# Patient Record
Sex: FEMALE | Race: WHITE | NOT HISPANIC OR LATINO | Employment: OTHER | URBAN - METROPOLITAN AREA
[De-identification: names, ages, dates, MRNs, and addresses within clinical notes are randomized per-mention and may not be internally consistent; named-entity substitution may affect disease eponyms.]

---

## 2017-05-04 ENCOUNTER — HOSPITAL ENCOUNTER (EMERGENCY)
Facility: HOSPITAL | Age: 72
Discharge: HOME | End: 2017-05-04
Attending: EMERGENCY MEDICINE
Payer: MEDICARE

## 2017-05-04 VITALS
WEIGHT: 230 LBS | OXYGEN SATURATION: 98 % | RESPIRATION RATE: 18 BRPM | BODY MASS INDEX: 45.16 KG/M2 | TEMPERATURE: 98 F | HEART RATE: 78 BPM | SYSTOLIC BLOOD PRESSURE: 138 MMHG | DIASTOLIC BLOOD PRESSURE: 68 MMHG | HEIGHT: 60 IN

## 2017-05-04 DIAGNOSIS — I10 ESSENTIAL HYPERTENSION: Primary | ICD-10-CM

## 2017-05-04 LAB
ALBUMIN SERPL BCP-MCNC: 3.9 G/DL
ALP SERPL-CCNC: 76 U/L
ALT SERPL W/O P-5'-P-CCNC: 13 U/L
ANION GAP SERPL CALC-SCNC: 13 MMOL/L
AST SERPL-CCNC: 18 U/L
BACTERIA #/AREA URNS HPF: ABNORMAL /HPF
BASOPHILS # BLD AUTO: 0.04 K/UL
BASOPHILS NFR BLD: 0.3 %
BILIRUB SERPL-MCNC: 0.3 MG/DL
BILIRUB UR QL STRIP: NEGATIVE
BNP SERPL-MCNC: 51 PG/ML
BUN SERPL-MCNC: 18 MG/DL
CALCIUM SERPL-MCNC: 9.5 MG/DL
CHLORIDE SERPL-SCNC: 108 MMOL/L
CLARITY UR: CLEAR
CO2 SERPL-SCNC: 21 MMOL/L
COLOR UR: YELLOW
CREAT SERPL-MCNC: 0.8 MG/DL
DIFFERENTIAL METHOD: ABNORMAL
EOSINOPHIL # BLD AUTO: 0.4 K/UL
EOSINOPHIL NFR BLD: 3.5 %
ERYTHROCYTE [DISTWIDTH] IN BLOOD BY AUTOMATED COUNT: 12.8 %
EST. GFR  (AFRICAN AMERICAN): >60 ML/MIN/1.73 M^2
EST. GFR  (NON AFRICAN AMERICAN): >60 ML/MIN/1.73 M^2
GLUCOSE SERPL-MCNC: 109 MG/DL
GLUCOSE UR QL STRIP: NEGATIVE
HCT VFR BLD AUTO: 43.5 %
HGB BLD-MCNC: 14.8 G/DL
HGB UR QL STRIP: ABNORMAL
KETONES UR QL STRIP: NEGATIVE
LEUKOCYTE ESTERASE UR QL STRIP: NEGATIVE
LYMPHOCYTES # BLD AUTO: 3 K/UL
LYMPHOCYTES NFR BLD: 25.4 %
MCH RBC QN AUTO: 32.7 PG
MCHC RBC AUTO-ENTMCNC: 34 %
MCV RBC AUTO: 96 FL
MICROSCOPIC COMMENT: ABNORMAL
MONOCYTES # BLD AUTO: 0.8 K/UL
MONOCYTES NFR BLD: 6.7 %
NEUTROPHILS # BLD AUTO: 7.6 K/UL
NEUTROPHILS NFR BLD: 63.7 %
NITRITE UR QL STRIP: NEGATIVE
PH UR STRIP: 6 [PH] (ref 5–8)
PLATELET # BLD AUTO: 234 K/UL
PMV BLD AUTO: 11 FL
POTASSIUM SERPL-SCNC: 4.9 MMOL/L
PROT SERPL-MCNC: 7 G/DL
PROT UR QL STRIP: NEGATIVE
RBC # BLD AUTO: 4.52 M/UL
RBC #/AREA URNS HPF: 7 /HPF (ref 0–4)
SODIUM SERPL-SCNC: 142 MMOL/L
SP GR UR STRIP: <=1.005 (ref 1–1.03)
SQUAMOUS #/AREA URNS HPF: 2 /HPF
TROPONIN I SERPL DL<=0.01 NG/ML-MCNC: 0.01 NG/ML
TROPONIN I SERPL DL<=0.01 NG/ML-MCNC: 0.01 NG/ML
URN SPEC COLLECT METH UR: ABNORMAL
UROBILINOGEN UR STRIP-ACNC: NEGATIVE EU/DL
WBC # BLD AUTO: 11.92 K/UL
WBC #/AREA URNS HPF: 1 /HPF (ref 0–5)

## 2017-05-04 PROCEDURE — 85025 COMPLETE CBC W/AUTO DIFF WBC: CPT

## 2017-05-04 PROCEDURE — 93005 ELECTROCARDIOGRAM TRACING: CPT

## 2017-05-04 PROCEDURE — 84484 ASSAY OF TROPONIN QUANT: CPT

## 2017-05-04 PROCEDURE — 25000003 PHARM REV CODE 250: Performed by: EMERGENCY MEDICINE

## 2017-05-04 PROCEDURE — 80053 COMPREHEN METABOLIC PANEL: CPT

## 2017-05-04 PROCEDURE — 83880 ASSAY OF NATRIURETIC PEPTIDE: CPT

## 2017-05-04 PROCEDURE — 81000 URINALYSIS NONAUTO W/SCOPE: CPT

## 2017-05-04 PROCEDURE — 99285 EMERGENCY DEPT VISIT HI MDM: CPT

## 2017-05-04 RX ORDER — LOSARTAN POTASSIUM 50 MG/1
50 TABLET ORAL DAILY
COMMUNITY
End: 2018-12-10

## 2017-05-04 RX ORDER — POTASSIUM CHLORIDE 1500 MG/1
TABLET, EXTENDED RELEASE ORAL
COMMUNITY
End: 2018-10-16

## 2017-05-04 RX ORDER — LORAZEPAM 1 MG/1
1 TABLET ORAL NIGHTLY
COMMUNITY
End: 2018-12-10 | Stop reason: SDUPTHER

## 2017-05-04 RX ORDER — ASPIRIN 325 MG
325 TABLET ORAL
Status: COMPLETED | OUTPATIENT
Start: 2017-05-04 | End: 2017-05-04

## 2017-05-04 RX ORDER — HYDROCORTISONE 1 %
CREAM (GRAM) TOPICAL
Qty: 30 G | Refills: 0 | Status: SHIPPED | OUTPATIENT
Start: 2017-05-04 | End: 2018-10-16

## 2017-05-04 RX ADMIN — ASPIRIN 325 MG ORAL TABLET 325 MG: 325 PILL ORAL at 03:05

## 2017-05-04 NOTE — ED NOTES
Pt getting dressed and up to bedside commode to urinate; pt discharged home per whch per cab; pt is alert and oriented and pt is stable; no rx given; nurse called to room and pt had a bm; pt cleaned self and dressed self; pt assisted to whch for the discharge and has instructions; pt assisted to check out desk in er lobby and cab requested for transport home as requested

## 2017-05-04 NOTE — ED NOTES
Pt  request to use restroom. Pt given instructions for clean catch urine sample. Pt verbalized understanding.  Pt ambulatory to bathroom.

## 2017-05-04 NOTE — ED AVS SNAPSHOT
[facility]  [address] [place] [address]               Purvi Quiroga   2017  1:31 AM   ED    Description:  Female : 1945   Department:  [facility]           Your Care was Coordinated By:     Provider Role From To    Alexandrea Lott MD Attending Provider 17 0147 --      Reason for Visit     Fatigue     Rash           Diagnoses this Visit        Comments    Essential hypertension    -  Primary       ED Disposition     None           To Do List           Follow-up Information     Schedule an appointment as soon as possible for a visit with Benjamín Zepeda MD.    Specialty:  Family Medicine    Contact information:    [address]  [place] [address]  [phone]        [facility]     [facility] -  Assistance  Unless otherwise directed by your provider, please contact [facility], our nurse care line that is available for  assistance.     Registered nurses in the [facility] provide: appointment scheduling, clinical advisement, health education, and other advisory services.  Call: [phone] (toll free)               Medications           Message regarding Medications     Verify the changes and/or additions to your medication regime listed below are the same as discussed with your clinician today.  If any of these changes or additions are incorrect, please notify your healthcare provider.        These medications were administered today        Dose Freq    aspirin tablet 325 mg 325 mg ED 1 Time    Sig: Take 1 tablet (325 mg total) by mouth ED 1 Time.    Class: Normal    Route: Oral           Verify that the below list of medications is an accurate representation of the medications you are currently taking.  If none reported, the list may be blank. If incorrect, please contact your healthcare provider. Carry this list with you in case of emergency.           Current  Medications     CELECOXIB (CELEBREX ORAL) Take by mouth.    HYDROCHLOROTHIAZIDE ORAL Take by mouth.    lorazepam (ATIVAN) 1 MG tablet Take 1 mg by mouth every evening.    losartan (COZAAR) 50 MG tablet Take 50 mg by mouth once daily.    METOPROLOL TARTRATE ORAL Take by mouth.    OMEPRAZOLE ORAL Take by mouth.    potassium chloride (K-TAB) 20 mEq Take by mouth.           Clinical Reference Information           Your Vitals Were     BP Pulse Temp Resp Height Weight    152/81 (BP Location: Left arm, Patient Position: Lying, BP Method: Automatic) 86 98 °F (36.7 °C) (Oral) 18 5' (1.524 m) 104.3 kg (230 lb)    SpO2 BMI             98% 44.92 kg/m2         Allergies as of 5/4/2017     No Known Allergies      Immunizations Administered on Date of Encounter - 5/4/2017     None      ED Micro, Lab, POCT     Start Ordered       Status Ordering Provider    05/04/17 0219 05/04/17 0218  Urinalysis Clean Catch  STAT      Final result     05/04/17 0219 05/04/17 0219  Urinalysis Microscopic  Once      Final result     05/04/17 0205 05/04/17 0205  CBC auto differential  STAT      Final result     05/04/17 0205 05/04/17 0205  Comprehensive metabolic panel  STAT      Final result     05/04/17 0205 05/04/17 0205  Troponin I  Now then every 3 hours     Comments:  PLEASE REVIEW ORDER START TIME BEFORE MARKING SPECIMEN COLLECTED.   Start Status   05/04/17 0205 Final result   05/04/17 0505 Final result       Acknowledged     05/04/17 0205 05/04/17 0205  B-Type natriuretic peptide (BNP)  STAT      Final result       ED Imaging Orders     Start Ordered       Status Ordering Provider    05/04/17 0205 05/04/17 0205  X-Ray Chest PA And Lateral  1 time imaging      Final result         Discharge Instructions         Taking Your Blood Pressure  Blood pressure is the force of blood against the artery wall as it moves from the heart through the blood vessels. You can take your own blood pressure reading using a digital monitor. Take readings as often  as your healthcare provider instructs. Take each reading at the same time of day.  Step 1. Relax    · Take your blood pressure at the same time every day, such as in the morning or evening, or at the time your healthcare provider recommends.  · Wait at least a half-hour after smoking, eating, drinking caffeinated beverages, or exercising.  · Sit comfortably at a table with both feet on the floor. Do not cross your legs or feet. Place the monitor near you.  · Rest for a few minutes before you begin.  Step 2. Wrap the cuff    · Place your arm on the table, palm up. Your arm should be at the level of your heart. Wrap the cuff around your upper arm, just above your elbow. Its best done on bare skin, not over clothing. Most cuffs will indicate where the brachial artery (the blood vessel in the middle of the arm at the inner side of the elbow) should line up with the cuff. Look in your monitor's instruction booklet for an illustration. You can also bring your cuff to your healthcare provider and have them show you how to correctly place the cuff.  · Make sure your cuff fits. If it doesnt wrap around your upper arm, order a larger cuff.  Step 3. Inflate the cuff    · Pump the cuff until the scale reads 160. If you have a self-inflating cuff, push the button that starts the pump.  · The cuff will tighten, then loosen.  · The numbers will change. When they stop changing, your blood pressure reading will appear.  · Take 2 or 3 readings one minute apart.  Step 4. Write down the results of each reading    · Write down your blood pressure numbers for each reading. Note the date and time. Keep your results in one place, such as a notebook. Even if your monitor has a built-in memory, keep a hard copy of the readings.  · Remove the cuff from your arm. Turn off the machine.  · Share your blood pressure records with your healthcare providers at each visit.  Date Last Reviewed: 4/27/2016  © 7913-7457 [employer] [address]  [place] [address]. All rights reserved. This information is not intended as a substitute for professional medical care. Always follow your healthcare professional's instructions.          [facility] Sign-Up     Activating your [facility] account is as easy as 1-2-3!     1) Visit [email or web address], select Sign Up Now, enter this activation code and your date of birth, then select Next.  [email or web address]  Expires: 6/18/2017  4:09 AM      2) Create a username and password to use when you visit [facility] in the future and select a security question in case you lose your password and select Next.    3) Enter your e-mail address and click Sign Up!    Additional Information  If you have questions, please e-mail [email or web address] or call [phone] to talk to our [facility] staff. Remember, [facility] is NOT to be used for urgent needs. For medical emergencies, dial 911.         Smoking Cessation     If you would like to quit smoking:   You may be eligible for free services if you are a [place] resident and started smoking cigarettes before September 1, 1988.  Call the Smoking Cessation Trust ([facility]) toll free at (351) 574-9837 or (575) 788-6325.   Call 1-800-QUIT-NOW if you do not meet the above criteria.   Contact us via email: [email or web address]   View our website for more information: [email or web address]         [facility] complies with applicable Federal civil rights laws and does not discriminate on the basis of race, color, national origin, age, disability, or sex.        Language Assistance Services     ATTENTION: Language assistance services are available, free of charge. Please call [phone].      ATENCIÓN: Si habla leonor, tiene a leung disposición servicios gratuitos de asistencia lingüística. Llame al [phone].     CHÚ Ý: N?u b?n nói Ti?ng Vi?t, có các d?ch v? h? tr? ngôn ng? mi?n phí dành cho b?n. G?i s? [phone].

## 2017-05-04 NOTE — ED NOTES
Patient cleared for discharge per dr sanabria and pt was made aware; pt has no complaints at present; vitals stable; no pain reported; pt plans to take a cab home

## 2017-05-04 NOTE — ED PROVIDER NOTES
Encounter Date: 5/4/2017       History     Chief Complaint   Patient presents with    Fatigue     pt called EMS for c/o feeling weak and having elevated BP    Rash     pt also c/o rash to generalized body x 2 weeks.      Review of patient's allergies indicates:  No Known Allergies  HPI Comments: 73 yo F presents to the ED by EMS for elevated BP. atient reports this evening she started feeling weak, sshe has a blood pressure cuff, from [employer], and took her blood pressure.  It was 160s over 180s.  She took her low certain.  She rechecked it another 30 minutes later, and it was still elevated.  She took another half tablet of her low certain.  She continued to take her blood pressure and when it was elevated at 220 she called 911. She has persistent shortness of breath when walking.  She denies chest pain abdominal pain nausea or vomiting.  She has a bitemporal headache, no vision changes  No vision loss no eye pain.  She also reports having an itchy rash over her right lower leg that is been persistent for 2 weeks.    Patient is a 72 y.o. female presenting with the following complaint: general illness. The history is provided by the patient.   General Illness    The current episode started today. The problem occurs rarely. The problem has been unchanged. The pain is at a severity of 3/10. Nothing relieves the symptoms. Nothing aggravates the symptoms. Pertinent negatives include no fever, no double vision, no abdominal pain, no nausea and no vomiting.     Past Medical History:   Diagnosis Date    GERD (gastroesophageal reflux disease)     Hypertension      No past surgical history on file.  History reviewed. No pertinent family history.  Social History   Substance Use Topics    Smoking status: None    Smokeless tobacco: None    Alcohol use None     Review of Systems   Constitutional: Negative for fever.   Eyes: Negative for double vision.   Respiratory: Negative.    Gastrointestinal: Negative for abdominal  pain, nausea and vomiting.   Genitourinary: Negative.    All other systems reviewed and are negative.      Physical Exam   Initial Vitals   BP Pulse Resp Temp SpO2   05/04/17 0145 05/04/17 0145 05/04/17 0145 05/04/17 0145 05/04/17 0145   195/89 91 18 98.2 °F (36.8 °C) 99 %     Physical Exam    Nursing note and vitals reviewed.  Constitutional: She appears well-developed and well-nourished. She appears distressed.   HENT:   Head: Normocephalic and atraumatic.   Eyes: EOM are normal. Pupils are equal, round, and reactive to light.   Neck: Normal range of motion. Neck supple.   Cardiovascular: Normal rate and normal heart sounds.   Pulmonary/Chest: Breath sounds normal.   Abdominal: Soft.   Musculoskeletal: Normal range of motion.   Neurological: She is alert and oriented to person, place, and time. She has normal strength. No cranial nerve deficit.   Skin: Skin is warm and dry.   Psychiatric: She has a normal mood and affect. Her behavior is normal. Thought content normal.         ED Course   Procedures  Labs Reviewed   CBC W/ AUTO DIFFERENTIAL - Abnormal; Notable for the following:        Result Value    MCH 32.7 (*)     All other components within normal limits    Narrative:     PLEASE REVIEW ORDER START TIME BEFORE MARKING SPECIMEN  COLLECTED.   COMPREHENSIVE METABOLIC PANEL - Abnormal; Notable for the following:     CO2 21 (*)     All other components within normal limits    Narrative:     PLEASE REVIEW ORDER START TIME BEFORE MARKING SPECIMEN  COLLECTED.   URINALYSIS - Abnormal; Notable for the following:     Specific Gravity, UA <=1.005 (*)     Occult Blood UA 1+ (*)     All other components within normal limits   URINALYSIS MICROSCOPIC - Abnormal; Notable for the following:     RBC, UA 7 (*)     All other components within normal limits   TROPONIN I    Narrative:     PLEASE REVIEW ORDER START TIME BEFORE MARKING SPECIMEN  COLLECTED.   TROPONIN I    Narrative:     PLEASE REVIEW ORDER START TIME BEFORE MARKING  SPECIMEN  COLLECTED.   B-TYPE NATRIURETIC PEPTIDE    Narrative:     PLEASE REVIEW ORDER START TIME BEFORE MARKING SPECIMEN  COLLECTED.             Medical Decision Making:   Initial Assessment:   73 yo F here with feeling weak, and having elevated blood pressure this evening  Differential Diagnosis:   ACS, renal insufficiency, dehydration, eczema  Clinical Tests:   Lab Tests: Ordered and Reviewed  The following lab test(s) were unremarkable: CMP and CBC  Radiological Study: Ordered and Reviewed  Medical Tests: Ordered and Reviewed  ED Management:  EKG nonischemic  Trop, BNP, CBC and CMP normal  BP resolved on it's own last 152/81    Gave instructions to follow up with primary care provider, encouraged sleep hygiene and stress reducing activities  Patient discharged in stable condition with return precautions for all conditions discussed with patient and/or any available family members. No further emergent ED evaluation or treatment clinically indicated at this time.      I discussed with patient that evaluation in the ED at this time does not suggest any emergent or life threatening condition medical condition requiring immediate intervention beyond what was provided in the ED. Regardless, an unremarkable evaluation in the ED does not preclude the development or presence of a serious of life threatening condition. As such, patient was instructed to return immediately for any worsening or change in current symptoms.                     ED Course     Clinical Impression:   The encounter diagnosis was Essential hypertension.          Alexandrea Lott MD  05/04/17 0413

## 2017-05-04 NOTE — DISCHARGE INSTRUCTIONS
Taking Your Blood Pressure  Blood pressure is the force of blood against the artery wall as it moves from the heart through the blood vessels. You can take your own blood pressure reading using a digital monitor. Take readings as often as your healthcare provider instructs. Take each reading at the same time of day.  Step 1. Relax    · Take your blood pressure at the same time every day, such as in the morning or evening, or at the time your healthcare provider recommends.  · Wait at least a half-hour after smoking, eating, drinking caffeinated beverages, or exercising.  · Sit comfortably at a table with both feet on the floor. Do not cross your legs or feet. Place the monitor near you.  · Rest for a few minutes before you begin.  Step 2. Wrap the cuff    · Place your arm on the table, palm up. Your arm should be at the level of your heart. Wrap the cuff around your upper arm, just above your elbow. Its best done on bare skin, not over clothing. Most cuffs will indicate where the brachial artery (the blood vessel in the middle of the arm at the inner side of the elbow) should line up with the cuff. Look in your monitor's instruction booklet for an illustration. You can also bring your cuff to your healthcare provider and have them show you how to correctly place the cuff.  · Make sure your cuff fits. If it doesnt wrap around your upper arm, order a larger cuff.  Step 3. Inflate the cuff    · Pump the cuff until the scale reads 160. If you have a self-inflating cuff, push the button that starts the pump.  · The cuff will tighten, then loosen.  · The numbers will change. When they stop changing, your blood pressure reading will appear.  · Take 2 or 3 readings one minute apart.  Step 4. Write down the results of each reading    · Write down your blood pressure numbers for each reading. Note the date and time. Keep your results in one place, such as a notebook. Even if your monitor has a built-in memory, keep a hard  copy of the readings.  · Remove the cuff from your arm. Turn off the machine.  · Share your blood pressure records with your healthcare providers at each visit.  Date Last Reviewed: 4/27/2016  © 1959-5333 [employer]. [address] [place] [address], [place] [address]. All rights reserved. This information is not intended as a substitute for professional medical care. Always follow your healthcare professional's instructions.

## 2017-05-04 NOTE — ED NOTES
Reviewed home care and follow up instructions with pt and also done per md; pt anxious about iv removal and tried to reassure

## 2018-10-16 ENCOUNTER — OFFICE VISIT (OUTPATIENT)
Dept: NEUROLOGY | Facility: CLINIC | Age: 73
End: 2018-10-16
Payer: MEDICARE

## 2018-10-16 VITALS
HEIGHT: 60 IN | SYSTOLIC BLOOD PRESSURE: 126 MMHG | HEART RATE: 81 BPM | DIASTOLIC BLOOD PRESSURE: 81 MMHG | WEIGHT: 208.31 LBS | BODY MASS INDEX: 40.9 KG/M2

## 2018-10-16 DIAGNOSIS — G43.009 MIGRAINE WITHOUT AURA AND WITHOUT STATUS MIGRAINOSUS, NOT INTRACTABLE: ICD-10-CM

## 2018-10-16 PROCEDURE — 3074F SYST BP LT 130 MM HG: CPT | Mod: CPTII,,, | Performed by: NEUROMUSCULOSKELETAL MEDICINE & OMM

## 2018-10-16 PROCEDURE — 99213 OFFICE O/P EST LOW 20 MIN: CPT | Mod: PBBFAC,PO | Performed by: NEUROMUSCULOSKELETAL MEDICINE & OMM

## 2018-10-16 PROCEDURE — 3079F DIAST BP 80-89 MM HG: CPT | Mod: CPTII,,, | Performed by: NEUROMUSCULOSKELETAL MEDICINE & OMM

## 2018-10-16 PROCEDURE — 3288F FALL RISK ASSESSMENT DOCD: CPT | Mod: CPTII,,, | Performed by: NEUROMUSCULOSKELETAL MEDICINE & OMM

## 2018-10-16 PROCEDURE — 99204 OFFICE O/P NEW MOD 45 MIN: CPT | Mod: S$PBB,,, | Performed by: NEUROMUSCULOSKELETAL MEDICINE & OMM

## 2018-10-16 PROCEDURE — 1100F PTFALLS ASSESS-DOCD GE2>/YR: CPT | Mod: CPTII,,, | Performed by: NEUROMUSCULOSKELETAL MEDICINE & OMM

## 2018-10-16 PROCEDURE — 99999 PR PBB SHADOW E&M-EST. PATIENT-LVL III: CPT | Mod: PBBFAC,,, | Performed by: NEUROMUSCULOSKELETAL MEDICINE & OMM

## 2018-10-16 RX ORDER — LOSARTAN POTASSIUM 100 MG/1
100 TABLET ORAL DAILY
COMMUNITY
End: 2018-12-10

## 2018-10-16 RX ORDER — AMITRIPTYLINE HYDROCHLORIDE 10 MG/1
10 TABLET, FILM COATED ORAL NIGHTLY
Qty: 30 TABLET | Refills: 3 | Status: ON HOLD | OUTPATIENT
Start: 2018-10-16 | End: 2024-01-15 | Stop reason: HOSPADM

## 2018-10-16 RX ORDER — BUTALBITAL, ACETAMINOPHEN AND CAFFEINE 50; 325; 40 MG/1; MG/1; MG/1
1 TABLET ORAL 3 TIMES DAILY PRN
Refills: 3 | COMMUNITY
Start: 2018-09-25 | End: 2018-12-13 | Stop reason: SDUPTHER

## 2018-10-16 RX ORDER — ATORVASTATIN CALCIUM 20 MG/1
TABLET, FILM COATED ORAL
Refills: 2 | COMMUNITY
Start: 2018-10-12 | End: 2018-12-10 | Stop reason: SDUPTHER

## 2018-10-16 RX ORDER — HYDROXYZINE PAMOATE 50 MG/1
50 CAPSULE ORAL 3 TIMES DAILY PRN
Refills: 0 | COMMUNITY
Start: 2018-10-12 | End: 2019-07-18 | Stop reason: SDUPTHER

## 2018-10-16 RX ORDER — OMEPRAZOLE 20 MG/1
20 CAPSULE, DELAYED RELEASE ORAL 2 TIMES DAILY
Refills: 0 | COMMUNITY
Start: 2018-07-17 | End: 2018-12-10 | Stop reason: SDUPTHER

## 2018-10-16 RX ORDER — METOPROLOL TARTRATE 50 MG/1
50 TABLET ORAL 2 TIMES DAILY
Refills: 2 | COMMUNITY
Start: 2018-09-12 | End: 2018-12-10 | Stop reason: SDUPTHER

## 2018-10-16 RX ORDER — ALENDRONATE SODIUM 70 MG/1
70 TABLET ORAL
Refills: 11 | COMMUNITY
Start: 2018-10-12 | End: 2018-12-10 | Stop reason: SDUPTHER

## 2018-10-16 RX ORDER — POTASSIUM CHLORIDE 750 MG/1
CAPSULE, EXTENDED RELEASE ORAL
Refills: 2 | COMMUNITY
Start: 2018-10-10 | End: 2018-12-10 | Stop reason: SDUPTHER

## 2018-10-16 NOTE — LETTER
October 17, 2018      Benjamín Zepeda MD  [address]  [place] [address]           [place] [facility]  [address] [address]  Phone: [phone]          Patient: Purvi Quiroga   MR Number: [id number]   YOB: 1945   Date of Visit: 10/16/2018       Dear Dr. Benjamín Zepeda:    Thank you for referring Purvi Quiroga to me for evaluation. Attached you will find relevant portions of my assessment and plan of care.    If you have questions, please do not hesitate to call me. I look forward to following Purvi Quiroga along with you.    Sincerely,    Chico Coleman MD    Enclosure  CC:  No Recipients    If you would like to receive this communication electronically, please contact [email or web address] or [phone] to request more information on [email or web address] access.    For providers and/or their staff who would like to refer a patient to [facility], please contact us through our one-stop-shop provider referral line, [facility], at [phone].    If you feel you have received this communication in error or would no longer like to receive these types of communications, please e-mail [email or web address]

## 2018-10-17 PROBLEM — G43.009 MIGRAINE WITHOUT AURA: Status: ACTIVE | Noted: 2018-10-17

## 2018-10-17 NOTE — PROGRESS NOTES
From: Jenni Lou  To: Melvin Farooq M.D.  Sent: 2/6/2018 7:48 AM PST  Subject: Non-Urgent Medical Question    This message is being sent by Deisi Lou on behalf of Jenni Lou    I have counseling at 9a and it's my first appt.... super important :( anything later?    ----- Message -----  From: Melvin Farooq M.D.  Sent: 2/6/18, 7:44 AM  To: Jenni Lou  Subject: RE: Non-Urgent Medical Question    I would recommend being seen. I can double book her between 8-9 if that works for you to get her in to be checked out.    ----- Message -----   From: Jenni Lou   Sent: 2/6/2018 7:34 AM PST   To: Melvin Farooq M.D.  Subject: Non-Urgent Medical Question    This message is being sent by Deisi Lou on behalf of Jenni Lou    [place] throat is super swollen :( I've had her on Benadryl but I think she definitely needs to be seen today...... João has similar symptoms but not quite as bad. I really think it's viral..... however, her throat is too swollen visually (IMO) that it makes me really uncomfortable..... they are both miserable with throat pain/cough... should [facility] thru the  or [facility]? Or can you see them? Or do you think it's okay but recommend OTC medications to help them feel better through this virus? I don't see white spot, just red and swelling and her's looks a little 'pale' for lack of a better term.   Purvi Junaid  1945  Review of patient's allergies indicates:  No Known Allergies  [unfilled]    Past Medical History:   Diagnosis Date    GERD (gastroesophageal reflux disease)     Hypertension      Social History     Socioeconomic History    Marital status: Single     Spouse name: Not on file    Number of children: Not on file    Years of education: Not on file    Highest education level: Not on file   Social Needs    Financial resource strain: Not on file    Food insecurity - worry: Not on file    Food insecurity - inability: Not on file    Transportation needs - medical: Not on file    Transportation needs - non-medical: Not on file   Occupational History    Not on file   Tobacco Use    Smoking status: Never Smoker   Substance and Sexual Activity    Alcohol use: No     Frequency: Never    Drug use: No    Sexual activity: Not on file   Other Topics Concern    Not on file   Social History Narrative    Not on file     No family history on file.    Review of systems:  Constitutional-negative  Eyes-negative  ENT, mouth-negative  Cardiovascular-negative  Respiratory-negative  GI-negative  - negative  Musculoskeletal-negative  Skin-negative  Neurologic-negative  Psychiatric-negative  Endocrine-negative  Hematology/lymph nodes-negative  Allergies/immunology-negative  Purvi Quiroga  1945  Review of patient's allergies indicates:  No Known Allergies  [unfilled]    Past Medical History:   Diagnosis Date    GERD (gastroesophageal reflux disease)     Hypertension      Social History     Socioeconomic History    Marital status: Single     Spouse name: Not on file    Number of children: Not on file    Years of education: Not on file    Highest education level: Not on file   Social Needs    Financial resource strain: Not on file    Food insecurity - worry: Not on file    Food insecurity - inability: Not on file    Transportation needs - medical: Not on file    Transportation  needs - non-medical: Not on file   Occupational History    Not on file   Tobacco Use    Smoking status: Never Smoker   Substance and Sexual Activity    Alcohol use: No     Frequency: Never    Drug use: No    Sexual activity: Not on file   Other Topics Concern    Not on file   Social History Narrative    Not on file     No family history on file.    Review of systems:  Constitutional-negative  Eyes-negative  ENT, mouth-negative  Cardiovascular-negative  Respiratory-negative  GI-negative  - negative  Musculoskeletal-negative  Skin-negative  Neurologic-negative  Psychiatric-negative  Endocrine-negative  Hematology/lymph nodes-negative  Allergies/immunology-negative  Gen. Appearance: Well-developed with no obvious deformities  Carotid arteries symmetrical pulses  Peripheral vascular shows symmetrical pulses with no obvious edema or tenderness  Social History :  Patient is retired.  She walks with a cane due to her arthritis.  Present history:   This is a 73-year-old female who presents with a history of migraine headaches for which she takes Fioricet almost on a daily basis.  Up until 2 weeks ago she was having daily headaches over the last year to.  She describes the headaches as a frontal pressure pain associated with photophobia, nausea occasionally but no vomiting.  The bad headaches are 5-6/10 intensity.  She has a frontal pressure headache right now that is 3-4/10.  She was taking Neurontin but states that she could not drink alcohol with the Neurontin so she stopped the Neurontin so that she can have her alcoholic drinks.  She has poor sleep with difficulty getting to sleep and takes Ativan 1 mg with melatonin.  She feels the Ativan 1 mg is not enough for her to get to sleep.  She denies any focal symptoms other than she does complain at times she drops things from the right hand.  She is left-handed.  She does complain the headaches are bad enough that she would like to take preventive medicines for the  headaches.  She denies any particular triggers.  She denies any aura with the headaches.    Neurological Exam:  Mental status-alert and oriented to person, place, and time; attention span and concentration is good. Fund of knowledge-patient is aware of current events and able to give detailed history of the current problem.recent and remote memory seems intact. Language function is normal with no evidence of aphasia  Cranial nerves:Visual acuity to hand chart -normal; visual fields to confrontation normal;pupils were equal and reactive to light ;no evidence of ptosis ;  funduscopic examination was normal with sharp disc margins. external ocular movements were full with no nystagmus. Facial sensation to pinprick : normal ; corneal reflexes intact; Facial muscles were symmetrical. Hearing is unimpaired symmetrical finger rub; Tongue movements - normal ; palate movements - normal ;Swallowing unimpaired. Shoulder shrug was intact with good strength Speech was normal  Motor examination: Upper : normal                                      Lower extremities - Normal;muscle tone was normal ;               left-handed  Sensory examination:   Upper; normal pinprick and soft touch ;   Lower extremities - normal and symmetrical.   Vibration sense: 15-20 seconds @ toes  Deep tendon reflexes: upper extremities :1-2+ symmetrical ;     lower extremities KJ- 1-2 +; AJ - 1-2+ Both plantar responses were flexor  Cerebellar examination upper: Normal finger to nose and rapid alternating movements  Gait: Steady with no ataxia;      heel and toe walk normal  Romberg test: negative       Tandem gait: Normal    Involuntary movements: Negative  TMJ - no tenderness  Cervical examination: Full range of motion with no pain Cervical tenderness :negative  Lumbar examination: Low back tenderness-negative                  Sciatic notchtenderness-negative            Straight leg raising : negative    Impression:  Migraine without aura; insomnia;  degenerative arthritis particularly in the knees  Recommendations/Plan :  Discussed preventive medications.  Will start patient on amitriptyline 10 mg HS with an option to double the dose at night if needed.  Have advised her to back off on the Fioricet and only use once or twice per week.  Follow-up 3 months with headache diary.;

## 2018-10-25 ENCOUNTER — TELEPHONE (OUTPATIENT)
Dept: NEUROLOGY | Facility: CLINIC | Age: 73
End: 2018-10-25

## 2018-10-25 NOTE — TELEPHONE ENCOUNTER
----- Message from Thaddeus Deshpande sent at 10/25/2018  2:45 PM CDT -----  Needs Advice    Reason for call: Pt is asking to speak w/ Marj to confirm if the doctor will prescribe gabapentin for headaches        Communication Preference: [phone]    Additional Information:

## 2018-10-25 NOTE — TELEPHONE ENCOUNTER
Patient said she took 1 amitriptyline tablet and didn't have any relief. She didn't want to increase the dose and would instead like to use gabapentin (previously taken when first Dx with migraine). She stated she tolerated it quite well. One incident of intoxication with the gabapentin (had a couple drinks).

## 2018-10-30 RX ORDER — GABAPENTIN 300 MG/1
300 CAPSULE ORAL 3 TIMES DAILY
Qty: 90 CAPSULE | Refills: 11 | Status: SHIPPED | OUTPATIENT
Start: 2018-10-30 | End: 2019-10-21 | Stop reason: SDUPTHER

## 2018-12-10 ENCOUNTER — TELEPHONE (OUTPATIENT)
Dept: INTERNAL MEDICINE | Facility: CLINIC | Age: 73
End: 2018-12-10

## 2018-12-10 ENCOUNTER — OFFICE VISIT (OUTPATIENT)
Dept: INTERNAL MEDICINE | Facility: CLINIC | Age: 73
End: 2018-12-10
Payer: MEDICARE

## 2018-12-10 VITALS
WEIGHT: 212.31 LBS | SYSTOLIC BLOOD PRESSURE: 160 MMHG | DIASTOLIC BLOOD PRESSURE: 100 MMHG | HEIGHT: 60 IN | HEART RATE: 70 BPM | RESPIRATION RATE: 10 BRPM | BODY MASS INDEX: 41.68 KG/M2

## 2018-12-10 DIAGNOSIS — G43.009 MIGRAINE WITHOUT AURA AND WITHOUT STATUS MIGRAINOSUS, NOT INTRACTABLE: ICD-10-CM

## 2018-12-10 DIAGNOSIS — E66.01 MORBID OBESITY: Chronic | ICD-10-CM

## 2018-12-10 DIAGNOSIS — G47.00 INSOMNIA, UNSPECIFIED TYPE: ICD-10-CM

## 2018-12-10 DIAGNOSIS — M81.8 OTHER OSTEOPOROSIS WITHOUT CURRENT PATHOLOGICAL FRACTURE: Chronic | ICD-10-CM

## 2018-12-10 DIAGNOSIS — K44.9 HIATAL HERNIA: ICD-10-CM

## 2018-12-10 DIAGNOSIS — I10 ESSENTIAL HYPERTENSION: ICD-10-CM

## 2018-12-10 DIAGNOSIS — Z00.00 ANNUAL PHYSICAL EXAM: Primary | ICD-10-CM

## 2018-12-10 DIAGNOSIS — M89.9 DISORDER OF BONE: ICD-10-CM

## 2018-12-10 PROCEDURE — 90471 IMMUNIZATION ADMIN: CPT | Mod: S$GLB,,, | Performed by: INTERNAL MEDICINE

## 2018-12-10 PROCEDURE — 99499 UNLISTED E&M SERVICE: CPT | Mod: S$GLB,,, | Performed by: INTERNAL MEDICINE

## 2018-12-10 PROCEDURE — 99999 PR PBB SHADOW E&M-EST. PATIENT-LVL III: CPT | Mod: PBBFAC,,, | Performed by: INTERNAL MEDICINE

## 2018-12-10 PROCEDURE — 99204 OFFICE O/P NEW MOD 45 MIN: CPT | Mod: 25,S$GLB,, | Performed by: INTERNAL MEDICINE

## 2018-12-10 PROCEDURE — 90714 TD VACC NO PRESV 7 YRS+ IM: CPT | Mod: S$GLB,,, | Performed by: INTERNAL MEDICINE

## 2018-12-10 PROCEDURE — 3080F DIAST BP >= 90 MM HG: CPT | Mod: CPTII,S$GLB,, | Performed by: INTERNAL MEDICINE

## 2018-12-10 PROCEDURE — 3077F SYST BP >= 140 MM HG: CPT | Mod: CPTII,S$GLB,, | Performed by: INTERNAL MEDICINE

## 2018-12-10 RX ORDER — LOSARTAN POTASSIUM 50 MG/1
50 TABLET ORAL 2 TIMES DAILY
Qty: 180 TABLET | Refills: 3 | Status: SHIPPED | OUTPATIENT
Start: 2018-12-10 | End: 2019-01-14

## 2018-12-10 RX ORDER — LORAZEPAM 1 MG/1
1 TABLET ORAL NIGHTLY
Qty: 30 TABLET | Refills: 2 | Status: SHIPPED | OUTPATIENT
Start: 2018-12-10 | End: 2019-04-23 | Stop reason: SDUPTHER

## 2018-12-10 RX ORDER — POTASSIUM CHLORIDE 750 MG/1
CAPSULE, EXTENDED RELEASE ORAL
Qty: 90 CAPSULE | Refills: 3 | Status: SHIPPED | OUTPATIENT
Start: 2018-12-10 | End: 2019-12-28

## 2018-12-10 RX ORDER — ATORVASTATIN CALCIUM 20 MG/1
TABLET, FILM COATED ORAL
Qty: 90 TABLET | Refills: 3 | Status: SHIPPED | OUTPATIENT
Start: 2018-12-10 | End: 2020-01-27

## 2018-12-10 RX ORDER — OMEPRAZOLE 20 MG/1
20 CAPSULE, DELAYED RELEASE ORAL 2 TIMES DAILY
Qty: 180 CAPSULE | Refills: 3 | Status: SHIPPED | OUTPATIENT
Start: 2018-12-10 | End: 2020-03-25 | Stop reason: SDUPTHER

## 2018-12-10 RX ORDER — HYDROCHLOROTHIAZIDE 25 MG/1
TABLET ORAL
Refills: 2 | COMMUNITY
Start: 2018-11-26 | End: 2018-12-10 | Stop reason: SDUPTHER

## 2018-12-10 RX ORDER — ALENDRONATE SODIUM 70 MG/1
70 TABLET ORAL
Qty: 12 TABLET | Refills: 4 | Status: SHIPPED | OUTPATIENT
Start: 2018-12-10 | End: 2020-02-26

## 2018-12-10 RX ORDER — METOPROLOL TARTRATE 50 MG/1
50 TABLET ORAL 2 TIMES DAILY
Qty: 180 TABLET | Refills: 3 | Status: SHIPPED | OUTPATIENT
Start: 2018-12-10 | End: 2020-02-24

## 2018-12-10 RX ORDER — DIPHENOXYLATE HYDROCHLORIDE AND ATROPINE SULFATE 2.5; .025 MG/1; MG/1
1 TABLET ORAL 4 TIMES DAILY PRN
Qty: 10 TABLET | Refills: 0
Start: 2018-12-10 | End: 2018-12-20

## 2018-12-10 RX ORDER — HYDROCHLOROTHIAZIDE 25 MG/1
TABLET ORAL
Qty: 90 TABLET | Refills: 3 | Status: SHIPPED | OUTPATIENT
Start: 2018-12-10 | End: 2020-03-12

## 2018-12-10 NOTE — TELEPHONE ENCOUNTER
----- Message from Basilio Mann sent at 12/10/2018  4:02 PM CST -----  Contact: Self [phone]  Pt will like an copy of the list of blood work she has to have done mail to home address.

## 2018-12-10 NOTE — PROGRESS NOTES
Subjective:       Patient ID: Purvi Quiroga is a 73 y.o. female.    Chief Complaint: Annual Exam    HPI     73 y.o. female here for annual exam.     Cholesterol: needs  Vaccines: Influenza - done; Tetanus - done last 2005; Pneumovax - 2016; Prevnar - 2017; Zoster - needs  Sexual Screening: not active  STD screening: not active  Eye exam:  Done recently  Mammogram: done last end of 2017 or beginning of 2018  Gyn exam: has a yearly pap smear - has high risk HPV; Dr. Natty Hobbs with [facility]  Colonoscopy: refuses colonoscopy  DEXA: needs to be done  A1c: needs    Exercise:  Not very much.  She does walk about 1 block, almost 2 blocks, twice a week to put the trash out.  She has severe arthritis.  She needs a knee replacement of her left knee.  She is not in favor of this.  Diet:  Gets one meal served during the week and cooks the rest of the time.  She like fruits.    HTN - Patient is currently on metoprolol 50 mg BID, losartan 50 mg BID, HCTZ 25 mg, . She does not check her BP at home. Side effects of medications note: none. Denies headaches, blurred vision, chest pain, shortness of breath, nausea.  She does not want to take the HCTZ 7 days a week.  She feels she does not need it.  When she went to the ER, her BP went to 160-170.  She takes 1/2 during the day sometimes.  No results found for: CHOL, TRIG, HDL, LDLCALC     She has trouble sleeping and takes melatonin and ativan at night to sleep.  She takes Ativan at night.      Past Medical History:   Diagnosis Date    Essential hypertension 12/10/2018    GERD (gastroesophageal reflux disease)     Hiatal hernia     Hypertension     Insomnia     Other osteoporosis without current pathological fracture 12/10/2018     Past Surgical History:   Procedure Laterality Date    CATARACT EXTRACTION, BILATERAL      DILATION AND CURETTAGE OF UTERUS      KNEE ARTHROSCOPY       Social History     Socioeconomic History    Marital status: Single     Spouse name: Not on  file    Number of children: Not on file    Years of education: Not on file    Highest education level: Not on file   Social Needs    Financial resource strain: Not on file    Food insecurity - worry: Not on file    Food insecurity - inability: Not on file    Transportation needs - medical: Not on file    Transportation needs - non-medical: Not on file   Occupational History    Not on file   Tobacco Use    Smoking status: Never Smoker    Smokeless tobacco: Never Used    Tobacco comment: [age] yo for 1 yr   Substance and Sexual Activity    Alcohol use: Yes     Frequency: Never     Comment: once in a while a little whiskey (not even weekly)    Drug use: No    Sexual activity: Not on file   Other Topics Concern    Not on file   Social History Narrative    Not on file     Review of patient's allergies indicates:  No Known Allergies  Purvi Quiroga had no medications administered during this visit.    Review of Systems    Objective:      Physical Exam   Constitutional: She is oriented to person, place, and time. She appears well-developed and well-nourished.   HENT:   Head: Normocephalic and atraumatic.   Mouth/Throat: No oropharyngeal exudate.   Eyes: EOM are normal. Pupils are equal, round, and reactive to light. Right eye exhibits no discharge. Left eye exhibits no discharge. No scleral icterus.   Neck: Normal range of motion. Neck supple. No tracheal deviation present. No thyromegaly present.   Cardiovascular: Normal rate, regular rhythm and normal heart sounds. Exam reveals no gallop and no friction rub.   No murmur heard.  Pulmonary/Chest: Effort normal and breath sounds normal. No respiratory distress. She has no wheezes. She has no rales. She exhibits no tenderness.   Abdominal: Soft. Bowel sounds are normal. She exhibits no distension and no mass. There is no tenderness. There is no rebound and no guarding.   Musculoskeletal: Normal range of motion. She exhibits no edema or tenderness.    Neurological: She is alert and oriented to person, place, and time.   Skin: Skin is warm and dry. No rash noted. No erythema. No pallor.   Psychiatric: She has a normal mood and affect. Her behavior is normal.   Vitals reviewed.      Assessment:       1. Annual physical exam    2. Essential hypertension    3. Other osteoporosis without current pathological fracture    4. Insomnia, unspecified type    5. Hiatal hernia    6. Disorder of bone    7. Morbid obesity        Plan:       1.  Check CBC, CMP, TSH, lipids.  Up-to-date on flu vaccine.  Tetanus vaccine given today.  Discussed shingles vaccine.  2.  Losartan 50 mg b.i.d., HCTZ 25 mg daily, metoprolol 50 mg b.i.d..  Return to clinic in a month with blood pressures to reassess.  Patient take blood pressure medications as prescribed.  3.  Continue Fosamax 70 mg daily.  4.  Signed pain contract.  Urine tox screen done.  Refill of Ativan 1 mg daily given.  5.  Monitor.  6.  Fosamax 70 mg daily.  Check DEXA scan.  7.  Encouraged patient to exercise.

## 2018-12-11 ENCOUNTER — TELEPHONE (OUTPATIENT)
Dept: INTERNAL MEDICINE | Facility: CLINIC | Age: 73
End: 2018-12-11

## 2018-12-11 NOTE — TELEPHONE ENCOUNTER
----- Message from Abi Seymour sent at 12/11/2018  6:39 AM CST -----  Regarding: Lab Client Services  Contact: [phone]  Hi my name is Abi I work in the Lab Client Services. We had a problem with some lab work on this patient. If someone from your office could call us at [phone] or ext [phone] that would be great. Anyone in my department can help. Thank you

## 2018-12-11 NOTE — TELEPHONE ENCOUNTER
"Spoke with client services, urine sent "in wrong tube". Dr Burgos will add to labwork being drawn  "

## 2018-12-13 ENCOUNTER — TELEPHONE (OUTPATIENT)
Dept: INTERNAL MEDICINE | Facility: CLINIC | Age: 73
End: 2018-12-13

## 2018-12-13 RX ORDER — BUTALBITAL, ACETAMINOPHEN AND CAFFEINE 50; 325; 40 MG/1; MG/1; MG/1
TABLET ORAL
Qty: 30 TABLET | Refills: 0 | Status: SHIPPED | OUTPATIENT
Start: 2018-12-13 | End: 2021-05-26

## 2018-12-13 NOTE — TELEPHONE ENCOUNTER
left for patient that lab orders have been sent to [facility] and she can schedule when ready. If needed, she may  a copy of lab orders from this clinic. Clinic number left

## 2018-12-13 NOTE — TELEPHONE ENCOUNTER
----- Message from Lucero Davila sent at 12/12/2018 11:46 AM CST -----  Contact: Purvi Quiroga [phone]  Purvi would like a call back in regards to her the labs orders that was supposed to be faxed over to [facility].

## 2018-12-14 RX ORDER — BUTALBITAL, ACETAMINOPHEN AND CAFFEINE 50; 325; 40 MG/1; MG/1; MG/1
1 TABLET ORAL 3 TIMES DAILY PRN
Qty: 30 TABLET | Refills: 0 | OUTPATIENT
Start: 2018-12-14

## 2018-12-14 NOTE — TELEPHONE ENCOUNTER
----- Message from Tia S Jim sent at 12/14/2018  8:43 AM CST -----  Contact: Pt Mobile/Home [phone]    RX request - refill or new RX.  Is this a refill or new RX:  Refill  RX name and strength: butalbital-acetaminophen-caffeine -40 mg (FIORICET, ESGIC) -40 mg per tablet  Directions:   Is this a 30 day or 90 day RX:  30  Local pharmacy or mail order pharmacy: [facility] - [place] - [address] [place] [address]   Pharmacy name and phone #[facility] Phone#[phone], Fax# [phone]  Comments: Patient would like to know if you have sent her lab orders to [facility] [address]?

## 2018-12-31 LAB
ALBUMIN SERPL-MCNC: 4.2 G/DL (ref 3.6–5.1)
ALBUMIN/GLOB SERPL: 1.7 (CALC) (ref 1–2.5)
ALP SERPL-CCNC: 96 U/L (ref 33–130)
ALT SERPL-CCNC: 14 U/L (ref 6–29)
AST SERPL-CCNC: 13 U/L (ref 10–35)
BASOPHILS # BLD AUTO: 84 CELLS/UL (ref 0–200)
BASOPHILS NFR BLD AUTO: 0.8 %
BILIRUB SERPL-MCNC: 0.4 MG/DL (ref 0.2–1.2)
BUN SERPL-MCNC: 22 MG/DL (ref 7–25)
BUN/CREAT SERPL: NORMAL (CALC) (ref 6–22)
CALCIUM SERPL-MCNC: 9.3 MG/DL (ref 8.6–10.4)
CHLORIDE SERPL-SCNC: 102 MMOL/L (ref 98–110)
CHOLEST SERPL-MCNC: 166 MG/DL
CHOLEST/HDLC SERPL: 2.7 (CALC)
CO2 SERPL-SCNC: 31 MMOL/L (ref 20–32)
COMMENT: ABNORMAL
CREAT SERPL-MCNC: 0.7 MG/DL (ref 0.6–0.93)
DRUGS IDENTIFIED IN SPECIMEN BY SCREEN METHOD: ABNORMAL
DRUGS SERPL SCN: ABNORMAL
EOSINOPHIL # BLD AUTO: 315 CELLS/UL (ref 15–500)
EOSINOPHIL NFR BLD AUTO: 3 %
ERYTHROCYTE [DISTWIDTH] IN BLOOD BY AUTOMATED COUNT: 11.9 % (ref 11–15)
GFR SERPL CREATININE-BSD FRML MDRD: 86 ML/MIN/1.73M2
GLOBULIN SER CALC-MCNC: 2.5 G/DL (CALC) (ref 1.9–3.7)
GLUCOSE SERPL-MCNC: 99 MG/DL (ref 65–99)
HCT VFR BLD AUTO: 41.2 % (ref 35–45)
HDLC SERPL-MCNC: 61 MG/DL
HGB BLD-MCNC: 14.1 G/DL (ref 11.7–15.5)
LDLC SERPL CALC-MCNC: 80 MG/DL (CALC)
LYMPHOCYTES # BLD AUTO: 2678 CELLS/UL (ref 850–3900)
LYMPHOCYTES NFR BLD AUTO: 25.5 %
MCH RBC QN AUTO: 32.5 PG (ref 27–33)
MCHC RBC AUTO-ENTMCNC: 34.2 G/DL (ref 32–36)
MCV RBC AUTO: 94.9 FL (ref 80–100)
MONOCYTES # BLD AUTO: 788 CELLS/UL (ref 200–950)
MONOCYTES NFR BLD AUTO: 7.5 %
NEUTROPHILS # BLD AUTO: 6636 CELLS/UL (ref 1500–7800)
NEUTROPHILS NFR BLD AUTO: 63.2 %
NONHDLC SERPL-MCNC: 105 MG/DL (CALC)
PLATELET # BLD AUTO: 277 THOUSAND/UL (ref 140–400)
PMV BLD REES-ECKER: 11.4 FL (ref 7.5–12.5)
POTASSIUM SERPL-SCNC: 3.8 MMOL/L (ref 3.5–5.3)
PROT SERPL-MCNC: 6.7 G/DL (ref 6.1–8.1)
RBC # BLD AUTO: 4.34 MILLION/UL (ref 3.8–5.1)
SERVICE CMNT 02-IMP: ABNORMAL
SODIUM SERPL-SCNC: 141 MMOL/L (ref 135–146)
TRIGL SERPL-MCNC: 149 MG/DL
TSH SERPL-ACNC: 1.47 MIU/L (ref 0.4–4.5)
WBC # BLD AUTO: 10.5 THOUSAND/UL (ref 3.8–10.8)

## 2019-01-04 DIAGNOSIS — Z12.39 BREAST CANCER SCREENING: ICD-10-CM

## 2019-01-14 ENCOUNTER — OFFICE VISIT (OUTPATIENT)
Dept: INTERNAL MEDICINE | Facility: CLINIC | Age: 74
End: 2019-01-14
Payer: MEDICARE

## 2019-01-14 VITALS
WEIGHT: 211.88 LBS | TEMPERATURE: 99 F | HEIGHT: 55 IN | SYSTOLIC BLOOD PRESSURE: 126 MMHG | HEART RATE: 70 BPM | RESPIRATION RATE: 18 BRPM | BODY MASS INDEX: 49.04 KG/M2 | DIASTOLIC BLOOD PRESSURE: 76 MMHG

## 2019-01-14 DIAGNOSIS — E66.01 MORBID OBESITY: Chronic | ICD-10-CM

## 2019-01-14 DIAGNOSIS — I10 ESSENTIAL HYPERTENSION: Primary | ICD-10-CM

## 2019-01-14 DIAGNOSIS — F43.9 STRESS AT HOME: ICD-10-CM

## 2019-01-14 PROCEDURE — 1101F PT FALLS ASSESS-DOCD LE1/YR: CPT | Mod: CPTII,S$GLB,, | Performed by: INTERNAL MEDICINE

## 2019-01-14 PROCEDURE — 99214 OFFICE O/P EST MOD 30 MIN: CPT | Mod: S$GLB,,, | Performed by: INTERNAL MEDICINE

## 2019-01-14 PROCEDURE — 3078F DIAST BP <80 MM HG: CPT | Mod: CPTII,S$GLB,, | Performed by: INTERNAL MEDICINE

## 2019-01-14 PROCEDURE — 99499 UNLISTED E&M SERVICE: CPT | Mod: S$GLB,,, | Performed by: INTERNAL MEDICINE

## 2019-01-14 PROCEDURE — 3074F PR MOST RECENT SYSTOLIC BLOOD PRESSURE < 130 MM HG: ICD-10-PCS | Mod: CPTII,S$GLB,, | Performed by: INTERNAL MEDICINE

## 2019-01-14 PROCEDURE — 99499 RISK ADDL DX/OHS AUDIT: ICD-10-PCS | Mod: S$GLB,,, | Performed by: INTERNAL MEDICINE

## 2019-01-14 PROCEDURE — 3078F PR MOST RECENT DIASTOLIC BLOOD PRESSURE < 80 MM HG: ICD-10-PCS | Mod: CPTII,S$GLB,, | Performed by: INTERNAL MEDICINE

## 2019-01-14 PROCEDURE — 99999 PR PBB SHADOW E&M-EST. PATIENT-LVL III: ICD-10-PCS | Mod: PBBFAC,,, | Performed by: INTERNAL MEDICINE

## 2019-01-14 PROCEDURE — 3074F SYST BP LT 130 MM HG: CPT | Mod: CPTII,S$GLB,, | Performed by: INTERNAL MEDICINE

## 2019-01-14 PROCEDURE — 99214 PR OFFICE/OUTPT VISIT, EST, LEVL IV, 30-39 MIN: ICD-10-PCS | Mod: S$GLB,,, | Performed by: INTERNAL MEDICINE

## 2019-01-14 PROCEDURE — 99999 PR PBB SHADOW E&M-EST. PATIENT-LVL III: CPT | Mod: PBBFAC,,, | Performed by: INTERNAL MEDICINE

## 2019-01-14 PROCEDURE — 1101F PR PT FALLS ASSESS DOC 0-1 FALLS W/OUT INJ PAST YR: ICD-10-PCS | Mod: CPTII,S$GLB,, | Performed by: INTERNAL MEDICINE

## 2019-01-14 RX ORDER — IRBESARTAN 150 MG/1
150 TABLET ORAL 2 TIMES DAILY
Qty: 180 TABLET | Refills: 3 | Status: SHIPPED | OUTPATIENT
Start: 2019-01-14 | End: 2019-05-01 | Stop reason: SDUPTHER

## 2019-01-14 RX ORDER — PAROXETINE 10 MG/1
10 TABLET, FILM COATED ORAL EVERY MORNING
Qty: 30 TABLET | Refills: 11 | Status: SHIPPED | OUTPATIENT
Start: 2019-01-14 | End: 2019-12-17
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
## 2019-01-16 DIAGNOSIS — M81.8 OTHER OSTEOPOROSIS WITHOUT CURRENT PATHOLOGICAL FRACTURE: Chronic | ICD-10-CM

## 2019-01-16 DIAGNOSIS — Z13.820 SCREENING FOR OSTEOPOROSIS: Primary | ICD-10-CM

## 2019-01-21 DIAGNOSIS — Z12.11 COLON CANCER SCREENING: ICD-10-CM

## 2019-01-26 ENCOUNTER — HOSPITAL ENCOUNTER (EMERGENCY)
Facility: HOSPITAL | Age: 74
Discharge: HOME | End: 2019-01-26
Attending: EMERGENCY MEDICINE
Payer: MEDICARE

## 2019-01-26 VITALS
DIASTOLIC BLOOD PRESSURE: 81 MMHG | BODY MASS INDEX: 48.37 KG/M2 | RESPIRATION RATE: 17 BRPM | HEIGHT: 55 IN | SYSTOLIC BLOOD PRESSURE: 188 MMHG | TEMPERATURE: 99 F | WEIGHT: 209 LBS | HEART RATE: 61 BPM | OXYGEN SATURATION: 98 %

## 2019-01-26 DIAGNOSIS — I10 ASYMPTOMATIC HYPERTENSION: Primary | ICD-10-CM

## 2019-01-26 LAB
ANION GAP SERPL CALC-SCNC: 9 MMOL/L
BASOPHILS # BLD AUTO: 0.02 K/UL
BASOPHILS NFR BLD: 0.2 %
BUN SERPL-MCNC: 14 MG/DL
CALCIUM SERPL-MCNC: 9.7 MG/DL
CHLORIDE SERPL-SCNC: 103 MMOL/L
CO2 SERPL-SCNC: 27 MMOL/L
CREAT SERPL-MCNC: 0.7 MG/DL
DIFFERENTIAL METHOD: ABNORMAL
EOSINOPHIL # BLD AUTO: 0.1 K/UL
EOSINOPHIL NFR BLD: 1.4 %
ERYTHROCYTE [DISTWIDTH] IN BLOOD BY AUTOMATED COUNT: 12.4 %
EST. GFR  (AFRICAN AMERICAN): >60 ML/MIN/1.73 M^2
EST. GFR  (NON AFRICAN AMERICAN): >60 ML/MIN/1.73 M^2
GLUCOSE SERPL-MCNC: 127 MG/DL
HCT VFR BLD AUTO: 42.5 %
HGB BLD-MCNC: 14.2 G/DL
LYMPHOCYTES # BLD AUTO: 1.4 K/UL
LYMPHOCYTES NFR BLD: 14.7 %
MCH RBC QN AUTO: 32.2 PG
MCHC RBC AUTO-ENTMCNC: 33.4 G/DL
MCV RBC AUTO: 96 FL
MONOCYTES # BLD AUTO: 0.6 K/UL
MONOCYTES NFR BLD: 6 %
NEUTROPHILS # BLD AUTO: 7.6 K/UL
NEUTROPHILS NFR BLD: 77.4 %
PLATELET # BLD AUTO: 266 K/UL
PMV BLD AUTO: 11 FL
POTASSIUM SERPL-SCNC: 4.1 MMOL/L
RBC # BLD AUTO: 4.41 M/UL
SODIUM SERPL-SCNC: 139 MMOL/L
WBC # BLD AUTO: 9.76 K/UL

## 2019-01-26 PROCEDURE — 93005 ELECTROCARDIOGRAM TRACING: CPT

## 2019-01-26 PROCEDURE — 85025 COMPLETE CBC W/AUTO DIFF WBC: CPT

## 2019-01-26 PROCEDURE — 80048 BASIC METABOLIC PNL TOTAL CA: CPT

## 2019-01-26 PROCEDURE — 99283 EMERGENCY DEPT VISIT LOW MDM: CPT

## 2019-01-26 PROCEDURE — 93010 ELECTROCARDIOGRAM REPORT: CPT | Mod: ,,, | Performed by: INTERNAL MEDICINE

## 2019-01-26 PROCEDURE — 93010 EKG 12-LEAD: ICD-10-PCS | Mod: ,,, | Performed by: INTERNAL MEDICINE

## 2019-01-26 NOTE — ED PROVIDER NOTES
"Encounter Date: 1/26/2019    SCRIBE #1 NOTE: I, Vicki Leon, am scribing for, and in the presence of,  Dr. Carias. I have scribed the entire note.       History     Chief Complaint   Patient presents with    Hypertension     c/o htn, nausea, and weakness since early this morning. States she was started on irbesartan about 1 month ago, but it was making her too sleepy, so she switched back to losartan about 1 week ago on her own. Has taken extra losartan, but states bp remained elevated. BP improved just prior to arrival to the ER     Patient is a 74-year-old female who presents to the ED with the complaint of hypertension. Upon arrival to the ED, initial /86. Per medical record, patient's medication recently changed by PCP from Losartan to Irbesartan. Patient took Irbesartan as written for several days, but was not satisfied with associated side affects. Patient reportedly transitioned back to Losartan, but denies consulting PCP about medication change. Patient endorses compliance with Losartan, HCTZ, and Metoprolol. Yesterday evening patient reports that she got into a verbal argument with her significant other. She states that this current relationship is very stressful. After the argument, patient began feeling anxious and started trending her blood pressures at home, with gradual increase. She admits taking extra dose of Losartan as well as a dose of Irbesartan, which she has not taken in weeks. Patient called EMS this morning after BP was 179/200.  Pt denies any CP/SOB/N/V/fever/chills/abdominal pain/ type symptoms/syncope or pre-syncope, or leg swelling or pain when explicitly asked.  During interview, BP at 139/71, pt states she is "reassured" by the aforementioned blood pressure reading.       The history is provided by the patient.     Review of patient's allergies indicates:  No Known Allergies  Past Medical History:   Diagnosis Date    Essential hypertension 12/10/2018    GERD " (gastroesophageal reflux disease)     Hiatal hernia     Hypertension     Insomnia     Other osteoporosis without current pathological fracture 12/10/2018     Past Surgical History:   Procedure Laterality Date    CATARACT EXTRACTION, BILATERAL      DILATION AND CURETTAGE OF UTERUS      KNEE ARTHROSCOPY       Family History   Problem Relation Age of Onset    Heart disease Father         fatal MI in mid 60s    Hypertension Father     Hypertension Sister     Cancer Brother         colon cancer    Hypertension Brother     Diabetes Neg Hx     Stroke Neg Hx     Hyperlipidemia Neg Hx      Social History     Tobacco Use    Smoking status: Never Smoker    Smokeless tobacco: Never Used    Tobacco comment: 18 or 21 yo for 1 yr   Substance Use Topics    Alcohol use: Yes     Frequency: Never     Comment: once in a while a little whiskey (not even weekly)    Drug use: No     Review of Systems   Constitutional: Positive for chills. Negative for fever.   HENT: Negative for congestion, rhinorrhea and sore throat.    Eyes: Negative for redness and visual disturbance.   Respiratory: Negative for cough, shortness of breath and wheezing.    Cardiovascular: Negative for chest pain and palpitations.   Gastrointestinal: Positive for nausea. Negative for abdominal pain, diarrhea and vomiting.   Genitourinary: Negative for dysuria and hematuria.   Musculoskeletal: Negative for back pain, myalgias and neck pain.   Skin: Negative for rash.   Neurological: Negative for dizziness, weakness and light-headedness.   Psychiatric/Behavioral: Negative for confusion.       Physical Exam     Initial Vitals [01/26/19 1052]   BP Pulse Resp Temp SpO2   (!) 166/86 63 18 98.6 °F (37 °C) 99 %      MAP       --         Physical Exam    Nursing note and vitals reviewed.  Constitutional: She appears well-developed and well-nourished. No distress.   Pt mildly anxious and somewhat rude toward nursing staff (observed by self).    HENT:   Head:  Normocephalic and atraumatic.   Mouth/Throat: Oropharynx is clear and moist.   Eyes: Conjunctivae and EOM are normal. Pupils are equal, round, and reactive to light.   Neck: Normal range of motion. Neck supple.   Cardiovascular: Normal rate, regular rhythm and normal heart sounds.   Pulmonary/Chest: Breath sounds normal. No respiratory distress.   Abdominal: Soft. Bowel sounds are normal. There is no tenderness. There is no rebound and no guarding.   Musculoskeletal: Normal range of motion. She exhibits no edema or tenderness.   Neurological: She is alert and oriented to person, place, and time. She has normal strength.   Skin: Skin is warm and dry. Capillary refill takes less than 2 seconds. No rash noted.   Psychiatric: She has a normal mood and affect. Thought content normal.         ED Course   Procedures  Labs Reviewed   CBC W/ AUTO DIFFERENTIAL - Abnormal; Notable for the following components:       Result Value    MCH 32.2 (*)     Gran% 77.4 (*)     Lymph% 14.7 (*)     All other components within normal limits   BASIC METABOLIC PANEL - Abnormal; Notable for the following components:    Glucose 127 (*)     All other components within normal limits     EKG Readings: (Independently Interpreted)   EKG with sinus rhythm; no significant ST elevation/depression/T wave changes; ventricular rate 66; no STEMI       Imaging Results    None          Medical Decision Making:   Clinical Tests:   Lab Tests: Ordered and Reviewed  Medical Tests: Ordered and Reviewed  ED Management:  - EKG with sinus rhythm; no significant ST elevation/depression/T wave changes; ventricular rate 66; no STEMI  - CBC w/ diff WNL; no leukocytosis; H/H stable  - BMP WNL; renal function WNL  - On recheck of pt's blood pressure, SBP approx 160  - Will not treat asymptomatic hypertension; discussed with patient; discussed [facility] policy on treatment vs non-treatment of asymptomatic hypertension   - Stressed pt to follow up with PCP to discuss  alternatives to current blood pressure regimen as newly prescribed ARB with side effect of drowsiness  - No further intervention is indicated at this time after having taken into account the patient's history, physical exam findings, and empirical and objective data obtained during the patient's emergency department workup.   - The patient is at low risk for an emergent medical condition at this time, and I am of the belief that that it is safe to discharge the patient from the emergency department.   - The patient is instructed to follow up as outpatient as indicated on the discharge paperwork.    - I have discussed the specifics of the workup with the patient and the patient has verbalized understanding of the details of the workup, the diagnosis, the treatment plan, and the need for outpatient follow-up.    - Although the patient has no emergent etiology today this does not preclude the development of an emergent condition so, in addition, I have advised the patient that they can return to the ED and/or activate EMS at any time with worsening of their symptoms, change of their symptoms, or with any other medical complaint.    - The patient remained comfortable and stable during their visit in the ED.    - Discharge and follow-up instructions discussed with the patient who expressed understanding and willingness to comply with my recommendations.  - Results of all emergency department tests  discussed thoroughly with patient; all patient questions answered; pt in agreement with plan  - Pt instructed to follow up with PCP in one week for recheck of today's complaints  - Pt given strict emergency department return precautions for any new or worsening of symptoms  - Pt discharged from the emergency department in stable condition, in no acute distress                        Clinical Impression:     1. Asymptomatic hypertension          IWali,  personally performed the services described in this  documentation. All medical record entries made by the scribe were at my direction and in my presence.  I have reviewed the chart and agree that the record reflects my personal performance and is accurate and complete. Wali Carias M.D. 6:46 PM01/26/2019                  Wali Carias MD  01/27/19 1637
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
## 2019-01-26 NOTE — ED NOTES
"Pt complaining to Dr. Carias "If you aren't gonna do anything for me then just leave and go on." Dr. Carias in to try to speak to pt about POC.   Pt wheeled out and will call a cab to take herself home.   "

## 2019-01-28 ENCOUNTER — TELEPHONE (OUTPATIENT)
Dept: INTERNAL MEDICINE | Facility: CLINIC | Age: 74
End: 2019-01-28

## 2019-01-28 NOTE — TELEPHONE ENCOUNTER
----- Message from Jeison Butler sent at 1/28/2019 11:23 AM CST -----  Contact: Patient [phone]  Patient stating went to ER and wants to update the Dr on the ER visit regarding the BP going up,    Thinks may have to change the Rx, not sure,    Would like for the Dr to give a call back.    Please call an advise  Thank you    
(2) assistive person

## 2019-01-29 DIAGNOSIS — I10 ESSENTIAL HYPERTENSION: Primary | ICD-10-CM

## 2019-01-31 ENCOUNTER — TELEPHONE (OUTPATIENT)
Dept: INTERNAL MEDICINE | Facility: CLINIC | Age: 74
End: 2019-01-31

## 2019-01-31 NOTE — TELEPHONE ENCOUNTER
Let's have patient decrease irbesartan to once daily.  Have her message just back in a week with blood pressure readings.

## 2019-01-31 NOTE — TELEPHONE ENCOUNTER
Patient takes both metoprolol and irbasartan twice daily, states b/p is controlled with irbasartan but she does have dizziness at times with taking these medication. Would like to know if dose can be lowered without effecting b/p or what other recommendations Dr Burgos may have.

## 2019-01-31 NOTE — TELEPHONE ENCOUNTER
----- Message from Tia Fernandez sent at 1/31/2019  9:09 AM CST -----  Contact: Pt Mobile/Home [phone]   Patient would like a call back in regards to her high blood pressure medication, irbesartan (AVAPRO) 150 MG tablet. She said that she heard that the medication have a recall on it and she would like to speak with you on today about it please. She said that she called on Monday and left a message for you to return her call and no one called her back.

## 2019-01-31 NOTE — TELEPHONE ENCOUNTER
Reviewed Dr Burgos's notes. Patient repeated instructions back accurately. Will call back with readings in 1 week with decreased dose.

## 2019-02-04 ENCOUNTER — TELEPHONE (OUTPATIENT)
Dept: INTERNAL MEDICINE | Facility: CLINIC | Age: 74
End: 2019-02-04

## 2019-02-04 NOTE — TELEPHONE ENCOUNTER
----- Message from Britany Morfin sent at 2/4/2019 11:35 AM CST -----  Contact: Boyd / [facility] / [phone]  Pharmacy is calling to speak with someone in regards to the pt's irbesartan (AVAPRO) 150 MG tablet. He states that he was advised by the pt that she no longer takes this medication twice daily she takes it once daily, Please call back and advise.      Thanks

## 2019-02-04 NOTE — TELEPHONE ENCOUNTER
Spoke with pharmacist, clarified pt irbesartan decreased x 1 week with b/p readings due Thursday. Will get back if orders change

## 2019-03-26 ENCOUNTER — PATIENT OUTREACH (OUTPATIENT)
Dept: ADMINISTRATIVE | Facility: HOSPITAL | Age: 74
End: 2019-03-26

## 2019-04-09 ENCOUNTER — OFFICE VISIT (OUTPATIENT)
Dept: INTERNAL MEDICINE | Facility: CLINIC | Age: 74
End: 2019-04-09
Payer: MEDICARE

## 2019-04-09 ENCOUNTER — NURSE TRIAGE (OUTPATIENT)
Dept: ADMINISTRATIVE | Facility: CLINIC | Age: 74
End: 2019-04-09

## 2019-04-09 VITALS
HEART RATE: 65 BPM | SYSTOLIC BLOOD PRESSURE: 122 MMHG | DIASTOLIC BLOOD PRESSURE: 84 MMHG | HEIGHT: 55 IN | RESPIRATION RATE: 16 BRPM | OXYGEN SATURATION: 98 % | BODY MASS INDEX: 48.67 KG/M2 | WEIGHT: 210.31 LBS | TEMPERATURE: 98 F

## 2019-04-09 DIAGNOSIS — I10 ESSENTIAL HYPERTENSION: Chronic | ICD-10-CM

## 2019-04-09 DIAGNOSIS — R42 DIZZINESS: ICD-10-CM

## 2019-04-09 DIAGNOSIS — M25.579 ANKLE PAIN, UNSPECIFIED CHRONICITY, UNSPECIFIED LATERALITY: ICD-10-CM

## 2019-04-09 DIAGNOSIS — R53.1 WEAKNESS: Primary | ICD-10-CM

## 2019-04-09 PROCEDURE — 3074F SYST BP LT 130 MM HG: CPT | Mod: CPTII,S$GLB,, | Performed by: INTERNAL MEDICINE

## 2019-04-09 PROCEDURE — 3079F DIAST BP 80-89 MM HG: CPT | Mod: CPTII,S$GLB,, | Performed by: INTERNAL MEDICINE

## 2019-04-09 PROCEDURE — 1100F PTFALLS ASSESS-DOCD GE2>/YR: CPT | Mod: CPTII,S$GLB,, | Performed by: INTERNAL MEDICINE

## 2019-04-09 PROCEDURE — 99214 OFFICE O/P EST MOD 30 MIN: CPT | Mod: S$GLB,,, | Performed by: INTERNAL MEDICINE

## 2019-04-09 PROCEDURE — 99499 RISK ADDL DX/OHS AUDIT: ICD-10-PCS | Mod: S$GLB,,, | Performed by: INTERNAL MEDICINE

## 2019-04-09 PROCEDURE — 3288F PR FALLS RISK ASSESSMENT DOCUMENTED: ICD-10-PCS | Mod: CPTII,S$GLB,, | Performed by: INTERNAL MEDICINE

## 2019-04-09 PROCEDURE — 99499 UNLISTED E&M SERVICE: CPT | Mod: S$GLB,,, | Performed by: INTERNAL MEDICINE

## 2019-04-09 PROCEDURE — 3079F PR MOST RECENT DIASTOLIC BLOOD PRESSURE 80-89 MM HG: ICD-10-PCS | Mod: CPTII,S$GLB,, | Performed by: INTERNAL MEDICINE

## 2019-04-09 PROCEDURE — 99999 PR PBB SHADOW E&M-EST. PATIENT-LVL III: ICD-10-PCS | Mod: PBBFAC,,, | Performed by: INTERNAL MEDICINE

## 2019-04-09 PROCEDURE — 99214 PR OFFICE/OUTPT VISIT, EST, LEVL IV, 30-39 MIN: ICD-10-PCS | Mod: S$GLB,,, | Performed by: INTERNAL MEDICINE

## 2019-04-09 PROCEDURE — 99999 PR PBB SHADOW E&M-EST. PATIENT-LVL III: CPT | Mod: PBBFAC,,, | Performed by: INTERNAL MEDICINE

## 2019-04-09 PROCEDURE — 3074F PR MOST RECENT SYSTOLIC BLOOD PRESSURE < 130 MM HG: ICD-10-PCS | Mod: CPTII,S$GLB,, | Performed by: INTERNAL MEDICINE

## 2019-04-09 PROCEDURE — 1100F PR PT FALLS ASSESS DOC 2+ FALLS/FALL W/INJURY/YR: ICD-10-PCS | Mod: CPTII,S$GLB,, | Performed by: INTERNAL MEDICINE

## 2019-04-09 PROCEDURE — 3288F FALL RISK ASSESSMENT DOCD: CPT | Mod: CPTII,S$GLB,, | Performed by: INTERNAL MEDICINE

## 2019-04-09 NOTE — PROGRESS NOTES
Subjective:       Patient ID: Purvi Quiroga is a 74 y.o. female.    Chief Complaint: Follow-up (3 month) and Dizziness (x 1 week intermittently)    HPI     74-year-old female here for 3 month follow-up and evaluation of dizziness.    Dizziness - feeling of weakness or like she is going to faint.  This has been going on for the last 3.5-4 years.  She was diagnosed with an electrolyte imbalance by the doctor and given potassium.  She was given potassium and this resolved.  This sensation came back in the last 1-2 weeks.  She feels like she is going to faint.  It does not last all day.  It comes and goes.  It is not positional.  It happens more often when she is sitting or lying down.    HTN - Patient is currently on  Losartan 50 mg b.i.d., HCTZ 25 mg daily, metoprolol 50 mg b.i.d.. She does check her BP at home, and it runs 140-150s since her weak spells.  She has been checking with the wrist cuff instead of an arm cuff.  It is not as reliable.  She used to have an arm cuff. Side effects of medications note: none. Denies headaches, blurred vision, chest pain, shortness of breath, nausea.    She has a cyst on each ankle that is causing her pain.    Review of Systems    Objective:      Physical Exam   Constitutional: She is oriented to person, place, and time. She appears well-developed and well-nourished.   HENT:   Head: Normocephalic and atraumatic.   Mouth/Throat: No oropharyngeal exudate.   Eyes: Pupils are equal, round, and reactive to light. EOM are normal. Right eye exhibits no discharge. Left eye exhibits no discharge. No scleral icterus.   Neck: Normal range of motion. Neck supple. No tracheal deviation present. No thyromegaly present.   Cardiovascular: Normal rate, regular rhythm and normal heart sounds. Exam reveals no gallop and no friction rub.   No murmur heard.  Pulmonary/Chest: Effort normal and breath sounds normal. No respiratory distress. She has no wheezes. She has no rales. She exhibits no  tenderness.   Abdominal: Soft. Bowel sounds are normal. She exhibits no distension and no mass. There is no tenderness. There is no rebound and no guarding.   Musculoskeletal: Normal range of motion. She exhibits edema (1+ pitting edema bilaterally). She exhibits no tenderness.   Neurological: She is alert and oriented to person, place, and time.   Skin: Skin is warm and dry. No rash noted. No erythema. No pallor.   Psychiatric: She has a normal mood and affect. Her behavior is normal.   Vitals reviewed.      Assessment:       1. Weakness    2. Dizziness    3. Essential hypertension    4. Ankle pain, unspecified chronicity, unspecified laterality        Plan:       1/2.  Check BMP, magnesium.  3.  Continue Losartan 50 mg b.i.d., HCTZ 25 mg daily, metoprolol 50 mg b.i.d.  4.  Refer to Orthopedics.

## 2019-04-10 ENCOUNTER — TELEPHONE (OUTPATIENT)
Dept: INTERNAL MEDICINE | Facility: CLINIC | Age: 74
End: 2019-04-10

## 2019-04-10 DIAGNOSIS — Z12.31 VISIT FOR SCREENING MAMMOGRAM: Primary | ICD-10-CM

## 2019-04-10 DIAGNOSIS — M89.9 DISORDER OF BONE: ICD-10-CM

## 2019-04-10 NOTE — TELEPHONE ENCOUNTER
No response from Dr. Sanders, had the  call her cell.  She advises pt should be fine, doesn't suspect it will drop any farther than it has already, and she should resume her metoprolol as usual, in the morning.  Advised pt of the above and instructed if she felt poorly to give us a call back, she verbalized understanding.

## 2019-04-10 NOTE — TELEPHONE ENCOUNTER
Takes metoprolol 50 mg po bid, may have taken it twice tonight, she is unsure.  May have taken a first dose at 7:15, and took a dose at 7:45pm.   She does feel a little bit light headed, but this is not unusual for her after taking her medication.  Her blood pressure was 143/69 at  0815.  She is retaking it, and it is now 138/79, pulse is 63.  SC sent to Dr. Lizbet Sanders, awaiting response.   Reason for Disposition   [1] DOUBLE DOSE (an extra dose or lesser amount) of prescription drug AND [2] NO symptoms (Exception: a double dose of antibiotics)    Protocols used: MEDICATION QUESTION CALL-A-AH

## 2019-04-13 LAB
BUN SERPL-MCNC: 27 MG/DL (ref 7–25)
BUN/CREAT SERPL: 27 (CALC) (ref 6–22)
CALCIUM SERPL-MCNC: 9.1 MG/DL (ref 8.6–10.4)
CHLORIDE SERPL-SCNC: 104 MMOL/L (ref 98–110)
CO2 SERPL-SCNC: 28 MMOL/L (ref 20–32)
CREAT SERPL-MCNC: 1 MG/DL (ref 0.6–0.93)
GFRSERPLBLD MDRD-ARVRAT: 55 ML/MIN/1.73M2
GLUCOSE SERPL-MCNC: 103 MG/DL (ref 65–139)
MAGNESIUM SERPL-MCNC: 2.3 MG/DL (ref 1.5–2.5)
POTASSIUM SERPL-SCNC: 5 MMOL/L (ref 3.5–5.3)
SODIUM SERPL-SCNC: 141 MMOL/L (ref 135–146)

## 2019-04-23 RX ORDER — LORAZEPAM 1 MG/1
TABLET ORAL
Qty: 30 TABLET | Refills: 2 | Status: SHIPPED | OUTPATIENT
Start: 2019-04-23 | End: 2019-07-10 | Stop reason: SDUPTHER

## 2019-04-26 ENCOUNTER — TELEPHONE (OUTPATIENT)
Dept: INTERNAL MEDICINE | Facility: CLINIC | Age: 74
End: 2019-04-26

## 2019-04-26 NOTE — TELEPHONE ENCOUNTER
----- Message from Tia Fernandez sent at 4/26/2019 11:16 AM CDT -----  Contact: Pt self Mobile/Home [phone]   Patient is calling in regards to her wanting to speak with you about a mammogram form. She said she would like to speak with you personally about this and she'll give you the rest of the information on where she would like to have the form sent to.

## 2019-04-26 NOTE — TELEPHONE ENCOUNTER
Returned patient's call, vm left for patient to return call to discuss where she would like mammogram form sent

## 2019-05-01 NOTE — TELEPHONE ENCOUNTER
Patient insistance that she takes irbesartan once daily because twice daily make her dizzy. Pharmacy has counselled patient, have spoke to patient and she will not take medication twice daily. Request sent to Dr Burgos to decrease dose at patient and pharmacy request for compliance.

## 2019-05-02 RX ORDER — IRBESARTAN 150 MG/1
150 TABLET ORAL DAILY
Qty: 90 TABLET | Refills: 3 | Status: SHIPPED | OUTPATIENT
Start: 2019-05-02 | End: 2019-11-08

## 2019-05-08 ENCOUNTER — TELEPHONE (OUTPATIENT)
Dept: INTERNAL MEDICINE | Facility: CLINIC | Age: 74
End: 2019-05-08

## 2019-05-08 NOTE — TELEPHONE ENCOUNTER
----- Message from Charlotte Torres sent at 5/8/2019 12:48 PM CDT -----  Contact: self   Pt would like a hard copy  of the orders for her mammogram and bone density tests at [facility]. She misplaced the previous copies. Please mail to the patient's address on file.

## 2019-05-28 ENCOUNTER — HOSPITAL ENCOUNTER (OUTPATIENT)
Dept: RADIOLOGY | Facility: HOSPITAL | Age: 74
Discharge: HOME | End: 2019-05-28
Attending: FAMILY MEDICINE
Payer: MEDICARE

## 2019-05-28 ENCOUNTER — OFFICE VISIT (OUTPATIENT)
Dept: SPORTS MEDICINE | Facility: CLINIC | Age: 74
End: 2019-05-28
Payer: MEDICARE

## 2019-05-28 VITALS — HEIGHT: 55 IN | TEMPERATURE: 98 F | BODY MASS INDEX: 48.6 KG/M2 | WEIGHT: 210 LBS

## 2019-05-28 DIAGNOSIS — M17.11 PRIMARY OSTEOARTHRITIS OF RIGHT KNEE: Primary | ICD-10-CM

## 2019-05-28 DIAGNOSIS — M25.562 PAIN IN BOTH KNEES, UNSPECIFIED CHRONICITY: ICD-10-CM

## 2019-05-28 DIAGNOSIS — M25.561 PAIN IN BOTH KNEES, UNSPECIFIED CHRONICITY: ICD-10-CM

## 2019-05-28 PROCEDURE — 99999 PR PBB SHADOW E&M-EST. PATIENT-LVL III: CPT | Mod: PBBFAC,,, | Performed by: FAMILY MEDICINE

## 2019-05-28 PROCEDURE — 99204 OFFICE O/P NEW MOD 45 MIN: CPT | Mod: 25,S$GLB,, | Performed by: FAMILY MEDICINE

## 2019-05-28 PROCEDURE — 20611 DRAIN/INJ JOINT/BURSA W/US: CPT | Mod: RT,S$GLB,, | Performed by: FAMILY MEDICINE

## 2019-05-28 PROCEDURE — 1101F PT FALLS ASSESS-DOCD LE1/YR: CPT | Mod: CPTII,S$GLB,, | Performed by: FAMILY MEDICINE

## 2019-05-28 PROCEDURE — 73564 XR KNEE ORTHO BILAT WITH FLEXION: ICD-10-PCS | Mod: 26,50,, | Performed by: RADIOLOGY

## 2019-05-28 PROCEDURE — 73564 X-RAY EXAM KNEE 4 OR MORE: CPT | Mod: 26,50,, | Performed by: RADIOLOGY

## 2019-05-28 PROCEDURE — 99999 PR PBB SHADOW E&M-EST. PATIENT-LVL III: ICD-10-PCS | Mod: PBBFAC,,, | Performed by: FAMILY MEDICINE

## 2019-05-28 PROCEDURE — 99204 PR OFFICE/OUTPT VISIT, NEW, LEVL IV, 45-59 MIN: ICD-10-PCS | Mod: 25,S$GLB,, | Performed by: FAMILY MEDICINE

## 2019-05-28 PROCEDURE — 20611 LARGE JOINT ASPIRATION/INJECTION: R KNEE: ICD-10-PCS | Mod: RT,S$GLB,, | Performed by: FAMILY MEDICINE

## 2019-05-28 PROCEDURE — 1101F PR PT FALLS ASSESS DOC 0-1 FALLS W/OUT INJ PAST YR: ICD-10-PCS | Mod: CPTII,S$GLB,, | Performed by: FAMILY MEDICINE

## 2019-05-28 PROCEDURE — 73564 X-RAY EXAM KNEE 4 OR MORE: CPT | Mod: TC,50,FY,PO

## 2019-05-28 RX ORDER — TRIAMCINOLONE ACETONIDE 40 MG/ML
40 INJECTION, SUSPENSION INTRA-ARTICULAR; INTRAMUSCULAR
Status: DISCONTINUED | OUTPATIENT
Start: 2019-05-28 | End: 2019-05-28 | Stop reason: HOSPADM

## 2019-05-28 RX ADMIN — TRIAMCINOLONE ACETONIDE 40 MG: 40 INJECTION, SUSPENSION INTRA-ARTICULAR; INTRAMUSCULAR at 11:05

## 2019-05-28 NOTE — PROGRESS NOTES
Purvi Quiroga, a 74 y.o. female, presents today for evaluation of her right knee.      History of Present Illness (HPI)  Location: anterior knee, right  Onset: Chronic,   Palliative:    Relative rest   Oral analgesics - failed NSAIDS/Acetaminophen    CSI, 1991  Provocative:    ADLS   Prolonged ambulation  Prior: none  Progression: worsening discomfort  Quality:    sharp pain  Radiation: none  Severity: per nursing documentation  Timing: intermittent w/ use  Trauma: none specific    Review of Systems (ROS)  A 10+ review of systems was performed with pertinent positives and negatives noted above in the history of present illness. Other systems were negative unless otherwise specified.    Physical Examination (PE)  General:  The patient is alert and oriented x 3. Mood is pleasant. Observation of ears, eyes and nose reveal no gross abnormalities. HEENT: NCAT, sclera anicteric.   Lungs: Respirations are equal and unlabored.  Gait is coordinated. Patient can toe walk and heel walk without difficulty.    RIGHT KNEE EXAMINATION    Observation/Inspection  Gait:   Nonantalgic   Alignment:  Neutral   Scars:   None   Muscle atrophy: Mild  Effusion:  None   Warmth:  None   Discoloration:   none     Tenderness / Crepitus (T / C):         T / C      T / C  Patella   - / -   Lateral joint line   - / -     Peripatellar medial  -  Medial joint line    + / -  Peripatellar lateral -  Medial plica   - / -  Patellar tendon -   Popliteal fossa   - / -  Quad tendon   -   Gastrocnemius   -  Prepatellar Bursa - / -   Quadricep   -  Tibial tubercle  -  Thigh/hamstring  -  Pes anserine/HS -  Fibula    -  ITB   - / -  Tibia     -  Tib/fib joint  - / -  LCL    -    MFC   - / -   MCL: Proximal  -    LFC   - / -   Distal    -          ROM: (* = pain)  PASSIVE   ACTIVE    Left :   5 / 0 / 145   5 / 0 / 145     Right :    5 / 0 / 145*   5 / 0 / 145*    Patellofemoral examination:  See above noted areas of tenderness.   Patella position     Subluxation / dislocation: Centered        Sup. / Inf;   Normal   Crepitus (PF):    Absent   Patellar Mobility:       Medial-lateral:   Normal    Superior-inferior:  Normal    Inferior tilt   Normal    Patellar tendon:  Normal   Lateral tilt:    Normal   J-sign:     None   Patellofemoral grind:   No pain     Meniscal Signs:     Pain on terminal extension:  +  Pain on terminal flexion:  +  Kierras maneuver:  +*  Squat     NT  Thessaly    NT    Ligament Examination:  ACL / Lachman:  WNL  PCL-Post.  drawer: normal 0 to 2mm  MCL- Valgus:  normal 0 to 2mm  LCL- Varus:    normal 0 to 2mm  Pivot shift:  guarding   Dial Test:   difference c/w other side   At 30° flexion: normal (< 5°)    At 90° flexion: normal (< 5°)   Reverse Pivot Shift:   normal (Equal)     Strength: (* = with pain) Painful Side  Quadriceps   3+/5 *  Hamstring:   3+/5 *    Extremity Neuro-vascular Examination:   Sensation:  Grossly intact to light touch all dermatomal regions.   Motor Function:  Fully intact motor function at hip, knee, foot and ankle    DTRs;  quadriceps and  achilles 2+.  No clonus and downgoing Babinski.    Vascular status:  DP and PT pulses 2+, brisk capillary refill, symmetric.     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, bilat   W/ knee pain, right >>> left    No evidence of neurologic pathology  No evidence of vascular pathology    Imaging studies reviewed:   X-ray knee, bilateral 19.05    Plan:    We discussed the importance of appropriate diet, weight, and regular exercise including quadriceps strengthening     We discussed options including:  #1 watchful waiting  #2 physical therapy aimed at:   Core stability   RoM knee   Strengthening quadriceps   Gait training   #3 injection therapy:   CSI iaknee     Right,     Left,    VSI iaknee    Right,     Left,    Orthobiologics   #4 consultation      The patient chooses #2 and #3 csi iaknee right    Pain  management: handout given  Bracing:   Physical therapy: nfPT  Activity (e.g. sports, work) restrictions: as tolerated   school/vocation: retired    Follow up in 12 w  I = vsi iaknee   Should symptoms worsen or fail to resolve, consider:  Revisiting the above options

## 2019-05-28 NOTE — PROCEDURES
"Large Joint Aspiration/Injection: R knee  Date/Time: 5/28/2019 11:07 AM  Performed by: Cj Caruso MD  Authorized by: Cj Caruso MD     Consent Done?:  Yes (Verbal)  Indications:  Pain  Procedure site marked: Yes    Timeout: Prior to procedure the correct patient, procedure, and site was verified      Location:  Knee  Site:  R knee  Prep: Patient was prepped and draped in usual sterile fashion    Ultrasonic Guidance for needle placement: Yes  Images are saved and documented.  Needle size:  20 G  Approach:  Lateral  Medications:  40 mg triamcinolone acetonide 40 mg/mL  Patient tolerance:  Patient tolerated the procedure well with no immediate complications    Additional Comments: Description of ultrasound utilization for needle guidance:   Ultrasound guidance used for needle localization. Images saved and stored for documentation. The knee joint was visualized. Dynamic visualization of the 20g x 3.5" needle was continuous throughout the procedure.      "

## 2019-05-30 ENCOUNTER — TELEPHONE (OUTPATIENT)
Dept: SPORTS MEDICINE | Facility: CLINIC | Age: 74
End: 2019-05-30

## 2019-05-30 NOTE — TELEPHONE ENCOUNTER
Patient communication:    CSI Left knee    ----- Message from Ernesto Hoffman MA sent at 5/29/2019  1:45 PM CDT -----  Contact: Self      ----- Message -----  From: Loren Stokes  Sent: 5/29/2019  10:27 AM  To: [facility] Staff    Called requesting a call back regarding possibly getting another injection in her right knee. Pt could be reached at   [phone]

## 2019-06-04 ENCOUNTER — TELEPHONE (OUTPATIENT)
Dept: SPORTS MEDICINE | Facility: CLINIC | Age: 74
End: 2019-06-04

## 2019-06-04 DIAGNOSIS — M17.11 PRIMARY OSTEOARTHRITIS OF RIGHT KNEE: Primary | ICD-10-CM

## 2019-06-04 NOTE — TELEPHONE ENCOUNTER
Patient call handled.     Ernesto Hoffman   Sports Medicine Assistant   [facility]     ----- Message from Ernesto Hoffman MA sent at 6/3/2019  3:56 PM CDT -----  Contact: Self/ [phone]      ----- Message -----  From: Harlan Pop  Sent: 6/3/2019   1:26 PM  To: [facility] Staff    Patient would like a call back to speak with someone on staff to ask questions about her CSI injection that she will be getting on 6/17/19. Patient would like to know how much relief she will have after her injection.

## 2019-06-04 NOTE — TELEPHONE ENCOUNTER
----- Message from Loren Stokes sent at 6/4/2019 11:44 AM CDT -----  Contact: Self  Pt called requesting another call back from Ernesto after 4:30 regarding her injection in her right knee. [phone]

## 2019-06-06 ENCOUNTER — TELEPHONE (OUTPATIENT)
Dept: INTERNAL MEDICINE | Facility: CLINIC | Age: 74
End: 2019-06-06

## 2019-06-06 NOTE — TELEPHONE ENCOUNTER
Received DEXA scan from [facility].  Bone mineral density has decreased since prior study.  Patient has osteopenia.  Please recommended calcium 1200 mg and vitamin-D 1000 units per day.

## 2019-06-14 ENCOUNTER — TELEPHONE (OUTPATIENT)
Dept: SPORTS MEDICINE | Facility: CLINIC | Age: 74
End: 2019-06-14

## 2019-06-14 NOTE — TELEPHONE ENCOUNTER
Patient reports that the injection began to work on her right knee, but would still like to have the option of getting the VSI. Left knee CSI for Monday.    Ernesto Hoffman   Sports Medicine Assistant   [facility]         ----- Message from Lucero Constantino sent at 6/14/2019 10:31 AM CDT -----  Contact: self@[email or web address]  Needs Advice    Reason for call:patient has questions about knee injections         Communication Preference:Home    Additional Information:

## 2019-06-17 ENCOUNTER — TELEPHONE (OUTPATIENT)
Dept: SPORTS MEDICINE | Facility: CLINIC | Age: 74
End: 2019-06-17

## 2019-06-17 ENCOUNTER — OFFICE VISIT (OUTPATIENT)
Dept: SPORTS MEDICINE | Facility: CLINIC | Age: 74
End: 2019-06-17
Payer: MEDICARE

## 2019-06-17 VITALS — HEIGHT: 55 IN | WEIGHT: 210 LBS | BODY MASS INDEX: 48.6 KG/M2 | TEMPERATURE: 98 F

## 2019-06-17 DIAGNOSIS — M17.12 PRIMARY OSTEOARTHRITIS OF LEFT KNEE: Primary | ICD-10-CM

## 2019-06-17 PROCEDURE — 99214 OFFICE O/P EST MOD 30 MIN: CPT | Mod: 25,S$GLB,, | Performed by: FAMILY MEDICINE

## 2019-06-17 PROCEDURE — 99999 PR PBB SHADOW E&M-EST. PATIENT-LVL III: CPT | Mod: PBBFAC,,, | Performed by: FAMILY MEDICINE

## 2019-06-17 PROCEDURE — 1101F PR PT FALLS ASSESS DOC 0-1 FALLS W/OUT INJ PAST YR: ICD-10-PCS | Mod: CPTII,S$GLB,, | Performed by: FAMILY MEDICINE

## 2019-06-17 PROCEDURE — 99999 PR PBB SHADOW E&M-EST. PATIENT-LVL III: ICD-10-PCS | Mod: PBBFAC,,, | Performed by: FAMILY MEDICINE

## 2019-06-17 PROCEDURE — 1101F PT FALLS ASSESS-DOCD LE1/YR: CPT | Mod: CPTII,S$GLB,, | Performed by: FAMILY MEDICINE

## 2019-06-17 PROCEDURE — 99214 PR OFFICE/OUTPT VISIT, EST, LEVL IV, 30-39 MIN: ICD-10-PCS | Mod: 25,S$GLB,, | Performed by: FAMILY MEDICINE

## 2019-06-17 PROCEDURE — 20611 DRAIN/INJ JOINT/BURSA W/US: CPT | Mod: LT,S$GLB,, | Performed by: FAMILY MEDICINE

## 2019-06-17 PROCEDURE — 20611 LARGE JOINT ASPIRATION/INJECTION: L KNEE: ICD-10-PCS | Mod: LT,S$GLB,, | Performed by: FAMILY MEDICINE

## 2019-06-17 RX ORDER — TRIAMCINOLONE ACETONIDE 40 MG/ML
40 INJECTION, SUSPENSION INTRA-ARTICULAR; INTRAMUSCULAR
Status: DISCONTINUED | OUTPATIENT
Start: 2019-06-17 | End: 2019-06-17 | Stop reason: HOSPADM

## 2019-06-17 RX ADMIN — TRIAMCINOLONE ACETONIDE 40 MG: 40 INJECTION, SUSPENSION INTRA-ARTICULAR; INTRAMUSCULAR at 03:06

## 2019-06-17 NOTE — PROCEDURES
"Large Joint Aspiration/Injection: L knee  Date/Time: 6/17/2019 3:37 PM  Performed by: Cj Caruso MD  Authorized by: Cj Caruso MD     Consent Done?:  Yes (Verbal)  Indications:  Pain  Procedure site marked: Yes    Timeout: Prior to procedure the correct patient, procedure, and site was verified      Location:  Knee  Site:  L knee  Prep: Patient was prepped and draped in usual sterile fashion    Ultrasonic Guidance for needle placement: Yes  Images are saved and documented.  Needle size:  20 G  Approach:  Lateral  Medications:  40 mg triamcinolone acetonide 40 mg/mL  Patient tolerance:  Patient tolerated the procedure well with no immediate complications    Additional Comments: Description of ultrasound utilization for needle guidance:   Ultrasound guidance used for needle localization. Images saved and stored for documentation. The knee joint was visualized. Dynamic visualization of the 20g x 3.5" needle was continuous throughout the procedure.      "

## 2019-06-17 NOTE — PROGRESS NOTES
Purvi Quiroga, a 74 y.o. female, presents today for evaluation of her right knee.      History of Present Illness (HPI)  Location: anterior knee, right  Onset: Chronic,   Palliative:    Relative rest   Oral analgesics - failed NSAIDS/Acetaminophen    CSI, 1991   MAINORI, NATALIYA Grier, 19.05.28, 50% Improvement    Provocative:    ADLS   Prolonged ambulation  Prior: none  Progression: worsening discomfort  Quality:    sharp pain  Radiation: none  Severity: per nursing documentation  Timing: intermittent w/ use  Trauma: none specific    Review of Systems (ROS)  A 10+ review of systems was performed with pertinent positives and negatives noted above in the history of present illness. Other systems were negative unless otherwise specified.    Physical Examination (PE)  General:  The patient is alert and oriented x 3. Mood is pleasant. Observation of ears, eyes and nose reveal no gross abnormalities. HEENT: NCAT, sclera anicteric.   Lungs: Respirations are equal and unlabored.  Gait is coordinated. Patient can toe walk and heel walk without difficulty.    RIGHT KNEE EXAMINATION    Observation/Inspection  Gait:   Nonantalgic   Alignment:  Neutral   Scars:   None   Muscle atrophy: Mild  Effusion:  None   Warmth:  None   Discoloration:   none     Tenderness / Crepitus (T / C):         T / C      T / C  Patella   - / -   Lateral joint line   - / -     Peripatellar medial  -  Medial joint line    + / -  Peripatellar lateral -  Medial plica   - / -  Patellar tendon -   Popliteal fossa   - / -  Quad tendon   -   Gastrocnemius   -  Prepatellar Bursa - / -   Quadricep   -  Tibial tubercle  -  Thigh/hamstring  -  Pes anserine/HS -  Fibula    -  ITB   - / -  Tibia     -  Tib/fib joint  - / -  LCL    -    MFC   - / -   MCL: Proximal  -    LFC   - / -   Distal    -          ROM: (* = pain)  PASSIVE   ACTIVE    Left :   5 / 0 / 145   5 / 0 / 145     Right :    5 / 0 / 145*   5 / 0 / 145*    Patellofemoral examination:  See above noted  areas of tenderness.   Patella position    Subluxation / dislocation: Centered        Sup. / Inf;   Normal   Crepitus (PF):    Absent   Patellar Mobility:       Medial-lateral:   Normal    Superior-inferior:  Normal    Inferior tilt   Normal    Patellar tendon:  Normal   Lateral tilt:    Normal   J-sign:     None   Patellofemoral grind:   No pain     Meniscal Signs:     Pain on terminal extension:  +  Pain on terminal flexion:  +  Kierras maneuver:  +*  Squat     NT  Thessaly    NT    Ligament Examination:  ACL / Lachman:  WNL  PCL-Post.  drawer: normal 0 to 2mm  MCL- Valgus:  normal 0 to 2mm  LCL- Varus:    normal 0 to 2mm  Pivot shift:  guarding   Dial Test:   difference c/w other side   At 30° flexion: normal (< 5°)    At 90° flexion: normal (< 5°)   Reverse Pivot Shift:   normal (Equal)     Strength: (* = with pain) Painful Side  Quadriceps   3+/5 *  Hamstring:   3+/5 *    Extremity Neuro-vascular Examination:   Sensation:  Grossly intact to light touch all dermatomal regions.   Motor Function:  Fully intact motor function at hip, knee, foot and ankle    DTRs;  quadriceps and  achilles 2+.  No clonus and downgoing Babinski.    Vascular status:  DP and PT pulses 2+, brisk capillary refill, symmetric.     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, bilat   W/ knee pain, left > right    No evidence of neurologic pathology  No evidence of vascular pathology    Imaging studies reviewed:   X-ray knee, bilateral 19.05    Plan:    We discussed the importance of appropriate diet, weight, and regular exercise including quadriceps strengthening     We discussed options including:  #1 watchful waiting  #2 physical therapy aimed at:   Core stability   RoM knee   Strengthening quadriceps   Gait training   #3 injection therapy:   CSI iaknee     Right, effective good%, repeat    Left,    VSI iaknee    Right,     Left,    Orthobiologics   #4 consultation       The patient chooses #3 csi iaknee left    Pain management: handout given  Bracing: cane, prn  Physical therapy: nfPT, discussed again today, deferred again today by pt  Activity (e.g. sports, work) restrictions: as tolerated   school/vocation: retired    Follow up in 12 w  I = vsi iaknee   Should symptoms worsen or fail to resolve, consider:  Revisiting the above options

## 2019-06-17 NOTE — TELEPHONE ENCOUNTER
Patient was checked in for appointment.    Ernesto Hoffman   Sports Medicine Assistant   [facility]     ----- Message from Rahul Del Castillo sent at 6/17/2019  1:51 PM CDT -----  Contact: Self   Will be 10 minutes  late to her 2:30pm appt today due to the rain.     [phone]

## 2019-06-19 ENCOUNTER — TELEPHONE (OUTPATIENT)
Dept: SPORTS MEDICINE | Facility: CLINIC | Age: 74
End: 2019-06-19

## 2019-06-19 NOTE — TELEPHONE ENCOUNTER
Scheduled patient's VSI     Ernesto Hoffman   Sports Medicine Assistant   [facility] [place]       ----- Message from Loren Stokes sent at 6/19/2019 10:10 AM CDT -----  Contact: Self  Pt requesting a return call back from Ernesto regarding injections in her knee. Pt could be reached at [phone]

## 2019-06-24 ENCOUNTER — TELEPHONE (OUTPATIENT)
Dept: INTERNAL MEDICINE | Facility: CLINIC | Age: 74
End: 2019-06-24

## 2019-06-24 NOTE — TELEPHONE ENCOUNTER
Spoke with patient, dosage clarified per Dr Burgos's previous notes. Patient repeated back accurately

## 2019-06-24 NOTE — TELEPHONE ENCOUNTER
----- Message from Jeison Butler sent at 6/24/2019  9:26 AM CDT -----  Contact: Patient [phone]  Patient would like to get medical advice.  Symptoms (please be specific):  Rx's usage concern    Comments: Patient stating is taking Vitamin D 1,000 and Calcium 1,000, wants to know if taking the Rx's correctly or should be taking more?, would like a call back to discuss.    Please call an advise  Thank you

## 2019-07-01 ENCOUNTER — TELEPHONE (OUTPATIENT)
Dept: ORTHOPEDICS | Facility: CLINIC | Age: 74
End: 2019-07-01

## 2019-07-01 NOTE — TELEPHONE ENCOUNTER
----- Message from Loren Stokes sent at 7/1/2019  3:05 PM CDT -----  Contact: Self  Pt called requesting a return call back from Ernesto regarding her injection appt. Pt could be reached at [phone]

## 2019-07-01 NOTE — TELEPHONE ENCOUNTER
Pt thought she may have to change her appt but was able to get a ride. Confirmed her appt for 7/9 at 10:15.    Pamela Pennington  Clinical Assistant, Sports Med

## 2019-07-02 ENCOUNTER — TELEPHONE (OUTPATIENT)
Dept: SPORTS MEDICINE | Facility: CLINIC | Age: 74
End: 2019-07-02

## 2019-07-09 ENCOUNTER — OFFICE VISIT (OUTPATIENT)
Dept: SPORTS MEDICINE | Facility: CLINIC | Age: 74
End: 2019-07-09
Payer: MEDICARE

## 2019-07-09 VITALS — WEIGHT: 210 LBS | TEMPERATURE: 98 F | HEIGHT: 55 IN | BODY MASS INDEX: 48.6 KG/M2

## 2019-07-09 DIAGNOSIS — M17.11 PRIMARY OSTEOARTHRITIS OF RIGHT KNEE: Primary | ICD-10-CM

## 2019-07-09 PROCEDURE — 99999 PR PBB SHADOW E&M-EST. PATIENT-LVL III: CPT | Mod: PBBFAC,,, | Performed by: FAMILY MEDICINE

## 2019-07-09 PROCEDURE — 20611 DRAIN/INJ JOINT/BURSA W/US: CPT | Mod: RT,S$GLB,, | Performed by: FAMILY MEDICINE

## 2019-07-09 PROCEDURE — 20611 LARGE JOINT ASPIRATION/INJECTION: R KNEE: ICD-10-PCS | Mod: RT,S$GLB,, | Performed by: FAMILY MEDICINE

## 2019-07-09 PROCEDURE — 99499 UNLISTED E&M SERVICE: CPT | Mod: S$GLB,,, | Performed by: FAMILY MEDICINE

## 2019-07-09 PROCEDURE — 99499 NO LOS: ICD-10-PCS | Mod: S$GLB,,, | Performed by: FAMILY MEDICINE

## 2019-07-09 PROCEDURE — 99999 PR PBB SHADOW E&M-EST. PATIENT-LVL III: ICD-10-PCS | Mod: PBBFAC,,, | Performed by: FAMILY MEDICINE

## 2019-07-09 NOTE — PROCEDURES
"Large Joint Aspiration/Injection: R knee  Date/Time: 7/9/2019 2:38 PM  Performed by: Cj Caruso MD  Authorized by: Cj Caruso MD     Consent Done?:  Yes (Verbal)  Indications:  Pain  Procedure site marked: Yes    Timeout: Prior to procedure the correct patient, procedure, and site was verified      Location:  Knee  Site:  R knee  Prep: Patient was prepped and draped in usual sterile fashion    Ultrasonic Guidance for needle placement: Yes  Images are saved and documented.  Needle size:  20 G  Approach:  Lateral  Medications:  20 mg sodium hyaluronate (EUFLEXXA) 10 mg/mL(mw 2.4 -3.6 million)  Patient tolerance:  Patient tolerated the procedure well with no immediate complications    Additional Comments: Description of ultrasound utilization for needle guidance:   Ultrasound guidance used for needle localization. Images saved and stored for documentation. The knee joint was visualized. Dynamic visualization of the 20g x 3.5" needle was continuous throughout the procedure.      "

## 2019-07-10 ENCOUNTER — TELEPHONE (OUTPATIENT)
Dept: SPORTS MEDICINE | Facility: CLINIC | Age: 74
End: 2019-07-10

## 2019-07-10 DIAGNOSIS — M17.12 PRIMARY OSTEOARTHRITIS OF LEFT KNEE: Primary | ICD-10-CM

## 2019-07-10 RX ORDER — LORAZEPAM 1 MG/1
TABLET ORAL
Qty: 30 TABLET | Refills: 0 | Status: SHIPPED | OUTPATIENT
Start: 2019-07-10 | End: 2019-08-01 | Stop reason: SDUPTHER

## 2019-07-10 NOTE — TELEPHONE ENCOUNTER
After discussing VSI at patient's last appointment for her left knee she would like to go ahead and schedule those appointments.    Ernesto Hoffman   Sports Medicine Assistant   [facility] [place]     ----- Message from Rahul Del Castillo sent at 7/10/2019 10:02 AM CDT -----  Contact: Self   Pt would like to get info about approval  for injection in left knee.     [phone]

## 2019-07-16 ENCOUNTER — OFFICE VISIT (OUTPATIENT)
Dept: SPORTS MEDICINE | Facility: CLINIC | Age: 74
End: 2019-07-16
Payer: MEDICARE

## 2019-07-16 VITALS — WEIGHT: 210 LBS | HEIGHT: 55 IN | TEMPERATURE: 98 F | BODY MASS INDEX: 48.6 KG/M2

## 2019-07-16 DIAGNOSIS — M17.11 PRIMARY OSTEOARTHRITIS OF RIGHT KNEE: Primary | ICD-10-CM

## 2019-07-16 PROCEDURE — 99999 PR PBB SHADOW E&M-EST. PATIENT-LVL III: ICD-10-PCS | Mod: PBBFAC,,, | Performed by: FAMILY MEDICINE

## 2019-07-16 PROCEDURE — 20611 DRAIN/INJ JOINT/BURSA W/US: CPT | Mod: RT,S$GLB,, | Performed by: FAMILY MEDICINE

## 2019-07-16 PROCEDURE — 20611 LARGE JOINT ASPIRATION/INJECTION: R KNEE: ICD-10-PCS | Mod: RT,S$GLB,, | Performed by: FAMILY MEDICINE

## 2019-07-16 PROCEDURE — 99999 PR PBB SHADOW E&M-EST. PATIENT-LVL III: CPT | Mod: PBBFAC,,, | Performed by: FAMILY MEDICINE

## 2019-07-16 PROCEDURE — 99499 UNLISTED E&M SERVICE: CPT | Mod: S$GLB,,, | Performed by: FAMILY MEDICINE

## 2019-07-16 PROCEDURE — 99499 NO LOS: ICD-10-PCS | Mod: S$GLB,,, | Performed by: FAMILY MEDICINE

## 2019-07-16 NOTE — PROCEDURES
"Large Joint Aspiration/Injection: R knee  Date/Time: 7/16/2019 10:50 AM  Performed by: Cj Caruso MD  Authorized by: Cj Caruso MD     Consent Done?:  Yes (Verbal)  Indications:  Pain  Procedure site marked: Yes    Timeout: Prior to procedure the correct patient, procedure, and site was verified      Location:  Knee  Site:  R knee  Prep: Patient was prepped and draped in usual sterile fashion    Ultrasonic Guidance for needle placement: Yes  Images are saved and documented.  Needle size:  20 G  Approach:  Lateral  Medications:  20 mg sodium hyaluronate (EUFLEXXA) 10 mg/mL(mw 2.4 -3.6 million)  Patient tolerance:  Patient tolerated the procedure well with no immediate complications    Additional Comments: Description of ultrasound utilization for needle guidance:   Ultrasound guidance used for needle localization. Images saved and stored for documentation. The knee joint was visualized. Dynamic visualization of the 20g x 3.5" needle was continuous throughout the procedure.      "

## 2019-07-18 ENCOUNTER — OFFICE VISIT (OUTPATIENT)
Dept: INTERNAL MEDICINE | Facility: CLINIC | Age: 74
End: 2019-07-18
Payer: MEDICARE

## 2019-07-18 VITALS
DIASTOLIC BLOOD PRESSURE: 78 MMHG | RESPIRATION RATE: 18 BRPM | BODY MASS INDEX: 49.99 KG/M2 | TEMPERATURE: 98 F | WEIGHT: 216 LBS | HEART RATE: 64 BPM | SYSTOLIC BLOOD PRESSURE: 112 MMHG | HEIGHT: 55 IN

## 2019-07-18 DIAGNOSIS — I10 ESSENTIAL HYPERTENSION: Primary | Chronic | ICD-10-CM

## 2019-07-18 DIAGNOSIS — E78.49 OTHER HYPERLIPIDEMIA: Chronic | ICD-10-CM

## 2019-07-18 DIAGNOSIS — G47.00 INSOMNIA, UNSPECIFIED TYPE: ICD-10-CM

## 2019-07-18 DIAGNOSIS — M81.8 OTHER OSTEOPOROSIS WITHOUT CURRENT PATHOLOGICAL FRACTURE: Chronic | ICD-10-CM

## 2019-07-18 DIAGNOSIS — F41.9 ANXIETY: Chronic | ICD-10-CM

## 2019-07-18 PROBLEM — F32.5 MAJOR DEPRESSIVE DISORDER WITH SINGLE EPISODE, IN REMISSION: Chronic | Status: ACTIVE | Noted: 2019-07-18

## 2019-07-18 PROCEDURE — 3078F PR MOST RECENT DIASTOLIC BLOOD PRESSURE < 80 MM HG: ICD-10-PCS | Mod: CPTII,S$GLB,, | Performed by: INTERNAL MEDICINE

## 2019-07-18 PROCEDURE — 99999 PR PBB SHADOW E&M-EST. PATIENT-LVL III: CPT | Mod: PBBFAC,,, | Performed by: INTERNAL MEDICINE

## 2019-07-18 PROCEDURE — 3074F SYST BP LT 130 MM HG: CPT | Mod: CPTII,S$GLB,, | Performed by: INTERNAL MEDICINE

## 2019-07-18 PROCEDURE — 99214 OFFICE O/P EST MOD 30 MIN: CPT | Mod: S$GLB,,, | Performed by: INTERNAL MEDICINE

## 2019-07-18 PROCEDURE — 3074F PR MOST RECENT SYSTOLIC BLOOD PRESSURE < 130 MM HG: ICD-10-PCS | Mod: CPTII,S$GLB,, | Performed by: INTERNAL MEDICINE

## 2019-07-18 PROCEDURE — 3078F DIAST BP <80 MM HG: CPT | Mod: CPTII,S$GLB,, | Performed by: INTERNAL MEDICINE

## 2019-07-18 PROCEDURE — 1101F PR PT FALLS ASSESS DOC 0-1 FALLS W/OUT INJ PAST YR: ICD-10-PCS | Mod: CPTII,S$GLB,, | Performed by: INTERNAL MEDICINE

## 2019-07-18 PROCEDURE — 99999 PR PBB SHADOW E&M-EST. PATIENT-LVL III: ICD-10-PCS | Mod: PBBFAC,,, | Performed by: INTERNAL MEDICINE

## 2019-07-18 PROCEDURE — 1101F PT FALLS ASSESS-DOCD LE1/YR: CPT | Mod: CPTII,S$GLB,, | Performed by: INTERNAL MEDICINE

## 2019-07-18 PROCEDURE — 99214 PR OFFICE/OUTPT VISIT, EST, LEVL IV, 30-39 MIN: ICD-10-PCS | Mod: S$GLB,,, | Performed by: INTERNAL MEDICINE

## 2019-07-18 RX ORDER — HYDROXYZINE PAMOATE 50 MG/1
50 CAPSULE ORAL 3 TIMES DAILY PRN
Qty: 90 CAPSULE | Refills: 3 | Status: SHIPPED | OUTPATIENT
Start: 2019-07-18 | End: 2020-06-19

## 2019-07-18 NOTE — PROGRESS NOTES
Subjective:       Patient ID: Purvi Quiroga is a 74 y.o. female.    Chief Complaint: Follow-up (3 Month); Advice Only (Hydroxyzine helps pt sleep); and Knee Pain (Left)    HPI     74-year-old female here for 6 month follow-up.  She has been getting shots in her knees to help with her arthritis.  She felt weak this am.  She slept ok.  She has to take medication to be able to sleep.  She is asking for atarax to help her sleep.  The atarax is a little stronger.  If she has to sleep, then she will take it.  Her normal routine is the ativan and melatonin.    HTN -  Patient is currently on HCTZ 25 mg, irbesartan 150 mg. She does not check her BP at home. Side effects of medications note: none. Denies headaches, blurred vision, chest pain, shortness of breath, nausea.    HLD - Patient is currently on lipitor 20 mg.  Her last lipid panel was   Cholesterol   Date Value Ref Range Status   12/21/2018 166 <200 mg/dL Final     Triglycerides   Date Value Ref Range Status   12/21/2018 149 <150 mg/dL Final     HDL   Date Value Ref Range Status   12/21/2018 61 >50 mg/dL Final     LDL Cholesterol   Date Value Ref Range Status   12/21/2018 80 mg/dL (calc) Final     Comment:     Reference range: <100     Desirable range <100 mg/dL for primary prevention;    <70 mg/dL for patients with CHD or diabetic patients   with > or = 2 CHD risk factors.     LDL-C is now calculated using the Ivonne   calculation, which is a validated novel method providing   better accuracy than the Friedewald equation in the   estimation of LDL-C.   Isidro HUNT et al. MEAGHAN. 2013;310(19): 0999-9951   (http://education.[employer]/faq/EGL861)     .  Side effects of the medication: none.    Patient has osteoporosis and is on Fosamax 70 mg weekly.    Patient is on Ativan 1 mg daily to help with sleep.  Patient has depression and is on Paxil 10 mg daily.  She did not like the paxil, because of how it made her feel.  She thinks she is doing better  without the paxil.  She could not tell if the issues were the paxil or her imagination that gave her weird ideas.    Review of Systems    Objective:      Physical Exam   Constitutional: She is oriented to person, place, and time. She appears well-developed and well-nourished.   HENT:   Head: Normocephalic and atraumatic.   Mouth/Throat: No oropharyngeal exudate.   Eyes: Pupils are equal, round, and reactive to light. EOM are normal. Right eye exhibits no discharge. Left eye exhibits no discharge. No scleral icterus.   Neck: Normal range of motion. Neck supple. No tracheal deviation present. No thyromegaly present.   Cardiovascular: Normal rate, regular rhythm and normal heart sounds. Exam reveals no gallop and no friction rub.   No murmur heard.  Pulmonary/Chest: Effort normal and breath sounds normal. No respiratory distress. She has no wheezes. She has no rales. She exhibits no tenderness.   Abdominal: Soft. Bowel sounds are normal. She exhibits no distension and no mass. There is no tenderness. There is no rebound and no guarding.   Musculoskeletal: Normal range of motion. She exhibits no edema or tenderness.   Neurological: She is alert and oriented to person, place, and time.   Skin: Skin is warm and dry. No rash noted. No erythema. No pallor.   Psychiatric: She has a normal mood and affect. Her behavior is normal.   Vitals reviewed.      Assessment:       1. Essential hypertension    2. Other hyperlipidemia    3. Other osteoporosis without current pathological fracture    4. Anxiety    5. Insomnia, unspecified type        Plan:       1.  Continue HCTZ 25 mg, irbesartan 150 mg  2.  Continue Lipitor 20 mg.  3.  Continue Fosamax 70 mg weekly.  4/5.  Continue Ativan 1 mg daily.  Melatonin as needed.  Atarax as needed.

## 2019-07-23 ENCOUNTER — OFFICE VISIT (OUTPATIENT)
Dept: SPORTS MEDICINE | Facility: CLINIC | Age: 74
End: 2019-07-23
Payer: MEDICARE

## 2019-07-23 VITALS — TEMPERATURE: 98 F | HEIGHT: 55 IN | WEIGHT: 216 LBS | BODY MASS INDEX: 49.99 KG/M2

## 2019-07-23 DIAGNOSIS — M17.0 PRIMARY OSTEOARTHRITIS OF KNEES, BILATERAL: Primary | ICD-10-CM

## 2019-07-23 PROCEDURE — 99499 UNLISTED E&M SERVICE: CPT | Mod: S$GLB,,, | Performed by: FAMILY MEDICINE

## 2019-07-23 PROCEDURE — 20610 DRAIN/INJ JOINT/BURSA W/O US: CPT | Mod: 50,S$GLB,, | Performed by: FAMILY MEDICINE

## 2019-07-23 PROCEDURE — 20610 LARGE JOINT ASPIRATION/INJECTION: R KNEE, L KNEE: ICD-10-PCS | Mod: 50,S$GLB,, | Performed by: FAMILY MEDICINE

## 2019-07-23 PROCEDURE — 99999 PR PBB SHADOW E&M-EST. PATIENT-LVL III: CPT | Mod: PBBFAC,,, | Performed by: FAMILY MEDICINE

## 2019-07-23 PROCEDURE — 99499 NO LOS: ICD-10-PCS | Mod: S$GLB,,, | Performed by: FAMILY MEDICINE

## 2019-07-23 PROCEDURE — 99999 PR PBB SHADOW E&M-EST. PATIENT-LVL III: ICD-10-PCS | Mod: PBBFAC,,, | Performed by: FAMILY MEDICINE

## 2019-07-23 NOTE — PROGRESS NOTES
Euflexxa Right knee 3/3  LOT NO: [id number]  EXP DATE: 2020.08.25    Euflexxa Left Knee 1/3  LOT NO: [id number]  EXP DATE: 2020.08.25

## 2019-07-23 NOTE — PROCEDURES
"Large Joint Aspiration/Injection: R knee, L knee  Date/Time: 7/23/2019 10:22 AM  Performed by: Cj Caruso MD  Authorized by: Cj Caruso MD     Consent Done?:  Yes (Verbal)  Indications:  Pain  Procedure site marked: Yes    Timeout: Prior to procedure the correct patient, procedure, and site was verified      Location:  Knee  Site:  R knee and L knee  Prep: Patient was prepped and draped in usual sterile fashion    Ultrasonic Guidance for needle placement: Yes  Images are saved and documented.  Needle size:  20 G  Approach:  Lateral  Medications:  20 mg sodium hyaluronate (EUFLEXXA) 10 mg/mL(mw 2.4 -3.6 million); 20 mg sodium hyaluronate (EUFLEXXA) 10 mg/mL(mw 2.4 -3.6 million)  Patient tolerance:  Patient tolerated the procedure well with no immediate complications    Additional Comments: Description of ultrasound utilization for needle guidance:   Ultrasound guidance used for needle localization. Images saved and stored for documentation. The knee joint was visualized. Dynamic visualization of the 20g x 3.5" needle was continuous throughout the procedure.      "

## 2019-07-23 NOTE — PROCEDURES
Large Joint Aspiration/Injection: R knee, L knee  Date/Time: 7/23/2019 10:26 AM  Performed by: Cj Caruso MD  Authorized by: Cj Caruso MD     Consent Done?:  Yes (Verbal)  Indications:  Pain  Procedure site marked: Yes    Timeout: Prior to procedure the correct patient, procedure, and site was verified      Location:  Knee  Site:  R knee and L knee  Prep: Patient was prepped and draped in usual sterile fashion    Ultrasonic Guidance for needle placement: No  Needle size:  20 G  Approach:  Lateral  Medications:  20 mg sodium hyaluronate (EUFLEXXA) 10 mg/mL(mw 2.4 -3.6 million); 20 mg sodium hyaluronate (EUFLEXXA) 10 mg/mL(mw 2.4 -3.6 million)  Patient tolerance:  Patient tolerated the procedure well with no immediate complications

## 2019-07-30 ENCOUNTER — OFFICE VISIT (OUTPATIENT)
Dept: SPORTS MEDICINE | Facility: CLINIC | Age: 74
End: 2019-07-30
Payer: MEDICARE

## 2019-07-30 VITALS — BODY MASS INDEX: 49.99 KG/M2 | TEMPERATURE: 98 F | WEIGHT: 216 LBS | HEIGHT: 55 IN

## 2019-07-30 DIAGNOSIS — M17.12 PRIMARY OSTEOARTHRITIS OF LEFT KNEE: Primary | ICD-10-CM

## 2019-07-30 PROCEDURE — 99999 PR PBB SHADOW E&M-EST. PATIENT-LVL III: ICD-10-PCS | Mod: PBBFAC,,, | Performed by: FAMILY MEDICINE

## 2019-07-30 PROCEDURE — 99499 UNLISTED E&M SERVICE: CPT | Mod: S$GLB,,, | Performed by: FAMILY MEDICINE

## 2019-07-30 PROCEDURE — 99999 PR PBB SHADOW E&M-EST. PATIENT-LVL III: CPT | Mod: PBBFAC,,, | Performed by: FAMILY MEDICINE

## 2019-07-30 PROCEDURE — 20610 DRAIN/INJ JOINT/BURSA W/O US: CPT | Mod: LT,S$GLB,, | Performed by: FAMILY MEDICINE

## 2019-07-30 PROCEDURE — 20610 LARGE JOINT ASPIRATION/INJECTION: L KNEE: ICD-10-PCS | Mod: LT,S$GLB,, | Performed by: FAMILY MEDICINE

## 2019-07-30 PROCEDURE — 99499 NO LOS: ICD-10-PCS | Mod: S$GLB,,, | Performed by: FAMILY MEDICINE

## 2019-07-30 NOTE — PROCEDURES
Large Joint Aspiration/Injection: L knee  Date/Time: 7/30/2019 11:37 AM  Performed by: Cj Caruso MD  Authorized by: Cj Caruso MD     Consent Done?:  Yes (Verbal)  Indications:  Pain  Procedure site marked: Yes    Timeout: Prior to procedure the correct patient, procedure, and site was verified      Location:  Knee  Site:  L knee  Prep: Patient was prepped and draped in usual sterile fashion    Ultrasonic Guidance for needle placement: No  Needle size:  20 G  Approach:  Lateral  Medications:  20 mg sodium hyaluronate (EUFLEXXA) 10 mg/mL(mw 2.4 -3.6 million)  Patient tolerance:  Patient tolerated the procedure well with no immediate complications

## 2019-07-30 NOTE — PROCEDURES
"Large Joint Aspiration/Injection: L knee  Date/Time: 7/30/2019 11:37 AM  Performed by: Cj Caruso MD  Authorized by: Cj Caruso MD     Consent Done?:  Yes (Verbal)  Indications:  Pain  Procedure site marked: Yes    Timeout: Prior to procedure the correct patient, procedure, and site was verified      Location:  Knee  Site:  L knee  Prep: Patient was prepped and draped in usual sterile fashion    Ultrasonic Guidance for needle placement: Yes  Images are saved and documented.  Needle size:  20 G  Approach:  Lateral  Medications:  20 mg sodium hyaluronate (EUFLEXXA) 10 mg/mL(mw 2.4 -3.6 million)  Patient tolerance:  Patient tolerated the procedure well with no immediate complications    Additional Comments: Description of ultrasound utilization for needle guidance:   Ultrasound guidance used for needle localization. Images saved and stored for documentation. The knee joint was visualized. Dynamic visualization of the 20g x 3.5" needle was continuous throughout the procedure.      "

## 2019-08-01 RX ORDER — LORAZEPAM 1 MG/1
TABLET ORAL
Qty: 30 TABLET | Refills: 2 | Status: SHIPPED | OUTPATIENT
Start: 2019-08-01 | End: 2019-10-25 | Stop reason: SDUPTHER

## 2019-08-06 ENCOUNTER — OFFICE VISIT (OUTPATIENT)
Dept: SPORTS MEDICINE | Facility: CLINIC | Age: 74
End: 2019-08-06
Payer: MEDICARE

## 2019-08-06 VITALS — WEIGHT: 216 LBS | HEIGHT: 55 IN | TEMPERATURE: 98 F | BODY MASS INDEX: 49.99 KG/M2

## 2019-08-06 DIAGNOSIS — M17.12 PRIMARY OSTEOARTHRITIS OF LEFT KNEE: Primary | ICD-10-CM

## 2019-08-06 PROCEDURE — 99999 PR PBB SHADOW E&M-EST. PATIENT-LVL III: CPT | Mod: PBBFAC,,, | Performed by: FAMILY MEDICINE

## 2019-08-06 PROCEDURE — 20611 DRAIN/INJ JOINT/BURSA W/US: CPT | Mod: LT,S$GLB,, | Performed by: FAMILY MEDICINE

## 2019-08-06 PROCEDURE — 99499 NO LOS: ICD-10-PCS | Mod: S$GLB,,, | Performed by: FAMILY MEDICINE

## 2019-08-06 PROCEDURE — 99999 PR PBB SHADOW E&M-EST. PATIENT-LVL III: ICD-10-PCS | Mod: PBBFAC,,, | Performed by: FAMILY MEDICINE

## 2019-08-06 PROCEDURE — 99499 UNLISTED E&M SERVICE: CPT | Mod: S$GLB,,, | Performed by: FAMILY MEDICINE

## 2019-08-06 PROCEDURE — 20611 LARGE JOINT ASPIRATION/INJECTION: L KNEE: ICD-10-PCS | Mod: LT,S$GLB,, | Performed by: FAMILY MEDICINE

## 2019-08-06 NOTE — PROCEDURES
"Large Joint Aspiration/Injection: L knee  Date/Time: 8/6/2019 11:37 AM  Performed by: Cj Caruso MD  Authorized by: Cj Caruso MD     Consent Done?:  Yes (Verbal)  Indications:  Pain  Procedure site marked: Yes    Timeout: Prior to procedure the correct patient, procedure, and site was verified      Location:  Knee  Site:  L knee  Prep: Patient was prepped and draped in usual sterile fashion    Ultrasonic Guidance for needle placement: Yes  Images are saved and documented.  Needle size:  20 G  Approach:  Lateral  Medications:  20 mg sodium hyaluronate (EUFLEXXA) 10 mg/mL(mw 2.4 -3.6 million)  Patient tolerance:  Patient tolerated the procedure well with no immediate complications    Additional Comments: Description of ultrasound utilization for needle guidance:   Ultrasound guidance used for needle localization. Images saved and stored for documentation. The knee joint was visualized. Dynamic visualization of the 20g x 3.5" needle was continuous throughout the procedure.      "

## 2019-08-26 ENCOUNTER — TELEPHONE (OUTPATIENT)
Dept: NEUROLOGY | Facility: CLINIC | Age: 74
End: 2019-08-26

## 2019-08-26 NOTE — TELEPHONE ENCOUNTER
----- Message from Thaddeus Deshpande sent at 8/26/2019  2:17 PM CDT -----  Contact: pt @ [phone]  Pt is asking if she can take 1 capsule 4x daily of gabapentin (NEURONTIN) 300 MG capsule. If so, pt is asking a new script be sent to pharmacy below.    [facility] - [place] - [address]   [place] [address]  Phone: [phone] Fax: [phone]

## 2019-09-24 ENCOUNTER — TELEPHONE (OUTPATIENT)
Dept: INTERNAL MEDICINE | Facility: CLINIC | Age: 74
End: 2019-09-24

## 2019-10-04 ENCOUNTER — TELEPHONE (OUTPATIENT)
Dept: SPORTS MEDICINE | Facility: CLINIC | Age: 74
End: 2019-10-04

## 2019-10-04 NOTE — TELEPHONE ENCOUNTER
R/s patient's appointment.    Ernesto Hoffman   Sports Medicine Assistant   [facility]         ----- Message from Pauly Camacho sent at 10/4/2019  1:00 PM CDT -----  Contact: pt  Pt calling to ask if she can get a cordizone shot scheduled    Communication:[phone]

## 2019-10-15 ENCOUNTER — OFFICE VISIT (OUTPATIENT)
Dept: SPORTS MEDICINE | Facility: CLINIC | Age: 74
End: 2019-10-15
Payer: MEDICARE

## 2019-10-15 VITALS — BODY MASS INDEX: 49.99 KG/M2 | TEMPERATURE: 98 F | HEIGHT: 55 IN | WEIGHT: 216 LBS

## 2019-10-15 DIAGNOSIS — M25.562 CHRONIC PAIN OF LEFT KNEE: ICD-10-CM

## 2019-10-15 DIAGNOSIS — G89.29 CHRONIC PAIN OF LEFT KNEE: ICD-10-CM

## 2019-10-15 DIAGNOSIS — M17.12 PRIMARY OSTEOARTHRITIS OF LEFT KNEE: Primary | ICD-10-CM

## 2019-10-15 PROCEDURE — 99214 OFFICE O/P EST MOD 30 MIN: CPT | Mod: 25,S$GLB,, | Performed by: FAMILY MEDICINE

## 2019-10-15 PROCEDURE — 1101F PT FALLS ASSESS-DOCD LE1/YR: CPT | Mod: CPTII,S$GLB,, | Performed by: FAMILY MEDICINE

## 2019-10-15 PROCEDURE — 99999 PR PBB SHADOW E&M-EST. PATIENT-LVL III: ICD-10-PCS | Mod: PBBFAC,,, | Performed by: FAMILY MEDICINE

## 2019-10-15 PROCEDURE — 20611 DRAIN/INJ JOINT/BURSA W/US: CPT | Mod: LT,S$GLB,, | Performed by: FAMILY MEDICINE

## 2019-10-15 PROCEDURE — 99214 PR OFFICE/OUTPT VISIT, EST, LEVL IV, 30-39 MIN: ICD-10-PCS | Mod: 25,S$GLB,, | Performed by: FAMILY MEDICINE

## 2019-10-15 PROCEDURE — 99999 PR PBB SHADOW E&M-EST. PATIENT-LVL III: CPT | Mod: PBBFAC,,, | Performed by: FAMILY MEDICINE

## 2019-10-15 PROCEDURE — 20611 LARGE JOINT ASPIRATION/INJECTION: L KNEE: ICD-10-PCS | Mod: LT,S$GLB,, | Performed by: FAMILY MEDICINE

## 2019-10-15 PROCEDURE — 1101F PR PT FALLS ASSESS DOC 0-1 FALLS W/OUT INJ PAST YR: ICD-10-PCS | Mod: CPTII,S$GLB,, | Performed by: FAMILY MEDICINE

## 2019-10-15 RX ORDER — PSEUDOEPHED/CODEINE/TRIPROLIDN 30-10-1.25
1 SYRUP ORAL DAILY
COMMUNITY
End: 2020-09-11 | Stop reason: SDUPTHER

## 2019-10-15 RX ORDER — TRIAMCINOLONE ACETONIDE 40 MG/ML
40 INJECTION, SUSPENSION INTRA-ARTICULAR; INTRAMUSCULAR
Status: DISCONTINUED | OUTPATIENT
Start: 2019-10-15 | End: 2019-10-15 | Stop reason: HOSPADM

## 2019-10-15 RX ORDER — CHOLECALCIFEROL (VITAMIN D3) 25 MCG
1000 TABLET ORAL DAILY
COMMUNITY
End: 2020-09-11 | Stop reason: SDUPTHER

## 2019-10-15 RX ADMIN — TRIAMCINOLONE ACETONIDE 40 MG: 40 INJECTION, SUSPENSION INTRA-ARTICULAR; INTRAMUSCULAR at 02:10

## 2019-10-15 NOTE — PROCEDURES
"Large Joint Aspiration/Injection: L knee  Date/Time: 10/15/2019 2:15 PM  Performed by: Cj Caruso MD  Authorized by: Cj Caruso MD     Consent Done?:  Yes (Verbal)  Indications:  Pain  Procedure site marked: Yes    Timeout: Prior to procedure the correct patient, procedure, and site was verified      Location:  Knee  Site:  L knee  Prep: Patient was prepped and draped in usual sterile fashion    Needle size:  20 G  Ultrasonic Guidance for needle placement: Yes  Images are saved and documented.  Approach:  Lateral  Medications:  40 mg triamcinolone acetonide 40 mg/mL  Patient tolerance:  Patient tolerated the procedure well with no immediate complications    Additional Comments: Description of ultrasound utilization for needle guidance:   Ultrasound guidance used for needle localization. Images saved and stored for documentation. The knee joint was visualized. Dynamic visualization of the 20g x 3.5" needle was continuous throughout the procedure.      "

## 2019-10-15 NOTE — PROGRESS NOTES
Purvi Quiroga, a 74 y.o. female, presents today for evaluation of her right and Left knee.      History of Present Illness (HPI)  Location: anterior knee, right  Onset: Chronic,   Palliative:    Relative rest   Oral analgesics - failed NSAIDS/Acetaminophen    CSI, 1991   CSI, RSarita Grier, 19.05.28, 50% Improvement   SAIRA PEREIRA, 06.17.2019   VSI, L/R Cherrie, 08.06.2019, R. 100% Improvement continued, L. 70% for 10 weeks  Provocative:    ADLS   Prolonged ambulation  Prior: none  Progression: worsening discomfort  Quality:    sharp pain  Radiation: none  Severity: per nursing documentation  Timing: intermittent w/ use  Trauma: none specific    Review of Systems (ROS)  A 10+ review of systems was performed with pertinent positives and negatives noted above in the history of present illness. Other systems were negative unless otherwise specified.    Physical Examination (PE)  General:  The patient is alert and oriented x 3. Mood is pleasant. Observation of ears, eyes and nose reveal no gross abnormalities. HEENT: NCAT, sclera anicteric.   Lungs: Respirations are equal and unlabored.  Gait is coordinated. Patient can toe walk and heel walk without difficulty.    RIGHT KNEE EXAMINATION    Observation/Inspection  Gait:   Nonantalgic   Alignment:  Neutral   Scars:   None   Muscle atrophy: Mild  Effusion:  None   Warmth:  None   Discoloration:   none     Tenderness / Crepitus (T / C):         T / C      T / C  Patella   - / -   Lateral joint line   - / -     Peripatellar medial  -  Medial joint line    + / -  Peripatellar lateral -  Medial plica   - / -  Patellar tendon -   Popliteal fossa   - / -  Quad tendon   -   Gastrocnemius   -  Prepatellar Bursa - / -   Quadricep   -  Tibial tubercle  -  Thigh/hamstring  -  Pes anserine/HS -  Fibula    -  ITB   - / -  Tibia     -  Tib/fib joint  - / -  LCL    -    MFC   - / -   MCL: Proximal  -    LFC   - / -   Distal    -          ROM: (* = pain)  PASSIVE   ACTIVE    Left :   5  / 0 / 145   5 / 0 / 145     Right :    5 / 0 / 145*   5 / 0 / 145*    Patellofemoral examination:  See above noted areas of tenderness.   Patella position    Subluxation / dislocation: Centered        Sup. / Inf;   Normal   Crepitus (PF):    Absent   Patellar Mobility:       Medial-lateral:   Normal    Superior-inferior:  Normal    Inferior tilt   Normal    Patellar tendon:  Normal   Lateral tilt:    Normal   J-sign:     None   Patellofemoral grind:   No pain     Meniscal Signs:     Pain on terminal extension:  +  Pain on terminal flexion:  +  Kierras maneuver:  +*  Squat     NT  Thessaly    NT    Ligament Examination:  ACL / Lachman:  WNL  PCL-Post.  drawer: normal 0 to 2mm  MCL- Valgus:  normal 0 to 2mm  LCL- Varus:    normal 0 to 2mm  Pivot shift:  guarding   Dial Test:   difference c/w other side   At 30° flexion: normal (< 5°)    At 90° flexion: normal (< 5°)   Reverse Pivot Shift:   normal (Equal)     Strength: (* = with pain) Painful Side  Quadriceps   3+/5 *  Hamstring:   3+/5 *    Extremity Neuro-vascular Examination:   Sensation:  Grossly intact to light touch all dermatomal regions.   Motor Function:  Fully intact motor function at hip, knee, foot and ankle    DTRs;  quadriceps and  achilles 2+.  No clonus and downgoing Babinski.    Vascular status:  DP and PT pulses 2+, brisk capillary refill, symmetric.     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, bilat   W/ knee pain, left >>> right    No evidence of neurologic pathology  No evidence of vascular pathology    Imaging studies reviewed:   X-ray knee, bilateral 19.05    Plan:    We discussed the importance of appropriate diet, weight, and regular exercise including quadriceps strengthening     We discussed options including:  #1 watchful waiting  #2 physical therapy aimed at:   Core stability   RoM knee   Strengthening quadriceps   Gait training   #3 injection therapy:   CSI iaknee      Right, effective good%, repeat    Left,    VSI iaknee    Right,     Left,    Orthobiologics   #4 consultation      The patient chooses #3 csi iaknee left    Pain management: handout given  Bracing: cane, prn  Physical therapy: nfPT, discussed again today, deferred again today by pt  Activity (e.g. sports, work) restrictions: as tolerated   school/vocation: retired    Follow up in 12 w  I = vsi iaknee   Should symptoms worsen or fail to resolve, consider:  Revisiting the above options

## 2019-10-18 ENCOUNTER — PATIENT OUTREACH (OUTPATIENT)
Dept: ADMINISTRATIVE | Facility: HOSPITAL | Age: 74
End: 2019-10-18

## 2019-10-22 RX ORDER — GABAPENTIN 300 MG/1
CAPSULE ORAL
Qty: 90 CAPSULE | Refills: 0 | Status: SHIPPED | OUTPATIENT
Start: 2019-10-22 | End: 2019-11-19 | Stop reason: SDUPTHER

## 2019-10-25 RX ORDER — LORAZEPAM 1 MG/1
TABLET ORAL
Qty: 30 TABLET | Refills: 0 | Status: SHIPPED | OUTPATIENT
Start: 2019-10-25 | End: 2019-12-02

## 2019-10-30 ENCOUNTER — TELEPHONE (OUTPATIENT)
Dept: INTERNAL MEDICINE | Facility: CLINIC | Age: 74
End: 2019-10-30

## 2019-10-30 DIAGNOSIS — R73.01 IMPAIRED FASTING BLOOD SUGAR: Chronic | ICD-10-CM

## 2019-10-30 DIAGNOSIS — I10 ESSENTIAL HYPERTENSION: Primary | Chronic | ICD-10-CM

## 2019-10-30 DIAGNOSIS — E78.49 OTHER HYPERLIPIDEMIA: Chronic | ICD-10-CM

## 2019-10-30 NOTE — TELEPHONE ENCOUNTER
----- Message from Alexis Rust sent at 10/30/2019 10:41 AM CDT -----  Contact: Pt [phone]  The patient is requesting orders to put in before her visit on 11/8/19.    Please call and advise.    Thank you

## 2019-11-02 LAB
ALBUMIN SERPL-MCNC: 4.1 G/DL (ref 3.6–5.1)
ALBUMIN/GLOB SERPL: 1.6 (CALC) (ref 1–2.5)
ALP SERPL-CCNC: 91 U/L (ref 33–130)
ALT SERPL-CCNC: 10 U/L (ref 6–29)
AST SERPL-CCNC: 13 U/L (ref 10–35)
BASOPHILS # BLD AUTO: 80 CELLS/UL (ref 0–200)
BASOPHILS NFR BLD AUTO: 0.7 %
BILIRUB SERPL-MCNC: 0.6 MG/DL (ref 0.2–1.2)
BUN SERPL-MCNC: 27 MG/DL (ref 7–25)
BUN/CREAT SERPL: 28 (CALC) (ref 6–22)
CALCIUM SERPL-MCNC: 8.9 MG/DL (ref 8.6–10.4)
CHLORIDE SERPL-SCNC: 103 MMOL/L (ref 98–110)
CHOLEST SERPL-MCNC: 141 MG/DL
CHOLEST/HDLC SERPL: 2.3 (CALC)
CO2 SERPL-SCNC: 27 MMOL/L (ref 20–32)
CREAT SERPL-MCNC: 0.97 MG/DL (ref 0.6–0.93)
EOSINOPHIL # BLD AUTO: 205 CELLS/UL (ref 15–500)
EOSINOPHIL NFR BLD AUTO: 1.8 %
ERYTHROCYTE [DISTWIDTH] IN BLOOD BY AUTOMATED COUNT: 12.7 % (ref 11–15)
GFRSERPLBLD MDRD-ARVRAT: 58 ML/MIN/1.73M2
GLOBULIN SER CALC-MCNC: 2.6 G/DL (CALC) (ref 1.9–3.7)
GLUCOSE SERPL-MCNC: 89 MG/DL (ref 65–99)
HBA1C MFR BLD: 5.6 % OF TOTAL HGB
HCT VFR BLD AUTO: 42.4 % (ref 35–45)
HDLC SERPL-MCNC: 62 MG/DL
HGB BLD-MCNC: 14.5 G/DL (ref 11.7–15.5)
LDLC SERPL CALC-MCNC: 63 MG/DL (CALC)
LYMPHOCYTES # BLD AUTO: 3363 CELLS/UL (ref 850–3900)
LYMPHOCYTES NFR BLD AUTO: 29.5 %
MCH RBC QN AUTO: 32.4 PG (ref 27–33)
MCHC RBC AUTO-ENTMCNC: 34.2 G/DL (ref 32–36)
MCV RBC AUTO: 94.6 FL (ref 80–100)
MONOCYTES # BLD AUTO: 821 CELLS/UL (ref 200–950)
MONOCYTES NFR BLD AUTO: 7.2 %
NEUTROPHILS # BLD AUTO: 6931 CELLS/UL (ref 1500–7800)
NEUTROPHILS NFR BLD AUTO: 60.8 %
NONHDLC SERPL-MCNC: 79 MG/DL (CALC)
PLATELET # BLD AUTO: 237 THOUSAND/UL (ref 140–400)
PMV BLD REES-ECKER: 11 FL (ref 7.5–12.5)
POTASSIUM SERPL-SCNC: 3.8 MMOL/L (ref 3.5–5.3)
PROT SERPL-MCNC: 6.7 G/DL (ref 6.1–8.1)
RBC # BLD AUTO: 4.48 MILLION/UL (ref 3.8–5.1)
SODIUM SERPL-SCNC: 141 MMOL/L (ref 135–146)
TRIGL SERPL-MCNC: 75 MG/DL
TSH SERPL-ACNC: 3.21 MIU/L (ref 0.4–4.5)
WBC # BLD AUTO: 11.4 THOUSAND/UL (ref 3.8–10.8)

## 2019-11-07 NOTE — PROGRESS NOTES
Subjective:       Patient ID: Purvi Quiroga is a 74 y.o. female.    Chief Complaint: Follow-up    HPI     74-year-old female here for follow-up of chronic medical conditions.    HTN -  Patient's co morbidities include: HLD. Patient is currently on HCTZ 25 mg, Lopressor 50 mg b.i.d., irbesartan 150 mg. She does not check her BP at home. Side effects of medications note: none. Denies blurred vision, chest pain, shortness of breath, nausea.    HLD - Patient is currently on Lipitor 20 mg.  Her last lipid panel was   Cholesterol   Date Value Ref Range Status   11/01/2019 141 <200 mg/dL Final     Triglycerides   Date Value Ref Range Status   11/01/2019 75 <150 mg/dL Final     HDL   Date Value Ref Range Status   11/01/2019 62 >50 mg/dL Final     LDL Cholesterol   Date Value Ref Range Status   11/01/2019 63 mg/dL (calc) Final     Comment:     Reference range: <100     Desirable range <100 mg/dL for primary prevention;    <70 mg/dL for patients with CHD or diabetic patients   with > or = 2 CHD risk factors.     LDL-C is now calculated using the Isidro-Brown   calculation, which is a validated novel method providing   better accuracy than the Friedewald equation in the   estimation of LDL-C.   Isidro SS et al. MEAGHNA. 2013;310(19): 0451-0438   (http://education.[employer].com/faq/LVX153)     .  Side effects of the medication: none.    Patient has insomnia and takes Ativan 1 mg nightly.  She is asking for something for sleep.  She has been on this medication for more than 2 yrs.  She thinks she has taken trazodone in the past.    Patient has anxiety and is on Paxil 40 mg daily, Elavil 10 mg.    She had a bone density [facility] at [facility].    She has gabapentin for her headaches.  This is helping with her headaches.  She sees Dr. Leigh in 1-2 weeks.  She would like to increase her gabapentin.  She wakes up in the am with a headache or pressure in her head.    Review of Systems    Objective:      Physical Exam    Constitutional: She is oriented to person, place, and time. She appears well-developed and well-nourished.   HENT:   Head: Normocephalic and atraumatic.   Mouth/Throat: No oropharyngeal exudate.   Eyes: Pupils are equal, round, and reactive to light. EOM are normal. Right eye exhibits no discharge. Left eye exhibits no discharge. No scleral icterus.   Neck: Normal range of motion. Neck supple. No tracheal deviation present. No thyromegaly present.   Cardiovascular: Normal rate, regular rhythm and normal heart sounds. Exam reveals no gallop and no friction rub.   No murmur heard.  Pulmonary/Chest: Effort normal and breath sounds normal. No respiratory distress. She has no wheezes. She has no rales. She exhibits no tenderness.   Abdominal: Soft. Bowel sounds are normal. She exhibits no distension and no mass. There is no tenderness. There is no rebound and no guarding.   Musculoskeletal: Normal range of motion. She exhibits no edema or tenderness.   Neurological: She is alert and oriented to person, place, and time.   Skin: Skin is warm and dry. No rash noted. No erythema. No pallor.   Psychiatric: She has a normal mood and affect. Her behavior is normal.   Vitals reviewed.      Assessment:       1. Essential hypertension    2. Other hyperlipidemia    3. Anxiety    4. Insomnia, unspecified type    5. CKD (chronic kidney disease), stage III        Plan:       1.  Continue HCTZ 25 mg, Lopressor 50 mg b.i.d..  Increase irbesartan 150 mg twice daily.  Return to clinic in 1 month to reassess.  Check blood pressures twice daily.  2.  Continue Lipitor 20 mg daily.  3.  Continue Paxil 10 mg daily.  4.  Transition from Ativan 1 mg to 0.5 mg nightly for week.  Try trazodone 50 mg plus melatonin between 3 and 10 mg between 1 4 hr before sleep.  5.  Monitor.  Advised patient to drink more water.

## 2019-11-08 ENCOUNTER — OFFICE VISIT (OUTPATIENT)
Dept: INTERNAL MEDICINE | Facility: CLINIC | Age: 74
End: 2019-11-08
Payer: MEDICARE

## 2019-11-08 VITALS
TEMPERATURE: 99 F | SYSTOLIC BLOOD PRESSURE: 140 MMHG | BODY MASS INDEX: 50.61 KG/M2 | RESPIRATION RATE: 20 BRPM | HEART RATE: 71 BPM | WEIGHT: 218.69 LBS | DIASTOLIC BLOOD PRESSURE: 90 MMHG | HEIGHT: 55 IN

## 2019-11-08 DIAGNOSIS — N18.30 CKD (CHRONIC KIDNEY DISEASE), STAGE III: Chronic | ICD-10-CM

## 2019-11-08 DIAGNOSIS — E78.49 OTHER HYPERLIPIDEMIA: Chronic | ICD-10-CM

## 2019-11-08 DIAGNOSIS — G47.00 INSOMNIA, UNSPECIFIED TYPE: Chronic | ICD-10-CM

## 2019-11-08 DIAGNOSIS — I10 ESSENTIAL HYPERTENSION: Primary | Chronic | ICD-10-CM

## 2019-11-08 DIAGNOSIS — F41.9 ANXIETY: Chronic | ICD-10-CM

## 2019-11-08 PROCEDURE — 99214 OFFICE O/P EST MOD 30 MIN: CPT | Mod: S$GLB,,, | Performed by: INTERNAL MEDICINE

## 2019-11-08 PROCEDURE — 3077F PR MOST RECENT SYSTOLIC BLOOD PRESSURE >= 140 MM HG: ICD-10-PCS | Mod: CPTII,S$GLB,, | Performed by: INTERNAL MEDICINE

## 2019-11-08 PROCEDURE — 3078F PR MOST RECENT DIASTOLIC BLOOD PRESSURE < 80 MM HG: ICD-10-PCS | Mod: CPTII,S$GLB,, | Performed by: INTERNAL MEDICINE

## 2019-11-08 PROCEDURE — 1101F PT FALLS ASSESS-DOCD LE1/YR: CPT | Mod: CPTII,S$GLB,, | Performed by: INTERNAL MEDICINE

## 2019-11-08 PROCEDURE — 99499 RISK ADDL DX/OHS AUDIT: ICD-10-PCS | Mod: S$GLB,,, | Performed by: INTERNAL MEDICINE

## 2019-11-08 PROCEDURE — 1101F PR PT FALLS ASSESS DOC 0-1 FALLS W/OUT INJ PAST YR: ICD-10-PCS | Mod: CPTII,S$GLB,, | Performed by: INTERNAL MEDICINE

## 2019-11-08 PROCEDURE — 99499 UNLISTED E&M SERVICE: CPT | Mod: S$GLB,,, | Performed by: INTERNAL MEDICINE

## 2019-11-08 PROCEDURE — 3077F SYST BP >= 140 MM HG: CPT | Mod: CPTII,S$GLB,, | Performed by: INTERNAL MEDICINE

## 2019-11-08 PROCEDURE — 99999 PR PBB SHADOW E&M-EST. PATIENT-LVL III: CPT | Mod: PBBFAC,,, | Performed by: INTERNAL MEDICINE

## 2019-11-08 PROCEDURE — 99214 PR OFFICE/OUTPT VISIT, EST, LEVL IV, 30-39 MIN: ICD-10-PCS | Mod: S$GLB,,, | Performed by: INTERNAL MEDICINE

## 2019-11-08 PROCEDURE — 99999 PR PBB SHADOW E&M-EST. PATIENT-LVL III: ICD-10-PCS | Mod: PBBFAC,,, | Performed by: INTERNAL MEDICINE

## 2019-11-08 PROCEDURE — 3078F DIAST BP <80 MM HG: CPT | Mod: CPTII,S$GLB,, | Performed by: INTERNAL MEDICINE

## 2019-11-08 RX ORDER — IRBESARTAN 150 MG/1
150 TABLET ORAL 2 TIMES DAILY
Qty: 180 TABLET | Refills: 3 | Status: SHIPPED | OUTPATIENT
Start: 2019-11-08 | End: 2020-03-06 | Stop reason: SDUPTHER

## 2019-11-08 RX ORDER — TRAZODONE HYDROCHLORIDE 50 MG/1
50 TABLET ORAL NIGHTLY PRN
Qty: 30 TABLET | Refills: 3 | Status: SHIPPED | OUTPATIENT
Start: 2019-11-08 | End: 2019-11-20

## 2019-11-14 ENCOUNTER — PATIENT OUTREACH (OUTPATIENT)
Dept: ADMINISTRATIVE | Facility: OTHER | Age: 74
End: 2019-11-14

## 2019-11-19 ENCOUNTER — OFFICE VISIT (OUTPATIENT)
Dept: NEUROLOGY | Facility: CLINIC | Age: 74
End: 2019-11-19
Payer: MEDICARE

## 2019-11-19 VITALS
WEIGHT: 224.63 LBS | HEART RATE: 71 BPM | BODY MASS INDEX: 51.98 KG/M2 | HEIGHT: 55 IN | SYSTOLIC BLOOD PRESSURE: 156 MMHG | DIASTOLIC BLOOD PRESSURE: 108 MMHG

## 2019-11-19 DIAGNOSIS — G43.009 MIGRAINE WITHOUT AURA AND WITHOUT STATUS MIGRAINOSUS, NOT INTRACTABLE: Primary | ICD-10-CM

## 2019-11-19 PROCEDURE — 3077F PR MOST RECENT SYSTOLIC BLOOD PRESSURE >= 140 MM HG: ICD-10-PCS | Mod: CPTII,S$GLB,, | Performed by: NEUROMUSCULOSKELETAL MEDICINE & OMM

## 2019-11-19 PROCEDURE — 3080F PR MOST RECENT DIASTOLIC BLOOD PRESSURE >= 90 MM HG: ICD-10-PCS | Mod: CPTII,S$GLB,, | Performed by: NEUROMUSCULOSKELETAL MEDICINE & OMM

## 2019-11-19 PROCEDURE — 1126F PR PAIN SEVERITY QUANTIFIED, NO PAIN PRESENT: ICD-10-PCS | Mod: S$GLB,,, | Performed by: NEUROMUSCULOSKELETAL MEDICINE & OMM

## 2019-11-19 PROCEDURE — 3080F DIAST BP >= 90 MM HG: CPT | Mod: CPTII,S$GLB,, | Performed by: NEUROMUSCULOSKELETAL MEDICINE & OMM

## 2019-11-19 PROCEDURE — 99214 PR OFFICE/OUTPT VISIT, EST, LEVL IV, 30-39 MIN: ICD-10-PCS | Mod: S$GLB,,, | Performed by: NEUROMUSCULOSKELETAL MEDICINE & OMM

## 2019-11-19 PROCEDURE — 1101F PR PT FALLS ASSESS DOC 0-1 FALLS W/OUT INJ PAST YR: ICD-10-PCS | Mod: CPTII,S$GLB,, | Performed by: NEUROMUSCULOSKELETAL MEDICINE & OMM

## 2019-11-19 PROCEDURE — 3077F SYST BP >= 140 MM HG: CPT | Mod: CPTII,S$GLB,, | Performed by: NEUROMUSCULOSKELETAL MEDICINE & OMM

## 2019-11-19 PROCEDURE — 99499 UNLISTED E&M SERVICE: CPT | Mod: S$GLB,,, | Performed by: NEUROMUSCULOSKELETAL MEDICINE & OMM

## 2019-11-19 PROCEDURE — 1159F PR MEDICATION LIST DOCUMENTED IN MEDICAL RECORD: ICD-10-PCS | Mod: S$GLB,,, | Performed by: NEUROMUSCULOSKELETAL MEDICINE & OMM

## 2019-11-19 PROCEDURE — 99499 RISK ADDL DX/OHS AUDIT: ICD-10-PCS | Mod: S$GLB,,, | Performed by: NEUROMUSCULOSKELETAL MEDICINE & OMM

## 2019-11-19 PROCEDURE — 99999 PR PBB SHADOW E&M-EST. PATIENT-LVL III: ICD-10-PCS | Mod: PBBFAC,,, | Performed by: NEUROMUSCULOSKELETAL MEDICINE & OMM

## 2019-11-19 PROCEDURE — 99214 OFFICE O/P EST MOD 30 MIN: CPT | Mod: S$GLB,,, | Performed by: NEUROMUSCULOSKELETAL MEDICINE & OMM

## 2019-11-19 PROCEDURE — 1101F PT FALLS ASSESS-DOCD LE1/YR: CPT | Mod: CPTII,S$GLB,, | Performed by: NEUROMUSCULOSKELETAL MEDICINE & OMM

## 2019-11-19 PROCEDURE — 1126F AMNT PAIN NOTED NONE PRSNT: CPT | Mod: S$GLB,,, | Performed by: NEUROMUSCULOSKELETAL MEDICINE & OMM

## 2019-11-19 PROCEDURE — 1159F MED LIST DOCD IN RCRD: CPT | Mod: S$GLB,,, | Performed by: NEUROMUSCULOSKELETAL MEDICINE & OMM

## 2019-11-19 PROCEDURE — 99999 PR PBB SHADOW E&M-EST. PATIENT-LVL III: CPT | Mod: PBBFAC,,, | Performed by: NEUROMUSCULOSKELETAL MEDICINE & OMM

## 2019-11-19 RX ORDER — GABAPENTIN 300 MG/1
300 CAPSULE ORAL 3 TIMES DAILY
Qty: 90 CAPSULE | Refills: 11 | Status: SHIPPED | OUTPATIENT
Start: 2019-11-19 | End: 2020-02-27 | Stop reason: SDUPTHER

## 2019-11-19 RX ORDER — GABAPENTIN 300 MG/1
300 CAPSULE ORAL 3 TIMES DAILY
Qty: 90 CAPSULE | Refills: 3 | Status: SHIPPED | OUTPATIENT
Start: 2019-11-19 | End: 2019-11-21

## 2019-11-20 ENCOUNTER — TELEPHONE (OUTPATIENT)
Dept: INTERNAL MEDICINE | Facility: CLINIC | Age: 74
End: 2019-11-20

## 2019-11-20 RX ORDER — TRAZODONE HYDROCHLORIDE 100 MG/1
100 TABLET ORAL NIGHTLY PRN
Qty: 90 TABLET | Refills: 3 | Status: SHIPPED | OUTPATIENT
Start: 2019-11-20 | End: 2019-11-29

## 2019-11-20 NOTE — PROGRESS NOTES
Social History : Patient is retired. She walks with a cane due to her arthritis.   Present history:  Patient presents for follow-up for headaches.  They are less frequent and less intense with pattern of couple times per week.  She takes gabapentin 300 mg 3 times a day in seems to do well with this regimen with the headaches.   previous note:  10- 16-18This is a 73-year-old female who presents with a history of migraine headaches for which she takes Fioricet almost on a daily basis. Up until 2 weeks ago she was having daily headaches over the last year to. She describes the headaches as a frontal pressure pain associated with photophobia, nausea occasionally but no vomiting. The bad headaches are 5-6/10 intensity. She has a frontal pressure headache right now that is 3-4/10. She was taking Neurontin but states that she could not drink alcohol with the Neurontin so she stopped the Neurontin so that she can have her alcoholic drinks. She has poor sleep with difficulty getting to sleep and takes Ativan 1 mg with melatonin. She feels the Ativan 1 mg is not enough for her to get to sleep. She denies any focal symptoms other than she does complain at times she drops things from the right hand. She is left-handed. She does complain the headaches are bad enough that she would like to take preventive medicines for the headaches. She denies any particular triggers. She denies any aura with the headaches.   Neurological Exam:   Mental status-alert and oriented to person, place, and time; attention span and concentration is good. Fund of knowledge-patient is aware of current events and able to give detailed history of the current problem.recent and remote memory seems intact. Language function is normal with no evidence of aphasia   Cranial nerves:Visual acuity to hand chart -normal; visual fields to confrontation normal;pupils were equal and reactive to light ;no evidence of ptosis ; funduscopic examination was normal with  sharp disc margins. external ocular movements were full with no nystagmus. Facial sensation to pinprick : normal ; corneal reflexes intact; Facial muscles were symmetrical. Hearing is unimpaired symmetrical finger rub; Tongue movements - normal ; palate movements - normal ;Swallowing unimpaired. Shoulder shrug was intact with good strength Speech was normal   Motor examination: Upper : normal   Lower extremities - Normal;muscle tone was normal ; left-handed   Sensory examination: Upper; normal pinprick and soft touch ;   Lower extremities - normal and symmetrical.   Vibration sense: 15-20 seconds @ toes   Deep tendon reflexes: upper extremities :1-2+ symmetrical ;   lower extremities KJ- 1-2 +; AJ - 1-2+ Both plantar responses were flexor   Cerebellar examination upper: Normal finger to nose and rapid alternating movements   Gait: Steady with no ataxia; heel and toe walk normal   Romberg test: negative Tandem gait: Normal   Involuntary movements: Negative   TMJ - no tenderness   Cervical examination: Full range of motion with no pain Cervical tenderness :negative   Lumbar examination: Low back tenderness-negative   Sciatic notchtenderness-negative Straight leg raising : negative   Impression: Migraine without aura; insomnia; 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 Follow-up 3 months with headache diary.;  Follow-up 6 months

## 2019-11-20 NOTE — TELEPHONE ENCOUNTER
----- Message from Jarad Monreal sent at 11/20/2019  1:34 PM CST -----  Contact: self/[phone]  Pt is calling stating that traZODone (DESYREL) 50 MG tablet is not working for her. She slept for 2 hours and has been up since. Pt wants to take 100 MG. Please advise.        Thank You

## 2019-11-21 ENCOUNTER — TELEPHONE (OUTPATIENT)
Dept: NEUROLOGY | Facility: CLINIC | Age: 74
End: 2019-11-21

## 2019-11-21 NOTE — TELEPHONE ENCOUNTER
----- Message from Meredith Flynn sent at 11/21/2019  1:42 PM CST -----  Contact: self @ [phone]  Pt is calling to see why her prescription for gabapentin (NEURONTIN) 300 MG capsule states she needs to make an appt before she can get any more refills.  Pls call to clarify.        There are 2 prescriptions in the system one with 3 refills and the note about making an appt and the other has 11 refills with no note.

## 2019-11-29 ENCOUNTER — TELEPHONE (OUTPATIENT)
Dept: INTERNAL MEDICINE | Facility: CLINIC | Age: 74
End: 2019-11-29

## 2019-11-29 NOTE — TELEPHONE ENCOUNTER
"Spoke with patient, states trazodone is causing "weird feelings" side effects. Reports it makes her feel sick, makes her have bizarre dreams and think she " seeing weird things". Wants to stop this and go back to lorazepam. Instructed to hold trazodone until advised further. (uploaded lorazepam pending decision)  "

## 2019-11-29 NOTE — TELEPHONE ENCOUNTER
----- Message from Basilio Mann sent at 11/29/2019 10:06 AM CST -----  Contact: Self [phone]  Patient would like an call back from the nurse in regards to sleeping meds, please advise.

## 2019-12-02 ENCOUNTER — TELEPHONE (OUTPATIENT)
Dept: INTERNAL MEDICINE | Facility: CLINIC | Age: 74
End: 2019-12-02

## 2019-12-02 RX ORDER — LORAZEPAM 0.5 MG/1
0.5 TABLET ORAL NIGHTLY PRN
Qty: 30 TABLET | Refills: 0 | Status: SHIPPED | OUTPATIENT
Start: 2019-12-02 | End: 2019-12-26

## 2019-12-02 NOTE — TELEPHONE ENCOUNTER
checked.  No suspicious activity noted.  Refill done.    Sent Ativan 0.5 mg to the pharmacy.  This is a little step-down from which she was taking before.

## 2019-12-02 NOTE — TELEPHONE ENCOUNTER
----- Message from Aaliyah Morfin sent at 12/2/2019  1:17 PM CST -----  Contact: self/ [phone]  Patient states she asked for a rx for LORazepam (ATIVAN) 1 MG tablet which that is what she had been taking  and not LORazepam (ATIVAN) 0.5 MG tablet.Patient would like a call back from the nurse .     [facility] [place] - [address] [place] [address] [phone] (Phone)  [phone] (Fax)

## 2019-12-02 NOTE — TELEPHONE ENCOUNTER
Spoke with patient, notified Dr Burgos is tapering dose to 0.5mg daily as previously discussed. Patient states she wants to remain on 1mg because trazadone caused too many problems and she doesn't want to wean down because this is the only med that worked. Spoke with Dr Burgos who states she needs to try the lower dose. Patient will discuss at upcoming visit in 2 weeks.

## 2019-12-03 RX ORDER — LORAZEPAM 1 MG/1
TABLET ORAL
Qty: 30 TABLET | OUTPATIENT
Start: 2019-12-03

## 2019-12-03 NOTE — TELEPHONE ENCOUNTER
Notified patient Dr Burgos called in 0.5mg lorazepam to pharmacy. Dr Burgos will discussed need for sleep medication and lorazepam dose increase at appt in two weeks.

## 2019-12-03 NOTE — TELEPHONE ENCOUNTER
----- Message from Lili Gannon sent at 12/3/2019  1:33 PM CST -----  Contact: Self Call [phone]  Patient left a message yesterday and the nurse was to give her a call back about an Rx for  LORazepam (ATIVAN) 0.5 MG tablet.

## 2019-12-17 ENCOUNTER — OFFICE VISIT (OUTPATIENT)
Dept: INTERNAL MEDICINE | Facility: CLINIC | Age: 74
End: 2019-12-17
Payer: MEDICARE

## 2019-12-17 VITALS
WEIGHT: 227.06 LBS | HEIGHT: 55 IN | SYSTOLIC BLOOD PRESSURE: 132 MMHG | TEMPERATURE: 98 F | RESPIRATION RATE: 14 BRPM | BODY MASS INDEX: 52.55 KG/M2 | HEART RATE: 70 BPM | DIASTOLIC BLOOD PRESSURE: 80 MMHG

## 2019-12-17 DIAGNOSIS — F41.9 ANXIETY: Chronic | ICD-10-CM

## 2019-12-17 DIAGNOSIS — I10 ESSENTIAL HYPERTENSION: Primary | Chronic | ICD-10-CM

## 2019-12-17 DIAGNOSIS — G47.00 INSOMNIA, UNSPECIFIED TYPE: Chronic | ICD-10-CM

## 2019-12-17 DIAGNOSIS — R35.1 NOCTURIA: ICD-10-CM

## 2019-12-17 DIAGNOSIS — E78.49 OTHER HYPERLIPIDEMIA: Chronic | ICD-10-CM

## 2019-12-17 DIAGNOSIS — R06.02 SOB (SHORTNESS OF BREATH): ICD-10-CM

## 2019-12-17 PROCEDURE — 3075F PR MOST RECENT SYSTOLIC BLOOD PRESS GE 130-139MM HG: ICD-10-PCS | Mod: CPTII,S$GLB,, | Performed by: INTERNAL MEDICINE

## 2019-12-17 PROCEDURE — 93005 EKG 12-LEAD: ICD-10-PCS | Mod: S$GLB,,, | Performed by: INTERNAL MEDICINE

## 2019-12-17 PROCEDURE — 1159F MED LIST DOCD IN RCRD: CPT | Mod: S$GLB,,, | Performed by: INTERNAL MEDICINE

## 2019-12-17 PROCEDURE — 3079F PR MOST RECENT DIASTOLIC BLOOD PRESSURE 80-89 MM HG: ICD-10-PCS | Mod: CPTII,S$GLB,, | Performed by: INTERNAL MEDICINE

## 2019-12-17 PROCEDURE — 1126F PR PAIN SEVERITY QUANTIFIED, NO PAIN PRESENT: ICD-10-PCS | Mod: S$GLB,,, | Performed by: INTERNAL MEDICINE

## 2019-12-17 PROCEDURE — 1126F AMNT PAIN NOTED NONE PRSNT: CPT | Mod: S$GLB,,, | Performed by: INTERNAL MEDICINE

## 2019-12-17 PROCEDURE — 93010 ELECTROCARDIOGRAM REPORT: CPT | Mod: S$GLB,,, | Performed by: INTERNAL MEDICINE

## 2019-12-17 PROCEDURE — 3075F SYST BP GE 130 - 139MM HG: CPT | Mod: CPTII,S$GLB,, | Performed by: INTERNAL MEDICINE

## 2019-12-17 PROCEDURE — 99999 PR PBB SHADOW E&M-EST. PATIENT-LVL III: ICD-10-PCS | Mod: PBBFAC,,, | Performed by: INTERNAL MEDICINE

## 2019-12-17 PROCEDURE — 93010 EKG 12-LEAD: ICD-10-PCS | Mod: S$GLB,,, | Performed by: INTERNAL MEDICINE

## 2019-12-17 PROCEDURE — 3079F DIAST BP 80-89 MM HG: CPT | Mod: CPTII,S$GLB,, | Performed by: INTERNAL MEDICINE

## 2019-12-17 PROCEDURE — 1101F PR PT FALLS ASSESS DOC 0-1 FALLS W/OUT INJ PAST YR: ICD-10-PCS | Mod: CPTII,S$GLB,, | Performed by: INTERNAL MEDICINE

## 2019-12-17 PROCEDURE — 99999 PR PBB SHADOW E&M-EST. PATIENT-LVL III: CPT | Mod: PBBFAC,,, | Performed by: INTERNAL MEDICINE

## 2019-12-17 PROCEDURE — 93005 ELECTROCARDIOGRAM TRACING: CPT | Mod: S$GLB,,, | Performed by: INTERNAL MEDICINE

## 2019-12-17 PROCEDURE — 1101F PT FALLS ASSESS-DOCD LE1/YR: CPT | Mod: CPTII,S$GLB,, | Performed by: INTERNAL MEDICINE

## 2019-12-17 PROCEDURE — 1159F PR MEDICATION LIST DOCUMENTED IN MEDICAL RECORD: ICD-10-PCS | Mod: S$GLB,,, | Performed by: INTERNAL MEDICINE

## 2019-12-17 PROCEDURE — 99214 PR OFFICE/OUTPT VISIT, EST, LEVL IV, 30-39 MIN: ICD-10-PCS | Mod: S$GLB,,, | Performed by: INTERNAL MEDICINE

## 2019-12-17 PROCEDURE — 99214 OFFICE O/P EST MOD 30 MIN: CPT | Mod: S$GLB,,, | Performed by: INTERNAL MEDICINE

## 2019-12-17 NOTE — PROGRESS NOTES
Subjective:       Patient ID: Purvi Quiroga is a 74 y.o. female.    Chief Complaint: Follow-up (1 month)    HPI     74-year-old female here for one-month follow-up.    HTN - Patient is currently on HCTZ 25 mg, Lopressor 50 mg b.i.d., irbesartan 50 mg b.i.d.. She does check her BP at home, and it runs 120/66 - 153/85. Side effects of medications note: none. Denies headaches, blurred vision, chest pain, shortness of breath, nausea.    Patient has insomnia.  She was on Ativan 1 mg nightly.  Transitioning her to 0.5 mg nightly.  She reports that she took trazodone.  She reports that it made her goofy.  She reports the last week that she has been taking ativan 0.5 mg and melatonin.  She takes 9 mg of the melatonin.  She is not sure how long before she takes this before she goes to sleep.  She takes the ativan 2 hours before she goes to sleep.  She reports that the melatonin and ativan are working pretty good in a way.    She has been going to the bathroom 3-5 times in the night.  Some nights, it is just twice a night.  Sometimes, she gets up 5 times before she sleeps.  She reports that she does not have a regular time she stops drinking fluids, because she does not have a regular bedtime.      She says that she takes something that makes her feel really SOB for 10-15 minutes.  This resolves spontaneously.  This is not a regular thing.   She thinks this is linked to her medication, but is not sure this is linked to her medication.  She was taking out her garbage the other day and got really SOB.  This lasts 10-20 minutes.    HLD - Patient is currently on Lipitor 20 mg.  Her last lipid panel was   Cholesterol   Date Value Ref Range Status   11/01/2019 141 <200 mg/dL Final     Triglycerides   Date Value Ref Range Status   11/01/2019 75 <150 mg/dL Final     HDL   Date Value Ref Range Status   11/01/2019 62 >50 mg/dL Final     LDL Cholesterol   Date Value Ref Range Status   11/01/2019 63 mg/dL (calc) Final     Comment:      Reference range: <100     Desirable range <100 mg/dL for primary prevention;    <70 mg/dL for patients with CHD or diabetic patients   with > or = 2 CHD risk factors.     LDL-C is now calculated using the Ivonne   calculation, which is a validated novel method providing   better accuracy than the Friedewald equation in the   estimation of LDL-C.   Isidro HUNT et al. MEAGHAN. 2013;310(19): 1971-0179   (http://[employer]/faq/YJE305)     .  Side effects of the medication:  None.    Review of Systems    Objective:      Physical Exam   Constitutional: She is oriented to person, place, and time. She appears well-developed and well-nourished.   HENT:   Head: Normocephalic and atraumatic.   Mouth/Throat: No oropharyngeal exudate.   Eyes: Pupils are equal, round, and reactive to light. EOM are normal. Right eye exhibits no discharge. Left eye exhibits no discharge. No scleral icterus.   Neck: Normal range of motion. Neck supple. No tracheal deviation present. No thyromegaly present.   Cardiovascular: Normal rate, regular rhythm and normal heart sounds. Exam reveals no gallop and no friction rub.   No murmur heard.  Pulmonary/Chest: Effort normal and breath sounds normal. No respiratory distress. She has no wheezes. She has no rales. She exhibits no tenderness.   Abdominal: Soft. Bowel sounds are normal. She exhibits no distension and no mass. There is no tenderness. There is no rebound and no guarding.   Musculoskeletal: Normal range of motion. She exhibits edema (1+ edema bilaterally). She exhibits no tenderness (1+ edema).   Neurological: She is alert and oriented to person, place, and time.   Skin: Skin is warm and dry. No rash noted. No erythema. No pallor.   Psychiatric: She has a normal mood and affect. Her behavior is normal.   Vitals reviewed.      Assessment:       1. Essential hypertension    2. Other hyperlipidemia    3. Anxiety    4. Insomnia, unspecified type    5. Nocturia    6. SOB  (shortness of breath)        Plan:       1.  Continue irbesartan 150 mg b.i.d., HCTZ 25 mg daily, Lopressor 50 mg b.i.d..  2.  Continue Lipitor 20 mg daily.  3.  Monitor.  4.  Continue Ativan 0.5 mg daily, melatonin 10 mg nightly before sleep.  5.  Advised patient to stop drinking the most for fluid surrounds before she goes to sleep at night, and to elevate her feet.  6.  Check EKG, dobutamine stress echo.

## 2019-12-19 ENCOUNTER — TELEPHONE (OUTPATIENT)
Dept: INTERNAL MEDICINE | Facility: CLINIC | Age: 74
End: 2019-12-19

## 2019-12-19 NOTE — TELEPHONE ENCOUNTER
----- Message from Jeison Butler sent at 12/19/2019 10:54 AM CST -----  Contact: Patient [phone]  Patient would like to get medical advice.    Comments: Patient calling would like for a call back regarding questions about the Echo stress test that pcp recommended for patient, call to discuss.    Please call an advise  Thank you

## 2019-12-19 NOTE — TELEPHONE ENCOUNTER
I am not sure what the question is that the patient has.  She can take her regularly scheduled medication in the morning prior to the echo.

## 2019-12-19 NOTE — TELEPHONE ENCOUNTER
Spoke to patient wanted to know what she had to do when she goes to have her stress echo. Patient was instructed no food/drink 4 hours before her echo. Can take meds before her test unless she is a diabetic.     Do not appy oil, powder or lotion to chest area. Will get an IV put in and needs to sign a consent form.    Stated she understood.

## 2019-12-26 RX ORDER — LORAZEPAM 0.5 MG/1
TABLET ORAL
Qty: 30 TABLET | Refills: 0 | Status: SHIPPED | OUTPATIENT
Start: 2019-12-26 | End: 2021-05-26

## 2019-12-28 RX ORDER — POTASSIUM CHLORIDE 750 MG/1
CAPSULE, EXTENDED RELEASE ORAL
Qty: 90 CAPSULE | Refills: 3 | Status: SHIPPED | OUTPATIENT
Start: 2019-12-28 | End: 2020-03-06 | Stop reason: SDUPTHER

## 2019-12-30 ENCOUNTER — TELEPHONE (OUTPATIENT)
Dept: INTERNAL MEDICINE | Facility: CLINIC | Age: 74
End: 2019-12-30

## 2019-12-30 NOTE — TELEPHONE ENCOUNTER
----- Message from Aaliyah Morfin sent at 12/30/2019  9:30 AM CST -----  Contact: SELF/ [phone]  Patient would like to get medical advice.  Symptoms (please be specific):  Infected sinus, coughing, yellow, green mucus, weak, no fever, no chills,   How long has patient had these symptoms:  5 days  Pharmacy name and phone # (copy/paste from chart):  [facility] - [place] - [address] [place] [address] [phone] (Phone)  [phone] (Fax)  Any drug allergies (copy/paste from chart):      Would the patient rather a call back or a response via [facility]?:    Comments:

## 2019-12-30 NOTE — TELEPHONE ENCOUNTER
"Attempted to contact patient to schedule appt for upper respiratory symptoms. vm left, pt voicemail states she "is not answers calls unless it is an emergency or priority. Clinic number left to book appt.   "

## 2020-01-17 ENCOUNTER — TELEPHONE (OUTPATIENT)
Dept: SPORTS MEDICINE | Facility: CLINIC | Age: 75
End: 2020-01-17

## 2020-01-17 DIAGNOSIS — M17.12 PRIMARY OSTEOARTHRITIS OF LEFT KNEE: ICD-10-CM

## 2020-01-17 DIAGNOSIS — M17.0 PRIMARY OSTEOARTHRITIS OF KNEES, BILATERAL: Primary | ICD-10-CM

## 2020-01-23 ENCOUNTER — TELEPHONE (OUTPATIENT)
Dept: INTERNAL MEDICINE | Facility: CLINIC | Age: 75
End: 2020-01-23

## 2020-01-23 ENCOUNTER — TELEPHONE (OUTPATIENT)
Dept: SPORTS MEDICINE | Facility: CLINIC | Age: 75
End: 2020-01-23

## 2020-01-23 RX ORDER — DICLOFENAC SODIUM 10 MG/G
2 GEL TOPICAL 3 TIMES DAILY
Qty: 200 G | Refills: 1 | Status: SHIPPED | OUTPATIENT
Start: 2020-01-23 | End: 2020-05-28 | Stop reason: SDUPTHER

## 2020-01-23 NOTE — TELEPHONE ENCOUNTER
----- Message from Basilio Mann sent at 1/23/2020  9:45 AM CST -----  Contact: Self [phone]  Patient is calling to ask Dr. Burgos to call in an Rx for arthritis, please advise.

## 2020-01-23 NOTE — TELEPHONE ENCOUNTER
Patient scheduled deshaun knee euflexxa VSI. Asked to please called internal med for RX    Ernesto Hoffman   Sports Medicine Assistant   [facility] [place]     ----- Message from Kaylene Long sent at 1/23/2020  9:10 AM CST -----  Contact: pt   Please call pt at [phone]    New RX request for Meloxicam 50 mg    Use [facility] - [place] - [address] [place] [address] [phone] (Phone)  [phone] (Fax)    Thank you

## 2020-01-23 NOTE — TELEPHONE ENCOUNTER
Requesting rx for ongoing arthritis to knees, has appt with sports med for knee injection bu reports needing something until this can be done.

## 2020-01-24 ENCOUNTER — TELEPHONE (OUTPATIENT)
Dept: INTERNAL MEDICINE | Facility: CLINIC | Age: 75
End: 2020-01-24

## 2020-01-24 ENCOUNTER — TELEPHONE (OUTPATIENT)
Dept: PRIMARY CARE CLINIC | Facility: CLINIC | Age: 75
End: 2020-01-24

## 2020-01-24 NOTE — TELEPHONE ENCOUNTER
----- Message from Viktoriya Milligan sent at 1/24/2020  8:26 AM CST -----  Contact: [employer]/beau/[phone]   [employer] called in regards to getting the dr to sign off for the pt abd pad/gaus/ and foam  dressing that was shipped last year 10-24-19. These are wound supplies.  they are aware that the pt has passed on . purchase order number [id number]. They would like a call back ASAP.     Please advise

## 2020-01-24 NOTE — TELEPHONE ENCOUNTER
"Spoke with patient, per Dr Burgos, meloxicam can cause further damage to kidneys. Patient states she has not tried diclofenac gel but "have severe arthritis and this won't work" encouraged patient to at least try this medication as it has been shown to help even severe pain. Patient refused and states she will just wait for her pain injections from ortho.   "

## 2020-01-24 NOTE — TELEPHONE ENCOUNTER
----- Message from Viktoriya Milligan sent at 1/24/2020  7:59 AM CST -----  Contact: self/[phone]  Pt called in regards to she does not want the Rx for     diclofenac sodium (VOLTAREN) 1 % Gel 200 G 1 1/23/2020  Apply 2 G topically 3 (three) times daily.     For her knees she wants meloxicam 15 mg.  she said please call her before 5 so she can get it delivered. Pt is schedule for her next series of shots (euflexxa) for her knee.  Pt would like a call back    [facility]  [phone]   Please advise

## 2020-01-27 RX ORDER — ATORVASTATIN CALCIUM 20 MG/1
TABLET, FILM COATED ORAL
Qty: 90 TABLET | Refills: 3 | Status: SHIPPED | OUTPATIENT
Start: 2020-01-27 | End: 2021-02-08

## 2020-01-30 ENCOUNTER — PATIENT OUTREACH (OUTPATIENT)
Dept: ADMINISTRATIVE | Facility: OTHER | Age: 75
End: 2020-01-30

## 2020-02-03 ENCOUNTER — OFFICE VISIT (OUTPATIENT)
Dept: SPORTS MEDICINE | Facility: CLINIC | Age: 75
End: 2020-02-03
Payer: MEDICARE

## 2020-02-03 VITALS — TEMPERATURE: 98 F | HEIGHT: 55 IN | BODY MASS INDEX: 52.54 KG/M2 | WEIGHT: 227 LBS

## 2020-02-03 DIAGNOSIS — M17.0 PRIMARY OSTEOARTHRITIS OF KNEES, BILATERAL: Primary | ICD-10-CM

## 2020-02-03 PROCEDURE — 20611 DRAIN/INJ JOINT/BURSA W/US: CPT | Mod: 50,S$GLB,, | Performed by: FAMILY MEDICINE

## 2020-02-03 PROCEDURE — 20611 LARGE JOINT ASPIRATION/INJECTION: R KNEE, L KNEE: ICD-10-PCS | Mod: 50,S$GLB,, | Performed by: FAMILY MEDICINE

## 2020-02-03 PROCEDURE — 99999 PR PBB SHADOW E&M-EST. PATIENT-LVL III: ICD-10-PCS | Mod: PBBFAC,,, | Performed by: FAMILY MEDICINE

## 2020-02-03 PROCEDURE — 99499 UNLISTED E&M SERVICE: CPT | Mod: S$GLB,,, | Performed by: FAMILY MEDICINE

## 2020-02-03 PROCEDURE — 99999 PR PBB SHADOW E&M-EST. PATIENT-LVL III: CPT | Mod: PBBFAC,,, | Performed by: FAMILY MEDICINE

## 2020-02-03 PROCEDURE — 99499 NO LOS: ICD-10-PCS | Mod: S$GLB,,, | Performed by: FAMILY MEDICINE

## 2020-02-03 NOTE — PROCEDURES
"Large Joint Aspiration/Injection: R knee, L knee  Date/Time: 2/3/2020 10:30 AM  Performed by: Cj Caruso MD  Authorized by: Cj Caruso MD     Consent Done?:  Yes (Verbal)  Indications:  Pain  Procedure site marked: Yes    Timeout: Prior to procedure the correct patient, procedure, and site was verified      Location:  Knee  Site:  R knee and L knee  Prep: Patient was prepped and draped in usual sterile fashion    Needle size:  20 G  Ultrasonic Guidance for needle placement: Yes  Images are saved and documented.  Approach:  Lateral  Medications:  20 mg sodium hyaluronate (EUFLEXXA) 10 mg/mL(mw 2.4 -3.6 million); 20 mg sodium hyaluronate (EUFLEXXA) 10 mg/mL(mw 2.4 -3.6 million)  Patient tolerance:  Patient tolerated the procedure well with no immediate complications    Additional Comments: Description of ultrasound utilization for needle guidance:   Ultrasound guidance used for needle localization. Images saved and stored for documentation. The knee joint was visualized. Dynamic visualization of the 20g x 3.5" needle was continuous throughout the procedure.      "

## 2020-02-04 ENCOUNTER — TELEPHONE (OUTPATIENT)
Dept: SPORTS MEDICINE | Facility: CLINIC | Age: 75
End: 2020-02-04

## 2020-02-04 NOTE — TELEPHONE ENCOUNTER
r/s patient's appt.    Ernesto Hoffman   Sports Medicine Assistant   [facility] [place]     ----- Message from Carlene Saleem sent at 2/4/2020 10:18 AM CST -----  Contact: self  Pt would like a call back regarding her joint injection shot. Pt would like her joint injection on 02/19 moved to the 02/17 due to pt having a stress test on the 18th.    Contact Info

## 2020-02-04 NOTE — PROGRESS NOTES
Patient, Purvi Quiroga (MRN #[id number]), presented with a recorded BMI of 52.76 kg/m^2 consistent with the definition of morbid obesity (ICD-10 E66.01). The patient's morbid obesity was monitored, evaluated, addressed and/or treated. This addendum to the medical record is made on 02/04/2020.

## 2020-02-10 ENCOUNTER — PATIENT OUTREACH (OUTPATIENT)
Dept: ADMINISTRATIVE | Facility: OTHER | Age: 75
End: 2020-02-10

## 2020-02-10 DIAGNOSIS — Z12.11 ENCOUNTER FOR FIT (FECAL IMMUNOCHEMICAL TEST) SCREENING: Primary | ICD-10-CM

## 2020-02-10 NOTE — PROGRESS NOTES
Care Everywhere: updated  Immunization: updated  Health Maintenance: updated  Media Review: reviewed for possible outside colon cancer report  Legacy Review: n/a  Order placed: fit kit   Upcoming appts:n/a

## 2020-02-12 ENCOUNTER — OFFICE VISIT (OUTPATIENT)
Dept: SPORTS MEDICINE | Facility: CLINIC | Age: 75
End: 2020-02-12
Payer: MEDICARE

## 2020-02-12 VITALS — BODY MASS INDEX: 52.54 KG/M2 | TEMPERATURE: 98 F | WEIGHT: 227 LBS | HEIGHT: 55 IN

## 2020-02-12 DIAGNOSIS — M17.0 PRIMARY OSTEOARTHRITIS OF KNEES, BILATERAL: Primary | ICD-10-CM

## 2020-02-12 PROCEDURE — 99999 PR PBB SHADOW E&M-EST. PATIENT-LVL III: ICD-10-PCS | Mod: PBBFAC,,, | Performed by: FAMILY MEDICINE

## 2020-02-12 PROCEDURE — 20611 DRAIN/INJ JOINT/BURSA W/US: CPT | Mod: 50,S$GLB,, | Performed by: FAMILY MEDICINE

## 2020-02-12 PROCEDURE — 99999 PR PBB SHADOW E&M-EST. PATIENT-LVL III: CPT | Mod: PBBFAC,,, | Performed by: FAMILY MEDICINE

## 2020-02-12 PROCEDURE — 20611 LARGE JOINT ASPIRATION/INJECTION: BILATERAL KNEE: ICD-10-PCS | Mod: 50,S$GLB,, | Performed by: FAMILY MEDICINE

## 2020-02-12 PROCEDURE — 99499 UNLISTED E&M SERVICE: CPT | Mod: S$GLB,,, | Performed by: FAMILY MEDICINE

## 2020-02-12 PROCEDURE — 99499 NO LOS: ICD-10-PCS | Mod: S$GLB,,, | Performed by: FAMILY MEDICINE

## 2020-02-12 NOTE — PROCEDURES
"Large Joint Aspiration/Injection: bilateral knee  Performed by: Cj Caruso MD  Authorized by: Cj Caruso MD  Date/Time: 2/12/2020 10:30 AM    Consent Done?:  Yes (Verbal)  Indications:  pain  Timeout: Immediately prior to procedure a time out was called to verify the correct patient, procedure, equipment, support staff and site/side marked as required.  Prep:Patient was prepped and draped in the usual sterile fashion.  Procedure site marked: Yes     Anesthesia  Local anesthesia not used        Details:   Needle size: 20 G  Ultrasonic Guidance for needle placement: Yes  Images are saved and documented.   Approach: lateral  Location:  Knee  Site:  Bilateral knee    Medications (Right): 20 mg sodium hyaluronate (EUFLEXXA) 10 mg/mL(mw 2.4 -3.6 million)  Medications (Left): 20 mg sodium hyaluronate (EUFLEXXA) 10 mg/mL(mw 2.4 -3.6 million)  Patient tolerance:  patient tolerated the procedure well with no immediate complications    Description of ultrasound utilization for needle guidance:   Ultrasound guidance used for needle localization. Images saved and stored for documentation. The knee joint was visualized. Dynamic visualization of the 20g x 3.5" needle was continuous throughout the procedure.         "

## 2020-02-16 ENCOUNTER — PATIENT OUTREACH (OUTPATIENT)
Dept: ADMINISTRATIVE | Facility: OTHER | Age: 75
End: 2020-02-16

## 2020-02-17 ENCOUNTER — OFFICE VISIT (OUTPATIENT)
Dept: SPORTS MEDICINE | Facility: CLINIC | Age: 75
End: 2020-02-17
Payer: MEDICARE

## 2020-02-17 VITALS — WEIGHT: 227 LBS | BODY MASS INDEX: 52.54 KG/M2 | TEMPERATURE: 98 F | HEIGHT: 55 IN

## 2020-02-17 DIAGNOSIS — M17.0 PRIMARY OSTEOARTHRITIS OF KNEES, BILATERAL: Primary | ICD-10-CM

## 2020-02-17 PROCEDURE — 20611 DRAIN/INJ JOINT/BURSA W/US: CPT | Mod: 50,S$GLB,, | Performed by: FAMILY MEDICINE

## 2020-02-17 PROCEDURE — 99499 NO LOS: ICD-10-PCS | Mod: S$GLB,,, | Performed by: FAMILY MEDICINE

## 2020-02-17 PROCEDURE — 20611 LARGE JOINT ASPIRATION/INJECTION: BILATERAL KNEE: ICD-10-PCS | Mod: 50,S$GLB,, | Performed by: FAMILY MEDICINE

## 2020-02-17 PROCEDURE — 99499 UNLISTED E&M SERVICE: CPT | Mod: S$GLB,,, | Performed by: FAMILY MEDICINE

## 2020-02-17 PROCEDURE — 99999 PR PBB SHADOW E&M-EST. PATIENT-LVL III: ICD-10-PCS | Mod: PBBFAC,,, | Performed by: FAMILY MEDICINE

## 2020-02-17 PROCEDURE — 99999 PR PBB SHADOW E&M-EST. PATIENT-LVL III: CPT | Mod: PBBFAC,,, | Performed by: FAMILY MEDICINE

## 2020-02-17 NOTE — PROCEDURES
"Large Joint Aspiration/Injection: bilateral knee  Performed by: Cj Caruso MD  Authorized by: Cj Caruso MD  Date/Time: 2/17/2020 10:00 AM    Consent Done?:  Yes (Verbal)  Indications:  pain  Timeout: Immediately prior to procedure a time out was called to verify the correct patient, procedure, equipment, support staff and site/side marked as required.  Prep:Patient was prepped and draped in the usual sterile fashion.  Procedure site marked: Yes     Anesthesia  Local anesthesia not used        Details:   Needle size: 20 G  Ultrasonic Guidance for needle placement: Yes  Images are saved and documented.   Approach: lateral  Location:  Knee  Site:  Bilateral knee    Medications (Right): 40 mg triamcinolone acetonide 40 mg/mL  Medications (Left): 40 mg triamcinolone acetonide 40 mg/mL  Patient tolerance:  patient tolerated the procedure well with no immediate complications    Description of ultrasound utilization for needle guidance:   Ultrasound guidance used for needle localization. Images saved and stored for documentation. The knee joint was visualized. Dynamic visualization of the 20g x 3.5" needle was continuous throughout the procedure.         "

## 2020-02-18 ENCOUNTER — HOSPITAL ENCOUNTER (OUTPATIENT)
Dept: CARDIOLOGY | Facility: HOSPITAL | Age: 75
Discharge: HOME | End: 2020-02-18
Attending: INTERNAL MEDICINE
Payer: MEDICARE

## 2020-02-18 VITALS — WEIGHT: 226 LBS | BODY MASS INDEX: 52.53 KG/M2

## 2020-02-18 DIAGNOSIS — R06.02 SOB (SHORTNESS OF BREATH): ICD-10-CM

## 2020-02-18 LAB
AORTIC ROOT ANNULUS: 3 CM
CV ECHO LV RWT: 0.58 CM
CV STRESS BASE HR: 63 BPM
DIASTOLIC BLOOD PRESSURE: 85 MMHG
DOP CALC LVOT AREA: 2.8 CM2
DOP CALC LVOT DIAMETER: 1.9 CM
ECHO LV POSTERIOR WALL: 1.1 CM (ref 0.6–1.1)
FRACTIONAL SHORTENING: 32 % (ref 28–44)
INTERVENTRICULAR SEPTUM: 0.9 CM (ref 0.6–1.1)
LEFT ATRIUM SIZE: 2.5 CM
LEFT INTERNAL DIMENSION IN SYSTOLE: 2.6 CM (ref 2.1–4)
LEFT VENTRICULAR INTERNAL DIMENSION IN DIASTOLE: 3.8 CM (ref 3.5–6)
LEFT VENTRICULAR MASS: 117.28 G
OHS CV CPX 85 PERCENT MAX PREDICTED HEART RATE MALE: 119
OHS CV CPX MAX PREDICTED HEART RATE: 140
OHS CV CPX PATIENT IS FEMALE: 1
OHS CV CPX PATIENT IS MALE: 0
OHS CV CPX PEAK DIASTOLIC BLOOD PRESSURE: 86 MMHG
OHS CV CPX PEAK HEAR RATE: 130 BPM
OHS CV CPX PEAK RATE PRESSURE PRODUCT: NORMAL
OHS CV CPX PEAK SYSTOLIC BLOOD PRESSURE: 209 MMHG
OHS CV CPX PERCENT MAX PREDICTED HEART RATE ACHIEVED: 93
OHS CV CPX RATE PRESSURE PRODUCT PRESENTING: 9828
SYSTOLIC BLOOD PRESSURE: 156 MMHG

## 2020-02-18 PROCEDURE — 93351 STRESS TTE COMPLETE: CPT | Mod: 26,,, | Performed by: INTERNAL MEDICINE

## 2020-02-18 PROCEDURE — 93351 STRESS TTE COMPLETE: CPT

## 2020-02-18 PROCEDURE — 63600175 PHARM REV CODE 636 W HCPCS: Performed by: INTERNAL MEDICINE

## 2020-02-18 PROCEDURE — 93351 STRESS ECHO (CUPID ONLY): ICD-10-PCS | Mod: 26,,, | Performed by: INTERNAL MEDICINE

## 2020-02-18 RX ORDER — DOBUTAMINE HYDROCHLORIDE 400 MG/100ML
10 INJECTION INTRAVENOUS CONTINUOUS
Status: DISCONTINUED | OUTPATIENT
Start: 2020-02-18 | End: 2021-04-21

## 2020-02-18 RX ADMIN — DOBUTAMINE HYDROCHLORIDE 10 MCG/KG/MIN: 400 INJECTION INTRAVENOUS at 10:02

## 2020-02-18 NOTE — NURSING
1040    Patient on table, ready for test.  Allergies/history confirmed.  Connected to monitor EKG and NIBP  1045    R Rickie VANCE @ BS, consent obtained, all questions answered.  1050    Test started per protocol, See EKG sheet for VS   1053    Dobutamine increased to 20mcg per protocol to increase heart rate.  Pt tolerating test well   1056    Dobutamine increased to 30 mcg per protocol to increase heart rate, pt tolerating test well.    1101    Target heart rate achieved.    1101     Testing phase complete, dobutamine off.  NS up at rapid rate for recovery phase   1110     Recovery phase complete.  Patient states she feels normal, no distress.  NS dc'd 200cc infused.               Iv d/c'd.  Pt released to Cardio.

## 2020-02-19 ENCOUNTER — TELEPHONE (OUTPATIENT)
Dept: INTERNAL MEDICINE | Facility: CLINIC | Age: 75
End: 2020-02-19

## 2020-02-19 DIAGNOSIS — R06.02 SOB (SHORTNESS OF BREATH): Primary | ICD-10-CM

## 2020-02-19 NOTE — TELEPHONE ENCOUNTER
Stress test is negative for damage to the heart.  Heart is brendan and relaxing normally.  We can check a chest x-ray as well.

## 2020-02-24 RX ORDER — METOPROLOL TARTRATE 50 MG/1
TABLET ORAL
Qty: 180 TABLET | Refills: 0 | Status: SHIPPED | OUTPATIENT
Start: 2020-02-24 | End: 2020-05-23

## 2020-02-24 NOTE — TELEPHONE ENCOUNTER
----- Message from Kassidy Rees sent at 2/24/2020 11:26 AM CST -----  Contact: Patient [phone]  Patient cancelled the chest XRAY that was scheduled for 03/10/2020 stating that she was not informed why it was needed.    Please call and advise.    Thank You

## 2020-02-24 NOTE — TELEPHONE ENCOUNTER
Spoke to pt and gave her the result of stress test and explain to her why the chest xray was set up. Patient  said that she will call back and set up xray when it is convenient for her.

## 2020-02-26 RX ORDER — ALENDRONATE SODIUM 70 MG/1
TABLET ORAL
Qty: 12 TABLET | Refills: 4 | Status: SHIPPED | OUTPATIENT
Start: 2020-02-26 | End: 2022-01-11 | Stop reason: SDUPTHER

## 2020-02-27 RX ORDER — GABAPENTIN 300 MG/1
300 CAPSULE ORAL 3 TIMES DAILY
Qty: 90 CAPSULE | Refills: 2 | Status: SHIPPED | OUTPATIENT
Start: 2020-02-27 | End: 2020-04-28 | Stop reason: SDUPTHER

## 2020-02-27 NOTE — TELEPHONE ENCOUNTER
I advised patient that order number would not be of help as RX is being sent electronically and I will not be speaking with a live representative.  Patient became Irate stating she doesn't care just get it done.  I informed patient that I'm trying to help her but she needs to turn her tone down. Informed patient that I am here to help her but will not be yelled at.  Confirmed that RX for gabapentin 300 mg needed to be sent to [facility], stated to patient I would forward message to Jared, have a great day and disconnected call.

## 2020-02-27 NOTE — TELEPHONE ENCOUNTER
----- Message from Tori Holm sent at 2/27/2020 10:09 AM CST -----  Contact: @[phone]  Pt called requesting a call from the nurse regarding her gabapentin. Please call back @[phone]

## 2020-03-06 RX ORDER — IRBESARTAN 150 MG/1
150 TABLET ORAL 2 TIMES DAILY
Qty: 180 TABLET | Refills: 2 | Status: SHIPPED | OUTPATIENT
Start: 2020-03-06 | End: 2020-03-06 | Stop reason: SDUPTHER

## 2020-03-06 RX ORDER — POTASSIUM CHLORIDE 750 MG/1
10 CAPSULE, EXTENDED RELEASE ORAL ONCE
Qty: 90 CAPSULE | Refills: 2 | Status: SHIPPED | OUTPATIENT
Start: 2020-03-06 | End: 2020-03-06

## 2020-03-06 RX ORDER — IRBESARTAN 150 MG/1
150 TABLET ORAL 2 TIMES DAILY
Qty: 180 TABLET | Refills: 2 | Status: SHIPPED | OUTPATIENT
Start: 2020-03-06 | End: 2020-10-01

## 2020-03-06 NOTE — TELEPHONE ENCOUNTER
----- Message from Riana Arboleda sent at 3/6/2020 12:35 PM CST -----  Contact: [phone]  Patient would like to speak to the nurse in regards to a refill on irbesartan (AVAPRO) 150 MG tablet. Patient would like a verbally agreement on this medication. Please call and advise

## 2020-03-12 RX ORDER — HYDROCHLOROTHIAZIDE 25 MG/1
TABLET ORAL
Qty: 90 TABLET | Refills: 2 | Status: SHIPPED | OUTPATIENT
Start: 2020-03-12 | End: 2020-10-15

## 2020-03-25 RX ORDER — OMEPRAZOLE 20 MG/1
20 CAPSULE, DELAYED RELEASE ORAL 2 TIMES DAILY
Qty: 180 CAPSULE | Refills: 2 | Status: SHIPPED | OUTPATIENT
Start: 2020-03-25 | End: 2020-11-25

## 2020-03-25 NOTE — TELEPHONE ENCOUNTER
I called to clarify what pharmacy.    Please approve to [facility].   She has never taken the 40mg qd.  I told her I would send request.    Thanks nish

## 2020-03-25 NOTE — TELEPHONE ENCOUNTER
----- Message from Aaliyah Morfin sent at 3/25/2020 12:24 PM CDT -----  Contact: self/ [phone]  Requesting an RX refill or new RX.  Is this a refill or new RX:    RX name and strength: omeprazole (PRILOSEC) 20 MG capsule 180 capsule   Directions (copy/paste from chart):   Take 1 capsule (20 mg total) by mouth 2 (two) times daily  Is this a 30 day or 90 day RX:  90  Local pharmacy or mail order pharmacy:    Pharmacy name and phone # (copy/paste from chart):     Comments:    Patient says to make sure that her Rx are 90 day supply

## 2020-04-09 ENCOUNTER — TELEPHONE (OUTPATIENT)
Dept: INTERNAL MEDICINE | Facility: CLINIC | Age: 75
End: 2020-04-09

## 2020-04-09 NOTE — TELEPHONE ENCOUNTER
Spoke with Dr Burgos but unable to reach patient via phone. Xray will not diagnosis heart disease and at this time only emergent xrays are being performed. Pt will need to be called again at later date regarding this.

## 2020-04-09 NOTE — TELEPHONE ENCOUNTER
----- Message from Kassidy Rees sent at 4/9/2020 12:21 PM CDT -----  Contact: Patient [phone]  Requesting Orders    Orders being requested: XRAY    Reason for request: to see if she has heart disease    Please advise. Orders need to go to [facility].    Thank You

## 2020-04-12 NOTE — TELEPHONE ENCOUNTER
vm left for patient that per Dr Burgos's notes, xrays will not diagnosis heart disease. Also notified via vm that only emergency xrays are being done at this time due to COVID situation. Dr Burgos suggests virtual visit or inclinic visit at later time to further discuss this.

## 2020-04-14 ENCOUNTER — TELEPHONE (OUTPATIENT)
Dept: NEUROLOGY | Facility: CLINIC | Age: 75
End: 2020-04-14

## 2020-04-14 NOTE — TELEPHONE ENCOUNTER
Spoke with patient and scheduled her an Audio visit with Jared on 5/19/2020. Advised patient to contact our office once she is down to a weeks worth of Gabapentin so we can change RX to a 90 day supply.

## 2020-04-14 NOTE — TELEPHONE ENCOUNTER
----- Message from Tony Barakat sent at 4/14/2020 12:14 PM CDT -----  Contact: Patient   Patient requesting a return call regarding a medication refill on (gabapentin (NEURONTIN) 300 MG capsule ) patient requesting a 3mon supply     [facility] - [address]  [address]  [facility] [address]  Phone: [phone] Fax: [phone]

## 2020-04-28 RX ORDER — POTASSIUM CHLORIDE 750 MG/1
CAPSULE, EXTENDED RELEASE ORAL
COMMUNITY
Start: 2020-03-06 | End: 2020-11-10

## 2020-04-28 RX ORDER — GABAPENTIN 300 MG/1
300 CAPSULE ORAL 3 TIMES DAILY
Qty: 270 CAPSULE | Refills: 3 | Status: SHIPPED | OUTPATIENT
Start: 2020-04-28 | End: 2020-05-19 | Stop reason: SDUPTHER

## 2020-04-28 NOTE — TELEPHONE ENCOUNTER
----- Message from Nury Gunter sent at 4/28/2020 10:15 AM CDT -----  Contact: pt   Pt is calling to speak with the nurse about her prescription for gabapentin (NEURONTIN) 300 MG capsule pt gets a mail order refill for a three month supply pt was told to call back to give a reminder can you please call pt at [phone].    RISA

## 2020-05-06 ENCOUNTER — TELEPHONE (OUTPATIENT)
Dept: INTERNAL MEDICINE | Facility: CLINIC | Age: 75
End: 2020-05-06

## 2020-05-06 NOTE — TELEPHONE ENCOUNTER
----- Message from Emmy Espinoza sent at 5/6/2020 12:10 PM CDT -----  Contact: pt  Pt would like to be called back regarding when should she return for an appt    Pt can be reached at [phone]

## 2020-05-18 ENCOUNTER — PATIENT OUTREACH (OUTPATIENT)
Dept: ADMINISTRATIVE | Facility: OTHER | Age: 75
End: 2020-05-18

## 2020-05-18 NOTE — PROGRESS NOTES
Care Everywhere: updated  Immunization: updated  Health Maintenance: updated  Media Review: reviewed for possible outside colon cancer report  Legacy Review: n/a  Order placed: n/a  Upcoming appts:n/a

## 2020-05-19 ENCOUNTER — OFFICE VISIT (OUTPATIENT)
Dept: NEUROLOGY | Facility: CLINIC | Age: 75
End: 2020-05-19
Payer: MEDICARE

## 2020-05-19 DIAGNOSIS — G43.009 MIGRAINE WITHOUT AURA AND WITHOUT STATUS MIGRAINOSUS, NOT INTRACTABLE: Primary | ICD-10-CM

## 2020-05-19 PROCEDURE — 1159F PR MEDICATION LIST DOCUMENTED IN MEDICAL RECORD: ICD-10-PCS | Mod: 95,,, | Performed by: NEUROMUSCULOSKELETAL MEDICINE & OMM

## 2020-05-19 PROCEDURE — 99442 PR PHYSICIAN TELEPHONE EVALUATION 11-20 MIN: ICD-10-PCS | Mod: 95,,, | Performed by: NEUROMUSCULOSKELETAL MEDICINE & OMM

## 2020-05-19 PROCEDURE — 1101F PT FALLS ASSESS-DOCD LE1/YR: CPT | Mod: CPTII,95,, | Performed by: NEUROMUSCULOSKELETAL MEDICINE & OMM

## 2020-05-19 PROCEDURE — 1101F PR PT FALLS ASSESS DOC 0-1 FALLS W/OUT INJ PAST YR: ICD-10-PCS | Mod: CPTII,95,, | Performed by: NEUROMUSCULOSKELETAL MEDICINE & OMM

## 2020-05-19 PROCEDURE — 99442 PR PHYSICIAN TELEPHONE EVALUATION 11-20 MIN: CPT | Mod: 95,,, | Performed by: NEUROMUSCULOSKELETAL MEDICINE & OMM

## 2020-05-19 PROCEDURE — 1159F MED LIST DOCD IN RCRD: CPT | Mod: 95,,, | Performed by: NEUROMUSCULOSKELETAL MEDICINE & OMM

## 2020-05-19 RX ORDER — GABAPENTIN 300 MG/1
300 CAPSULE ORAL 3 TIMES DAILY
Qty: 270 CAPSULE | Refills: 3 | Status: SHIPPED | OUTPATIENT
Start: 2020-05-19 | End: 2021-07-19

## 2020-05-19 NOTE — PROGRESS NOTES
Progress Notes        Social History : Patient is retired. She walks with a cane due to her arthritis.   Present history: Audio Only Telehealth Visit     The patient location is:  [place]  The chief complaint leading to consultation is:  Headaches  Visit type: Virtual visit with audio only (telephone)  Total time spent with patient:  20 min     The reason for the audio only service rather than synchronous audio and video virtual visit was related to technical difficulties or patient preference/necessity.     Each patient to whom I provide medical services by telemedicine is:  (1) informed of the relationship between the physician and patient and the respective role of any other health care provider with respect to management of the patient; and (2) notified that they may decline to receive medical services by telemedicine and may withdraw from such care at any time. Patient verbally consented to receive this service via voice-only telephone call.       HPI:  Patient presents for follow-up for headaches.  Gabapentin 300 mg 3 times a day is working quite well.  She is down to about 2-3 headaches per week.  She has noticed a slight tremor on occasion otherwise she is doing much better.  She has an appointment with her primary care this month.  Headaches are predominantly in the morning.  They typically will respond to Tylenol     Assessment and plan:  Continue gabapentin 300 mg t.i.d. with Tylenol if needed.                        This service was not originating from a related E/M service provided within the previous 7 days nor will  to an E/M service or procedure within the next 24 hours or my soonest available appointment.  Prevailing standard of care was able to be met in this audio-only visit.     Previous note:  11-19-19:  Patient presents for follow-up for headaches.  They are less frequent and less intense with pattern of couple times per week.  She takes gabapentin 300 mg 3 times a day in seems to  do well with this regimen with the headaches.   previous note:  10- 16-18This is a 73-year-old female who presents with a history of migraine headaches for which she takes Fioricet almost on a daily basis. Up until 2 weeks ago she was having daily headaches over the last year to. She describes the headaches as a frontal pressure pain associated with photophobia, nausea occasionally but no vomiting. The bad headaches are 5-6/10 intensity. She has a frontal pressure headache right now that is 3-4/10. She was taking Neurontin but states that she could not drink alcohol with the Neurontin so she stopped the Neurontin so that she can have her alcoholic drinks. She has poor sleep with difficulty getting to sleep and takes Ativan 1 mg with melatonin. She feels the Ativan 1 mg is not enough for her to get to sleep. She denies any focal symptoms other than she does complain at times she drops things from the right hand. She is left-handed. She does complain the headaches are bad enough that she would like to take preventive medicines for the headaches. She denies any particular triggers. She denies any aura with the headaches.   Neurological Exam:   Mental status-alert and oriented to person, place, and time; attention span and concentration is good. Fund of knowledge-patient is aware of current events and able to give detailed history of the current problem.recent and remote memory seems intact. Language function is normal with no evidence of aphasia   Cranial nerves:Visual acuity to hand chart -normal; visual fields to confrontation normal;pupils were equal and reactive to light ;no evidence of ptosis ; funduscopic examination was normal with sharp disc margins. external ocular movements were full with no nystagmus. Facial sensation to pinprick : normal ; corneal reflexes intact; Facial muscles were symmetrical. Hearing is unimpaired symmetrical finger rub; Tongue movements - normal ; palate movements - normal ;Swallowing  unimpaired. Shoulder shrug was intact with good strength Speech was normal   Motor examination: Upper : normal   Lower extremities - Normal;muscle tone was normal ; left-handed   Sensory examination: Upper; normal pinprick and soft touch ;   Lower extremities - normal and symmetrical.   Vibration sense: 15-20 seconds @ toes   Deep tendon reflexes: upper extremities :1-2+ symmetrical ;   lower extremities KJ- 1-2 +; AJ - 1-2+ Both plantar responses were flexor   Cerebellar examination upper: Normal finger to nose and rapid alternating movements   Gait: Steady with no ataxia; heel and toe walk normal   Romberg test: negative Tandem gait: Normal   Involuntary movements: Negative   TMJ - no tenderness   Cervical examination: Full range of motion with no pain Cervical tenderness :negative   Lumbar examination: Low back tenderness-negative   Sciatic notchtenderness-negative Straight leg raising : negative   Impression: Migraine without aura; insomnia; 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 Follow-up 3 months with headache diary.;  Follow-up 6 months

## 2020-05-28 ENCOUNTER — OFFICE VISIT (OUTPATIENT)
Dept: INTERNAL MEDICINE | Facility: CLINIC | Age: 75
End: 2020-05-28
Payer: MEDICARE

## 2020-05-28 VITALS
TEMPERATURE: 98 F | OXYGEN SATURATION: 96 % | WEIGHT: 228.63 LBS | DIASTOLIC BLOOD PRESSURE: 70 MMHG | HEART RATE: 76 BPM | HEIGHT: 55 IN | BODY MASS INDEX: 52.91 KG/M2 | SYSTOLIC BLOOD PRESSURE: 130 MMHG

## 2020-05-28 DIAGNOSIS — M19.90 ARTHRITIS: Chronic | ICD-10-CM

## 2020-05-28 DIAGNOSIS — R06.02 SOB (SHORTNESS OF BREATH): Primary | ICD-10-CM

## 2020-05-28 DIAGNOSIS — E66.01 MORBID OBESITY: Chronic | ICD-10-CM

## 2020-05-28 DIAGNOSIS — N18.30 CKD (CHRONIC KIDNEY DISEASE), STAGE III: Chronic | ICD-10-CM

## 2020-05-28 PROCEDURE — 1101F PR PT FALLS ASSESS DOC 0-1 FALLS W/OUT INJ PAST YR: ICD-10-PCS | Mod: CPTII,S$GLB,, | Performed by: INTERNAL MEDICINE

## 2020-05-28 PROCEDURE — 1159F PR MEDICATION LIST DOCUMENTED IN MEDICAL RECORD: ICD-10-PCS | Mod: S$GLB,,, | Performed by: INTERNAL MEDICINE

## 2020-05-28 PROCEDURE — 99999 PR PBB SHADOW E&M-EST. PATIENT-LVL IV: CPT | Mod: PBBFAC,,, | Performed by: INTERNAL MEDICINE

## 2020-05-28 PROCEDURE — 3078F DIAST BP <80 MM HG: CPT | Mod: CPTII,S$GLB,, | Performed by: INTERNAL MEDICINE

## 2020-05-28 PROCEDURE — 3075F PR MOST RECENT SYSTOLIC BLOOD PRESS GE 130-139MM HG: ICD-10-PCS | Mod: CPTII,S$GLB,, | Performed by: INTERNAL MEDICINE

## 2020-05-28 PROCEDURE — 99214 OFFICE O/P EST MOD 30 MIN: CPT | Mod: S$GLB,,, | Performed by: INTERNAL MEDICINE

## 2020-05-28 PROCEDURE — 1159F MED LIST DOCD IN RCRD: CPT | Mod: S$GLB,,, | Performed by: INTERNAL MEDICINE

## 2020-05-28 PROCEDURE — 99499 UNLISTED E&M SERVICE: CPT | Mod: S$GLB,,, | Performed by: INTERNAL MEDICINE

## 2020-05-28 PROCEDURE — 3075F SYST BP GE 130 - 139MM HG: CPT | Mod: CPTII,S$GLB,, | Performed by: INTERNAL MEDICINE

## 2020-05-28 PROCEDURE — 1101F PT FALLS ASSESS-DOCD LE1/YR: CPT | Mod: CPTII,S$GLB,, | Performed by: INTERNAL MEDICINE

## 2020-05-28 PROCEDURE — 1126F AMNT PAIN NOTED NONE PRSNT: CPT | Mod: S$GLB,,, | Performed by: INTERNAL MEDICINE

## 2020-05-28 PROCEDURE — 99214 PR OFFICE/OUTPT VISIT, EST, LEVL IV, 30-39 MIN: ICD-10-PCS | Mod: S$GLB,,, | Performed by: INTERNAL MEDICINE

## 2020-05-28 PROCEDURE — 3078F PR MOST RECENT DIASTOLIC BLOOD PRESSURE < 80 MM HG: ICD-10-PCS | Mod: CPTII,S$GLB,, | Performed by: INTERNAL MEDICINE

## 2020-05-28 PROCEDURE — 99499 RISK ADDL DX/OHS AUDIT: ICD-10-PCS | Mod: S$GLB,,, | Performed by: INTERNAL MEDICINE

## 2020-05-28 PROCEDURE — 99999 PR PBB SHADOW E&M-EST. PATIENT-LVL IV: ICD-10-PCS | Mod: PBBFAC,,, | Performed by: INTERNAL MEDICINE

## 2020-05-28 PROCEDURE — 1126F PR PAIN SEVERITY QUANTIFIED, NO PAIN PRESENT: ICD-10-PCS | Mod: S$GLB,,, | Performed by: INTERNAL MEDICINE

## 2020-05-28 RX ORDER — DICLOFENAC SODIUM 10 MG/G
2 GEL TOPICAL 3 TIMES DAILY
Qty: 200 G | Refills: 1 | Status: SHIPPED | OUTPATIENT
Start: 2020-05-28 | End: 2021-05-26

## 2020-05-28 RX ORDER — ALBUTEROL SULFATE 90 UG/1
1-2 AEROSOL, METERED RESPIRATORY (INHALATION) EVERY 6 HOURS PRN
Qty: 18 G | Refills: 0 | Status: SHIPPED | OUTPATIENT
Start: 2020-05-28 | End: 2020-08-14

## 2020-05-28 NOTE — PATIENT INSTRUCTIONS
For inhaler, exhale completely, then depress back of inhaler and weight 2 seconds before breathing in.

## 2020-05-28 NOTE — PROGRESS NOTES
Subjective:       Patient ID: Purvi Quiroga is a 75 y.o. female.    Chief Complaint: Follow-up (6 mo)    HPI     75-year-old female here for 6 month follow-up of shortness of breath.  She has been having SOB since November and thought this was a medication side effect.  She prolonged getting a stress test, because she was sick in November.  She had a stress test in November.  She still has the SOB.  It is not all the time and lasts for a short period of time.  She has more SOB with activity - using her rolling walker.  She tries to exercise some everyday.  She is about to go 1/2-3/4 a block daily.  This takes a minute or two.  This is not occurring with lying down.  No consistent reflux symptoms.    Review of Systems    Objective:      Physical Exam   Constitutional: She is oriented to person, place, and time. She appears well-developed and well-nourished.   HENT:   Head: Normocephalic and atraumatic.   Mouth/Throat: No oropharyngeal exudate.   Eyes: Pupils are equal, round, and reactive to light. EOM are normal. Right eye exhibits no discharge. Left eye exhibits no discharge. No scleral icterus.   Neck: Normal range of motion. Neck supple. No tracheal deviation present. No thyromegaly present.   Cardiovascular: Normal rate, regular rhythm and normal heart sounds. Exam reveals no gallop and no friction rub.   No murmur heard.  Pulmonary/Chest: Effort normal and breath sounds normal. No respiratory distress. She has no wheezes. She has no rales. She exhibits no tenderness.   Abdominal: Soft. Bowel sounds are normal. She exhibits no distension and no mass. There is no tenderness. There is no rebound and no guarding.   Musculoskeletal: Normal range of motion. She exhibits no edema or tenderness.   Neurological: She is alert and oriented to person, place, and time.   Skin: Skin is warm and dry. No rash noted. No erythema. No pallor.   Psychiatric: She has a normal mood and affect. Her behavior is normal.   Vitals  reviewed.      Assessment:       1. SOB (shortness of breath)    2. Morbid obesity    3. CKD (chronic kidney disease), stage III    4. Arthritis        Plan:       1.  Check lung function test, PFTs.  2.  Monitor.  Could be affecting shortness of breath.  3.  Advised patient to avoid oral anti-inflammatories.  Diclofenac gel prescribed.  4.  Diclofenac gel.

## 2020-06-04 ENCOUNTER — TELEPHONE (OUTPATIENT)
Dept: INTERNAL MEDICINE | Facility: CLINIC | Age: 75
End: 2020-06-04

## 2020-06-04 DIAGNOSIS — R06.02 SOB (SHORTNESS OF BREATH): Primary | ICD-10-CM

## 2020-06-04 NOTE — TELEPHONE ENCOUNTER
----- Message from Linaverónica Buckner sent at 6/4/2020  8:53 AM CDT -----  Contact: [phone]  Patient is wondering if her CT order has already been sent to [facility]? She said she hasn't had blood work in a while. Can you send her blood work orders to [facility] in [place]? She also said the gel for her arthritis is $45.   And she said she apologizes for the way she was at her last appointment, she has been going through a tough time.

## 2020-06-11 ENCOUNTER — TELEPHONE (OUTPATIENT)
Dept: INTERNAL MEDICINE | Facility: CLINIC | Age: 75
End: 2020-06-11

## 2020-06-11 ENCOUNTER — HOSPITAL ENCOUNTER (OUTPATIENT)
Dept: PULMONOLOGY | Facility: HOSPITAL | Age: 75
Discharge: HOME | End: 2020-06-11
Attending: INTERNAL MEDICINE
Payer: MEDICARE

## 2020-06-11 DIAGNOSIS — R06.02 SOB (SHORTNESS OF BREATH): ICD-10-CM

## 2020-06-11 PROCEDURE — 94010 BREATHING CAPACITY TEST: CPT

## 2020-06-11 PROCEDURE — 94729 DIFFUSING CAPACITY: CPT

## 2020-06-11 PROCEDURE — 94640 AIRWAY INHALATION TREATMENT: CPT

## 2020-06-11 PROCEDURE — 94727 GAS DIL/WSHOT DETER LNG VOL: CPT

## 2020-06-11 NOTE — TELEPHONE ENCOUNTER
----- Message from Riana Arboleda sent at 6/11/2020 11:59 AM CDT -----  Contact: [phone]  Patient would like to speak to the nurse in regards to the lab orders and X-ray. Patient wants to know if the orders were sent to [facility], Please call and advise

## 2020-06-17 ENCOUNTER — TELEPHONE (OUTPATIENT)
Dept: INTERNAL MEDICINE | Facility: CLINIC | Age: 75
End: 2020-06-17

## 2020-06-17 DIAGNOSIS — R06.02 SOB (SHORTNESS OF BREATH): Primary | ICD-10-CM

## 2020-06-17 LAB
BRPFT: ABNORMAL
DLCO ADJ PRE: 9.53 ML/(MIN*MMHG) (ref 9.31–20.77)
DLCO SINGLE BREATH LLN: 9.31
DLCO SINGLE BREATH PRE REF: 63.4 %
DLCO SINGLE BREATH REF: 15.04
DLCOC SBVA LLN: 2.28
DLCOC SBVA PRE REF: 72.7 %
DLCOC SBVA REF: 4.36
DLCOC SINGLE BREATH LLN: 9.31
DLCOC SINGLE BREATH PRE REF: 63.4 %
DLCOC SINGLE BREATH REF: 15.04
DLCOVA LLN: 2.28
DLCOVA PRE REF: 72.7 %
DLCOVA PRE: 3.17 ML/(MIN*MMHG*L) (ref 2.28–6.44)
DLCOVA REF: 4.36
DLVAADJ PRE: 3.17 ML/(MIN*MMHG*L) (ref 2.28–6.44)
FEF 25 75 CHG: 31.8 %
FEF 25 75 LLN: 0.6
FEF 25 75 POST REF: 57.2 %
FEF 25 75 PRE REF: 43.4 %
FEF 25 75 REF: 1.37
FET100 CHG: -5.5 %
FEV1 CHG: 16.5 %
FEV1 FVC CHG: 5.3 %
FEV1 FVC LLN: 65
FEV1 FVC POST REF: 88.3 %
FEV1 FVC PRE REF: 83.9 %
FEV1 FVC REF: 79
FEV1 LLN: 1.07
FEV1 POST REF: 84 %
FEV1 PRE REF: 72.1 %
FEV1 REF: 1.5
FRCN2 LLN: 1.39
FRCN2 PRE REF: 66.3 %
FRCN2 REF: 2.21
FVC CHG: 10.7 %
FVC LLN: 1.36
FVC POST REF: 94.6 %
FVC PRE REF: 85.5 %
FVC REF: 1.91
IVC PRE: 1.74 L (ref 1.36–2.46)
IVC SINGLE BREATH LLN: 1.36
IVC SINGLE BREATH PRE REF: 91.1 %
IVC SINGLE BREATH REF: 1.91
PEF CHG: 16 %
PEF LLN: 2.71
PEF POST REF: 84.3 %
PEF PRE REF: 72.7 %
PEF REF: 3.97
POST FEF 25 75: 0.78 L/S (ref 0.6–2.14)
POST FET 100: 6.8 SEC
POST FEV1 FVC: 69.56 % (ref 64.51–92.99)
POST FEV1: 1.26 L (ref 1.07–1.93)
POST FVC: 1.81 L (ref 1.36–2.46)
POST PEF: 3.35 L/S (ref 2.71–5.24)
PRE DLCO: 9.53 ML/(MIN*MMHG) (ref 9.31–20.77)
PRE FEF 25 75: 0.59 L/S (ref 0.6–2.14)
PRE FET 100: 7.19 SEC
PRE FEV1 FVC: 66.07 % (ref 64.51–92.99)
PRE FEV1: 1.08 L (ref 1.07–1.93)
PRE FRC N2: 1.47 L
PRE FVC: 1.63 L (ref 1.36–2.46)
PRE PEF: 2.89 L/S (ref 2.71–5.24)
RVN2 LLN: 1.16
RVN2 PRE REF: 59.5 %
RVN2 PRE: 1.03 L (ref 1.16–2.31)
RVN2 REF: 1.73
RVN2TLCN2 LLN: 34.87
RVN2TLCN2 PRE REF: 84.3 %
RVN2TLCN2 PRE: 37.49 % (ref 34.87–54.05)
RVN2TLCN2 REF: 44.46
TLCN2 LLN: 2.46
TLCN2 PRE REF: 79.8 %
TLCN2 PRE: 2.75 L (ref 2.46–4.44)
TLCN2 REF: 3.45
VA PRE: 3.01 L (ref 3.3–3.3)
VA SINGLE BREATH LLN: 3.3
VA SINGLE BREATH PRE REF: 91.1 %
VA SINGLE BREATH REF: 3.3
VCMAXN2 LLN: 1.36
VCMAXN2 PRE REF: 90.1 %
VCMAXN2 PRE: 1.72 L (ref 1.36–2.46)
VCMAXN2 REF: 1.91

## 2020-06-17 NOTE — TELEPHONE ENCOUNTER
We did not get an answer from the lung function test.  Should get methacholine challenge to evaluate further.    Please arrange test.

## 2020-06-19 ENCOUNTER — TELEPHONE (OUTPATIENT)
Dept: INTERNAL MEDICINE | Facility: CLINIC | Age: 75
End: 2020-06-19

## 2020-06-19 DIAGNOSIS — R06.02 SOB (SHORTNESS OF BREATH): Primary | ICD-10-CM

## 2020-06-19 RX ORDER — HYDROXYZINE PAMOATE 50 MG/1
CAPSULE ORAL
Qty: 90 CAPSULE | Refills: 3 | Status: SHIPPED | OUTPATIENT
Start: 2020-06-19 | End: 2021-03-29

## 2020-06-19 NOTE — TELEPHONE ENCOUNTER
----- Message from Rocky Castillo sent at 6/19/2020 10:32 AM CDT -----  I don't even know what that is to schedule.  ----- Message -----  From: Estela Camacho LPN  Sent: 6/19/2020   8:31 AM CDT  To: [facility] Referral Coordinators    methacholine challenge test, please schedule. If unable to do so let us know and Dr Burgos will refer to Pulmonology

## 2020-06-20 NOTE — TELEPHONE ENCOUNTER
----- Message from Lina Dudley sent at 6/19/2020  3:51 PM CDT -----  Contact: [phone]  PT IS REQUESTING A CALL BACK IN REGARDS TO HER BLOOD WORK SHE HAS SCHEDULED NEXT WEEK AT [facility]. IS THE BLOOD WORK FASTING?    PT CONFUSED ABOUT HER PULMONARY RESULTS. PLEASE CALL PT.   THANKS.

## 2020-06-20 NOTE — TELEPHONE ENCOUNTER
vm left for patient that labs are not fasting. Reviewed Dr Burgos's notes on Pulmonary function test and referral to pulmonology to further investigate the cause of SOB. Instructed to call Lina or Rocky at clinic to schedule referral

## 2020-06-24 ENCOUNTER — TELEPHONE (OUTPATIENT)
Dept: SPORTS MEDICINE | Facility: CLINIC | Age: 75
End: 2020-06-24

## 2020-06-24 DIAGNOSIS — M17.0 PRIMARY OSTEOARTHRITIS OF KNEES, BILATERAL: Primary | ICD-10-CM

## 2020-06-24 NOTE — TELEPHONE ENCOUNTER
pt. requesting vsi appt for 8.04.2020    Ernesto Hoffman   Orthopaedic Clinical Assistant  [facility]           ----- Message from Raven Millan sent at 6/24/2020 10:01 AM CDT -----      Request return call to schedule patients knee injections    Can be reached @# [phone]

## 2020-06-26 ENCOUNTER — TELEPHONE (OUTPATIENT)
Dept: PULMONOLOGY | Facility: CLINIC | Age: 75
End: 2020-06-26

## 2020-06-26 ENCOUNTER — TELEPHONE (OUTPATIENT)
Dept: INTERNAL MEDICINE | Facility: CLINIC | Age: 75
End: 2020-06-26

## 2020-06-26 NOTE — TELEPHONE ENCOUNTER
----- Message from Dalila Lara sent at 6/26/2020  3:48 PM CDT -----  Regarding: appt. referral  Contact: patient  [phone]-please call above patient being referred to department waiting on a call back thanks.

## 2020-06-26 NOTE — TELEPHONE ENCOUNTER
I returned a call to Mrs Quiroga who is requesting a appointment for SOB. Dr Rosenberg is sending her and she wants to be seen at [facility]. I told patient there are no Pulmonologists at [place] and she said Dr Rosenberg told her there was! She asked about [facility] on [facility] and I told her only [facility] and [facility] on [facility]. She said she is not coming to [facility] because it is too big and you have to walk 3 miles to get anywhere. She declined a visit and said she will talk to Dr Palomo Burgos's nurse about it. Deisi Virgen LPN

## 2020-06-27 LAB
ALBUMIN SERPL-MCNC: 4.1 G/DL (ref 3.6–5.1)
ALBUMIN/GLOB SERPL: 1.6 (CALC) (ref 1–2.5)
ALP SERPL-CCNC: 83 U/L (ref 37–153)
ALT SERPL-CCNC: 14 U/L (ref 6–29)
AST SERPL-CCNC: 25 U/L (ref 10–35)
BILIRUB SERPL-MCNC: 0.6 MG/DL (ref 0.2–1.2)
BUN SERPL-MCNC: 29 MG/DL (ref 7–25)
BUN/CREAT SERPL: 27 (CALC) (ref 6–22)
CALCIUM SERPL-MCNC: 9.4 MG/DL (ref 8.6–10.4)
CHLORIDE SERPL-SCNC: 105 MMOL/L (ref 98–110)
CO2 SERPL-SCNC: 27 MMOL/L (ref 20–32)
CREAT SERPL-MCNC: 1.06 MG/DL (ref 0.6–0.93)
GFRSERPLBLD MDRD-ARVRAT: 51 ML/MIN/1.73M2
GLOBULIN SER CALC-MCNC: 2.5 G/DL (CALC) (ref 1.9–3.7)
GLUCOSE SERPL-MCNC: 112 MG/DL (ref 65–99)
POTASSIUM SERPL-SCNC: 4.1 MMOL/L (ref 3.5–5.3)
PROT SERPL-MCNC: 6.6 G/DL (ref 6.1–8.1)
SODIUM SERPL-SCNC: 142 MMOL/L (ref 135–146)

## 2020-06-29 ENCOUNTER — TELEPHONE (OUTPATIENT)
Dept: INTERNAL MEDICINE | Facility: CLINIC | Age: 75
End: 2020-06-29

## 2020-06-29 NOTE — TELEPHONE ENCOUNTER
----- Message from Riana Arboleda sent at 6/26/2020  3:29 PM CDT -----  Contact: [phone]  Patient would like to speak to the nurse in regards to her x-ray orders. Please call and advise

## 2020-07-01 ENCOUNTER — TELEPHONE (OUTPATIENT)
Dept: INTERNAL MEDICINE | Facility: CLINIC | Age: 75
End: 2020-07-01

## 2020-07-01 NOTE — TELEPHONE ENCOUNTER
Spoke with patient yesterday afternoon, requesting copy of labwork from previous visit and needs xray refaxed to diagnostic imaging. Results mailed to pt at her request and order refased.

## 2020-07-01 NOTE — TELEPHONE ENCOUNTER
----- Message from Deisi March sent at 6/30/2020 11:02 AM CDT -----  Contact: Patient [phone]  Patient is requesting a call concerning a personal matter. She has been calling since last Friday.    Please call and advise.    Thank You

## 2020-07-15 ENCOUNTER — TELEPHONE (OUTPATIENT)
Dept: SPORTS MEDICINE | Facility: CLINIC | Age: 75
End: 2020-07-15

## 2020-07-15 NOTE — TELEPHONE ENCOUNTER
Returned patient phone call to reschedule injection appointments as requested. Patient expressed understanding of her new appointment dates and times.    Nury Horne MS, OTC  Clinical Assistant to Dr. Mane Katz

## 2020-07-21 ENCOUNTER — TELEPHONE (OUTPATIENT)
Dept: INTERNAL MEDICINE | Facility: CLINIC | Age: 75
End: 2020-07-21

## 2020-07-21 NOTE — TELEPHONE ENCOUNTER
----- Message from Erika Clay sent at 7/21/2020 12:35 PM CDT -----  Contact: self/[phone]  Patient called in regards needing to talk with pcp about medical records. Please call and advise. Thank you.

## 2020-07-21 NOTE — TELEPHONE ENCOUNTER
I spoke to pt, she's going to see a pulmonologist at .   Need a record release form to get reords.  I will mail a form to pt as requested

## 2020-07-27 ENCOUNTER — TELEPHONE (OUTPATIENT)
Dept: INTERNAL MEDICINE | Facility: CLINIC | Age: 75
End: 2020-07-27

## 2020-07-27 NOTE — TELEPHONE ENCOUNTER
----- Message from Anuj Veronica sent at 7/27/2020 10:24 AM CDT -----  Regarding: Amanda Castro's dbsils-[phone]  A nurse from Dr. Amanda Castro's office in [facility] is requesting a callback.  Dr. Castro is Ms. Loja's pulmonologist and they would like to receive the pt's visit notes from the last visit she had with the doctor.    Callback number: Amanda Castro's qqdgls-[phone]

## 2020-08-09 ENCOUNTER — PATIENT OUTREACH (OUTPATIENT)
Dept: ADMINISTRATIVE | Facility: OTHER | Age: 75
End: 2020-08-09

## 2020-08-11 ENCOUNTER — OFFICE VISIT (OUTPATIENT)
Dept: SPORTS MEDICINE | Facility: CLINIC | Age: 75
End: 2020-08-11
Payer: MEDICARE

## 2020-08-11 VITALS — TEMPERATURE: 98 F | BODY MASS INDEX: 52.77 KG/M2 | WEIGHT: 228 LBS | HEIGHT: 55 IN

## 2020-08-11 DIAGNOSIS — M17.0 PRIMARY OSTEOARTHRITIS OF KNEES, BILATERAL: Primary | ICD-10-CM

## 2020-08-11 PROCEDURE — 99499 UNLISTED E&M SERVICE: CPT | Mod: S$GLB,,, | Performed by: FAMILY MEDICINE

## 2020-08-11 PROCEDURE — 20611 LARGE JOINT ASPIRATION/INJECTION: BILATERAL KNEE: ICD-10-PCS | Mod: 50,S$GLB,, | Performed by: FAMILY MEDICINE

## 2020-08-11 PROCEDURE — 99999 PR PBB SHADOW E&M-EST. PATIENT-LVL III: CPT | Mod: PBBFAC,,, | Performed by: FAMILY MEDICINE

## 2020-08-11 PROCEDURE — 20611 DRAIN/INJ JOINT/BURSA W/US: CPT | Mod: 50,S$GLB,, | Performed by: FAMILY MEDICINE

## 2020-08-11 PROCEDURE — 99999 PR PBB SHADOW E&M-EST. PATIENT-LVL III: ICD-10-PCS | Mod: PBBFAC,,, | Performed by: FAMILY MEDICINE

## 2020-08-11 PROCEDURE — 99499 NO LOS: ICD-10-PCS | Mod: S$GLB,,, | Performed by: FAMILY MEDICINE

## 2020-08-11 NOTE — PROCEDURES
"Large Joint Aspiration/Injection: bilateral knee    Date/Time: 8/11/2020 2:00 PM  Performed by: Cj Caruso MD  Authorized by: Cj Caruso MD     Consent Done?:  Yes (Verbal)  Indications:  Pain  Site marked: the procedure site was marked    Timeout: prior to procedure the correct patient, procedure, and site was verified    Prep: patient was prepped and draped in usual sterile fashion    Local anesthesia used?: No      Details:  Needle Size:  20 G  Ultrasonic Guidance for needle placement?: Yes    Images are saved and documented.  Approach:  Lateral  Location:  Knee  Laterality:  Bilateral  Site:  Bilateral knee  Medications (Right):  20 mg sodium hyaluronate (EUFLEXXA) 10 mg/mL(mw 2.4 -3.6 million)  Medications (Left):  20 mg sodium hyaluronate (EUFLEXXA) 10 mg/mL(mw 2.4 -3.6 million)  Patient tolerance:  Patient tolerated the procedure well with no immediate complications     Description of ultrasound utilization for needle guidance:   Ultrasound guidance used for needle localization. Images saved and stored for documentation. The knee joint was visualized. Dynamic visualization of the 20g x 3.5" needle was continuous throughout the procedure.       "

## 2020-08-13 ENCOUNTER — TELEPHONE (OUTPATIENT)
Dept: SPORTS MEDICINE | Facility: CLINIC | Age: 75
End: 2020-08-13

## 2020-08-13 NOTE — TELEPHONE ENCOUNTER
discussed patient's soreness and stiffness: Patient denies any fevers, chills, nausea, emesis, or night sweats. Denies any itchy, warm to touch or erythema.    P:  #1 heat  #2 tylenol    Ernesto Hoffman   Orthopaedic Clinical Assistant to Dr. Cj Caruso  [facility]         ----- Message from Maria Fernanda Petty sent at 8/13/2020  1:31 PM CDT -----  Pt would like to receive a call back regarding her injections. Please contact the pt to advise    Contact info [phone]

## 2020-08-14 RX ORDER — ALBUTEROL SULFATE 90 UG/1
AEROSOL, METERED RESPIRATORY (INHALATION)
Qty: 8.5 G | Refills: 7 | Status: SHIPPED | OUTPATIENT
Start: 2020-08-14 | End: 2020-11-23

## 2020-08-18 ENCOUNTER — OFFICE VISIT (OUTPATIENT)
Dept: SPORTS MEDICINE | Facility: CLINIC | Age: 75
End: 2020-08-18
Payer: MEDICARE

## 2020-08-18 VITALS — TEMPERATURE: 98 F | WEIGHT: 228 LBS | HEIGHT: 55 IN | BODY MASS INDEX: 52.77 KG/M2

## 2020-08-18 DIAGNOSIS — M17.0 PRIMARY OSTEOARTHRITIS OF KNEES, BILATERAL: Primary | ICD-10-CM

## 2020-08-18 PROCEDURE — 99999 PR PBB SHADOW E&M-EST. PATIENT-LVL IV: CPT | Mod: PBBFAC,,, | Performed by: FAMILY MEDICINE

## 2020-08-18 PROCEDURE — 20611 DRAIN/INJ JOINT/BURSA W/US: CPT | Mod: 50,S$GLB,, | Performed by: FAMILY MEDICINE

## 2020-08-18 PROCEDURE — 99499 UNLISTED E&M SERVICE: CPT | Mod: S$GLB,,, | Performed by: FAMILY MEDICINE

## 2020-08-18 PROCEDURE — 20611 LARGE JOINT ASPIRATION/INJECTION: BILATERAL KNEE: ICD-10-PCS | Mod: 50,S$GLB,, | Performed by: FAMILY MEDICINE

## 2020-08-18 PROCEDURE — 99499 NO LOS: ICD-10-PCS | Mod: S$GLB,,, | Performed by: FAMILY MEDICINE

## 2020-08-18 PROCEDURE — 99999 PR PBB SHADOW E&M-EST. PATIENT-LVL IV: ICD-10-PCS | Mod: PBBFAC,,, | Performed by: FAMILY MEDICINE

## 2020-08-18 NOTE — PROCEDURES
"Large Joint Aspiration/Injection: bilateral knee    Date/Time: 8/18/2020 11:00 AM  Performed by: Cj Caruso MD  Authorized by: Cj Caruso MD     Consent Done?:  Yes (Verbal)  Indications:  Pain  Site marked: the procedure site was marked    Timeout: prior to procedure the correct patient, procedure, and site was verified    Prep: patient was prepped and draped in usual sterile fashion    Local anesthesia used?: No      Details:  Needle Size:  20 G  Ultrasonic Guidance for needle placement?: Yes    Images are saved and documented.  Approach:  Lateral  Location:  Knee  Laterality:  Bilateral  Site:  Bilateral knee  Medications (Right):  20 mg sodium hyaluronate (EUFLEXXA) 10 mg/mL(mw 2.4 -3.6 million)  Medications (Left):  20 mg sodium hyaluronate (EUFLEXXA) 10 mg/mL(mw 2.4 -3.6 million)  Patient tolerance:  Patient tolerated the procedure well with no immediate complications     Description of ultrasound utilization for needle guidance:   Ultrasound guidance used for needle localization. Images saved and stored for documentation. The knee joint was visualized. Dynamic visualization of the 20g x 3.5" needle was continuous throughout the procedure.       "

## 2020-08-21 ENCOUNTER — TELEPHONE (OUTPATIENT)
Dept: SPORTS MEDICINE | Facility: CLINIC | Age: 75
End: 2020-08-21

## 2020-08-21 NOTE — TELEPHONE ENCOUNTER
pt. will call back to r/s last vsi    Ernesto Hoffman   Orthopaedic Clinical Assistant to Dr. Cj Caruso  [facility]     ----- Message from Ernesto Hoffman MA sent at 8/20/2020  3:52 PM CDT -----  Contact: pt    ----- Message -----  From: Kaylene Long  Sent: 8/20/2020   3:06 PM CDT  To: [facility] Staff    Please call pt at [phone]    Patient would like to reschedule her injection appt     Thank you

## 2020-08-25 ENCOUNTER — OFFICE VISIT (OUTPATIENT)
Dept: SPORTS MEDICINE | Facility: CLINIC | Age: 75
End: 2020-08-25
Payer: MEDICARE

## 2020-08-25 ENCOUNTER — TELEPHONE (OUTPATIENT)
Dept: SPORTS MEDICINE | Facility: CLINIC | Age: 75
End: 2020-08-25

## 2020-08-25 VITALS — BODY MASS INDEX: 52.77 KG/M2 | TEMPERATURE: 98 F | WEIGHT: 228 LBS | HEIGHT: 55 IN

## 2020-08-25 DIAGNOSIS — M17.0 PRIMARY OSTEOARTHRITIS OF KNEES, BILATERAL: Primary | ICD-10-CM

## 2020-08-25 PROCEDURE — 20611 DRAIN/INJ JOINT/BURSA W/US: CPT | Mod: 50,S$GLB,, | Performed by: FAMILY MEDICINE

## 2020-08-25 PROCEDURE — 20611 LARGE JOINT ASPIRATION/INJECTION: BILATERAL KNEE: ICD-10-PCS | Mod: 50,S$GLB,, | Performed by: FAMILY MEDICINE

## 2020-08-25 PROCEDURE — 99499 UNLISTED E&M SERVICE: CPT | Mod: S$GLB,,, | Performed by: FAMILY MEDICINE

## 2020-08-25 PROCEDURE — 99999 PR PBB SHADOW E&M-EST. PATIENT-LVL IV: ICD-10-PCS | Mod: PBBFAC,,, | Performed by: FAMILY MEDICINE

## 2020-08-25 PROCEDURE — 99999 PR PBB SHADOW E&M-EST. PATIENT-LVL IV: CPT | Mod: PBBFAC,,, | Performed by: FAMILY MEDICINE

## 2020-08-25 PROCEDURE — 99499 NO LOS: ICD-10-PCS | Mod: S$GLB,,, | Performed by: FAMILY MEDICINE

## 2020-08-25 NOTE — PROCEDURES
"Large Joint Aspiration/Injection: bilateral knee    Date/Time: 8/25/2020 11:00 AM  Performed by: Cj Caruso MD  Authorized by: Cj Caruso MD     Consent Done?:  Yes (Verbal)  Indications:  Pain  Site marked: the procedure site was marked    Timeout: prior to procedure the correct patient, procedure, and site was verified    Prep: patient was prepped and draped in usual sterile fashion    Local anesthesia used?: No      Details:  Needle Size:  20 G  Ultrasonic Guidance for needle placement?: Yes    Images are saved and documented.  Approach:  Lateral  Location:  Knee  Laterality:  Bilateral  Site:  Bilateral knee  Medications (Right):  20 mg sodium hyaluronate (EUFLEXXA) 10 mg/mL(mw 2.4 -3.6 million)  Medications (Left):  20 mg sodium hyaluronate (EUFLEXXA) 10 mg/mL(mw 2.4 -3.6 million)  Patient tolerance:  Patient tolerated the procedure well with no immediate complications     Description of ultrasound utilization for needle guidance:   Ultrasound guidance used for needle localization. Images saved and stored for documentation. The knee joint was visualized. Dynamic visualization of the 20g x 3.5" needle was continuous throughout the procedure.       "

## 2020-08-25 NOTE — TELEPHONE ENCOUNTER
confirmed appt    Ernesto Hoffman   Orthopaedic Clinical Assistant to Dr. Cj Caruso  [facility]     ----- Message from Kaylene Long sent at 8/25/2020  8:36 AM CDT -----  Contact: pt  Please call pt at [phone]    Patient will be coming in today for her 11 am injection appt     Thank you

## 2020-09-11 ENCOUNTER — TELEPHONE (OUTPATIENT)
Dept: INTERNAL MEDICINE | Facility: CLINIC | Age: 75
End: 2020-09-11

## 2020-09-11 RX ORDER — MELATONIN 5 MG
5 CAPSULE ORAL NIGHTLY PRN
Qty: 90 EACH | Refills: 1 | Status: SHIPPED | OUTPATIENT
Start: 2020-09-11

## 2020-09-11 RX ORDER — CHOLECALCIFEROL (VITAMIN D3) 25 MCG
1000 TABLET ORAL DAILY
Qty: 30 TABLET | Refills: 11 | Status: SHIPPED | OUTPATIENT
Start: 2020-09-11

## 2020-09-11 RX ORDER — MELATONIN 5 MG
5 CAPSULE ORAL NIGHTLY PRN
Qty: 90 EACH | Refills: 1 | Status: SHIPPED | OUTPATIENT
Start: 2020-09-11 | End: 2020-09-11 | Stop reason: SDUPTHER

## 2020-09-11 RX ORDER — PSEUDOEPHED/CODEINE/TRIPROLIDN 30-10-1.25
1 SYRUP ORAL DAILY
Qty: 30 TABLET | Refills: 11 | Status: SHIPPED | OUTPATIENT
Start: 2020-09-11

## 2020-09-11 NOTE — TELEPHONE ENCOUNTER
Pt requesting RX for melatonin.   Patient states she does not really need a rx , because the medication is otc, but NEEDS RX FOR HER INSURANCE BENEFITS

## 2020-09-11 NOTE — TELEPHONE ENCOUNTER
----- Message from Rachael Morfin sent at 9/11/2020 12:38 PM CDT -----  Contact: Self [phone]  Would like to receive medical advice.    Would they like a call back or a response via [facility]:  call back    Additional information:  Calling to speak with the nurse regarding if the provider can write a rx  for melatonin. Patient states she does not really need a rx , because the medication is otc, but needs a rx for her benefits. Patient is requesting a call back regarding message. Patient is requesting a call back regarding message.

## 2020-10-01 RX ORDER — IRBESARTAN 150 MG/1
TABLET ORAL
Qty: 180 TABLET | Refills: 1 | Status: SHIPPED | OUTPATIENT
Start: 2020-10-01 | End: 2021-02-22

## 2020-10-15 ENCOUNTER — OFFICE VISIT (OUTPATIENT)
Dept: INTERNAL MEDICINE | Facility: CLINIC | Age: 75
End: 2020-10-15
Payer: MEDICARE

## 2020-10-15 VITALS
DIASTOLIC BLOOD PRESSURE: 88 MMHG | SYSTOLIC BLOOD PRESSURE: 122 MMHG | OXYGEN SATURATION: 95 % | HEART RATE: 68 BPM | BODY MASS INDEX: 52.99 KG/M2 | HEIGHT: 55 IN | RESPIRATION RATE: 16 BRPM | TEMPERATURE: 97 F

## 2020-10-15 DIAGNOSIS — R53.1 WEAKNESS: ICD-10-CM

## 2020-10-15 DIAGNOSIS — R73.01 IMPAIRED FASTING BLOOD SUGAR: Chronic | ICD-10-CM

## 2020-10-15 DIAGNOSIS — N18.31 STAGE 3A CHRONIC KIDNEY DISEASE: Chronic | ICD-10-CM

## 2020-10-15 DIAGNOSIS — I10 ESSENTIAL HYPERTENSION: Primary | Chronic | ICD-10-CM

## 2020-10-15 DIAGNOSIS — R29.898 LEG WEAKNESS, BILATERAL: ICD-10-CM

## 2020-10-15 DIAGNOSIS — E78.49 OTHER HYPERLIPIDEMIA: Chronic | ICD-10-CM

## 2020-10-15 PROCEDURE — 1126F PR PAIN SEVERITY QUANTIFIED, NO PAIN PRESENT: ICD-10-PCS | Mod: S$GLB,,, | Performed by: INTERNAL MEDICINE

## 2020-10-15 PROCEDURE — 3074F PR MOST RECENT SYSTOLIC BLOOD PRESSURE < 130 MM HG: ICD-10-PCS | Mod: CPTII,S$GLB,, | Performed by: INTERNAL MEDICINE

## 2020-10-15 PROCEDURE — 3079F PR MOST RECENT DIASTOLIC BLOOD PRESSURE 80-89 MM HG: ICD-10-PCS | Mod: CPTII,S$GLB,, | Performed by: INTERNAL MEDICINE

## 2020-10-15 PROCEDURE — 1159F MED LIST DOCD IN RCRD: CPT | Mod: S$GLB,,, | Performed by: INTERNAL MEDICINE

## 2020-10-15 PROCEDURE — 1101F PT FALLS ASSESS-DOCD LE1/YR: CPT | Mod: CPTII,S$GLB,, | Performed by: INTERNAL MEDICINE

## 2020-10-15 PROCEDURE — 3074F SYST BP LT 130 MM HG: CPT | Mod: CPTII,S$GLB,, | Performed by: INTERNAL MEDICINE

## 2020-10-15 PROCEDURE — 1159F PR MEDICATION LIST DOCUMENTED IN MEDICAL RECORD: ICD-10-PCS | Mod: S$GLB,,, | Performed by: INTERNAL MEDICINE

## 2020-10-15 PROCEDURE — 99999 PR PBB SHADOW E&M-EST. PATIENT-LVL V: CPT | Mod: PBBFAC,,, | Performed by: INTERNAL MEDICINE

## 2020-10-15 PROCEDURE — 99499 RISK ADDL DX/OHS AUDIT: ICD-10-PCS | Mod: S$GLB,,, | Performed by: INTERNAL MEDICINE

## 2020-10-15 PROCEDURE — 99214 PR OFFICE/OUTPT VISIT, EST, LEVL IV, 30-39 MIN: ICD-10-PCS | Mod: S$GLB,,, | Performed by: INTERNAL MEDICINE

## 2020-10-15 PROCEDURE — 3079F DIAST BP 80-89 MM HG: CPT | Mod: CPTII,S$GLB,, | Performed by: INTERNAL MEDICINE

## 2020-10-15 PROCEDURE — 1101F PR PT FALLS ASSESS DOC 0-1 FALLS W/OUT INJ PAST YR: ICD-10-PCS | Mod: CPTII,S$GLB,, | Performed by: INTERNAL MEDICINE

## 2020-10-15 PROCEDURE — 1126F AMNT PAIN NOTED NONE PRSNT: CPT | Mod: S$GLB,,, | Performed by: INTERNAL MEDICINE

## 2020-10-15 PROCEDURE — 99499 UNLISTED E&M SERVICE: CPT | Mod: S$GLB,,, | Performed by: INTERNAL MEDICINE

## 2020-10-15 PROCEDURE — 99999 PR PBB SHADOW E&M-EST. PATIENT-LVL V: ICD-10-PCS | Mod: PBBFAC,,, | Performed by: INTERNAL MEDICINE

## 2020-10-15 PROCEDURE — 99214 OFFICE O/P EST MOD 30 MIN: CPT | Mod: S$GLB,,, | Performed by: INTERNAL MEDICINE

## 2020-10-15 RX ORDER — IPRATROPIUM BROMIDE AND ALBUTEROL SULFATE 2.5; .5 MG/3ML; MG/3ML
SOLUTION RESPIRATORY (INHALATION)
COMMUNITY
Start: 2020-08-28

## 2020-10-15 RX ORDER — AMLODIPINE BESYLATE 5 MG/1
5 TABLET ORAL DAILY
Qty: 30 TABLET | Refills: 11 | Status: SHIPPED | OUTPATIENT
Start: 2020-10-15 | End: 2021-04-21 | Stop reason: SDUPTHER

## 2020-10-15 RX ORDER — A/SINGAPORE/GP1908/2015 IVR-180 (AN A/MICHIGAN/45/2015 (H1N1)PDM09-LIKE VIRUS, A/HONG KONG/4801/2014, NYMC X-263B (H3N2) (AN A/HONG KONG/4801/2014-LIKE VIRUS), AND B/BRISBANE/60/2008, WILD TYPE (A B/BRISBANE/60/2008-LIKE VIRUS) 15; 15; 15 UG/.5ML; UG/.5ML; UG/.5ML
INJECTION, SUSPENSION INTRAMUSCULAR
COMMUNITY
Start: 2020-09-17 | End: 2022-07-25

## 2020-10-15 RX ORDER — BUDESONIDE AND FORMOTEROL FUMARATE DIHYDRATE 160; 4.5 UG/1; UG/1
AEROSOL RESPIRATORY (INHALATION)
COMMUNITY
Start: 2020-10-05 | End: 2021-09-08 | Stop reason: SDUPTHER

## 2020-10-15 NOTE — PROGRESS NOTES
Subjective:       Patient ID: Purvi Quiroga is a 75 y.o. female.    Chief Complaint: Follow-up (6 month -Wants to talk about Fluid pill)    HPI     75-year-old female here for 6 month follow-up.    HTN - Patient is currently on Lopressor 50 mg b.i.d., HCTZ 25 mg, irbesartan 150 mg BID.  She does not currently check her BP at home, because her BP cuff broke.. Side effects of medications note:  None.  Denies headaches, blurred vision, chest pain, shortness of breath, nausea.  Her BP was running mostly in the 140s-150s/90 until her cuff broke.    She needs something else for the fluid. She reports that the HCTZ is about to kill her.  She is urinating all night.  She has not had a night sleep in years.  She is waking up all night to urinate.  She takes long naps from the afternoon to the middle of the night without getting up to go to the bathroom.    Her legs feel heavy.  She has trouble lifting her legs.  This has been going on quite a while.  She never really wanted to deal with it.  Her left leg in particular.  She has to lift her left leg with her hand it is so heavy.  She cannot do outpatient PT, because of transportation.    HLD - Patient is currently on Lipitor 20 mg.  Her last lipid panel was   Cholesterol   Date Value Ref Range Status   11/01/2019 141 <200 mg/dL Final     Triglycerides   Date Value Ref Range Status   11/01/2019 75 <150 mg/dL Final     HDL   Date Value Ref Range Status   11/01/2019 62 >50 mg/dL Final     LDL Cholesterol   Date Value Ref Range Status   11/01/2019 63 mg/dL (calc) Final     Comment:     Reference range: <100     Desirable range <100 mg/dL for primary prevention;    <70 mg/dL for patients with CHD or diabetic patients   with > or = 2 CHD risk factors.     LDL-C is now calculated using the Ivonne   calculation, which is a validated novel method providing   better accuracy than the Friedewald equation in the   estimation of LDL-C.   Isidro HUNT et al. MEAGHAN. 2013;310(19):  2061-2068   (http://education.NetMovies/faq/SHL217)     .  Side effects of the medication:  None.    Review of Systems      Objective:      Physical Exam  Vitals signs reviewed.   Constitutional:       Appearance: She is well-developed.   HENT:      Head: Normocephalic and atraumatic.      Mouth/Throat:      Pharynx: No oropharyngeal exudate.   Eyes:      General: No scleral icterus.        Right eye: No discharge.         Left eye: No discharge.      Pupils: Pupils are equal, round, and reactive to light.   Neck:      Musculoskeletal: Normal range of motion and neck supple.      Thyroid: No thyromegaly.      Trachea: No tracheal deviation.   Cardiovascular:      Rate and Rhythm: Normal rate and regular rhythm.      Heart sounds: Normal heart sounds. No murmur. No friction rub. No gallop.    Pulmonary:      Effort: Pulmonary effort is normal. No respiratory distress.      Breath sounds: Normal breath sounds. No wheezing or rales.   Chest:      Chest wall: No tenderness.   Abdominal:      General: Bowel sounds are normal. There is no distension.      Palpations: Abdomen is soft. There is no mass.      Tenderness: There is no abdominal tenderness. There is no guarding or rebound.   Musculoskeletal: Normal range of motion.         General: No tenderness.   Skin:     General: Skin is warm and dry.      Coloration: Skin is not pale.      Findings: No erythema or rash.   Neurological:      Mental Status: She is alert and oriented to person, place, and time.   Psychiatric:         Behavior: Behavior normal.         Assessment:       1. Essential hypertension    2. Other hyperlipidemia    3. Stage 3a chronic kidney disease    4. Impaired fasting blood sugar    5. Weakness    6. Leg weakness, bilateral        Plan:       1.  Continue irbesartan 150 mg, Lopressor 50 mg.  Stop HCTZ.  Start amlodipine 5 mg.  Return to clinic in a month reassess.  2.  Continue Lipitor 20 mg daily.  3.  Monitor.  4.  Check A1c.  5/6.   Home health physical therapy ordered.    Check CBC, CMP, TSH, lipids, A1c.

## 2020-10-19 ENCOUNTER — TELEPHONE (OUTPATIENT)
Dept: INTERNAL MEDICINE | Facility: CLINIC | Age: 75
End: 2020-10-19

## 2020-10-19 NOTE — TELEPHONE ENCOUNTER
----- Message from Valentina Veronica sent at 10/16/2020  3:59 PM CDT -----  Regarding: External Orders  Called patient to schedule X-ray Patient stated she would like [facility]. Also Patient stated she is suppose to have labs drawn as well and would like the lab order to go to [facility]. Patient stated there is no hurry either next week or the following week is fine with her, Patient just wants to make sure the nurse didn't forget. Thank you

## 2020-10-19 NOTE — TELEPHONE ENCOUNTER
----- Message from Nuyr Sommers sent at 10/19/2020  1:16 PM CDT -----  Contact: Pt- [phone]  Type:  Needs Medical Advice    Who Called: Pt    Symptoms (please be specific):  labs    Would the patient rather a call back or a response via [facility]? Call    Best Call Back Number: [phone]    Additional Information:  Pt requesting lab orders be faxed to [facility]. She is requesting a call back.

## 2020-11-09 ENCOUNTER — TELEPHONE (OUTPATIENT)
Dept: NEUROLOGY | Facility: CLINIC | Age: 75
End: 2020-11-09

## 2020-11-09 NOTE — TELEPHONE ENCOUNTER
Spoke with patient and informed her that we are no longer offering Audio Only visits and scheduled her for 1/19/2021 @ 11:40 am.

## 2020-11-09 NOTE — TELEPHONE ENCOUNTER
----- Message from Devon Haddad sent at 11/9/2020 12:20 PM CST -----  Contact: Patient  Patient Advice/Staff Message     Reason for call: Calling to speak with the RN regarding scheduling appt. Says she was told by the nurse that the doctor was no longer doing VV, calling to clarify.        Communication Preference: [phone]    Additional Information:

## 2020-11-10 ENCOUNTER — TELEPHONE (OUTPATIENT)
Dept: INTERNAL MEDICINE | Facility: CLINIC | Age: 75
End: 2020-11-10
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
## 2020-11-10 NOTE — TELEPHONE ENCOUNTER
----- Message from Meredith Blanco sent at 11/9/2020 12:17 PM CST -----  Regarding: patito call  Contact: patient [phone]  Please call angel about rescheduling lab at [facility]

## 2020-12-05 LAB
ALBUMIN SERPL-MCNC: 4.3 G/DL (ref 3.6–5.1)
ALBUMIN/GLOB SERPL: 1.8 (CALC) (ref 1–2.5)
ALP SERPL-CCNC: 87 U/L (ref 37–153)
ALT SERPL-CCNC: 9 U/L (ref 6–29)
AST SERPL-CCNC: 12 U/L (ref 10–35)
BASOPHILS # BLD AUTO: 80 CELLS/UL (ref 0–200)
BASOPHILS NFR BLD AUTO: 0.8 %
BILIRUB SERPL-MCNC: 0.8 MG/DL (ref 0.2–1.2)
BUN SERPL-MCNC: 21 MG/DL (ref 7–25)
BUN/CREAT SERPL: 21 (CALC) (ref 6–22)
CALCIUM SERPL-MCNC: 9.1 MG/DL (ref 8.6–10.4)
CHLORIDE SERPL-SCNC: 103 MMOL/L (ref 98–110)
CHOLEST SERPL-MCNC: 185 MG/DL
CHOLEST/HDLC SERPL: 3.2 (CALC)
CO2 SERPL-SCNC: 28 MMOL/L (ref 20–32)
CREAT SERPL-MCNC: 0.99 MG/DL (ref 0.6–0.93)
EOSINOPHIL # BLD AUTO: 580 CELLS/UL (ref 15–500)
EOSINOPHIL NFR BLD AUTO: 5.8 %
ERYTHROCYTE [DISTWIDTH] IN BLOOD BY AUTOMATED COUNT: 12.6 % (ref 11–15)
GFRSERPLBLD MDRD-ARVRAT: 56 ML/MIN/1.73M2
GLOBULIN SER CALC-MCNC: 2.4 G/DL (CALC) (ref 1.9–3.7)
GLUCOSE SERPL-MCNC: 102 MG/DL (ref 65–99)
HBA1C MFR BLD: 5.3 % OF TOTAL HGB
HCT VFR BLD AUTO: 43.1 % (ref 35–45)
HDLC SERPL-MCNC: 58 MG/DL
HGB BLD-MCNC: 14.6 G/DL (ref 11.7–15.5)
LDLC SERPL CALC-MCNC: 107 MG/DL (CALC)
LYMPHOCYTES # BLD AUTO: 2250 CELLS/UL (ref 850–3900)
LYMPHOCYTES NFR BLD AUTO: 22.5 %
MCH RBC QN AUTO: 32 PG (ref 27–33)
MCHC RBC AUTO-ENTMCNC: 33.9 G/DL (ref 32–36)
MCV RBC AUTO: 94.5 FL (ref 80–100)
MONOCYTES # BLD AUTO: 830 CELLS/UL (ref 200–950)
MONOCYTES NFR BLD AUTO: 8.3 %
NEUTROPHILS # BLD AUTO: 6260 CELLS/UL (ref 1500–7800)
NEUTROPHILS NFR BLD AUTO: 62.6 %
NONHDLC SERPL-MCNC: 127 MG/DL (CALC)
PLATELET # BLD AUTO: 258 THOUSAND/UL (ref 140–400)
PMV BLD REES-ECKER: 10.8 FL (ref 7.5–12.5)
POTASSIUM SERPL-SCNC: 4.7 MMOL/L (ref 3.5–5.3)
PROT SERPL-MCNC: 6.7 G/DL (ref 6.1–8.1)
RBC # BLD AUTO: 4.56 MILLION/UL (ref 3.8–5.1)
SODIUM SERPL-SCNC: 141 MMOL/L (ref 135–146)
TRIGL SERPL-MCNC: 105 MG/DL
TSH SERPL-ACNC: 1 MIU/L (ref 0.4–4.5)
WBC # BLD AUTO: 10 THOUSAND/UL (ref 3.8–10.8)

## 2020-12-21 NOTE — TELEPHONE ENCOUNTER
Called patient, no answer, left VM to call office.  Dr. William is not taking any new patients. Dr. William will no longer be at [facility].  
Alert and oriented to person, place and time

## 2021-01-08 ENCOUNTER — TELEPHONE (OUTPATIENT)
Dept: INTERNAL MEDICINE | Facility: CLINIC | Age: 76
End: 2021-01-08

## 2021-01-12 ENCOUNTER — TELEPHONE (OUTPATIENT)
Dept: SPORTS MEDICINE | Facility: CLINIC | Age: 76
End: 2021-01-12

## 2021-01-14 ENCOUNTER — TELEPHONE (OUTPATIENT)
Dept: SPORTS MEDICINE | Facility: CLINIC | Age: 76
End: 2021-01-14

## 2021-01-14 DIAGNOSIS — M17.0 PRIMARY OSTEOARTHRITIS OF KNEES, BILATERAL: Primary | ICD-10-CM

## 2021-01-21 ENCOUNTER — OFFICE VISIT (OUTPATIENT)
Dept: NEUROLOGY | Facility: CLINIC | Age: 76
End: 2021-01-21
Payer: MEDICARE

## 2021-01-21 VITALS
DIASTOLIC BLOOD PRESSURE: 97 MMHG | HEART RATE: 71 BPM | BODY MASS INDEX: 54.65 KG/M2 | HEIGHT: 55 IN | SYSTOLIC BLOOD PRESSURE: 182 MMHG | WEIGHT: 236.13 LBS

## 2021-01-21 DIAGNOSIS — G43.009 MIGRAINE WITHOUT AURA AND WITHOUT STATUS MIGRAINOSUS, NOT INTRACTABLE: Primary | ICD-10-CM

## 2021-01-21 PROCEDURE — 99999 PR PBB SHADOW E&M-EST. PATIENT-LVL IV: ICD-10-PCS | Mod: PBBFAC,,, | Performed by: NEUROMUSCULOSKELETAL MEDICINE & OMM

## 2021-01-21 PROCEDURE — 1159F PR MEDICATION LIST DOCUMENTED IN MEDICAL RECORD: ICD-10-PCS | Mod: S$GLB,,, | Performed by: NEUROMUSCULOSKELETAL MEDICINE & OMM

## 2021-01-21 PROCEDURE — 1126F AMNT PAIN NOTED NONE PRSNT: CPT | Mod: S$GLB,,, | Performed by: NEUROMUSCULOSKELETAL MEDICINE & OMM

## 2021-01-21 PROCEDURE — 3080F DIAST BP >= 90 MM HG: CPT | Mod: CPTII,S$GLB,, | Performed by: NEUROMUSCULOSKELETAL MEDICINE & OMM

## 2021-01-21 PROCEDURE — 3288F FALL RISK ASSESSMENT DOCD: CPT | Mod: CPTII,S$GLB,, | Performed by: NEUROMUSCULOSKELETAL MEDICINE & OMM

## 2021-01-21 PROCEDURE — 1126F PR PAIN SEVERITY QUANTIFIED, NO PAIN PRESENT: ICD-10-PCS | Mod: S$GLB,,, | Performed by: NEUROMUSCULOSKELETAL MEDICINE & OMM

## 2021-01-21 PROCEDURE — 3077F PR MOST RECENT SYSTOLIC BLOOD PRESSURE >= 140 MM HG: ICD-10-PCS | Mod: CPTII,S$GLB,, | Performed by: NEUROMUSCULOSKELETAL MEDICINE & OMM

## 2021-01-21 PROCEDURE — 3077F SYST BP >= 140 MM HG: CPT | Mod: CPTII,S$GLB,, | Performed by: NEUROMUSCULOSKELETAL MEDICINE & OMM

## 2021-01-21 PROCEDURE — 1101F PR PT FALLS ASSESS DOC 0-1 FALLS W/OUT INJ PAST YR: ICD-10-PCS | Mod: CPTII,S$GLB,, | Performed by: NEUROMUSCULOSKELETAL MEDICINE & OMM

## 2021-01-21 PROCEDURE — 1159F MED LIST DOCD IN RCRD: CPT | Mod: S$GLB,,, | Performed by: NEUROMUSCULOSKELETAL MEDICINE & OMM

## 2021-01-21 PROCEDURE — 99214 PR OFFICE/OUTPT VISIT, EST, LEVL IV, 30-39 MIN: ICD-10-PCS | Mod: S$GLB,,, | Performed by: NEUROMUSCULOSKELETAL MEDICINE & OMM

## 2021-01-21 PROCEDURE — 99999 PR PBB SHADOW E&M-EST. PATIENT-LVL IV: CPT | Mod: PBBFAC,,, | Performed by: NEUROMUSCULOSKELETAL MEDICINE & OMM

## 2021-01-21 PROCEDURE — 99214 OFFICE O/P EST MOD 30 MIN: CPT | Mod: S$GLB,,, | Performed by: NEUROMUSCULOSKELETAL MEDICINE & OMM

## 2021-01-21 PROCEDURE — 1101F PT FALLS ASSESS-DOCD LE1/YR: CPT | Mod: CPTII,S$GLB,, | Performed by: NEUROMUSCULOSKELETAL MEDICINE & OMM

## 2021-01-21 PROCEDURE — 3080F PR MOST RECENT DIASTOLIC BLOOD PRESSURE >= 90 MM HG: ICD-10-PCS | Mod: CPTII,S$GLB,, | Performed by: NEUROMUSCULOSKELETAL MEDICINE & OMM

## 2021-01-21 PROCEDURE — 3288F PR FALLS RISK ASSESSMENT DOCUMENTED: ICD-10-PCS | Mod: CPTII,S$GLB,, | Performed by: NEUROMUSCULOSKELETAL MEDICINE & OMM

## 2021-02-02 ENCOUNTER — TELEPHONE (OUTPATIENT)
Dept: SPORTS MEDICINE | Facility: CLINIC | Age: 76
End: 2021-02-02

## 2021-02-03 ENCOUNTER — TELEPHONE (OUTPATIENT)
Dept: SPORTS MEDICINE | Facility: CLINIC | Age: 76
End: 2021-02-03

## 2021-02-22 RX ORDER — IRBESARTAN 150 MG/1
TABLET ORAL
Qty: 180 TABLET | Refills: 2 | Status: SHIPPED | OUTPATIENT
Start: 2021-02-22 | End: 2021-12-25

## 2021-03-01 ENCOUNTER — CLINICAL SUPPORT (OUTPATIENT)
Dept: SPORTS MEDICINE | Facility: CLINIC | Age: 76
End: 2021-03-01
Payer: MEDICARE

## 2021-03-01 VITALS — BODY MASS INDEX: 54.62 KG/M2 | WEIGHT: 236 LBS | HEIGHT: 55 IN

## 2021-03-01 DIAGNOSIS — M17.0 PRIMARY OSTEOARTHRITIS OF KNEES, BILATERAL: Primary | ICD-10-CM

## 2021-03-01 PROCEDURE — 20611 DRAIN/INJ JOINT/BURSA W/US: CPT | Mod: 50,S$GLB,, | Performed by: FAMILY MEDICINE

## 2021-03-01 PROCEDURE — 99999 PR PBB SHADOW E&M-EST. PATIENT-LVL III: CPT | Mod: PBBFAC,,, | Performed by: FAMILY MEDICINE

## 2021-03-01 PROCEDURE — 99499 UNLISTED E&M SERVICE: CPT | Mod: S$GLB,,, | Performed by: FAMILY MEDICINE

## 2021-03-01 PROCEDURE — 99999 PR PBB SHADOW E&M-EST. PATIENT-LVL III: ICD-10-PCS | Mod: PBBFAC,,, | Performed by: FAMILY MEDICINE

## 2021-03-01 PROCEDURE — 20611 LARGE JOINT ASPIRATION/INJECTION: BILATERAL KNEE: ICD-10-PCS | Mod: 50,S$GLB,, | Performed by: FAMILY MEDICINE

## 2021-03-01 PROCEDURE — 99499 NO LOS: ICD-10-PCS | Mod: S$GLB,,, | Performed by: FAMILY MEDICINE

## 2021-03-08 ENCOUNTER — TELEPHONE (OUTPATIENT)
Dept: SPORTS MEDICINE | Facility: CLINIC | Age: 76
End: 2021-03-08

## 2021-03-08 ENCOUNTER — CLINICAL SUPPORT (OUTPATIENT)
Dept: SPORTS MEDICINE | Facility: CLINIC | Age: 76
End: 2021-03-08
Payer: MEDICARE

## 2021-03-08 VITALS
DIASTOLIC BLOOD PRESSURE: 77 MMHG | WEIGHT: 238 LBS | BODY MASS INDEX: 55.08 KG/M2 | HEIGHT: 55 IN | RESPIRATION RATE: 18 BRPM | HEART RATE: 66 BPM | SYSTOLIC BLOOD PRESSURE: 130 MMHG

## 2021-03-08 DIAGNOSIS — M17.0 PRIMARY OSTEOARTHRITIS OF KNEES, BILATERAL: Primary | ICD-10-CM

## 2021-03-08 PROCEDURE — 99999 PR PBB SHADOW E&M-EST. PATIENT-LVL IV: ICD-10-PCS | Mod: PBBFAC,,, | Performed by: FAMILY MEDICINE

## 2021-03-08 PROCEDURE — 20611 LARGE JOINT ASPIRATION/INJECTION: BILATERAL KNEE: ICD-10-PCS | Mod: 50,S$GLB,, | Performed by: FAMILY MEDICINE

## 2021-03-08 PROCEDURE — 99499 UNLISTED E&M SERVICE: CPT | Mod: S$GLB,,, | Performed by: FAMILY MEDICINE

## 2021-03-08 PROCEDURE — 99999 PR PBB SHADOW E&M-EST. PATIENT-LVL IV: CPT | Mod: PBBFAC,,, | Performed by: FAMILY MEDICINE

## 2021-03-08 PROCEDURE — 99499 NO LOS: ICD-10-PCS | Mod: S$GLB,,, | Performed by: FAMILY MEDICINE

## 2021-03-08 PROCEDURE — 20611 DRAIN/INJ JOINT/BURSA W/US: CPT | Mod: 50,S$GLB,, | Performed by: FAMILY MEDICINE

## 2021-03-12 ENCOUNTER — TELEPHONE (OUTPATIENT)
Dept: SPORTS MEDICINE | Facility: CLINIC | Age: 76
End: 2021-03-12

## 2021-03-22 ENCOUNTER — CLINICAL SUPPORT (OUTPATIENT)
Dept: SPORTS MEDICINE | Facility: CLINIC | Age: 76
End: 2021-03-22
Payer: MEDICARE

## 2021-03-22 VITALS — TEMPERATURE: 97 F | BODY MASS INDEX: 55.08 KG/M2 | WEIGHT: 238 LBS | HEIGHT: 55 IN

## 2021-03-22 DIAGNOSIS — M17.0 PRIMARY OSTEOARTHRITIS OF BOTH KNEES: Primary | ICD-10-CM

## 2021-03-22 DIAGNOSIS — M25.561 PAIN IN BOTH KNEES, UNSPECIFIED CHRONICITY: ICD-10-CM

## 2021-03-22 DIAGNOSIS — M25.562 PAIN IN BOTH KNEES, UNSPECIFIED CHRONICITY: ICD-10-CM

## 2021-03-22 PROCEDURE — 99499 NO LOS: ICD-10-PCS | Mod: S$GLB,,, | Performed by: FAMILY MEDICINE

## 2021-03-22 PROCEDURE — 20611 LARGE JOINT ASPIRATION/INJECTION: BILATERAL KNEE: ICD-10-PCS | Mod: 50,S$GLB,, | Performed by: FAMILY MEDICINE

## 2021-03-22 PROCEDURE — 99999 PR PBB SHADOW E&M-EST. PATIENT-LVL IV: CPT | Mod: PBBFAC,,, | Performed by: FAMILY MEDICINE

## 2021-03-22 PROCEDURE — 99499 UNLISTED E&M SERVICE: CPT | Mod: S$GLB,,, | Performed by: FAMILY MEDICINE

## 2021-03-22 PROCEDURE — 20611 DRAIN/INJ JOINT/BURSA W/US: CPT | Mod: 50,S$GLB,, | Performed by: FAMILY MEDICINE

## 2021-03-22 PROCEDURE — 99999 PR PBB SHADOW E&M-EST. PATIENT-LVL IV: ICD-10-PCS | Mod: PBBFAC,,, | Performed by: FAMILY MEDICINE

## 2021-03-29 RX ORDER — HYDROXYZINE PAMOATE 50 MG/1
CAPSULE ORAL
Qty: 90 CAPSULE | Refills: 1 | Status: SHIPPED | OUTPATIENT
Start: 2021-03-29 | End: 2021-12-10

## 2021-04-07 ENCOUNTER — TELEPHONE (OUTPATIENT)
Dept: INTERNAL MEDICINE | Facility: CLINIC | Age: 76
End: 2021-04-07

## 2021-04-14 ENCOUNTER — TELEPHONE (OUTPATIENT)
Dept: SPORTS MEDICINE | Facility: CLINIC | Age: 76
End: 2021-04-14

## 2021-04-21 ENCOUNTER — OFFICE VISIT (OUTPATIENT)
Dept: INTERNAL MEDICINE | Facility: CLINIC | Age: 76
End: 2021-04-21
Payer: MEDICARE

## 2021-04-21 VITALS
DIASTOLIC BLOOD PRESSURE: 80 MMHG | HEART RATE: 73 BPM | SYSTOLIC BLOOD PRESSURE: 110 MMHG | WEIGHT: 237.63 LBS | HEIGHT: 55 IN | OXYGEN SATURATION: 99 % | TEMPERATURE: 98 F | BODY MASS INDEX: 54.99 KG/M2

## 2021-04-21 DIAGNOSIS — E66.01 MORBID OBESITY: Chronic | ICD-10-CM

## 2021-04-21 DIAGNOSIS — F41.9 ANXIETY: Chronic | ICD-10-CM

## 2021-04-21 DIAGNOSIS — G47.00 INSOMNIA, UNSPECIFIED TYPE: Chronic | ICD-10-CM

## 2021-04-21 DIAGNOSIS — E78.49 OTHER HYPERLIPIDEMIA: Chronic | ICD-10-CM

## 2021-04-21 DIAGNOSIS — R07.9 CHEST PAIN, UNSPECIFIED TYPE: ICD-10-CM

## 2021-04-21 DIAGNOSIS — R06.02 SOB (SHORTNESS OF BREATH): ICD-10-CM

## 2021-04-21 DIAGNOSIS — I10 ESSENTIAL HYPERTENSION: Primary | Chronic | ICD-10-CM

## 2021-04-21 DIAGNOSIS — N18.31 STAGE 3A CHRONIC KIDNEY DISEASE: Chronic | ICD-10-CM

## 2021-04-21 PROCEDURE — 1101F PR PT FALLS ASSESS DOC 0-1 FALLS W/OUT INJ PAST YR: ICD-10-PCS | Mod: CPTII,S$GLB,, | Performed by: INTERNAL MEDICINE

## 2021-04-21 PROCEDURE — 3074F PR MOST RECENT SYSTOLIC BLOOD PRESSURE < 130 MM HG: ICD-10-PCS | Mod: CPTII,S$GLB,, | Performed by: INTERNAL MEDICINE

## 2021-04-21 PROCEDURE — 99499 RISK ADDL DX/OHS AUDIT: ICD-10-PCS | Mod: S$GLB,,, | Performed by: INTERNAL MEDICINE

## 2021-04-21 PROCEDURE — 3074F SYST BP LT 130 MM HG: CPT | Mod: CPTII,S$GLB,, | Performed by: INTERNAL MEDICINE

## 2021-04-21 PROCEDURE — 93005 EKG 12-LEAD: ICD-10-PCS | Mod: S$GLB,,, | Performed by: INTERNAL MEDICINE

## 2021-04-21 PROCEDURE — 99999 PR PBB SHADOW E&M-EST. PATIENT-LVL IV: ICD-10-PCS | Mod: PBBFAC,,, | Performed by: INTERNAL MEDICINE

## 2021-04-21 PROCEDURE — 1101F PT FALLS ASSESS-DOCD LE1/YR: CPT | Mod: CPTII,S$GLB,, | Performed by: INTERNAL MEDICINE

## 2021-04-21 PROCEDURE — 3079F DIAST BP 80-89 MM HG: CPT | Mod: CPTII,S$GLB,, | Performed by: INTERNAL MEDICINE

## 2021-04-21 PROCEDURE — 1126F AMNT PAIN NOTED NONE PRSNT: CPT | Mod: S$GLB,,, | Performed by: INTERNAL MEDICINE

## 2021-04-21 PROCEDURE — 3288F FALL RISK ASSESSMENT DOCD: CPT | Mod: CPTII,S$GLB,, | Performed by: INTERNAL MEDICINE

## 2021-04-21 PROCEDURE — 99214 PR OFFICE/OUTPT VISIT, EST, LEVL IV, 30-39 MIN: ICD-10-PCS | Mod: S$GLB,,, | Performed by: INTERNAL MEDICINE

## 2021-04-21 PROCEDURE — 99499 UNLISTED E&M SERVICE: CPT | Mod: S$GLB,,, | Performed by: INTERNAL MEDICINE

## 2021-04-21 PROCEDURE — 99999 PR PBB SHADOW E&M-EST. PATIENT-LVL IV: CPT | Mod: PBBFAC,,, | Performed by: INTERNAL MEDICINE

## 2021-04-21 PROCEDURE — 3079F PR MOST RECENT DIASTOLIC BLOOD PRESSURE 80-89 MM HG: ICD-10-PCS | Mod: CPTII,S$GLB,, | Performed by: INTERNAL MEDICINE

## 2021-04-21 PROCEDURE — 1126F PR PAIN SEVERITY QUANTIFIED, NO PAIN PRESENT: ICD-10-PCS | Mod: S$GLB,,, | Performed by: INTERNAL MEDICINE

## 2021-04-21 PROCEDURE — 93010 EKG 12-LEAD: ICD-10-PCS | Mod: S$GLB,,, | Performed by: INTERNAL MEDICINE

## 2021-04-21 PROCEDURE — 93010 ELECTROCARDIOGRAM REPORT: CPT | Mod: S$GLB,,, | Performed by: INTERNAL MEDICINE

## 2021-04-21 PROCEDURE — 1159F MED LIST DOCD IN RCRD: CPT | Mod: S$GLB,,, | Performed by: INTERNAL MEDICINE

## 2021-04-21 PROCEDURE — 93005 ELECTROCARDIOGRAM TRACING: CPT | Mod: S$GLB,,, | Performed by: INTERNAL MEDICINE

## 2021-04-21 PROCEDURE — 1159F PR MEDICATION LIST DOCUMENTED IN MEDICAL RECORD: ICD-10-PCS | Mod: S$GLB,,, | Performed by: INTERNAL MEDICINE

## 2021-04-21 PROCEDURE — 3288F PR FALLS RISK ASSESSMENT DOCUMENTED: ICD-10-PCS | Mod: CPTII,S$GLB,, | Performed by: INTERNAL MEDICINE

## 2021-04-21 PROCEDURE — 99214 OFFICE O/P EST MOD 30 MIN: CPT | Mod: S$GLB,,, | Performed by: INTERNAL MEDICINE

## 2021-04-21 RX ORDER — AMLODIPINE BESYLATE 5 MG/1
5 TABLET ORAL DAILY
Qty: 90 TABLET | Refills: 3 | Status: SHIPPED | OUTPATIENT
Start: 2021-04-21 | End: 2022-03-10

## 2021-04-21 RX ORDER — METOPROLOL TARTRATE 50 MG/1
50 TABLET ORAL 2 TIMES DAILY
Qty: 180 TABLET | Refills: 3 | Status: SHIPPED | OUTPATIENT
Start: 2021-04-21 | End: 2022-02-28

## 2021-04-21 RX ORDER — MONTELUKAST SODIUM 10 MG/1
10 TABLET ORAL NIGHTLY
Qty: 90 TABLET | Refills: 3 | Status: SHIPPED | OUTPATIENT
Start: 2021-04-21 | End: 2022-02-28

## 2021-04-21 RX ORDER — DIPHENOXYLATE HYDROCHLORIDE AND ATROPINE SULFATE 2.5; .025 MG/1; MG/1
1 TABLET ORAL 4 TIMES DAILY PRN
Qty: 10 TABLET | Refills: 0 | Status: SHIPPED | OUTPATIENT
Start: 2021-04-21 | End: 2022-02-09 | Stop reason: SDUPTHER

## 2021-04-30 ENCOUNTER — TELEPHONE (OUTPATIENT)
Dept: INTERNAL MEDICINE | Facility: CLINIC | Age: 76
End: 2021-04-30

## 2021-05-05 ENCOUNTER — TELEPHONE (OUTPATIENT)
Dept: INTERNAL MEDICINE | Facility: CLINIC | Age: 76
End: 2021-05-05

## 2021-05-11 ENCOUNTER — HOSPITAL ENCOUNTER (OUTPATIENT)
Dept: CARDIOLOGY | Facility: HOSPITAL | Age: 76
Discharge: HOME | End: 2021-05-11
Attending: INTERNAL MEDICINE
Payer: MEDICARE

## 2021-05-11 VITALS — WEIGHT: 237 LBS | BODY MASS INDEX: 55.08 KG/M2

## 2021-05-11 DIAGNOSIS — R06.02 SOB (SHORTNESS OF BREATH): ICD-10-CM

## 2021-05-11 DIAGNOSIS — R07.9 CHEST PAIN, UNSPECIFIED TYPE: ICD-10-CM

## 2021-05-11 LAB
CV ECHO LV RWT: 0.37 CM
CV STRESS BASE HR: 68 BPM
DIASTOLIC BLOOD PRESSURE: 76 MMHG
ECHO LV POSTERIOR WALL: 0.8 CM (ref 0.6–1.1)
EJECTION FRACTION: 55 %
INTERVENTRICULAR SEPTUM: 0.8 CM (ref 0.6–1.1)
LEFT VENTRICULAR INTERNAL DIMENSION IN DIASTOLE: 4.3 CM (ref 3.5–6)
LEFT VENTRICULAR MASS: 105.33 G
OHS CV CPX 1 MINUTE RECOVERY HEART RATE: 75 BPM
OHS CV CPX 85 PERCENT MAX PREDICTED HEART RATE MALE: 118
OHS CV CPX MAX PREDICTED HEART RATE: 139
OHS CV CPX PATIENT IS FEMALE: 1
OHS CV CPX PATIENT IS MALE: 0
OHS CV CPX PEAK DIASTOLIC BLOOD PRESSURE: 94 MMHG
OHS CV CPX PEAK HEAR RATE: 113 BPM
OHS CV CPX PEAK RATE PRESSURE PRODUCT: NORMAL
OHS CV CPX PEAK SYSTOLIC BLOOD PRESSURE: 153 MMHG
OHS CV CPX PERCENT MAX PREDICTED HEART RATE ACHIEVED: 81
OHS CV CPX RATE PRESSURE PRODUCT PRESENTING: 8500
SYSTOLIC BLOOD PRESSURE: 125 MMHG

## 2021-05-11 PROCEDURE — 93351 STRESS ECHO (CUPID ONLY): ICD-10-PCS | Mod: 26,,, | Performed by: INTERNAL MEDICINE

## 2021-05-11 PROCEDURE — 93351 STRESS TTE COMPLETE: CPT | Mod: 26,,, | Performed by: INTERNAL MEDICINE

## 2021-05-11 PROCEDURE — 63600175 PHARM REV CODE 636 W HCPCS: Performed by: NURSE PRACTITIONER

## 2021-05-11 PROCEDURE — 93351 STRESS TTE COMPLETE: CPT

## 2021-05-11 RX ORDER — DOBUTAMINE HYDROCHLORIDE 400 MG/100ML
10 INJECTION INTRAVENOUS CONTINUOUS
Status: DISCONTINUED | OUTPATIENT
Start: 2021-05-11 | End: 2023-02-02

## 2021-05-11 RX ADMIN — DOBUTAMINE HYDROCHLORIDE 10 MCG/KG/MIN: 400 INJECTION INTRAVENOUS at 02:05

## 2021-05-12 ENCOUNTER — TELEPHONE (OUTPATIENT)
Dept: INTERNAL MEDICINE | Facility: CLINIC | Age: 76
End: 2021-05-12

## 2021-05-12 DIAGNOSIS — R06.02 SOB (SHORTNESS OF BREATH): Primary | ICD-10-CM

## 2021-05-12 DIAGNOSIS — R07.9 CHEST PAIN, UNSPECIFIED TYPE: ICD-10-CM

## 2021-05-26 ENCOUNTER — TELEPHONE (OUTPATIENT)
Dept: CARDIOLOGY | Facility: CLINIC | Age: 76
End: 2021-05-26

## 2021-05-26 ENCOUNTER — OFFICE VISIT (OUTPATIENT)
Dept: CARDIOLOGY | Facility: CLINIC | Age: 76
End: 2021-05-26
Payer: MEDICARE

## 2021-05-26 VITALS
SYSTOLIC BLOOD PRESSURE: 140 MMHG | DIASTOLIC BLOOD PRESSURE: 83 MMHG | HEART RATE: 74 BPM | BODY MASS INDEX: 56.22 KG/M2 | HEIGHT: 55 IN | WEIGHT: 242.94 LBS

## 2021-05-26 DIAGNOSIS — R06.02 SOB (SHORTNESS OF BREATH): ICD-10-CM

## 2021-05-26 DIAGNOSIS — N18.31 STAGE 3A CHRONIC KIDNEY DISEASE: Chronic | ICD-10-CM

## 2021-05-26 DIAGNOSIS — E78.00 PURE HYPERCHOLESTEROLEMIA: Chronic | ICD-10-CM

## 2021-05-26 DIAGNOSIS — R07.9 CHEST PAIN, UNSPECIFIED TYPE: ICD-10-CM

## 2021-05-26 DIAGNOSIS — I20.89 ANGINAL EQUIVALENT: ICD-10-CM

## 2021-05-26 DIAGNOSIS — I10 ESSENTIAL HYPERTENSION: Primary | Chronic | ICD-10-CM

## 2021-05-26 DIAGNOSIS — E66.01 MORBID OBESITY: Chronic | ICD-10-CM

## 2021-05-26 PROBLEM — E78.5 HYPERLIPIDEMIA: Status: ACTIVE | Noted: 2019-07-18

## 2021-05-26 PROBLEM — G47.33 OBSTRUCTIVE SLEEP APNEA SYNDROME: Status: ACTIVE | Noted: 2021-05-26

## 2021-05-26 PROBLEM — E78.5 HYPERLIPIDEMIA: Chronic | Status: ACTIVE | Noted: 2019-07-18

## 2021-05-26 PROBLEM — K21.9 GASTROESOPHAGEAL REFLUX DISEASE: Status: ACTIVE | Noted: 2021-05-26

## 2021-05-26 PROCEDURE — 99499 RISK ADDL DX/OHS AUDIT: ICD-10-PCS | Mod: S$GLB,,, | Performed by: INTERNAL MEDICINE

## 2021-05-26 PROCEDURE — 99204 PR OFFICE/OUTPT VISIT, NEW, LEVL IV, 45-59 MIN: ICD-10-PCS | Mod: S$GLB,,, | Performed by: INTERNAL MEDICINE

## 2021-05-26 PROCEDURE — 3288F PR FALLS RISK ASSESSMENT DOCUMENTED: ICD-10-PCS | Mod: CPTII,S$GLB,, | Performed by: INTERNAL MEDICINE

## 2021-05-26 PROCEDURE — 1101F PR PT FALLS ASSESS DOC 0-1 FALLS W/OUT INJ PAST YR: ICD-10-PCS | Mod: CPTII,S$GLB,, | Performed by: INTERNAL MEDICINE

## 2021-05-26 PROCEDURE — 1159F PR MEDICATION LIST DOCUMENTED IN MEDICAL RECORD: ICD-10-PCS | Mod: S$GLB,,, | Performed by: INTERNAL MEDICINE

## 2021-05-26 PROCEDURE — 99999 PR PBB SHADOW E&M-EST. PATIENT-LVL V: CPT | Mod: PBBFAC,,, | Performed by: INTERNAL MEDICINE

## 2021-05-26 PROCEDURE — 99499 UNLISTED E&M SERVICE: CPT | Mod: S$GLB,,, | Performed by: INTERNAL MEDICINE

## 2021-05-26 PROCEDURE — 1126F AMNT PAIN NOTED NONE PRSNT: CPT | Mod: S$GLB,,, | Performed by: INTERNAL MEDICINE

## 2021-05-26 PROCEDURE — 1101F PT FALLS ASSESS-DOCD LE1/YR: CPT | Mod: CPTII,S$GLB,, | Performed by: INTERNAL MEDICINE

## 2021-05-26 PROCEDURE — 99204 OFFICE O/P NEW MOD 45 MIN: CPT | Mod: S$GLB,,, | Performed by: INTERNAL MEDICINE

## 2021-05-26 PROCEDURE — 1126F PR PAIN SEVERITY QUANTIFIED, NO PAIN PRESENT: ICD-10-PCS | Mod: S$GLB,,, | Performed by: INTERNAL MEDICINE

## 2021-05-26 PROCEDURE — 3288F FALL RISK ASSESSMENT DOCD: CPT | Mod: CPTII,S$GLB,, | Performed by: INTERNAL MEDICINE

## 2021-05-26 PROCEDURE — 99999 PR PBB SHADOW E&M-EST. PATIENT-LVL V: ICD-10-PCS | Mod: PBBFAC,,, | Performed by: INTERNAL MEDICINE

## 2021-05-26 PROCEDURE — 1159F MED LIST DOCD IN RCRD: CPT | Mod: S$GLB,,, | Performed by: INTERNAL MEDICINE

## 2021-05-26 RX ORDER — SPIRONOLACTONE AND HYDROCHLOROTHIAZIDE 25; 25 MG/1; MG/1
1 TABLET ORAL DAILY
Qty: 30 TABLET | Refills: 11 | Status: SHIPPED | OUTPATIENT
Start: 2021-05-26 | End: 2021-07-02

## 2021-05-27 ENCOUNTER — TELEPHONE (OUTPATIENT)
Dept: NEUROLOGY | Facility: CLINIC | Age: 76
End: 2021-05-27

## 2021-05-28 ENCOUNTER — TELEPHONE (OUTPATIENT)
Dept: CARDIOLOGY | Facility: CLINIC | Age: 76
End: 2021-05-28

## 2021-06-02 ENCOUNTER — TELEPHONE (OUTPATIENT)
Dept: SPORTS MEDICINE | Facility: CLINIC | Age: 76
End: 2021-06-02

## 2021-06-09 ENCOUNTER — TELEPHONE (OUTPATIENT)
Dept: CARDIOLOGY | Facility: CLINIC | Age: 76
End: 2021-06-09

## 2021-06-15 ENCOUNTER — TELEPHONE (OUTPATIENT)
Dept: CARDIOLOGY | Facility: CLINIC | Age: 76
End: 2021-06-15

## 2021-06-17 ENCOUNTER — TELEPHONE (OUTPATIENT)
Dept: INTERNAL MEDICINE | Facility: CLINIC | Age: 76
End: 2021-06-17

## 2021-06-18 ENCOUNTER — TELEPHONE (OUTPATIENT)
Dept: CARDIOLOGY | Facility: CLINIC | Age: 76
End: 2021-06-18

## 2021-06-30 ENCOUNTER — TELEPHONE (OUTPATIENT)
Dept: CARDIOLOGY | Facility: CLINIC | Age: 76
End: 2021-06-30

## 2021-07-01 ENCOUNTER — PATIENT OUTREACH (OUTPATIENT)
Dept: ADMINISTRATIVE | Facility: OTHER | Age: 76
End: 2021-07-01

## 2021-07-02 ENCOUNTER — TELEPHONE (OUTPATIENT)
Dept: CARDIOLOGY | Facility: CLINIC | Age: 76
End: 2021-07-02

## 2021-07-02 ENCOUNTER — OFFICE VISIT (OUTPATIENT)
Dept: CARDIOLOGY | Facility: CLINIC | Age: 76
End: 2021-07-02
Payer: MEDICARE

## 2021-07-02 ENCOUNTER — LAB VISIT (OUTPATIENT)
Dept: LAB | Facility: HOSPITAL | Age: 76
End: 2021-07-02
Attending: INTERNAL MEDICINE
Payer: MEDICARE

## 2021-07-02 VITALS
SYSTOLIC BLOOD PRESSURE: 134 MMHG | BODY MASS INDEX: 56.02 KG/M2 | HEART RATE: 68 BPM | HEIGHT: 55 IN | WEIGHT: 242.06 LBS | DIASTOLIC BLOOD PRESSURE: 78 MMHG

## 2021-07-02 DIAGNOSIS — R06.09 DYSPNEA ON EXERTION: ICD-10-CM

## 2021-07-02 DIAGNOSIS — E66.01 MORBID OBESITY: Chronic | ICD-10-CM

## 2021-07-02 DIAGNOSIS — I20.89 ANGINAL EQUIVALENT: Primary | ICD-10-CM

## 2021-07-02 DIAGNOSIS — R06.02 SOB (SHORTNESS OF BREATH): ICD-10-CM

## 2021-07-02 DIAGNOSIS — I10 ESSENTIAL HYPERTENSION: Chronic | ICD-10-CM

## 2021-07-02 DIAGNOSIS — N18.31 STAGE 3A CHRONIC KIDNEY DISEASE: Chronic | ICD-10-CM

## 2021-07-02 DIAGNOSIS — E78.00 PURE HYPERCHOLESTEROLEMIA: Chronic | ICD-10-CM

## 2021-07-02 LAB
ANION GAP SERPL CALC-SCNC: 11 MMOL/L (ref 8–16)
BNP SERPL-MCNC: 86 PG/ML (ref 0–99)
BUN SERPL-MCNC: 20 MG/DL (ref 8–23)
CALCIUM SERPL-MCNC: 9.1 MG/DL (ref 8.7–10.5)
CHLORIDE SERPL-SCNC: 107 MMOL/L (ref 95–110)
CO2 SERPL-SCNC: 23 MMOL/L (ref 23–29)
CREAT SERPL-MCNC: 0.9 MG/DL (ref 0.5–1.4)
EST. GFR  (AFRICAN AMERICAN): >60 ML/MIN/1.73 M^2
EST. GFR  (NON AFRICAN AMERICAN): >60 ML/MIN/1.73 M^2
GLUCOSE SERPL-MCNC: 106 MG/DL (ref 70–110)
POTASSIUM SERPL-SCNC: 4.8 MMOL/L (ref 3.5–5.1)
SODIUM SERPL-SCNC: 141 MMOL/L (ref 136–145)

## 2021-07-02 PROCEDURE — 99214 OFFICE O/P EST MOD 30 MIN: CPT | Mod: S$GLB,,, | Performed by: INTERNAL MEDICINE

## 2021-07-02 PROCEDURE — 99499 UNLISTED E&M SERVICE: CPT | Mod: S$GLB,,, | Performed by: INTERNAL MEDICINE

## 2021-07-02 PROCEDURE — 1159F MED LIST DOCD IN RCRD: CPT | Mod: S$GLB,,, | Performed by: INTERNAL MEDICINE

## 2021-07-02 PROCEDURE — 99999 PR PBB SHADOW E&M-EST. PATIENT-LVL IV: ICD-10-PCS | Mod: PBBFAC,,, | Performed by: INTERNAL MEDICINE

## 2021-07-02 PROCEDURE — 1159F PR MEDICATION LIST DOCUMENTED IN MEDICAL RECORD: ICD-10-PCS | Mod: S$GLB,,, | Performed by: INTERNAL MEDICINE

## 2021-07-02 PROCEDURE — 99214 PR OFFICE/OUTPT VISIT, EST, LEVL IV, 30-39 MIN: ICD-10-PCS | Mod: S$GLB,,, | Performed by: INTERNAL MEDICINE

## 2021-07-02 PROCEDURE — 99499 RISK ADDL DX/OHS AUDIT: ICD-10-PCS | Mod: S$GLB,,, | Performed by: INTERNAL MEDICINE

## 2021-07-02 PROCEDURE — 83880 ASSAY OF NATRIURETIC PEPTIDE: CPT | Performed by: INTERNAL MEDICINE

## 2021-07-02 PROCEDURE — 36415 COLL VENOUS BLD VENIPUNCTURE: CPT | Mod: PO | Performed by: INTERNAL MEDICINE

## 2021-07-02 PROCEDURE — 99999 PR PBB SHADOW E&M-EST. PATIENT-LVL IV: CPT | Mod: PBBFAC,,, | Performed by: INTERNAL MEDICINE

## 2021-07-02 PROCEDURE — 80048 BASIC METABOLIC PNL TOTAL CA: CPT | Performed by: INTERNAL MEDICINE

## 2021-07-02 PROCEDURE — 1126F AMNT PAIN NOTED NONE PRSNT: CPT | Mod: S$GLB,,, | Performed by: INTERNAL MEDICINE

## 2021-07-02 PROCEDURE — 1126F PR PAIN SEVERITY QUANTIFIED, NO PAIN PRESENT: ICD-10-PCS | Mod: S$GLB,,, | Performed by: INTERNAL MEDICINE

## 2021-07-02 RX ORDER — FUROSEMIDE 20 MG/1
20 TABLET ORAL DAILY
Qty: 30 TABLET | Refills: 11 | Status: SHIPPED | OUTPATIENT
Start: 2021-07-02 | End: 2021-07-02 | Stop reason: SDUPTHER

## 2021-07-02 RX ORDER — FUROSEMIDE 20 MG/1
20 TABLET ORAL DAILY
Qty: 30 TABLET | Refills: 11 | Status: SHIPPED | OUTPATIENT
Start: 2021-07-02 | End: 2021-11-19 | Stop reason: SDUPTHER

## 2021-07-07 ENCOUNTER — TELEPHONE (OUTPATIENT)
Dept: CARDIOLOGY | Facility: CLINIC | Age: 76
End: 2021-07-07

## 2021-07-07 ENCOUNTER — TELEPHONE (OUTPATIENT)
Dept: NEUROLOGY | Facility: CLINIC | Age: 76
End: 2021-07-07

## 2021-07-09 ENCOUNTER — HOSPITAL ENCOUNTER (OUTPATIENT)
Dept: CARDIOLOGY | Facility: HOSPITAL | Age: 76
Discharge: HOME | End: 2021-07-09
Attending: INTERNAL MEDICINE
Payer: MEDICARE

## 2021-07-09 DIAGNOSIS — I20.89 ANGINAL EQUIVALENT: ICD-10-CM

## 2021-07-09 DIAGNOSIS — R06.02 SOB (SHORTNESS OF BREATH): ICD-10-CM

## 2021-07-09 DIAGNOSIS — E66.01 MORBID OBESITY: Chronic | ICD-10-CM

## 2021-07-09 DIAGNOSIS — R07.9 CHEST PAIN, UNSPECIFIED TYPE: ICD-10-CM

## 2021-07-09 LAB
CFR FLOW - ANTERIOR: 2.54
CFR FLOW - INFERIOR: 2.74
CFR FLOW - LATERAL: 2.34
CFR FLOW - MAX: 3.81
CFR FLOW - MIN: 1.57
CFR FLOW - SEPTAL: 2.49
CFR FLOW - WHOLE HEART: 2.53
CV PHARM DOSE: 60 MG
CV STRESS BASE HR: 64 BPM
DIASTOLIC BLOOD PRESSURE: 75 MMHG
EJECTION FRACTION- HIGH: 65 %
END DIASTOLIC INDEX-HIGH: 153 ML/M2
END DIASTOLIC INDEX-LOW: 93 ML/M2
END SYSTOLIC INDEX-HIGH: 71 ML/M2
END SYSTOLIC INDEX-LOW: 31 ML/M2
NUC REST DIASTOLIC VOLUME INDEX: 64
NUC REST EJECTION FRACTION: 72
NUC REST SYSTOLIC VOLUME INDEX: 18
NUC STRESS DIASTOLIC VOLUME INDEX: 77
NUC STRESS EJECTION FRACTION: 79 %
NUC STRESS SYSTOLIC VOLUME INDEX: 16
OHS CV CPX 85 PERCENT MAX PREDICTED HEART RATE MALE: 118
OHS CV CPX MAX PREDICTED HEART RATE: 139
OHS CV CPX PATIENT IS FEMALE: 1
OHS CV CPX PATIENT IS MALE: 0
OHS CV CPX PEAK DIASTOLIC BLOOD PRESSURE: 89 MMHG
OHS CV CPX PEAK HEAR RATE: 78 BPM
OHS CV CPX PEAK RATE PRESSURE PRODUCT: NORMAL
OHS CV CPX PEAK SYSTOLIC BLOOD PRESSURE: 139 MMHG
OHS CV CPX PERCENT MAX PREDICTED HEART RATE ACHIEVED: 56
OHS CV CPX RATE PRESSURE PRODUCT PRESENTING: 9344
REST FLOW - ANTERIOR: 0.64 CC/MIN/G
REST FLOW - INFERIOR: 0.57 CC/MIN/G
REST FLOW - LATERAL: 0.75 CC/MIN/G
REST FLOW - MAX: 1.08 CC/MIN/G
REST FLOW - MIN: 0.28 CC/MIN/G
REST FLOW - SEPTAL: 0.78 CC/MIN/G
REST FLOW - WHOLE HEART: 0.69 CC/MIN/G
RETIRED EF AND QEF - SEE NOTES: 53 %
STRESS FLOW - ANTERIOR: 1.59 CC/MIN/G
STRESS FLOW - INFERIOR: 1.54 CC/MIN/G
STRESS FLOW - LATERAL: 1.74 CC/MIN/G
STRESS FLOW - MAX: 2.42 CC/MIN/G
STRESS FLOW - MIN: 0.89 CC/MIN/G
STRESS FLOW - SEPTAL: 1.85 CC/MIN/G
STRESS FLOW - WHOLE HEART: 1.68 CC/MIN/G
SYSTOLIC BLOOD PRESSURE: 146 MMHG

## 2021-07-09 PROCEDURE — 78431 CARDIAC PET SCAN STRESS (CUPID ONLY): ICD-10-PCS | Mod: 26,,, | Performed by: INTERNAL MEDICINE

## 2021-07-09 PROCEDURE — 93016 CV STRESS TEST SUPVJ ONLY: CPT | Mod: ,,, | Performed by: INTERNAL MEDICINE

## 2021-07-09 PROCEDURE — 93018 CARDIAC PET SCAN STRESS (CUPID ONLY): ICD-10-PCS | Mod: ,,, | Performed by: INTERNAL MEDICINE

## 2021-07-09 PROCEDURE — 78434 AQMBF PET REST & RX STRESS: CPT | Mod: 26,,, | Performed by: INTERNAL MEDICINE

## 2021-07-09 PROCEDURE — 78431 MYOCRD IMG PET RST&STRS CT: CPT | Mod: 26,,, | Performed by: INTERNAL MEDICINE

## 2021-07-09 PROCEDURE — 78434 CARDIAC PET SCAN STRESS (CUPID ONLY): ICD-10-PCS | Mod: 26,,, | Performed by: INTERNAL MEDICINE

## 2021-07-09 PROCEDURE — 93018 CV STRESS TEST I&R ONLY: CPT | Mod: ,,, | Performed by: INTERNAL MEDICINE

## 2021-07-09 PROCEDURE — 78434 AQMBF PET REST & RX STRESS: CPT

## 2021-07-09 PROCEDURE — 93016 CARDIAC PET SCAN STRESS (CUPID ONLY): ICD-10-PCS | Mod: ,,, | Performed by: INTERNAL MEDICINE

## 2021-07-09 PROCEDURE — 63600175 PHARM REV CODE 636 W HCPCS: Performed by: INTERNAL MEDICINE

## 2021-07-09 RX ORDER — DIPYRIDAMOLE 5 MG/ML
60 INJECTION INTRAVENOUS ONCE
Status: COMPLETED | OUTPATIENT
Start: 2021-07-09 | End: 2021-07-09

## 2021-07-09 RX ORDER — AMINOPHYLLINE 25 MG/ML
75 INJECTION, SOLUTION INTRAVENOUS ONCE
Status: COMPLETED | OUTPATIENT
Start: 2021-07-09 | End: 2021-07-09

## 2021-07-09 RX ADMIN — DIPYRIDAMOLE 60 MG: 5 INJECTION INTRAVENOUS at 03:07

## 2021-07-09 RX ADMIN — AMINOPHYLLINE 75 MG: 25 INJECTION, SOLUTION INTRAVENOUS at 04:07

## 2021-07-10 ENCOUNTER — TELEPHONE (OUTPATIENT)
Dept: INTERNAL MEDICINE | Facility: CLINIC | Age: 76
End: 2021-07-10

## 2021-07-12 ENCOUNTER — TELEPHONE (OUTPATIENT)
Dept: CARDIOLOGY | Facility: CLINIC | Age: 76
End: 2021-07-12

## 2021-07-14 ENCOUNTER — TELEPHONE (OUTPATIENT)
Dept: INTERNAL MEDICINE | Facility: CLINIC | Age: 76
End: 2021-07-14

## 2021-07-19 ENCOUNTER — TELEPHONE (OUTPATIENT)
Dept: SPORTS MEDICINE | Facility: CLINIC | Age: 76
End: 2021-07-19

## 2021-07-26 ENCOUNTER — TELEPHONE (OUTPATIENT)
Dept: SPORTS MEDICINE | Facility: CLINIC | Age: 76
End: 2021-07-26

## 2021-09-01 ENCOUNTER — PATIENT OUTREACH (OUTPATIENT)
Dept: ADMINISTRATIVE | Facility: OTHER | Age: 76
End: 2021-09-01

## 2021-09-08 RX ORDER — ALBUTEROL SULFATE 90 UG/1
AEROSOL, METERED RESPIRATORY (INHALATION)
Qty: 8.5 G | Refills: 1 | Status: SHIPPED | OUTPATIENT
Start: 2021-09-08 | End: 2023-08-31 | Stop reason: SDUPTHER

## 2021-09-08 RX ORDER — BUDESONIDE AND FORMOTEROL FUMARATE DIHYDRATE 160; 4.5 UG/1; UG/1
AEROSOL RESPIRATORY (INHALATION)
Qty: 1 INHALER | Refills: 5 | Status: SHIPPED | OUTPATIENT
Start: 2021-09-08 | End: 2023-02-02

## 2021-09-09 ENCOUNTER — TELEPHONE (OUTPATIENT)
Dept: PRIMARY CARE CLINIC | Facility: CLINIC | Age: 76
End: 2021-09-09

## 2021-09-09 RX ORDER — CIPROFLOXACIN HYDROCHLORIDE 3 MG/ML
SOLUTION/ DROPS OPHTHALMIC
Qty: 10 ML | Refills: 0 | Status: SHIPPED | OUTPATIENT
Start: 2021-09-09 | End: 2022-07-25 | Stop reason: ALTCHOICE

## 2021-09-16 ENCOUNTER — TELEPHONE (OUTPATIENT)
Dept: INTERNAL MEDICINE | Facility: CLINIC | Age: 76
End: 2021-09-16

## 2021-09-17 ENCOUNTER — OFFICE VISIT (OUTPATIENT)
Dept: INTERNAL MEDICINE | Facility: CLINIC | Age: 76
End: 2021-09-17
Payer: MEDICARE

## 2021-09-17 DIAGNOSIS — B96.89 ACUTE BACTERIAL SINUSITIS: Primary | ICD-10-CM

## 2021-09-17 DIAGNOSIS — J01.90 ACUTE BACTERIAL SINUSITIS: Primary | ICD-10-CM

## 2021-09-17 PROCEDURE — 99441 PR PHYSICIAN TELEPHONE EVALUATION 5-10 MIN: ICD-10-PCS | Mod: 95,,, | Performed by: INTERNAL MEDICINE

## 2021-09-17 PROCEDURE — 1159F PR MEDICATION LIST DOCUMENTED IN MEDICAL RECORD: ICD-10-PCS | Mod: CPTII,95,, | Performed by: INTERNAL MEDICINE

## 2021-09-17 PROCEDURE — 1159F MED LIST DOCD IN RCRD: CPT | Mod: CPTII,95,, | Performed by: INTERNAL MEDICINE

## 2021-09-17 PROCEDURE — 99441 PR PHYSICIAN TELEPHONE EVALUATION 5-10 MIN: CPT | Mod: 95,,, | Performed by: INTERNAL MEDICINE

## 2021-09-17 PROCEDURE — 1160F RVW MEDS BY RX/DR IN RCRD: CPT | Mod: CPTII,95,, | Performed by: INTERNAL MEDICINE

## 2021-09-17 PROCEDURE — 1160F PR REVIEW ALL MEDS BY PRESCRIBER/CLIN PHARMACIST DOCUMENTED: ICD-10-PCS | Mod: CPTII,95,, | Performed by: INTERNAL MEDICINE

## 2021-09-17 RX ORDER — AMOXICILLIN AND CLAVULANATE POTASSIUM 875; 125 MG/1; MG/1
1 TABLET, FILM COATED ORAL 2 TIMES DAILY
Qty: 14 TABLET | Refills: 0 | Status: SHIPPED | OUTPATIENT
Start: 2021-09-17 | End: 2021-09-24

## 2021-09-17 RX ORDER — FLUTICASONE PROPIONATE 50 MCG
1 SPRAY, SUSPENSION (ML) NASAL DAILY
Qty: 16 G | Refills: 0 | Status: SHIPPED | OUTPATIENT
Start: 2021-09-17 | End: 2022-03-10

## 2021-09-21 DIAGNOSIS — G43.009 MIGRAINE WITHOUT AURA AND WITHOUT STATUS MIGRAINOSUS, NOT INTRACTABLE: Primary | ICD-10-CM

## 2021-09-21 RX ORDER — GABAPENTIN 300 MG/1
CAPSULE ORAL
Qty: 90 CAPSULE | Refills: 0 | Status: SHIPPED | OUTPATIENT
Start: 2021-09-21 | End: 2022-02-09 | Stop reason: SDUPTHER

## 2021-09-27 ENCOUNTER — TELEPHONE (OUTPATIENT)
Dept: SPORTS MEDICINE | Facility: CLINIC | Age: 76
End: 2021-09-27

## 2021-09-27 DIAGNOSIS — M17.0 PRIMARY OSTEOARTHRITIS OF BOTH KNEES: Primary | ICD-10-CM

## 2021-10-08 ENCOUNTER — TELEPHONE (OUTPATIENT)
Dept: INTERNAL MEDICINE | Facility: CLINIC | Age: 76
End: 2021-10-08

## 2021-10-08 RX ORDER — AMOXICILLIN AND CLAVULANATE POTASSIUM 875; 125 MG/1; MG/1
1 TABLET, FILM COATED ORAL 2 TIMES DAILY
Qty: 14 TABLET | Refills: 0 | Status: SHIPPED | OUTPATIENT
Start: 2021-10-08 | End: 2021-10-15

## 2021-10-18 ENCOUNTER — OFFICE VISIT (OUTPATIENT)
Dept: SPORTS MEDICINE | Facility: CLINIC | Age: 76
End: 2021-10-18
Payer: MEDICARE

## 2021-10-18 VITALS — WEIGHT: 242 LBS | BODY MASS INDEX: 56.01 KG/M2 | TEMPERATURE: 98 F | HEIGHT: 55 IN

## 2021-10-18 DIAGNOSIS — M17.0 PRIMARY OSTEOARTHRITIS OF BOTH KNEES: Primary | ICD-10-CM

## 2021-10-18 PROCEDURE — 99499 NO LOS: ICD-10-PCS | Mod: S$GLB,,, | Performed by: FAMILY MEDICINE

## 2021-10-18 PROCEDURE — 1101F PR PT FALLS ASSESS DOC 0-1 FALLS W/OUT INJ PAST YR: ICD-10-PCS | Mod: CPTII,S$GLB,, | Performed by: FAMILY MEDICINE

## 2021-10-18 PROCEDURE — 99999 PR PBB SHADOW E&M-EST. PATIENT-LVL IV: CPT | Mod: PBBFAC,,, | Performed by: FAMILY MEDICINE

## 2021-10-18 PROCEDURE — 1159F PR MEDICATION LIST DOCUMENTED IN MEDICAL RECORD: ICD-10-PCS | Mod: CPTII,S$GLB,, | Performed by: FAMILY MEDICINE

## 2021-10-18 PROCEDURE — 3288F PR FALLS RISK ASSESSMENT DOCUMENTED: ICD-10-PCS | Mod: CPTII,S$GLB,, | Performed by: FAMILY MEDICINE

## 2021-10-18 PROCEDURE — 1160F PR REVIEW ALL MEDS BY PRESCRIBER/CLIN PHARMACIST DOCUMENTED: ICD-10-PCS | Mod: CPTII,S$GLB,, | Performed by: FAMILY MEDICINE

## 2021-10-18 PROCEDURE — 20611 DRAIN/INJ JOINT/BURSA W/US: CPT | Mod: 50,S$GLB,, | Performed by: FAMILY MEDICINE

## 2021-10-18 PROCEDURE — 3288F FALL RISK ASSESSMENT DOCD: CPT | Mod: CPTII,S$GLB,, | Performed by: FAMILY MEDICINE

## 2021-10-18 PROCEDURE — 99999 PR PBB SHADOW E&M-EST. PATIENT-LVL IV: ICD-10-PCS | Mod: PBBFAC,,, | Performed by: FAMILY MEDICINE

## 2021-10-18 PROCEDURE — 1159F MED LIST DOCD IN RCRD: CPT | Mod: CPTII,S$GLB,, | Performed by: FAMILY MEDICINE

## 2021-10-18 PROCEDURE — 1101F PT FALLS ASSESS-DOCD LE1/YR: CPT | Mod: CPTII,S$GLB,, | Performed by: FAMILY MEDICINE

## 2021-10-18 PROCEDURE — 1160F RVW MEDS BY RX/DR IN RCRD: CPT | Mod: CPTII,S$GLB,, | Performed by: FAMILY MEDICINE

## 2021-10-18 PROCEDURE — 1125F AMNT PAIN NOTED PAIN PRSNT: CPT | Mod: CPTII,S$GLB,, | Performed by: FAMILY MEDICINE

## 2021-10-18 PROCEDURE — 99499 UNLISTED E&M SERVICE: CPT | Mod: S$GLB,,, | Performed by: FAMILY MEDICINE

## 2021-10-18 PROCEDURE — 20611 LARGE JOINT ASPIRATION/INJECTION: BILATERAL KNEE: ICD-10-PCS | Mod: 50,S$GLB,, | Performed by: FAMILY MEDICINE

## 2021-10-18 PROCEDURE — 1125F PR PAIN SEVERITY QUANTIFIED, PAIN PRESENT: ICD-10-PCS | Mod: CPTII,S$GLB,, | Performed by: FAMILY MEDICINE

## 2021-10-20 ENCOUNTER — OFFICE VISIT (OUTPATIENT)
Dept: INTERNAL MEDICINE | Facility: CLINIC | Age: 76
End: 2021-10-20
Payer: MEDICARE

## 2021-10-20 VITALS
TEMPERATURE: 98 F | OXYGEN SATURATION: 94 % | WEIGHT: 238.75 LBS | HEIGHT: 55 IN | SYSTOLIC BLOOD PRESSURE: 126 MMHG | BODY MASS INDEX: 55.26 KG/M2 | DIASTOLIC BLOOD PRESSURE: 88 MMHG | HEART RATE: 79 BPM

## 2021-10-20 DIAGNOSIS — R73.01 IMPAIRED FASTING BLOOD SUGAR: Chronic | ICD-10-CM

## 2021-10-20 DIAGNOSIS — H91.93 BILATERAL HEARING LOSS, UNSPECIFIED HEARING LOSS TYPE: ICD-10-CM

## 2021-10-20 DIAGNOSIS — E78.00 PURE HYPERCHOLESTEROLEMIA: Chronic | ICD-10-CM

## 2021-10-20 DIAGNOSIS — N18.31 STAGE 3A CHRONIC KIDNEY DISEASE: Chronic | ICD-10-CM

## 2021-10-20 DIAGNOSIS — H92.03 OTALGIA OF BOTH EARS: ICD-10-CM

## 2021-10-20 DIAGNOSIS — I10 ESSENTIAL HYPERTENSION: Primary | Chronic | ICD-10-CM

## 2021-10-20 PROCEDURE — 1126F PR PAIN SEVERITY QUANTIFIED, NO PAIN PRESENT: ICD-10-PCS | Mod: CPTII,S$GLB,, | Performed by: INTERNAL MEDICINE

## 2021-10-20 PROCEDURE — 3074F PR MOST RECENT SYSTOLIC BLOOD PRESSURE < 130 MM HG: ICD-10-PCS | Mod: CPTII,S$GLB,, | Performed by: INTERNAL MEDICINE

## 2021-10-20 PROCEDURE — 90694 VACC AIIV4 NO PRSRV 0.5ML IM: CPT | Mod: S$GLB,,, | Performed by: INTERNAL MEDICINE

## 2021-10-20 PROCEDURE — 1160F PR REVIEW ALL MEDS BY PRESCRIBER/CLIN PHARMACIST DOCUMENTED: ICD-10-PCS | Mod: CPTII,S$GLB,, | Performed by: INTERNAL MEDICINE

## 2021-10-20 PROCEDURE — 99999 PR PBB SHADOW E&M-EST. PATIENT-LVL V: CPT | Mod: PBBFAC,,, | Performed by: INTERNAL MEDICINE

## 2021-10-20 PROCEDURE — 3288F PR FALLS RISK ASSESSMENT DOCUMENTED: ICD-10-PCS | Mod: CPTII,S$GLB,, | Performed by: INTERNAL MEDICINE

## 2021-10-20 PROCEDURE — 3288F FALL RISK ASSESSMENT DOCD: CPT | Mod: CPTII,S$GLB,, | Performed by: INTERNAL MEDICINE

## 2021-10-20 PROCEDURE — 1126F AMNT PAIN NOTED NONE PRSNT: CPT | Mod: CPTII,S$GLB,, | Performed by: INTERNAL MEDICINE

## 2021-10-20 PROCEDURE — 99214 OFFICE O/P EST MOD 30 MIN: CPT | Mod: S$GLB,,, | Performed by: INTERNAL MEDICINE

## 2021-10-20 PROCEDURE — G0008 ADMIN INFLUENZA VIRUS VAC: HCPCS | Mod: S$GLB,,, | Performed by: INTERNAL MEDICINE

## 2021-10-20 PROCEDURE — 1160F RVW MEDS BY RX/DR IN RCRD: CPT | Mod: CPTII,S$GLB,, | Performed by: INTERNAL MEDICINE

## 2021-10-20 PROCEDURE — 1101F PT FALLS ASSESS-DOCD LE1/YR: CPT | Mod: CPTII,S$GLB,, | Performed by: INTERNAL MEDICINE

## 2021-10-20 PROCEDURE — 3079F DIAST BP 80-89 MM HG: CPT | Mod: CPTII,S$GLB,, | Performed by: INTERNAL MEDICINE

## 2021-10-20 PROCEDURE — 99999 PR PBB SHADOW E&M-EST. PATIENT-LVL V: ICD-10-PCS | Mod: PBBFAC,,, | Performed by: INTERNAL MEDICINE

## 2021-10-20 PROCEDURE — 3074F SYST BP LT 130 MM HG: CPT | Mod: CPTII,S$GLB,, | Performed by: INTERNAL MEDICINE

## 2021-10-20 PROCEDURE — 1159F MED LIST DOCD IN RCRD: CPT | Mod: CPTII,S$GLB,, | Performed by: INTERNAL MEDICINE

## 2021-10-20 PROCEDURE — 3079F PR MOST RECENT DIASTOLIC BLOOD PRESSURE 80-89 MM HG: ICD-10-PCS | Mod: CPTII,S$GLB,, | Performed by: INTERNAL MEDICINE

## 2021-10-20 PROCEDURE — G0008 FLU VACCINE - QUADRIVALENT - ADJUVANTED: ICD-10-PCS | Mod: S$GLB,,, | Performed by: INTERNAL MEDICINE

## 2021-10-20 PROCEDURE — 90694 FLU VACCINE - QUADRIVALENT - ADJUVANTED: ICD-10-PCS | Mod: S$GLB,,, | Performed by: INTERNAL MEDICINE

## 2021-10-20 PROCEDURE — 1159F PR MEDICATION LIST DOCUMENTED IN MEDICAL RECORD: ICD-10-PCS | Mod: CPTII,S$GLB,, | Performed by: INTERNAL MEDICINE

## 2021-10-20 PROCEDURE — 99214 PR OFFICE/OUTPT VISIT, EST, LEVL IV, 30-39 MIN: ICD-10-PCS | Mod: S$GLB,,, | Performed by: INTERNAL MEDICINE

## 2021-10-20 PROCEDURE — 1101F PR PT FALLS ASSESS DOC 0-1 FALLS W/OUT INJ PAST YR: ICD-10-PCS | Mod: CPTII,S$GLB,, | Performed by: INTERNAL MEDICINE

## 2021-10-25 ENCOUNTER — TELEPHONE (OUTPATIENT)
Dept: SPORTS MEDICINE | Facility: CLINIC | Age: 76
End: 2021-10-25
Payer: MEDICARE

## 2021-11-01 ENCOUNTER — OFFICE VISIT (OUTPATIENT)
Dept: SPORTS MEDICINE | Facility: CLINIC | Age: 76
End: 2021-11-01
Payer: MEDICARE

## 2021-11-01 VITALS — HEIGHT: 55 IN | WEIGHT: 236 LBS | BODY MASS INDEX: 54.62 KG/M2 | TEMPERATURE: 99 F

## 2021-11-01 DIAGNOSIS — M17.0 PRIMARY OSTEOARTHRITIS OF BOTH KNEES: Primary | ICD-10-CM

## 2021-11-01 PROCEDURE — 1125F AMNT PAIN NOTED PAIN PRSNT: CPT | Mod: CPTII,S$GLB,, | Performed by: FAMILY MEDICINE

## 2021-11-01 PROCEDURE — 3288F FALL RISK ASSESSMENT DOCD: CPT | Mod: CPTII,S$GLB,, | Performed by: FAMILY MEDICINE

## 2021-11-01 PROCEDURE — 99499 NO LOS: ICD-10-PCS | Mod: S$GLB,,, | Performed by: FAMILY MEDICINE

## 2021-11-01 PROCEDURE — 1160F PR REVIEW ALL MEDS BY PRESCRIBER/CLIN PHARMACIST DOCUMENTED: ICD-10-PCS | Mod: CPTII,S$GLB,, | Performed by: FAMILY MEDICINE

## 2021-11-01 PROCEDURE — 99499 UNLISTED E&M SERVICE: CPT | Mod: S$GLB,,, | Performed by: FAMILY MEDICINE

## 2021-11-01 PROCEDURE — 99999 PR PBB SHADOW E&M-EST. PATIENT-LVL IV: ICD-10-PCS | Mod: PBBFAC,,, | Performed by: FAMILY MEDICINE

## 2021-11-01 PROCEDURE — 1159F MED LIST DOCD IN RCRD: CPT | Mod: CPTII,S$GLB,, | Performed by: FAMILY MEDICINE

## 2021-11-01 PROCEDURE — 1159F PR MEDICATION LIST DOCUMENTED IN MEDICAL RECORD: ICD-10-PCS | Mod: CPTII,S$GLB,, | Performed by: FAMILY MEDICINE

## 2021-11-01 PROCEDURE — 1101F PR PT FALLS ASSESS DOC 0-1 FALLS W/OUT INJ PAST YR: ICD-10-PCS | Mod: CPTII,S$GLB,, | Performed by: FAMILY MEDICINE

## 2021-11-01 PROCEDURE — 20611 DRAIN/INJ JOINT/BURSA W/US: CPT | Mod: 50,S$GLB,, | Performed by: FAMILY MEDICINE

## 2021-11-01 PROCEDURE — 1125F PR PAIN SEVERITY QUANTIFIED, PAIN PRESENT: ICD-10-PCS | Mod: CPTII,S$GLB,, | Performed by: FAMILY MEDICINE

## 2021-11-01 PROCEDURE — 3288F PR FALLS RISK ASSESSMENT DOCUMENTED: ICD-10-PCS | Mod: CPTII,S$GLB,, | Performed by: FAMILY MEDICINE

## 2021-11-01 PROCEDURE — 1160F RVW MEDS BY RX/DR IN RCRD: CPT | Mod: CPTII,S$GLB,, | Performed by: FAMILY MEDICINE

## 2021-11-01 PROCEDURE — 1101F PT FALLS ASSESS-DOCD LE1/YR: CPT | Mod: CPTII,S$GLB,, | Performed by: FAMILY MEDICINE

## 2021-11-01 PROCEDURE — 99999 PR PBB SHADOW E&M-EST. PATIENT-LVL IV: CPT | Mod: PBBFAC,,, | Performed by: FAMILY MEDICINE

## 2021-11-01 PROCEDURE — 20611 LARGE JOINT ASPIRATION/INJECTION: BILATERAL KNEE: ICD-10-PCS | Mod: 50,S$GLB,, | Performed by: FAMILY MEDICINE

## 2021-11-08 ENCOUNTER — OFFICE VISIT (OUTPATIENT)
Dept: SPORTS MEDICINE | Facility: CLINIC | Age: 76
End: 2021-11-08
Payer: MEDICARE

## 2021-11-08 VITALS — WEIGHT: 236 LBS | BODY MASS INDEX: 54.62 KG/M2 | HEIGHT: 55 IN

## 2021-11-08 DIAGNOSIS — M17.0 PRIMARY OSTEOARTHRITIS OF BOTH KNEES: Primary | ICD-10-CM

## 2021-11-08 PROCEDURE — 1160F PR REVIEW ALL MEDS BY PRESCRIBER/CLIN PHARMACIST DOCUMENTED: ICD-10-PCS | Mod: CPTII,S$GLB,, | Performed by: FAMILY MEDICINE

## 2021-11-08 PROCEDURE — 1101F PR PT FALLS ASSESS DOC 0-1 FALLS W/OUT INJ PAST YR: ICD-10-PCS | Mod: CPTII,S$GLB,, | Performed by: FAMILY MEDICINE

## 2021-11-08 PROCEDURE — 20611 LARGE JOINT ASPIRATION/INJECTION: BILATERAL KNEE: ICD-10-PCS | Mod: 50,S$GLB,, | Performed by: FAMILY MEDICINE

## 2021-11-08 PROCEDURE — 1125F PR PAIN SEVERITY QUANTIFIED, PAIN PRESENT: ICD-10-PCS | Mod: CPTII,S$GLB,, | Performed by: FAMILY MEDICINE

## 2021-11-08 PROCEDURE — 1159F PR MEDICATION LIST DOCUMENTED IN MEDICAL RECORD: ICD-10-PCS | Mod: CPTII,S$GLB,, | Performed by: FAMILY MEDICINE

## 2021-11-08 PROCEDURE — 1159F MED LIST DOCD IN RCRD: CPT | Mod: CPTII,S$GLB,, | Performed by: FAMILY MEDICINE

## 2021-11-08 PROCEDURE — 99999 PR PBB SHADOW E&M-EST. PATIENT-LVL III: ICD-10-PCS | Mod: PBBFAC,,, | Performed by: FAMILY MEDICINE

## 2021-11-08 PROCEDURE — 99999 PR PBB SHADOW E&M-EST. PATIENT-LVL III: CPT | Mod: PBBFAC,,, | Performed by: FAMILY MEDICINE

## 2021-11-08 PROCEDURE — 99499 NO LOS: ICD-10-PCS | Mod: S$GLB,,, | Performed by: FAMILY MEDICINE

## 2021-11-08 PROCEDURE — 1101F PT FALLS ASSESS-DOCD LE1/YR: CPT | Mod: CPTII,S$GLB,, | Performed by: FAMILY MEDICINE

## 2021-11-08 PROCEDURE — 3288F FALL RISK ASSESSMENT DOCD: CPT | Mod: CPTII,S$GLB,, | Performed by: FAMILY MEDICINE

## 2021-11-08 PROCEDURE — 3288F PR FALLS RISK ASSESSMENT DOCUMENTED: ICD-10-PCS | Mod: CPTII,S$GLB,, | Performed by: FAMILY MEDICINE

## 2021-11-08 PROCEDURE — 1125F AMNT PAIN NOTED PAIN PRSNT: CPT | Mod: CPTII,S$GLB,, | Performed by: FAMILY MEDICINE

## 2021-11-08 PROCEDURE — 99499 UNLISTED E&M SERVICE: CPT | Mod: S$GLB,,, | Performed by: FAMILY MEDICINE

## 2021-11-08 PROCEDURE — 20611 DRAIN/INJ JOINT/BURSA W/US: CPT | Mod: 50,S$GLB,, | Performed by: FAMILY MEDICINE

## 2021-11-08 PROCEDURE — 1160F RVW MEDS BY RX/DR IN RCRD: CPT | Mod: CPTII,S$GLB,, | Performed by: FAMILY MEDICINE

## 2021-11-19 DIAGNOSIS — R06.09 DYSPNEA ON EXERTION: ICD-10-CM

## 2021-11-19 RX ORDER — FUROSEMIDE 20 MG/1
20 TABLET ORAL DAILY
Qty: 30 TABLET | Refills: 11 | Status: SHIPPED | OUTPATIENT
Start: 2021-11-19 | End: 2022-11-19

## 2021-11-19 RX ORDER — ATORVASTATIN CALCIUM 20 MG/1
20 TABLET, FILM COATED ORAL DAILY
Qty: 90 TABLET | Refills: 0 | Status: SHIPPED | OUTPATIENT
Start: 2021-11-19 | End: 2022-01-25

## 2021-12-06 ENCOUNTER — TELEPHONE (OUTPATIENT)
Dept: OTOLARYNGOLOGY | Facility: CLINIC | Age: 76
End: 2021-12-06
Payer: MEDICARE

## 2021-12-10 ENCOUNTER — TELEPHONE (OUTPATIENT)
Dept: INTERNAL MEDICINE | Facility: CLINIC | Age: 76
End: 2021-12-10
Payer: MEDICARE

## 2021-12-10 RX ORDER — HYDROXYZINE PAMOATE 50 MG/1
CAPSULE ORAL
Qty: 90 CAPSULE | Refills: 2 | Status: SHIPPED | OUTPATIENT
Start: 2021-12-10 | End: 2022-06-30

## 2021-12-23 ENCOUNTER — TELEPHONE (OUTPATIENT)
Dept: INTERNAL MEDICINE | Facility: CLINIC | Age: 76
End: 2021-12-23
Payer: MEDICARE

## 2021-12-23 DIAGNOSIS — R11.0 NAUSEA: Primary | ICD-10-CM

## 2021-12-23 RX ORDER — ONDANSETRON 8 MG/1
8 TABLET, ORALLY DISINTEGRATING ORAL EVERY 12 HOURS PRN
Qty: 30 TABLET | Refills: 2 | Status: SHIPPED | OUTPATIENT
Start: 2021-12-23 | End: 2022-03-29

## 2021-12-24 RX ORDER — OMEPRAZOLE 20 MG/1
CAPSULE, DELAYED RELEASE ORAL
Qty: 180 CAPSULE | Refills: 2 | Status: SHIPPED | OUTPATIENT
Start: 2021-12-24 | End: 2022-06-14 | Stop reason: SDUPTHER

## 2021-12-25 RX ORDER — IRBESARTAN 150 MG/1
TABLET ORAL
Qty: 180 TABLET | Refills: 2 | Status: SHIPPED | OUTPATIENT
Start: 2021-12-25 | End: 2022-09-10

## 2022-01-04 ENCOUNTER — TELEPHONE (OUTPATIENT)
Dept: NEUROLOGY | Facility: CLINIC | Age: 77
End: 2022-01-04
Payer: MEDICARE

## 2022-01-04 NOTE — TELEPHONE ENCOUNTER
----- Message from Amanda Miller sent at 1/4/2022  4:41 PM CST -----  Contact: Patient  Patient requesting call back to schedule appointment.     Patient @[phone]

## 2022-01-12 ENCOUNTER — TELEPHONE (OUTPATIENT)
Dept: SPORTS MEDICINE | Facility: CLINIC | Age: 77
End: 2022-01-12
Payer: MEDICARE

## 2022-01-12 RX ORDER — ALENDRONATE SODIUM 70 MG/1
70 TABLET ORAL
Qty: 12 TABLET | Refills: 2 | Status: SHIPPED | OUTPATIENT
Start: 2022-01-12 | End: 2022-10-04

## 2022-01-12 NOTE — TELEPHONE ENCOUNTER
----- Message from Antonio Lara sent at 1/12/2022 12:18 PM CST -----  Regarding: Call back request  Contact: pt  Pt requesting call back to get appt scheduled.    Pt @ [phone]

## 2022-01-12 NOTE — TELEPHONE ENCOUNTER
Returned call to patient in regards to scheduling appointment with Dr. Caruso.  Patient requested appointment for bilateral knee CSI. She states she does not wish to have Euflexxa again. Patient is scheduled for Tuesday 1/25/22 with Dr. Caruso

## 2022-01-18 ENCOUNTER — TELEPHONE (OUTPATIENT)
Dept: INTERNAL MEDICINE | Facility: CLINIC | Age: 77
End: 2022-01-18
Payer: MEDICARE

## 2022-01-18 RX ORDER — TRAZODONE HYDROCHLORIDE 50 MG/1
50 TABLET ORAL NIGHTLY PRN
Qty: 90 TABLET | Refills: 3 | Status: SHIPPED | OUTPATIENT
Start: 2022-01-18 | End: 2023-04-17

## 2022-01-18 NOTE — TELEPHONE ENCOUNTER
Patient states she quit taking trazadone and tried taking just melatonin at night for rest. This was working but has since stopped. She is having trouble sleeping and wants to start back on trazodone for rest. Needs new rx sent to [facility].

## 2022-01-18 NOTE — TELEPHONE ENCOUNTER
----- Message from Lucero Lara sent at 1/18/2022  1:38 PM CST -----  Contact: Purvi [phone]  Patient would like to get medical advice.    Pharmacy name and phone # (copy from chart):  [facility] - [place], [place] - [address]    Comments:   Calling to discuss getting trazodone prescribed again patient states.

## 2022-01-25 RX ORDER — ATORVASTATIN CALCIUM 20 MG/1
TABLET, FILM COATED ORAL
Qty: 90 TABLET | Refills: 2 | Status: SHIPPED | OUTPATIENT
Start: 2022-01-25 | End: 2022-11-13

## 2022-02-04 DIAGNOSIS — E78.49 OTHER HYPERLIPIDEMIA: ICD-10-CM

## 2022-02-04 DIAGNOSIS — R73.01 IMPAIRED FASTING BLOOD SUGAR: ICD-10-CM

## 2022-02-04 DIAGNOSIS — E78.00 PURE HYPERCHOLESTEROLEMIA: ICD-10-CM

## 2022-02-04 DIAGNOSIS — I10 ESSENTIAL HYPERTENSION: Primary | ICD-10-CM

## 2022-02-09 DIAGNOSIS — G43.009 MIGRAINE WITHOUT AURA AND WITHOUT STATUS MIGRAINOSUS, NOT INTRACTABLE: ICD-10-CM

## 2022-02-09 RX ORDER — DIPHENOXYLATE HYDROCHLORIDE AND ATROPINE SULFATE 2.5; .025 MG/1; MG/1
1 TABLET ORAL 4 TIMES DAILY PRN
Qty: 10 TABLET | Refills: 0 | Status: SHIPPED | OUTPATIENT
Start: 2022-02-09 | End: 2022-02-19

## 2022-02-09 RX ORDER — GABAPENTIN 300 MG/1
CAPSULE ORAL
Qty: 270 CAPSULE | Refills: 3 | Status: SHIPPED | OUTPATIENT
Start: 2022-02-09 | End: 2022-09-08

## 2022-02-09 NOTE — TELEPHONE ENCOUNTER
----- Message from Marj Garcia sent at 2/9/2022  1:47 PM CST -----  Contact: Self/[phone]  Requesting an RX refill or new RX.  Is this a refill or new RX: New  RX name and strength :  diphenoxylate-atropine 2.5-0.025 mg (LOMOTIL) 2.5-0.025 mg per tablet  Is this a 30 day or 90 day RX:   [facility] - [facility] [place] - [address] [facility] [address]  Phone: [phone] Fax: [phone]      The doctors have asked that we provide their patients with the following 2 reminders -- prescription refills can take up to 72 hours, and a friendly reminder that in the future you can use your [facility] account to request refills: yes       Pt stated that she has 1 pills left

## 2022-02-28 RX ORDER — METOPROLOL TARTRATE 50 MG/1
TABLET ORAL
Qty: 180 TABLET | Refills: 2 | Status: SHIPPED | OUTPATIENT
Start: 2022-02-28 | End: 2023-02-27 | Stop reason: SDUPTHER

## 2022-02-28 RX ORDER — MONTELUKAST SODIUM 10 MG/1
TABLET ORAL
Qty: 90 TABLET | Refills: 2 | Status: SHIPPED | OUTPATIENT
Start: 2022-02-28 | End: 2023-06-15

## 2022-03-10 ENCOUNTER — TELEPHONE (OUTPATIENT)
Dept: INTERNAL MEDICINE | Facility: CLINIC | Age: 77
End: 2022-03-10
Payer: MEDICARE

## 2022-03-10 RX ORDER — AMLODIPINE BESYLATE 5 MG/1
TABLET ORAL
Qty: 90 TABLET | Refills: 2 | Status: SHIPPED | OUTPATIENT
Start: 2022-03-10 | End: 2022-07-25

## 2022-03-10 RX ORDER — FLUTICASONE PROPIONATE 50 MCG
1 SPRAY, SUSPENSION (ML) NASAL DAILY
Qty: 16 G | Refills: 0 | Status: SHIPPED | OUTPATIENT
Start: 2022-03-10

## 2022-03-10 RX ORDER — LEVOCETIRIZINE DIHYDROCHLORIDE 5 MG/1
5 TABLET, FILM COATED ORAL NIGHTLY
Qty: 30 TABLET | Refills: 0 | Status: SHIPPED | OUTPATIENT
Start: 2022-03-10 | End: 2022-07-25

## 2022-03-10 NOTE — TELEPHONE ENCOUNTER
----- Message from Abi Santoro sent at 3/10/2022  8:57 AM CST -----  Contact: Pt [phone]  Patient would like to get medical advice.  Symptoms (please be specific):  Stuffy  Nose (possible sinus infection)  How long have you had these symptoms: 4 days   Would you like a call back, or a response through your [facility] portal?:   call back   [facility] - [place] - [address] [facility] [place] [address]  Phone: [phone] Fax: [phone]

## 2022-03-17 ENCOUNTER — TELEPHONE (OUTPATIENT)
Dept: SPORTS MEDICINE | Facility: CLINIC | Age: 77
End: 2022-03-17
Payer: MEDICARE

## 2022-03-17 NOTE — TELEPHONE ENCOUNTER
----- Message from Tianna Fischer sent at 3/17/2022  1:00 PM CDT -----  Contact: [phone]  Pt is calling to see if she can get scheduled for a injection.    Pt would like a call back from the nurse.    Pt says that she would need to get approval from the insurance first.    Pt also stated she has questions for the nurse.    [phone]

## 2022-03-17 NOTE — TELEPHONE ENCOUNTER
Spoke with patient at length. Pt states she would like to see Dr. Caruso to discuss why he will not prescribe pain medication stronger than ibuprofen. Pt also asked if he would fill out paperwork for a scooter. I informed her that he typically does not. She was very adamant that she needs stronger pain medication and/or a scooter. I told the pt what Dr. Caruso typically does now she is more than welcome to speak with him at her visit.     Sara Mckinnon  Clinical Assistant to Dr. Cj Caruso

## 2022-03-28 ENCOUNTER — OFFICE VISIT (OUTPATIENT)
Dept: SPORTS MEDICINE | Facility: CLINIC | Age: 77
End: 2022-03-28
Payer: MEDICARE

## 2022-03-28 VITALS — BODY MASS INDEX: 54.62 KG/M2 | HEIGHT: 55 IN | TEMPERATURE: 97 F | WEIGHT: 236 LBS

## 2022-03-28 DIAGNOSIS — Z99.89 DEPENDENT ON WALKER FOR AMBULATION: ICD-10-CM

## 2022-03-28 DIAGNOSIS — M17.0 PRIMARY OSTEOARTHRITIS OF BOTH KNEES: Primary | ICD-10-CM

## 2022-03-28 DIAGNOSIS — M25.562 CHRONIC PAIN OF BOTH KNEES: ICD-10-CM

## 2022-03-28 DIAGNOSIS — M25.561 CHRONIC PAIN OF BOTH KNEES: ICD-10-CM

## 2022-03-28 DIAGNOSIS — G89.29 CHRONIC PAIN OF BOTH KNEES: ICD-10-CM

## 2022-03-28 DIAGNOSIS — R26.89 ANTALGIC GAIT: ICD-10-CM

## 2022-03-28 PROCEDURE — 1125F PR PAIN SEVERITY QUANTIFIED, PAIN PRESENT: ICD-10-PCS | Mod: CPTII,S$GLB,, | Performed by: FAMILY MEDICINE

## 2022-03-28 PROCEDURE — 1159F MED LIST DOCD IN RCRD: CPT | Mod: CPTII,S$GLB,, | Performed by: FAMILY MEDICINE

## 2022-03-28 PROCEDURE — 99214 PR OFFICE/OUTPT VISIT, EST, LEVL IV, 30-39 MIN: ICD-10-PCS | Mod: 25,S$GLB,, | Performed by: FAMILY MEDICINE

## 2022-03-28 PROCEDURE — 20611 LARGE JOINT ASPIRATION/INJECTION: BILATERAL KNEE: ICD-10-PCS | Mod: 50,S$GLB,, | Performed by: FAMILY MEDICINE

## 2022-03-28 PROCEDURE — 1160F PR REVIEW ALL MEDS BY PRESCRIBER/CLIN PHARMACIST DOCUMENTED: ICD-10-PCS | Mod: CPTII,S$GLB,, | Performed by: FAMILY MEDICINE

## 2022-03-28 PROCEDURE — 1160F RVW MEDS BY RX/DR IN RCRD: CPT | Mod: CPTII,S$GLB,, | Performed by: FAMILY MEDICINE

## 2022-03-28 PROCEDURE — 99499 RISK ADDL DX/OHS AUDIT: ICD-10-PCS | Mod: S$GLB,,, | Performed by: FAMILY MEDICINE

## 2022-03-28 PROCEDURE — 1125F AMNT PAIN NOTED PAIN PRSNT: CPT | Mod: CPTII,S$GLB,, | Performed by: FAMILY MEDICINE

## 2022-03-28 PROCEDURE — 99999 PR PBB SHADOW E&M-EST. PATIENT-LVL IV: ICD-10-PCS | Mod: PBBFAC,,, | Performed by: FAMILY MEDICINE

## 2022-03-28 PROCEDURE — 99499 UNLISTED E&M SERVICE: CPT | Mod: S$GLB,,, | Performed by: FAMILY MEDICINE

## 2022-03-28 PROCEDURE — 1159F PR MEDICATION LIST DOCUMENTED IN MEDICAL RECORD: ICD-10-PCS | Mod: CPTII,S$GLB,, | Performed by: FAMILY MEDICINE

## 2022-03-28 PROCEDURE — 99999 PR PBB SHADOW E&M-EST. PATIENT-LVL IV: CPT | Mod: PBBFAC,,, | Performed by: FAMILY MEDICINE

## 2022-03-28 PROCEDURE — 20611 DRAIN/INJ JOINT/BURSA W/US: CPT | Mod: 50,S$GLB,, | Performed by: FAMILY MEDICINE

## 2022-03-28 PROCEDURE — 99214 OFFICE O/P EST MOD 30 MIN: CPT | Mod: 25,S$GLB,, | Performed by: FAMILY MEDICINE

## 2022-03-28 RX ORDER — TRIAMCINOLONE ACETONIDE 40 MG/ML
40 INJECTION, SUSPENSION INTRA-ARTICULAR; INTRAMUSCULAR
Status: DISCONTINUED | OUTPATIENT
Start: 2022-03-28 | End: 2022-03-28 | Stop reason: HOSPADM

## 2022-03-28 RX ADMIN — TRIAMCINOLONE ACETONIDE 40 MG: 40 INJECTION, SUSPENSION INTRA-ARTICULAR; INTRAMUSCULAR at 01:03

## 2022-03-28 NOTE — PROGRESS NOTES
Purvi Quiroga, a 77 y.o. female, presents today for evaluation of her bilateral, right> left knee. Pt reports consistent relief with previous VSI, requesting CSI bilateral knee today to help with relief until she can repeat VSI. Also notes new neuropathy in her feet- seeing podiatry. Taking NSAIDs for pain. Using a rolling walker for ambulation but notes the brakes are worn out on her current walker.    History of Present Illness (HPI)  Location: anterior knee, bilateral  Onset: Chronic x years  Palliative:     Relative rest   Oral analgesics - failed NSAIDS/Acetaminophen    CSI, 1991   CSI, R. iaKnee, 19.05.28, 50% Improvement   KELLI CARLSarita Grier, 06.17.2019   VSI Euflexxa B knee 2021.03.22   VSI Euflexxa B knee 2021.11.08  Provocative:    ADLS   Prolonged ambulation  Prior: see above  Progression: chronic discomfort  Quality:    pain is a 7/10 aching pain today. Pt reports no pain at rest, consistent pain with ambulation.  Radiation: Denies, numbness, tingling, and inability to bear weight.  Severity: per nursing documentation  Timing: intermittent w/ use  Trauma: none since last visit    Review of Systems (ROS)  A 10+ review of systems was performed with pertinent positives and negatives noted above in the history of present illness. Other systems were negative unless otherwise specified.    Physical Examination (PE)  General:  The patient is alert and oriented x 3. Mood is pleasant. Observation of ears, eyes and nose reveal no gross abnormalities. HEENT: NCAT, sclera anicteric.   Lungs: Respirations are equal and unlabored.  Gait is coordinated. Patient can toe walk and heel walk without difficulty.    KNEE EXAMINATION    Observation/Inspection  Gait:   Nonantalgic   Alignment:  Neutral   Scars:   None   Muscle atrophy: Mild  Effusion:  None   Warmth:  None   Discoloration:   none     Tenderness / Crepitus (T / C):         T / C      T / C  Patella   - / -   Lateral joint line   - / -     Peripatellar medial   -  Medial joint line    + / -  Peripatellar lateral -  Medial plica   - / -  Patellar tendon -   Popliteal fossa   - / -  Quad tendon   -   Gastrocnemius   -  Prepatellar Bursa - / -   Quadricep   -  Tibial tubercle  -  Thigh/hamstring  -  Pes anserine/HS -  Fibula    -  ITB   - / -  Tibia     -  Tib/fib joint  - / -  LCL    -    MFC   - / -   MCL: Proximal  -    LFC   - / -   Distal    -          ROM: (* = pain)  PASSIVE   ACTIVE    Left :   5 / 0 / 145   5 / 0 / 145     Right :    5 / 0 / 145   5 / 0 / 145    Patellofemoral examination:  See above noted areas of tenderness.   Patella position    Subluxation / dislocation: Centered        Sup. / Inf;   Normal   Crepitus (PF):    Absent   Patellar Mobility:       Medial-lateral:   Normal    Superior-inferior:  Normal    Inferior tilt   Normal    Patellar tendon:  Normal   Lateral tilt:    Normal   J-sign:     None   Patellofemoral grind:   No pain     Meniscal Signs:     Pain on terminal extension:  +  Pain on terminal flexion:  +  Kierras maneuver:  +*  Squat     NT  Thesaly    NT    Ligament Examination:  ACL / Lachman:  WNL  PCL-Post.  drawer: normal 0 to 2mm  MCL- Valgus:  normal 0 to 2mm  LCL- Varus:    normal 0 to 2mm  Pivot shift:  guarding   Dial Test:   difference c/w other side   At 30° flexion: normal (< 5°)    At 90° flexion: normal (< 5°)   Reverse Pivot Shift:   normal (Equal)     Strength: (* = with pain) Painful Side  Quadriceps   5/5  Hamstrin/5    Extremity Neuro-vascular Examination:   Sensation:  Grossly intact to light touch all dermatomal regions.   Motor Function:  Fully intact motor function at hip, knee, foot and ankle    DTRs;  quadriceps and  achilles 2+.  No clonus and downgoing Babinski.    Vascular status:  DP and PT pulses 2+, brisk capillary refill, symmetric.     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    We discussed the importance of appropriate diet, weight, and regular exercise    We discussed options including    Watchful waiting / relative rest    Physical therapy Again discussed, again deferred by pt   Injection therapy csi iaknee bilat   Consultation Re: TKA when BMI < 40   The patient chooses As above   x = prescribed  CSI = corticosteroid injection  VSI = viscosupplement injection  PRPI = platelet rich plasma injection  ia = intra articular  R = right  L = left  B = bilateral   nfSx = surgical consultation was recommended, but patient is not interested in consultation at this time    Physical Therapy        Formal (fPT), @ [facility] facility    Formal (fPT), @ [facility] facility        Homegoing (hgPT), per concurrent fPT recommendations    Homegoing (hgPT), per prior fPT recommendations    Homegoing (hgPT), handout provided        w/  (atPT)    [blank] = not prescribed  x = prescribed  b = prescribed, and begin as indicated  t = continue as indicated  r = prescribed, and restart as indicated  p = completed prior as indicated  hs = prescribed, and with high school   col = prescribed, and with college or university   nfPT = physical therapy was recommended, but patient is not interested in PT at this time    Activity (e.g. sports, work) restrictions    [blank] = as tolerated  pt = per physical therapist  at = per   NWB = non weight bearing on affected lower extremity, with crutches assistance for ambulation    Bracing Wheeled walker, given script for replacement today   [blank] = not prescribed  r = recommended, but not fit with at todays visit  f = prescribed and fit with at todays  visit  t = continue as indicated  d = d/c  p = as needed  rare = use on rare, as-needed basis; advised against chronic use    Pain management    [blank] = No prescription necessary. A handout detailing dosing of appropriate   over-the-counter musculoskeletal analgesics was made available to the patient.   m = meloxicam x 14 days  mp = 14 day course of meloxicam prescribed prior    Follow up 12   [blank] = as needed  [number] = in [number] weeks  CSI = for corticosteroid injection  VSI = for viscosupplement injection or injection series  PRP = for platelet rich plasma injection or injection series  MRI = after MRI imaging  ns = should surgical options be deferred (no surgery)  o = appointment offered, deferred by patient    Should symptoms worsen or fail to resolve, consider    Revisiting the above options and / or      Vocation:

## 2022-03-28 NOTE — PROGRESS NOTES
Patient, Purvi Quiroga (MRN #[id number]), presented with a recorded BMI of 54.85 kg/m^2 consistent with the definition of morbid obesity (ICD-10 E66.01). The patient's morbid obesity was monitored, evaluated, addressed and/or treated. This addendum to the medical record is made on 03/28/2022.    1. Symptoms have recurred     2. At least six months have elapsed since the prior series of injections     3. There was significant improvement in pain and functional capacity achieved with the prior series of injections   using a standardized assessment tool

## 2022-03-28 NOTE — PROCEDURES
"Large Joint Aspiration/Injection: bilateral knee    Date/Time: 3/28/2022 1:00 PM  Performed by: Cj Caruso MD  Authorized by: Cj Caruso MD     Consent Done?:  Yes (Verbal)  Indications:  Pain  Site marked: the procedure site was marked    Timeout: prior to procedure the correct patient, procedure, and site was verified    Prep: patient was prepped and draped in usual sterile fashion      Details:  Needle Size:  20 G  Ultrasonic Guidance for needle placement?: Yes    Images are saved and documented.  Approach:  Lateral  Location:  Knee  Laterality:  Bilateral  Site:  Bilateral knee  Medications (Right):  40 mg triamcinolone acetonide 40 mg/mL  Medications (Left):  40 mg triamcinolone acetonide 40 mg/mL  Patient tolerance:  Patient tolerated the procedure well with no immediate complications     Description of ultrasound utilization for needle guidance:   Ultrasound guidance used for needle localization. Images saved and stored for documentation. The SUPRAPATELLAR BURSA / KNEE JOINT was visualized. Dynamic visualization of the 20g x 3.5" needle was continuous throughout the procedure.       "

## 2022-03-29 ENCOUNTER — TELEPHONE (OUTPATIENT)
Dept: SPORTS MEDICINE | Facility: CLINIC | Age: 77
End: 2022-03-29
Payer: MEDICARE

## 2022-03-29 NOTE — TELEPHONE ENCOUNTER
----- Message from Magali Hernandez sent at 3/29/2022  3:26 PM CDT -----  Regarding: Walker  Contact: pt @ [phone]  Patient is calling to have office call her, she need dr to order a specific kind of walker that she need. Asking for a call back

## 2022-03-29 NOTE — TELEPHONE ENCOUNTER
Patient states that she wants roller walker  with the lift up seat with storage under the seat, she does not want the metal basket. She gets the walker from [employer] but does not have the phone number.

## 2022-03-30 ENCOUNTER — TELEPHONE (OUTPATIENT)
Dept: SPORTS MEDICINE | Facility: CLINIC | Age: 77
End: 2022-03-30
Payer: MEDICARE

## 2022-03-30 ENCOUNTER — PATIENT OUTREACH (OUTPATIENT)
Dept: ADMINISTRATIVE | Facility: OTHER | Age: 77
End: 2022-03-30
Payer: MEDICARE

## 2022-03-30 DIAGNOSIS — M17.0 PRIMARY OSTEOARTHRITIS OF BOTH KNEES: Primary | ICD-10-CM

## 2022-03-30 NOTE — TELEPHONE ENCOUNTER
"Spoke to patient about her walker. Explained that the process of the medical device company is working with her insurance company and that this take a few days to get everything approved.     Sara Mckinnon  Clinical Assistant to Dr. Cj Caruso      ----- Message from Felix Tinoco sent at 3/30/2022  1:16 PM CDT -----  Regarding: Pt. Advise  Contact: patient  Pt. Called stating she missed a phone call from "Sara" requesting a call back.            Pt. @[phone]      "

## 2022-03-30 NOTE — PROGRESS NOTES
Health Maintenance Due   Topic Date Due    Hepatitis C Screening  Never done    Aspirin/Antiplatelet Therapy  Never done    Shingles Vaccine (1 of 2) Never done     Updates were requested from care everywhere.  Chart was reviewed for overdue Proactive [facility] Encounters ([facility]) topics (CRS, Breast Cancer Screening, Eye exam)  Health Maintenance has been updated.  [facility] immunization registry triggered.  Immunizations were reconciled.

## 2022-03-30 NOTE — TELEPHONE ENCOUNTER
MARTINAM for patient to call us back regarding a walker.     Sara Mckinnon  Clinical Assistant to Dr. Cj Caruso

## 2022-03-31 ENCOUNTER — OFFICE VISIT (OUTPATIENT)
Dept: PODIATRY | Facility: CLINIC | Age: 77
End: 2022-03-31
Payer: MEDICARE

## 2022-03-31 VITALS
BODY MASS INDEX: 54.62 KG/M2 | DIASTOLIC BLOOD PRESSURE: 76 MMHG | WEIGHT: 236 LBS | HEIGHT: 55 IN | HEART RATE: 76 BPM | SYSTOLIC BLOOD PRESSURE: 147 MMHG

## 2022-03-31 DIAGNOSIS — M79.671 BILATERAL FOOT PAIN: ICD-10-CM

## 2022-03-31 DIAGNOSIS — G60.9 IDIOPATHIC PERIPHERAL NEUROPATHY: Primary | ICD-10-CM

## 2022-03-31 DIAGNOSIS — M79.672 BILATERAL FOOT PAIN: ICD-10-CM

## 2022-03-31 PROCEDURE — 1101F PT FALLS ASSESS-DOCD LE1/YR: CPT | Mod: CPTII,S$GLB,, | Performed by: PODIATRIST

## 2022-03-31 PROCEDURE — 3077F PR MOST RECENT SYSTOLIC BLOOD PRESSURE >= 140 MM HG: ICD-10-PCS | Mod: CPTII,S$GLB,, | Performed by: PODIATRIST

## 2022-03-31 PROCEDURE — 1159F PR MEDICATION LIST DOCUMENTED IN MEDICAL RECORD: ICD-10-PCS | Mod: CPTII,S$GLB,, | Performed by: PODIATRIST

## 2022-03-31 PROCEDURE — 3288F PR FALLS RISK ASSESSMENT DOCUMENTED: ICD-10-PCS | Mod: CPTII,S$GLB,, | Performed by: PODIATRIST

## 2022-03-31 PROCEDURE — 3077F SYST BP >= 140 MM HG: CPT | Mod: CPTII,S$GLB,, | Performed by: PODIATRIST

## 2022-03-31 PROCEDURE — 1159F MED LIST DOCD IN RCRD: CPT | Mod: CPTII,S$GLB,, | Performed by: PODIATRIST

## 2022-03-31 PROCEDURE — 3078F PR MOST RECENT DIASTOLIC BLOOD PRESSURE < 80 MM HG: ICD-10-PCS | Mod: CPTII,S$GLB,, | Performed by: PODIATRIST

## 2022-03-31 PROCEDURE — 1125F PR PAIN SEVERITY QUANTIFIED, PAIN PRESENT: ICD-10-PCS | Mod: CPTII,S$GLB,, | Performed by: PODIATRIST

## 2022-03-31 PROCEDURE — 3078F DIAST BP <80 MM HG: CPT | Mod: CPTII,S$GLB,, | Performed by: PODIATRIST

## 2022-03-31 PROCEDURE — 1125F AMNT PAIN NOTED PAIN PRSNT: CPT | Mod: CPTII,S$GLB,, | Performed by: PODIATRIST

## 2022-03-31 PROCEDURE — 99999 PR PBB SHADOW E&M-EST. PATIENT-LVL V: ICD-10-PCS | Mod: PBBFAC,,, | Performed by: PODIATRIST

## 2022-03-31 PROCEDURE — 1160F RVW MEDS BY RX/DR IN RCRD: CPT | Mod: CPTII,S$GLB,, | Performed by: PODIATRIST

## 2022-03-31 PROCEDURE — 3288F FALL RISK ASSESSMENT DOCD: CPT | Mod: CPTII,S$GLB,, | Performed by: PODIATRIST

## 2022-03-31 PROCEDURE — 99203 OFFICE O/P NEW LOW 30 MIN: CPT | Mod: S$GLB,,, | Performed by: PODIATRIST

## 2022-03-31 PROCEDURE — 1101F PR PT FALLS ASSESS DOC 0-1 FALLS W/OUT INJ PAST YR: ICD-10-PCS | Mod: CPTII,S$GLB,, | Performed by: PODIATRIST

## 2022-03-31 PROCEDURE — 99999 PR PBB SHADOW E&M-EST. PATIENT-LVL V: CPT | Mod: PBBFAC,,, | Performed by: PODIATRIST

## 2022-03-31 PROCEDURE — 1160F PR REVIEW ALL MEDS BY PRESCRIBER/CLIN PHARMACIST DOCUMENTED: ICD-10-PCS | Mod: CPTII,S$GLB,, | Performed by: PODIATRIST

## 2022-03-31 PROCEDURE — 99203 PR OFFICE/OUTPT VISIT, NEW, LEVL III, 30-44 MIN: ICD-10-PCS | Mod: S$GLB,,, | Performed by: PODIATRIST

## 2022-03-31 NOTE — PROGRESS NOTES
"Subjective:      Patient ID: Purvi Quiroga is a 77 y.o. female.    Chief Complaint: Foot Pain (Bilateral foot pain)    78 yo F with Hx of migraines, knee osteoarthritis, HTN, osteoporosis presents for pain in both feet. Pain occurs primarily at night without provocation, reaches up to 7-8/10, located primarily at forefoot and medial foot bilaterally, occasionally associated with burning and tingling in the same distribution. The pain disrupts her sleep. She denies spinal degenerative disease. She has experienced tingling in the fingers in the past. She is currently on gabapentin TID prescribed by [facility] for migraines, last progress note recommended titration down to BID due to improvement in migraines. She has not spoken to the neurologist about the symptoms in her feet. She does not believe the gabapentin improves her foot pain. She has also attempted Bengay without relief. She ambulates with a rolling walker.     Vitals:    03/31/22 1102   BP: (!) 147/76   Pulse: 76   Weight: 107 kg (236 lb)   Height: 4' 7" (1.397 m)   PainSc:   8   PainLoc: Foot      Past Medical History:   Diagnosis Date    Asthma     Essential hypertension 12/10/2018    Hiatal hernia     Insomnia     Other osteoporosis without current pathological fracture 12/10/2018       Past Surgical History:   Procedure Laterality Date    CATARACT EXTRACTION, BILATERAL      DILATION AND CURETTAGE OF UTERUS      KNEE ARTHROSCOPY         Family History   Problem Relation Age of Onset    Heart disease Father         fatal MI in mid 60s    Hypertension Father     Hypertension Sister     Cancer Brother         colon cancer    Hypertension Brother     Diabetes Neg Hx     Stroke Neg Hx     Hyperlipidemia Neg Hx        Social History     Socioeconomic History    Marital status: Single   Tobacco Use    Smoking status: Never Smoker    Smokeless tobacco: Never Used    Tobacco comment: 18 or 19 yo for 1 yr   Substance and Sexual " Activity    Alcohol use: Yes     Comment: once in a while a little whiskey (not even weekly)    Drug use: No       Current Outpatient Medications   Medication Sig Dispense Refill    AFLURIA QUAD 7215-0686, PF, 60 mcg/0.5 mL vaccine ADM 0.5ML IM UTD  0    albuterol (PROVENTIL/VENTOLIN HFA) 90 mcg/actuation inhaler INHALE 1-2 PUFFS INTO THE LUNGS EVERY 6 HOURS AS NEEDED FOR SHORTNESS OF BREATH 8.5 g 1    albuterol-ipratropium (DUO-NEB) 2.5 mg-0.5 mg/3 mL nebulizer solution USE 1 VIAL via NEBULIZER EVERY 4 HOURS AS NEEDED SHORTNESS OF BREATH      alendronate (FOSAMAX) 70 MG tablet Take 1 tablet (70 mg total) by mouth every 7 days. 12 tablet 2    amLODIPine (NORVASC) 5 MG tablet TAKE 1 TABLET BY MOUTH ONCE DAILY 90 tablet 2    atorvastatin (LIPITOR) 20 MG tablet TAKE 1 TABLET BY MOUTH ONCE DAILY 90 tablet 2    calcium carbonate 650 mg calcium (1,625 mg) tablet Take 1 tablet (650 mg total) by mouth once daily. 30 tablet 11    COVID-19 vacc,mRNA,Moderna,/PF (MODERNA COVID-19 VACCINE, EUA, IM) PHARMACY ADMINISTERED      FLUAD QUAD 2020-21,65Y UP,,PF, 60 mcg (15 mcg x 4)/0.5 mL Syrg SEASONAL FLU SHOT      fluticasone propionate (FLONASE) 50 mcg/actuation nasal spray 1 spray (50 mcg total) by Each Nostril route once daily. 16 g 0    furosemide (LASIX) 20 MG tablet Take 1 tablet (20 mg total) by mouth once daily. 30 tablet 11    gabapentin (NEURONTIN) 300 MG capsule TAKE 1 CAPSULE BY MOUTH 3  TIMES DAILY 270 capsule 3    hydrOXYzine pamoate (VISTARIL) 50 MG Cap TAKE ONE CAPSULE BY MOUTH THREE TIMES DAILY AS NEEDED 90 capsule 2    irbesartan (AVAPRO) 150 MG tablet TAKE 1 TABLET BY MOUTH  TWICE DAILY 180 tablet 2    levocetirizine (XYZAL) 5 MG tablet Take 1 tablet (5 mg total) by mouth every evening. 30 tablet 0    melatonin 5 mg Cap Take 1 capsule (5 mg total) by mouth nightly as needed (insomnia). 90 each 1    metoprolol tartrate (LOPRESSOR) 50 MG tablet TAKE 1 TABLET BY MOUTH  TWICE DAILY 180 tablet 2     montelukast (SINGULAIR) 10 mg tablet TAKE 1 TABLET BY MOUTH IN  THE EVENING 90 tablet 2    omeprazole (PRILOSEC) 20 MG capsule TAKE 1 CAPSULE BY MOUTH  TWICE DAILY 180 capsule 2    ondansetron (ZOFRAN-ODT) 8 MG TbDL PLACE 1 TABLET UNDER THE TONGUE EVERY TWELVE HOURS AS NEEDED 30 tablet 2    potassium chloride (MICRO-K) 10 MEQ CpSR TAKE ONE CAPSULE BY MOUTH EVERY MORNING with meals 90 capsule 3    SYMBICORT 160-4.5 mcg/actuation HFAA INHALE 2 PUFFS BY MOUTH EVERY MORNING AND EVERY EVENING. RINSE MOUTH AND THROAT AFTER USE. 1 Inhaler 5    traZODone (DESYREL) 50 MG tablet Take 1 tablet (50 mg total) by mouth nightly as needed for Insomnia. 90 tablet 3    vitamin D (VITAMIN D3) 1000 units Tab Take 1 tablet (1,000 Units total) by mouth once daily. 30 tablet 11    amitriptyline (ELAVIL) 10 MG tablet Take 1 tablet (10 mg total) by mouth every evening. 30 tablet 3    ciprofloxacin HCl (CILOXAN) 0.3 % ophthalmic solution One drop each eye every 2 hours while awake for 2 days, then every 4 hours and while awake for 5 days (Patient not taking: Reported on 3/31/2022) 10 mL 0     Current Facility-Administered Medications   Medication Dose Route Frequency Provider Last Rate Last Admin    DOBUTamine 1000 mg in D5W 250 mL infusion (premix titrating)  10 mcg/kg/min Intravenous Continuous Rui Damian NP 16.1 mL/hr at 05/11/21 1404 10 mcg/kg/min at 05/11/21 1404       Review of patient's allergies indicates:  No Known Allergies      Review of Systems   Constitutional: Negative for chills and fever.   HENT: Negative for ear pain.    Eyes: Negative for pain.   Cardiovascular: Negative for chest pain.   Respiratory: Negative for cough and shortness of breath.    Skin: Negative.  Negative for color change and itching.   Musculoskeletal: Positive for arthritis (knees) and joint pain (knees).   Gastrointestinal: Negative for nausea and vomiting.   Genitourinary: Negative for dysuria.   Neurological: Positive for  paresthesias. Negative for focal weakness.   Psychiatric/Behavioral: The patient is nervous/anxious.    All other systems reviewed and are negative.          Objective:      Physical Exam  Constitutional:       General: She is not in acute distress.     Appearance: Normal appearance. She is obese. She is not ill-appearing.   Cardiovascular:      Pulses:           Dorsalis pedis pulses are 2+ on the right side and 2+ on the left side.        Posterior tibial pulses are 0 on the right side and 0 on the left side.      Comments: No rubor on dependency.  No hair growth bilateral extremity.  Skin temp is warm to cool bilateral foot.  Musculoskeletal:      Right foot: Decreased range of motion (ankle). No deformity.      Left foot: Decreased range of motion (ankle). No deformity.      Comments: MMT 4+/5 BLEs, <10 degrees ankle dorsiflexion bilaterally, no significant foot deformity bilaterally. No consistent pain on PROM or palpation of feet bilaterally.     Crepitus of knees bilaterally.    Feet:      Right foot:      Protective Sensation: 10 sites tested. 10 sites sensed.      Skin integrity: Skin integrity normal.      Left foot:      Protective Sensation: 10 sites tested. 10 sites sensed.      Skin integrity: Skin integrity normal.      Comments: Bilateral lipedema buttocks to ankles, telangectasia throughout with dimpling of skin, discomfort on palpation. Lipedema likely preventing PT pulse palpation.   Skin:     General: Skin is warm.      Capillary Refill: Capillary refill takes less than 2 seconds.      Findings: No ecchymosis or erythema.      Nails: There is no clubbing.   Neurological:      Mental Status: She is alert and oriented to person, place, and time.      Sensory: Sensory deficit present.      Motor: Weakness present.      Comments: Discomfort on percussion of tibial nerve, sural nerve, dorsal cutaneous nerves, common fibular nerves bilaterally. Straight leg raise positive for reproduction of left  forefoot pain, right straight leg raise negative.     Light touch sensation intact bilaterally.                Assessment:       Encounter Diagnoses   Name Primary?    Idiopathic peripheral neuropathy Yes    Bilateral foot pain          Plan:       Purvi was seen today for foot pain.    Diagnoses and all orders for this visit:    Idiopathic peripheral neuropathy    Bilateral foot pain      I counseled the patient on her conditions, their implications and medical management.    From a clinical standpoint patient has idiopathic peripheral neuropathy.  Defer to neurology for further evaluation and management.  Recommend patient maintain her normal dose of gabapentin 200 mg p.o. t.i.d. however she may take 600 mg p.o. q.h.s..    RTC p.r.n. as discussed.    Assisted by Matthieu Garcia DPM PGY 2    I have personally taken the history and examined this patient and agree with the resident's note as stated as above.   Jian Dc DPM, FACFAS      A portion of this note was generated by voice recognition software and may contain spelling and grammar errors.      .

## 2022-03-31 NOTE — PATIENT INSTRUCTIONS
Recommend taking gabapentin 600 mg at night and follow up with Neurologist for nerve pain to both feet.

## 2022-04-04 ENCOUNTER — TELEPHONE (OUTPATIENT)
Dept: SPORTS MEDICINE | Facility: CLINIC | Age: 77
End: 2022-04-04
Payer: MEDICARE

## 2022-04-04 NOTE — TELEPHONE ENCOUNTER
Spoke with patient about walker order. She would like to think about it and then get back to me if she would like to put this through insurance or not.     Sara Mckinnon  Clinical Assistant to Dr. Cj Caruso

## 2022-04-04 NOTE — TELEPHONE ENCOUNTER
Spoke with patient at length about the status of her walker. She did not accept the walker originally ordered for her as it was not the one she wanted. Patient was very agitated this task could not be completed. I assured her I would look into this matter and do my best to assist with as much as I could to get this taken care of. Patient insisted I needed to speak with her insurance company. I told her I would look into what the process was and try to assist with the matter as much as I could. Patient understood and seemed to display understanding of what the plan would be.     Sara Mckinnon  Clinical Assistant to Dr. Cj Caruso

## 2022-04-05 ENCOUNTER — TELEPHONE (OUTPATIENT)
Dept: NEUROLOGY | Facility: CLINIC | Age: 77
End: 2022-04-05
Payer: MEDICARE

## 2022-04-05 ENCOUNTER — PES CALL (OUTPATIENT)
Dept: ADMINISTRATIVE | Facility: CLINIC | Age: 77
End: 2022-04-05
Payer: MEDICARE

## 2022-04-05 NOTE — TELEPHONE ENCOUNTER
----- Message from Tianna Fischer sent at 4/5/2022  9:19 AM CDT -----  Contact: [phone]  Pt is calling to get the nurse to call back pt stated that the Podiatry dr would treat her and told her he was referring her back to neuro dept.    pt would like a call back.    [phone]

## 2022-04-19 ENCOUNTER — TELEPHONE (OUTPATIENT)
Dept: SPORTS MEDICINE | Facility: CLINIC | Age: 77
End: 2022-04-19
Payer: MEDICARE

## 2022-04-19 DIAGNOSIS — M17.0 PRIMARY OSTEOARTHRITIS OF BOTH KNEES: Primary | ICD-10-CM

## 2022-04-19 NOTE — TELEPHONE ENCOUNTER
Spoke with patient about physical therapy. I gave her the number to central scheduling to reschedule ehr appointment.     Sara Mckinnon  Clinical Assistant to Dr. Cj Caruso

## 2022-04-25 ENCOUNTER — CLINICAL SUPPORT (OUTPATIENT)
Dept: REHABILITATION | Facility: HOSPITAL | Age: 77
End: 2022-04-25
Payer: MEDICARE

## 2022-04-25 DIAGNOSIS — M25.662 DECREASED RANGE OF MOTION (ROM) OF BOTH KNEES: ICD-10-CM

## 2022-04-25 DIAGNOSIS — M25.661 DECREASED RANGE OF MOTION (ROM) OF BOTH KNEES: ICD-10-CM

## 2022-04-25 DIAGNOSIS — M17.0 PRIMARY OSTEOARTHRITIS OF BOTH KNEES: ICD-10-CM

## 2022-04-25 DIAGNOSIS — R53.1 DECREASED STRENGTH: ICD-10-CM

## 2022-04-25 PROCEDURE — 97161 PT EVAL LOW COMPLEX 20 MIN: CPT | Mod: PN

## 2022-04-25 PROCEDURE — 97110 THERAPEUTIC EXERCISES: CPT | Mod: PN

## 2022-04-25 NOTE — PLAN OF CARE
[facility]  Physical Therapy Initial Evaluation    Name: Purvi Quiroga  Clinic Number: [id number]    Therapy Diagnosis:   Encounter Diagnoses   Name Primary?    Primary osteoarthritis of both knees     Decreased range of motion (ROM) of both knees     Decreased strength      Physician: Cj Caruso, *    Physician Orders: PT Eval and Treat  Medical Diagnosis from Referral: M17.0 (ICD-10-CM) - Primary osteoarthritis of both knees  Evaluation Date: 4/25/2022  Authorization Period Expiration: 05/23/2022  Plan of Care Expiration: 6/24/22  Visit # / Visits authorized: 1/12  FOTO: 1/5  PTA Visit: 0/6    Time In: 11:10 AM  Time Out: 12:00 AM  Total Billable Time: 50 minutes (1 TE + 1 LCE)    Precautions: Standard, Asthma, HTN, Neuropathy in feet    Subjective   Date of onset: 2022  History of current condition - Purvi reports: she has severe osteoarthritis in her knees, and refused knee replacements in the past. She is now too overweight, and has been getting injections in her knees to manage her pain. She is very limited in walking and standing reporting only being able to stand for 30 seconds at a time with a rollator along with very short walking distances. She thinks her knee pains started about 20 years ago and worsened over time. Most of her knee pain is in weight bearing activities, and feels very limited in her mobility.  Does have a history of passing out from imbalance of electrolytes, but has not passed out in 6 years. Past history of right knee arthroscopy, and also some new memory issues she thinks she has been having. Denies any numbness or tingling.      Medical History:   Past Medical History:   Diagnosis Date    Asthma     Essential hypertension 12/10/2018    Hiatal hernia     Insomnia     Other osteoporosis without current pathological fracture 12/10/2018       Surgical History:   Purvi Quiroga  has a past surgical history that includes Knee  arthroscopy; Dilation and curettage of uterus; and Cataract extraction, bilateral.    Medications:   Purvi has a current medication list which includes the following prescription(s): afluria quad 5855-1194 (pf), albuterol, albuterol-ipratropium, alendronate, amitriptyline, amlodipine, atorvastatin, calcium carbonate, ciprofloxacin hcl, covid-19 vacc,mrna(moderna)/pf, fluad quad 2020-21(65y up)(pf), fluticasone propionate, furosemide, gabapentin, hydroxyzine pamoate, irbesartan, levocetirizine, melatonin, metoprolol tartrate, montelukast, omeprazole, ondansetron, potassium chloride, symbicort, trazodone, and vitamin d, and the following Facility-Administered Medications: dobutamine.    Allergies:   Review of patient's allergies indicates:  No Known Allergies     Imaging, 5/28/19  Significant DJD and marked joint space narrowing identified bilaterally.  No acute fracture or dislocation.  No bone destruction identified    Prior Therapy: None  Social History: lives in a senior citizen complex, uses elevator to get to the 4th floor   Occupation: retired  Prior Level of Function: chronic Bilateral knee pain and standing/walking limitations  Current Level of Function: poor tolerance in standing/walking activities, unable to squat and stoop  Pts goals: to come once a week and try therapy    Pain:  Current 3/10, worst 8/10, best 2/10   Location: anterior knee pain  Description: Aching, Dull, Burning, Throbbing and Sharp  Aggravating Factors: see current functional limitations  Easing Factors: ibyprofen    Objective     Gait: forward trunk lean with rollator, decreased brigitte and stride length. Does use her cane infrequently at home for very short distances  Posture: increased forward lean with rollator  Sensation: WNL  Palpation: +tender to palpation at Bilateral knee joint lines    * = knee pain with testing  NT = Not tested  Hip  Right   Left  Pain/Dysfunction with Movement    AROM PROM MMT AROM PROM MMT    Flexion  (L2,120 deg) 90 90 3+/5 90 90 3+/5    Extension (20 deg) 0 0 2+/5 0 0 2+/5    Abduction (L5, 45 deg) WFL WFL 3+/5 WFL WFL 3+/5    Adduction (30 deg) WFL WFL 4/5 WFL WFL 4/5    IR (30 deg) WFL WFL NT WFL WFL NT    ER (45 deg) WFL WFL NT WFL WFL NT       Knee  Right   Left  Pain/Dysfunction with Movement    AROM PROM MMT AROM PROM MMT    Flexion (S2, 140 deg) 91 92 3+/5 104 105 3+/5    Extension (L3, 0-5 deg) 0 0 3+/5 0 0 3+/5      Ankle  Right   Left  Pain/Dysfunction with Movement    AROM PROM MMT AROM PROM MMT    Dorsiflexion (L4) WFL WFL 4/5 WFL WFL 4/5    Plantarflexion (S1) WFL WFL 4/5 WFL WFL 4/5      Special Tests:  Patellar Grind Test = positive Bilateral   Knee Varus Stress Test = positive B  Knee Valgus Stress Test = positive B  Lachman Drawer Test = not tested  Anterior Drawer Test = WNL  Posterior Drawer Test = WNL    CMS Impairment/Limitation/Restriction for FOTO KNEE Survey    Therapist reviewed FOTO scores for Purvi Quiroga on 2022.   FOTO documents entered into [employer] - see Media section.    Limitation Score: 68%  Category: Mobility  Predicted: 50%     TREATMENT   Treatment Time In: 11:50 AM  Treatment Time Out: 12:00 PM  Total Treatment time separate from Evaluation: 10 minutes    Purvi received therapeutic exercises to develop strength, endurance, ROM, flexibility and posture for 10 minutes including:  Quad sets: 5x10'' holds, towel under knees  Hooklying hip adduction: 5x10'' holds  Sidelying hip abduction: 5x Bilateral   Bridges: 5x double leg   Long arc quad: 5x Bilateral   Seated marchinx Bilateral       Home Exercises and Patient Education Provided  Education provided: Yes  - correct body mechanics for knee health  - course of therapy, prognosis  - importance of HEP    Written Home Exercises Provided: yes.  Exercises were reviewed and Purvi was able to demonstrate them prior to the end of the session.  Purvi demonstrated good  understanding of the education provided.     See  EMR under Patient Instructions for exercises provided 4/25/2022.    Assessment   Purvi is a 77 y.o. female referred to outpatient Physical Therapy with a medical diagnosis of Primary osteoarthritis in both knees at [facility] [place] location. Pt currently presents with Bilateral knee pain, decreased lumbar ROM, decreased B knee ROM, decreased BLE strength, poor posture, impaired balance and gait, and functional deficits with squatting/stooping, transitions, bed mobility, standing and walking activities. Pt presents with signs and symptoms of advanced Bilateral knee arthritis that is chronic and greatly limites her ADLs. Pt would benefit from skilled PT consisting of gait training, muscular skeletal stretching/strengthening, manual therapy, neuro muscular re-education, and modalities prn to address limitations and increase functional mobility.    Pt prognosis is Fair.   Pt will benefit from skilled outpatient Physical Therapy to address the deficits stated above and in the chart below, provide pt/family education, and to maximize pt's level of independence.     Plan of care discussed with patient: Yes  Pt's spiritual, cultural and educational needs considered and patient is agreeable to the plan of care and goals as stated below:     Anticipated Barriers for therapy: co-morbidities, transportation,     Medical Necessity is demonstrated by the following  History  Co-morbidities and personal factors that may impact the plan of care Co-morbidities:   Asthma, HTN, Neuropathy in feet    Personal Factors:   no deficits     moderate   Examination  Body Structures and Functions, activity limitations and participation restrictions that may impact the plan of care Body Regions:   back  lower extremities  trunk    Body Systems:    gross symmetry  ROM  strength  gross coordinated movement  balance  gait  transfers  transitions  motor control  edema    Participation Restrictions:   Recreational  walking    Activity limitations:   Learning and applying knowledge  no deficits    General Tasks and Commands  no deficits    Communication  no deficits    Mobility  lifting and carrying objects  walking   driving    Self care  no deficits    Domestic Life  shopping  cooking  doing house work (cleaning house, washing dishes, laundry)    Interactions/Relationships  no deficits    Life Areas  no deficits    Community and Social Life  no deficits         high   Clinical Presentation stable and uncomplicated low   Decision Making/ Complexity Score: low     GOALS: Short Term Goals:  4 weeks  1. Report decreased B knee pain </=4/10 with standing activities to increase tolerance for ADLs and increased QoL.  2. Pt will standing in upright position with rollator with proper posture and improved gait mechanics.   3. Increase strength by 1/3 MMT grade in BLE  to increase tolerance for ADL and work activities.  4. Pt to tolerate HEP to improve ROM and independence with ADL's.    Long Term Goals: 8 weeks  1. Report decreased B pain </= 3/10 with walking/standing and stooping activities to increase tolerance for ADLs and increased QoL.  2. Patient goal: to come once a week and try therapy  3. Increase strength to >/= 4+/5 in BLE to increase tolerance for ADL and work activities.  4. Pt will report at </= 50% impaired on KNEE FOTO to demonstrate increase in LE function with every day tasks.   5. Pt will negotiate 4 steps and ambulate community distances at >/= Mod I for increased functional mobility.  6. Increase B knee AROM to 0-110 degrees in order to be able to perform ADLs without difficulty.    Plan   Plan of care Certification: 4/25/2022 to 6/24/22.    Outpatient Physical Therapy 2 times weekly for 8 weeks to include the following interventions: Aquatic Therapy, Debridement (nonselective), Debridement (selective), Electrical Stimulation IFC/Russian, Gait Training, Manual Therapy, Moist Heat/ Ice, Neuromuscular Re-ed,  Orthotic Management and Training, Patient Education, Self Care, Therapeutic Activites and Therapeutic Exercise.     Jak Stock, PT

## 2022-05-10 ENCOUNTER — PATIENT OUTREACH (OUTPATIENT)
Dept: ADMINISTRATIVE | Facility: OTHER | Age: 77
End: 2022-05-10
Payer: MEDICARE

## 2022-05-13 ENCOUNTER — TELEPHONE (OUTPATIENT)
Dept: NEUROLOGY | Facility: CLINIC | Age: 77
End: 2022-05-13
Payer: MEDICARE

## 2022-05-13 NOTE — TELEPHONE ENCOUNTER
----- Message from Tony Barakat sent at 5/13/2022 11:56 AM CDT -----  Contact: @ [phone]  Patient calling to schedule f/up and to address a new concern ( Neuropathy ) pls call

## 2022-05-17 ENCOUNTER — LAB VISIT (OUTPATIENT)
Dept: LAB | Facility: HOSPITAL | Age: 77
End: 2022-05-17
Attending: INTERNAL MEDICINE
Payer: MEDICARE

## 2022-05-17 DIAGNOSIS — I10 ESSENTIAL HYPERTENSION: Chronic | ICD-10-CM

## 2022-05-17 DIAGNOSIS — E78.00 PURE HYPERCHOLESTEROLEMIA: Chronic | ICD-10-CM

## 2022-05-17 DIAGNOSIS — N18.31 STAGE 3A CHRONIC KIDNEY DISEASE: Chronic | ICD-10-CM

## 2022-05-17 DIAGNOSIS — R73.01 IMPAIRED FASTING BLOOD SUGAR: Chronic | ICD-10-CM

## 2022-05-17 LAB
CHOLEST SERPL-MCNC: 158 MG/DL (ref 120–199)
CHOLEST/HDLC SERPL: 2.8 {RATIO} (ref 2–5)
ESTIMATED AVG GLUCOSE: 108 MG/DL (ref 68–131)
HBA1C MFR BLD: 5.4 % (ref 4–5.6)
HDLC SERPL-MCNC: 56 MG/DL (ref 40–75)
HDLC SERPL: 35.4 % (ref 20–50)
LDLC SERPL CALC-MCNC: 75.8 MG/DL (ref 63–159)
NONHDLC SERPL-MCNC: 102 MG/DL
TRIGL SERPL-MCNC: 131 MG/DL (ref 30–150)

## 2022-05-17 PROCEDURE — 83036 HEMOGLOBIN GLYCOSYLATED A1C: CPT | Performed by: INTERNAL MEDICINE

## 2022-05-17 PROCEDURE — 36415 COLL VENOUS BLD VENIPUNCTURE: CPT | Mod: PO | Performed by: INTERNAL MEDICINE

## 2022-05-17 PROCEDURE — 80061 LIPID PANEL: CPT | Performed by: INTERNAL MEDICINE

## 2022-05-19 ENCOUNTER — TELEPHONE (OUTPATIENT)
Dept: INTERNAL MEDICINE | Facility: CLINIC | Age: 77
End: 2022-05-19
Payer: MEDICARE

## 2022-05-19 NOTE — TELEPHONE ENCOUNTER
----- Message from Marium Felix sent at 5/19/2022 12:37 PM CDT -----  Contact: pt - [phone]  Caller: PT - [phone]    Reason: requesting lab results to be mailed to her

## 2022-05-25 ENCOUNTER — TELEPHONE (OUTPATIENT)
Dept: NEUROLOGY | Facility: CLINIC | Age: 77
End: 2022-05-25
Payer: MEDICARE

## 2022-05-25 NOTE — TELEPHONE ENCOUNTER
----- Message from Nury Gunter sent at 5/25/2022  1:15 PM CDT -----  Regarding: pt  Pt is calling to speak with the nurse to see if she can be seen earlier for her appt can you please call pt at [phone].    RISA

## 2022-06-08 DIAGNOSIS — M17.0 PRIMARY OSTEOARTHRITIS OF BOTH KNEES: Primary | ICD-10-CM

## 2022-06-09 ENCOUNTER — TELEPHONE (OUTPATIENT)
Dept: SPORTS MEDICINE | Facility: CLINIC | Age: 77
End: 2022-06-09
Payer: MEDICARE

## 2022-06-09 NOTE — TELEPHONE ENCOUNTER
Called and spoke to patient.  She is scheduled for her Euflexxa injections with Dr. Caruso.  Patient was in agreement with the dates and time.

## 2022-06-09 NOTE — TELEPHONE ENCOUNTER
----- Message from Sara Ordonez sent at 6/8/2022  4:21 PM CDT -----  Regarding: RE: self [phone]  Referral is in. Thank you   ----- Message -----  From: Sera Gipson MA  Sent: 6/8/2022  12:33 PM CDT  To: Sara Ordonez  Subject: FW: self [phone]                            Patient would like to schedule her Euflexxa injections.  Can you please enter the referral.    Thanks  Sera  ----- Message -----  From: Marialuisa Myles  Sent: 6/8/2022  12:21 PM CDT  To: [facility] Staff  Subject: self [phone]                                Type: Patient Call Back    Who called: self    What is the request in detail: patient asked for a call back to schedule and appt she stated the office does it them selves.     Can the clinic reply by [facility]? no    Would the patient rather a call back or a response via [facility]?  Call back    Best call back number: [phone]

## 2022-06-10 ENCOUNTER — CLINICAL SUPPORT (OUTPATIENT)
Dept: REHABILITATION | Facility: HOSPITAL | Age: 77
End: 2022-06-10
Payer: MEDICARE

## 2022-06-10 DIAGNOSIS — R53.1 DECREASED STRENGTH: ICD-10-CM

## 2022-06-10 DIAGNOSIS — M25.661 DECREASED RANGE OF MOTION (ROM) OF BOTH KNEES: Primary | ICD-10-CM

## 2022-06-10 DIAGNOSIS — M25.662 DECREASED RANGE OF MOTION (ROM) OF BOTH KNEES: Primary | ICD-10-CM

## 2022-06-10 PROCEDURE — 97110 THERAPEUTIC EXERCISES: CPT | Mod: PN

## 2022-06-10 NOTE — PROGRESS NOTES
Physical Therapy Daily Treatment Note     Name: Purvi MENDIOLA [facility] Number: [id number]    Therapy Diagnosis:   Encounter Diagnoses   Name Primary?    Decreased range of motion (ROM) of both knees Yes    Decreased strength      Physician: Cj Caruso, *    Visit Date: 6/10/2022    Physician Orders: PT Eval and Treat  Medical Diagnosis from Referral: M17.0 (ICD-10-CM) - Primary osteoarthritis of both knees  Evaluation Date: 2022  Authorization Period Expiration: 2022  Plan of Care Expiration: 22  Visit # / Visits authorized:   FOTO:   PTA Visit:      Time In: 12:05 PM  Time Out: 1:00 PM  Total Billable Time: 55 minutes (4 TE)     Precautions: Standard, Asthma, HTN, Neuropathy in feet    Subjective     Pt reports: she has not done any of her exercises, and does not do much on her feet for most of the day. Using a rollator to ambulate, and only gets up to go to the bathroom or eat at this time.  She was compliant with home exercise program.  Response to previous treatment: 1st after  Functional change: 1st after    Pain: 6/10  Location: low back and Bilateral knees    Objective     Purvi received therapeutic exercises to develop strength, endurance, ROM, flexibility, posture and core stabilization for 55 minutes including:  Quad sets: 10x5'' holds, towel under knees  Straight leg raise: 2x5 Bilateral   Hooklying hip adduction: 5x10'' holds  Sidelying hip abduction: 2x8 Bilateral   Bridges: 10x double leg   Long arc quad: 2x10 Bilateral   Seated marchinx Bilateral - not done today    Home Exercises Provided and Patient Education Provided   Education provided:   - correct body mechanics for knee health  - course of therapy, prognosis  - importance of HEP    Written Home Exercises Provided: yes.  Exercises were reviewed and Purvi was able to demonstrate them prior to the end of the session.  Purvi demonstrated good  understanding of the education provided.     See EMR  under Patient Instructions for exercises provided 4/25/22.    Assessment     Pt did fairly well, and required heavy encouragement throughout the session for continuation of repetitions. She has mild pain mainly with left knee mobility and exercises, but strong fear and avoidance behaviors. She would verbalize she has a hard time working up the motivation to exercise, and unsure of the after effects. She did say that she can only still come in once a week, but is willing to ry her exercises at home better and do some walking down the hallway outside of her apartment. Progress repetitions next visit and can initiate weight bearing activities next visit.   Purvi Is progressing well towards her goals.   Pt prognosis is Guarded.     Pt will continue to benefit from skilled outpatient physical therapy to address the deficits listed in the problem list box on initial evaluation, provide pt/family education and to maximize pt's level of independence in the home and community environment.   Pt's spiritual, cultural and educational needs considered and pt agreeable to plan of care and goals.    Anticipated barriers to physical therapy: co-morbidities, transportation,     Goals:   GOALS: Short Term Goals:  4 weeks  1. Report decreased B knee pain </=4/10 with standing activities to increase tolerance for ADLs and increased QoL. - progressing  2. Pt will standing in upright position with rollator with proper posture and improved gait mechanics.  - progressing  3. Increase strength by 1/3 MMT grade in BLE  to increase tolerance for ADL and work activities. - progressing  4. Pt to tolerate HEP to improve ROM and independence with ADL's. - progressing     Long Term Goals: 8 weeks  1. Report decreased B pain </= 3/10 with walking/standing and stooping activities to increase tolerance for ADLs and increased QoL. - progressing  2. Patient goal: to come once a week and try therapy - progressing  3. Increase strength to >/= 4+/5 in  BLE to increase tolerance for ADL and work activities. - progressing  4. Pt will report at </= 50% impaired on KNEE FOTO to demonstrate increase in LE function with every day tasks.  - progressing  5. Pt will negotiate 4 steps and ambulate community distances at >/= Mod I for increased functional mobility. - progressing  6. Increase B knee AROM to 0-110 degrees in order to be able to perform ADLs without difficulty. - progressing    Plan     Cont per POC and protocol.    Jak Stock, PT

## 2022-06-14 RX ORDER — OMEPRAZOLE 20 MG/1
20 CAPSULE, DELAYED RELEASE ORAL 2 TIMES DAILY
Qty: 180 CAPSULE | Refills: 1 | Status: SHIPPED | OUTPATIENT
Start: 2022-06-14 | End: 2022-10-27

## 2022-06-17 ENCOUNTER — CLINICAL SUPPORT (OUTPATIENT)
Dept: REHABILITATION | Facility: HOSPITAL | Age: 77
End: 2022-06-17
Payer: MEDICARE

## 2022-06-17 DIAGNOSIS — M25.662 DECREASED RANGE OF MOTION (ROM) OF BOTH KNEES: Primary | ICD-10-CM

## 2022-06-17 DIAGNOSIS — M25.661 DECREASED RANGE OF MOTION (ROM) OF BOTH KNEES: Primary | ICD-10-CM

## 2022-06-17 DIAGNOSIS — R53.1 DECREASED STRENGTH: ICD-10-CM

## 2022-06-17 PROCEDURE — 97110 THERAPEUTIC EXERCISES: CPT | Mod: PN

## 2022-06-17 NOTE — PROGRESS NOTES
"  Physical Therapy Daily Treatment Note     Name: Purvi MENDIOLA [facility] Number: [id number]    Therapy Diagnosis:   Encounter Diagnoses   Name Primary?    Decreased range of motion (ROM) of both knees Yes    Decreased strength      Physician: Cj Caruso, *    Visit Date: 6/17/2022    Physician Orders: PT Eval and Treat  Medical Diagnosis from Referral: M17.0 (ICD-10-CM) - Primary osteoarthritis of both knees  Evaluation Date: 4/25/2022  Authorization Period Expiration: 05/23/2022  Plan of Care Expiration: 6/24/22  Visit # / Visits authorized: 3/12  FOTO: 3/5  PTA Visit: 0/6     Time In: 12:05 PM  Time Out: 1:00 PM  Total Billable Time: 55 minutes (4 TE)     Precautions: Standard, Asthma, HTN, Neuropathy in feet    Subjective     Pt reports: she "has not made up her mind about doing exercises at home", and not doing any extra walking. Pt reports sedentary behavior overall.   She was compliant with home exercise program.  Response to previous treatment: some soreness in knees  Functional change: None at this time    Pain: 6/10  Location: low back and Bilateral knees    Objective     Purvi received therapeutic exercises to develop strength, endurance, ROM, flexibility, posture and core stabilization for 55 minutes including:  Straight leg raise: 15x left, 2x5 right  Quad sets: 20x5'' holds with towel under right knee  Lower trunk rotations: 1 minute  Sidelying hip abduction: 2x10 Bilateral   Bridges: 2x10 double leg   Open books: 8x5'' holds Bilateral   Long arc quad: 2x10 Bilateral     Home Exercises Provided and Patient Education Provided   Education provided:   - correct body mechanics for knee health  - course of therapy, prognosis  - importance of HEP    Written Home Exercises Provided: yes.  Exercises were reviewed and Purvi was able to demonstrate them prior to the end of the session.  Purvi demonstrated good  understanding of the education provided.     See EMR under Patient Instructions for " exercises provided 4/25/22.    Assessment     Pt required encouragement throughout, but was able to perform some increased repetitions today. Heavy education again on importance of home exercise program and regular activities. She was given an home exercise program today, and she stated she will try to do her exercises and move more.   Purvi Is progressing well towards her goals.   Pt prognosis is Guarded.     Pt will continue to benefit from skilled outpatient physical therapy to address the deficits listed in the problem list box on initial evaluation, provide pt/family education and to maximize pt's level of independence in the home and community environment.   Pt's spiritual, cultural and educational needs considered and pt agreeable to plan of care and goals.    Anticipated barriers to physical therapy: co-morbidities, transportation,     Goals:   GOALS: Short Term Goals:  4 weeks  1. Report decreased B knee pain </=4/10 with standing activities to increase tolerance for ADLs and increased QoL. - progressing  2. Pt will standing in upright position with rollator with proper posture and improved gait mechanics.  - progressing  3. Increase strength by 1/3 MMT grade in BLE  to increase tolerance for ADL and work activities. - progressing  4. Pt to tolerate HEP to improve ROM and independence with ADL's. - progressing     Long Term Goals: 8 weeks  1. Report decreased B pain </= 3/10 with walking/standing and stooping activities to increase tolerance for ADLs and increased QoL. - progressing  2. Patient goal: to come once a week and try therapy - progressing  3. Increase strength to >/= 4+/5 in BLE to increase tolerance for ADL and work activities. - progressing  4. Pt will report at </= 50% impaired on KNEE FOTO to demonstrate increase in LE function with every day tasks.  - progressing  5. Pt will negotiate 4 steps and ambulate community distances at >/= Mod I for increased functional mobility. - progressing  6.  Increase B knee AROM to 0-110 degrees in order to be able to perform ADLs without difficulty. - progressing    Plan     Cont per POC and protocol.    Jak Stock, PT

## 2022-06-24 ENCOUNTER — CLINICAL SUPPORT (OUTPATIENT)
Dept: REHABILITATION | Facility: HOSPITAL | Age: 77
End: 2022-06-24
Payer: MEDICARE

## 2022-06-24 DIAGNOSIS — M25.662 DECREASED RANGE OF MOTION (ROM) OF BOTH KNEES: Primary | ICD-10-CM

## 2022-06-24 DIAGNOSIS — R53.1 DECREASED STRENGTH: ICD-10-CM

## 2022-06-24 DIAGNOSIS — M25.661 DECREASED RANGE OF MOTION (ROM) OF BOTH KNEES: Primary | ICD-10-CM

## 2022-06-24 PROCEDURE — 97110 THERAPEUTIC EXERCISES: CPT | Mod: PN

## 2022-06-24 NOTE — PROGRESS NOTES
Physical Therapy Daily Treatment Note     Name: Purvi MENDIOLA [facility] Number: [id number]    Therapy Diagnosis:   Encounter Diagnoses   Name Primary?    Decreased range of motion (ROM) of both knees Yes    Decreased strength      Physician: Cj Caruso, *    Visit Date: 6/24/2022    Physician Orders: PT Eval and Treat  Medical Diagnosis from Referral: M17.0 (ICD-10-CM) - Primary osteoarthritis of both knees  Evaluation Date: 4/25/2022  Authorization Period Expiration: 05/23/2022  Plan of Care Expiration: 6/24/22  Visit # / Visits authorized: 4/12  FOTO: 4/5  PTA Visit: 0/6     Time In: 12:00 PM  Time Out: 1:00 PM  Total Billable Time: 60 minutes (4 TE)     Precautions: Standard, Asthma, HTN, Neuropathy in feet    Subjective     Pt reports: she did some of the exercises, and can tell they are helping a bit. She also has tried to do some extra walking and her left knee/foot was not bothering her as much too.   She was compliant with home exercise program.  Response to previous treatment: some soreness in knees  Functional change: None at this time    Pain: 6/10  Location: low back and Bilateral knees    Objective     Purvi received therapeutic exercises to develop strength, endurance, ROM, flexibility, posture and core stabilization for 60 minutes including:  Quad sets: 20x5'' holds with towel under Bilateral knees  Straight leg raise: 2x10 left, 2x5 right  Lower trunk rotations: 2 minute  Sidelying hip abduction: 2x10 Bilateral   Bridges: 3x10 double leg   Open books: 8x5'' holds Bilateral   Long arc quad: 2x10 Bilateral - not done today    Home Exercises Provided and Patient Education Provided   Education provided:   - correct body mechanics for knee health  - course of therapy, prognosis  - importance of HEP    Written Home Exercises Provided: yes.  Exercises were reviewed and Purvi was able to demonstrate them prior to the end of the session.  Purvi demonstrated good  understanding of the  education provided.     See EMR under Patient Instructions for exercises provided 4/25/22.    Assessment     Decreased coaxing and encouragement today, and pt more willing to perform all activities. Increasing repetitions each visit, and will slowly progress to jose f bearing activities. Pt slowly becoming more compliant with home exercise program and improved motivation in therapy. Moderate fear and avoidance behavior.   Purvi Is progressing well towards her goals.   Pt prognosis is Guarded.     Pt will continue to benefit from skilled outpatient physical therapy to address the deficits listed in the problem list box on initial evaluation, provide pt/family education and to maximize pt's level of independence in the home and community environment.   Pt's spiritual, cultural and educational needs considered and pt agreeable to plan of care and goals.    Anticipated barriers to physical therapy: co-morbidities, transportation,     Goals:   GOALS: Short Term Goals:  4 weeks  1. Report decreased B knee pain </=4/10 with standing activities to increase tolerance for ADLs and increased QoL. - progressing  2. Pt will standing in upright position with rollator with proper posture and improved gait mechanics.  - progressing  3. Increase strength by 1/3 MMT grade in BLE  to increase tolerance for ADL and work activities. - progressing  4. Pt to tolerate HEP to improve ROM and independence with ADL's. - progressing     Long Term Goals: 8 weeks  1. Report decreased B pain </= 3/10 with walking/standing and stooping activities to increase tolerance for ADLs and increased QoL. - progressing  2. Patient goal: to come once a week and try therapy - progressing  3. Increase strength to >/= 4+/5 in BLE to increase tolerance for ADL and work activities. - progressing  4. Pt will report at </= 50% impaired on KNEE FOTO to demonstrate increase in LE function with every day tasks.  - progressing  5. Pt will negotiate 4 steps and  ambulate community distances at >/= Mod I for increased functional mobility. - progressing  6. Increase B knee AROM to 0-110 degrees in order to be able to perform ADLs without difficulty. - progressing    Plan     Cont per POC and protocol.    Jak Stock, PT

## 2022-06-30 RX ORDER — HYDROXYZINE PAMOATE 50 MG/1
CAPSULE ORAL
Qty: 90 CAPSULE | Refills: 0 | Status: SHIPPED | OUTPATIENT
Start: 2022-06-30 | End: 2022-07-25

## 2022-07-06 ENCOUNTER — TELEPHONE (OUTPATIENT)
Dept: NEUROLOGY | Facility: CLINIC | Age: 77
End: 2022-07-06
Payer: MEDICARE

## 2022-07-06 NOTE — TELEPHONE ENCOUNTER
Called and spoke with patient who stated she wanted to see Dr. Coleman sooner for neuropathy and also follow up with him on her headaches. Appointment scheduled appointment letter placed in the mail.

## 2022-07-06 NOTE — TELEPHONE ENCOUNTER
----- Message from Magali Hernandez sent at 7/6/2022 10:10 AM CDT -----  Regarding: appt  Contact: pt @ [phone]  Pt is asking for nurse to call about her cancelled appt

## 2022-07-08 ENCOUNTER — CLINICAL SUPPORT (OUTPATIENT)
Dept: REHABILITATION | Facility: HOSPITAL | Age: 77
End: 2022-07-08
Payer: MEDICARE

## 2022-07-08 DIAGNOSIS — R53.1 DECREASED STRENGTH: ICD-10-CM

## 2022-07-08 DIAGNOSIS — M25.662 DECREASED RANGE OF MOTION (ROM) OF BOTH KNEES: Primary | ICD-10-CM

## 2022-07-08 DIAGNOSIS — M25.661 DECREASED RANGE OF MOTION (ROM) OF BOTH KNEES: Primary | ICD-10-CM

## 2022-07-08 PROCEDURE — 97110 THERAPEUTIC EXERCISES: CPT | Mod: PN

## 2022-07-08 NOTE — PROGRESS NOTES
Physical Therapy Daily Treatment Note     Name: Purvi MENDIOLA [facility] Number: [id number]    Therapy Diagnosis:   Encounter Diagnoses   Name Primary?    Decreased range of motion (ROM) of both knees Yes    Decreased strength      Physician: Cj Caruso, *    Visit Date: 7/8/2022    Physician Orders: PT Eval and Treat  Medical Diagnosis from Referral: M17.0 (ICD-10-CM) - Primary osteoarthritis of both knees  Evaluation Date: 4/25/2022  Authorization Period Expiration: 05/23/2022  Plan of Care Expiration: 6/24/22  Visit # / Visits authorized: 5/12  FOTO: 5/5 - FOTO next  PTA Visit: 0/6     Time In: 12:00 PM  Time Out: 1:00 PM  Total Billable Time: 55 minutes (4 TE)     Precautions: Standard, Asthma, HTN, Neuropathy in feet    Subjective     Pt reports: she is trying to be more consistent with her exercises and has been doing more at home but not all her regular home exercise program.   She was compliant with home exercise program.  Response to previous treatment: some soreness in knees  Functional change: None at this time    Pain: 6/10  Location: low back and Bilateral knees    Objective     Purvi received therapeutic exercises to develop strength, endurance, ROM, flexibility, posture and core stabilization for 55 minutes including:  Quad sets: 20x5'' holds with towel under Bilateral knees  Straight leg raise: 2x10 Bilateral   Lower trunk rotations: 2 minute  Sidelying hip abduction: 3x10 Bilateral   Bridges: 3x10 double leg   Sidelying clams: 20x Bilateral   Long arc quad: 2x10 Bilateral - not done today    Home Exercises Provided and Patient Education Provided   Education provided:   - correct body mechanics for knee health  - course of therapy, prognosis  - importance of HEP    Written Home Exercises Provided: yes.  Exercises were reviewed and Purvi was able to demonstrate them prior to the end of the session.  Purvi demonstrated good  understanding of the education provided.     See EMR under  Patient Instructions for exercises provided 4/25/22.    Assessment     Steadily advancing repetitions in session, and pt appears to have improve home exercise program compliance as well. She states she is trying to break her sedentary habits, and is becoming more accustomed to doing activity overall.   Purvi Is progressing well towards her goals.   Pt prognosis is Guarded.     Pt will continue to benefit from skilled outpatient physical therapy to address the deficits listed in the problem list box on initial evaluation, provide pt/family education and to maximize pt's level of independence in the home and community environment.   Pt's spiritual, cultural and educational needs considered and pt agreeable to plan of care and goals.    Anticipated barriers to physical therapy: co-morbidities, transportation,     Goals:   GOALS: Short Term Goals:  4 weeks  1. Report decreased B knee pain </=4/10 with standing activities to increase tolerance for ADLs and increased QoL. - progressing  2. Pt will standing in upright position with rollator with proper posture and improved gait mechanics.  - progressing  3. Increase strength by 1/3 MMT grade in BLE  to increase tolerance for ADL and work activities. - progressing  4. Pt to tolerate HEP to improve ROM and independence with ADL's. - progressing     Long Term Goals: 8 weeks  1. Report decreased B pain </= 3/10 with walking/standing and stooping activities to increase tolerance for ADLs and increased QoL. - progressing  2. Patient goal: to come once a week and try therapy - progressing  3. Increase strength to >/= 4+/5 in BLE to increase tolerance for ADL and work activities. - progressing  4. Pt will report at </= 50% impaired on KNEE FOTO to demonstrate increase in LE function with every day tasks.  - progressing  5. Pt will negotiate 4 steps and ambulate community distances at >/= Mod I for increased functional mobility. - progressing  6. Increase B knee AROM to 0-110  degrees in order to be able to perform ADLs without difficulty. - progressing    Plan     Cont per POC and protocol.    Jak Stock, PT

## 2022-07-08 NOTE — PLAN OF CARE
Outpatient Therapy Updated Plan of Care     Visit Date: 7/8/2022  Name: Purvi Quiroga  [facility] Number: [id number]    Therapy Diagnosis:   Encounter Diagnoses   Name Primary?    Decreased range of motion (ROM) of both knees Yes    Decreased strength      Physician: Cj aCruso, *    Physician Orders: PT Eval and Treat  Medical Diagnosis from Referral: M17.0 (ICD-10-CM) - Primary osteoarthritis of both knees  Evaluation Date: 4/25/2022  Total Visits Received: 5    Current Certification Period:  4/25/22 to 6/24/22  Precautions:  Standard, Asthma, HTN, Neuropathy in feet  Visits from Evaluation Date:  4  Functional Level Prior to Evaluation:  Sedentary and poor BLE strength and activity tolerance     Subjective     Update: she is trying to be more consistent with her exercises and has been doing more at home but not all her regular home exercise program.     Objective     Gait: forward trunk lean with rollator, decreased brigitte and stride length. Does use her cane infrequently at home for very short distances  Posture: increased forward lean with rollator  Sensation: WNL  Palpation: +tender to palpation at Bilateral knee joint lines     * = knee pain with testing  NT = Not tested  Hip   Right     Left   Pain/Dysfunction with Movement     AROM PROM MMT AROM PROM MMT     Flexion (L2,120 deg) 90 90 3+/5 90 90 3+/5     Extension (20 deg) 0 0 2+/5 0 0 2+/5     Abduction (L5, 45 deg) WFL WFL 3+/5 WFL WFL 3+/5     Adduction (30 deg) WFL WFL 4/5 WFL WFL 4/5     IR (30 deg) WFL WFL NT WFL WFL NT     ER (45 deg) WFL WFL NT WFL WFL NT        Knee   Right     Left   Pain/Dysfunction with Movement     AROM PROM MMT AROM PROM MMT     Flexion (S2, 140 deg) 91 92 3+/5 104 105 3+/5     Extension (L3, 0-5 deg) 0 0 3+/5 0 0 3+/5        Ankle   Right     Left   Pain/Dysfunction with Movement     AROM PROM MMT AROM PROM MMT     Dorsiflexion (L4) WFL WFL 4/5 WFL WFL 4/5     Plantarflexion (S1) WFL WFL 4/5 WFL WFL 4/5         Special Tests:  Patellar Grind Test = positive Bilateral   Knee Varus Stress Test = positive B  Knee Valgus Stress Test = positive B  Lachman Drawer Test = not tested  Anterior Drawer Test = WNL  Posterior Drawer Test = WNL    Assessment     Update: Pt improving in home exercise program compliance and more amenable to attending and participating in therapy. Continuous education needed for each session though coaxing for each exercise and repetitions has decreased overall. Pt improving steadily in exercise tolerance and BLE strength overall. Extension lag noted in each leg and no changes in gait due to chronic lumbar spinal stenosis.     Previous Short/Long Term Goals Status:   GOALS: Short Term Goals:  4 weeks  1. Report decreased B knee pain </=4/10 with standing activities to increase tolerance for ADLs and increased QoL. - progressing  2. Pt will standing in upright position with rollator with proper posture and improved gait mechanics. - progressing  3. Increase strength by 1/3 MMT grade in BLE  to increase tolerance for ADL and work activities. - progressing  4. Pt to tolerate HEP to improve ROM and independence with ADL's. - progressing     Long Term Goals: 8 weeks  1. Report decreased B pain </= 3/10 with walking/standing and stooping activities to increase tolerance for ADLs and increased QoL. - progressing  2. Patient goal: to come once a week and try therapy - progressing  3. Increase strength to >/= 4+/5 in BLE to increase tolerance for ADL and work activities. - progressing  4. Pt will report at </= 50% impaired on KNEE FOTO to demonstrate increase in LE function with every day tasks.  - progressing  5. Pt will negotiate 4 steps and ambulate community distances at >/= Mod I for increased functional mobility.  6. Increase B knee AROM to 0-110 degrees in order to be able to perform ADLs without difficulty.  Long Term Goal Status:   continue per initial plan of care.  Reasons for Recertification of  Therapy:   UPOC    Plan     Updated Certification Period: 7/8/2022 to 8/19/22  Recommended Treatment Plan: 2 times per week for 6 weeks: Aquatic Therapy, Cervical/Lumbar Traction, Electrical Stimulation IFC, Gait Training, Manual Therapy, Moist Heat/ Ice, Neuromuscular Re-ed, Orthotic Management and Training, Patient Education, Self Care, Therapeutic Activities and Therapeutic Exercise  Other Recommendations: None    Jak Stock, PT  7/8/2022      I CERTIFY THE NEED FOR THESE SERVICES FURNISHED UNDER THIS PLAN OF TREATMENT AND WHILE UNDER MY CARE    Physician's comments:        Physician's Signature: ___________________________________________________

## 2022-07-11 ENCOUNTER — TELEPHONE (OUTPATIENT)
Dept: SPORTS MEDICINE | Facility: CLINIC | Age: 77
End: 2022-07-11
Payer: MEDICARE

## 2022-07-11 NOTE — TELEPHONE ENCOUNTER
Spoke with pt to reschedule her appt for 7/14, pt decided just to start the Euflexxa series on 7/21

## 2022-07-21 ENCOUNTER — OFFICE VISIT (OUTPATIENT)
Dept: SPORTS MEDICINE | Facility: CLINIC | Age: 77
End: 2022-07-21
Payer: MEDICARE

## 2022-07-21 ENCOUNTER — HOSPITAL ENCOUNTER (EMERGENCY)
Facility: HOSPITAL | Age: 77
Discharge: HOME | End: 2022-07-21
Attending: EMERGENCY MEDICINE
Payer: MEDICARE

## 2022-07-21 VITALS
BODY MASS INDEX: 54.62 KG/M2 | HEIGHT: 55 IN | HEART RATE: 78 BPM | WEIGHT: 236 LBS | DIASTOLIC BLOOD PRESSURE: 76 MMHG | SYSTOLIC BLOOD PRESSURE: 134 MMHG

## 2022-07-21 VITALS
HEART RATE: 81 BPM | TEMPERATURE: 99 F | RESPIRATION RATE: 21 BRPM | DIASTOLIC BLOOD PRESSURE: 88 MMHG | OXYGEN SATURATION: 95 % | HEIGHT: 55 IN | BODY MASS INDEX: 56.23 KG/M2 | SYSTOLIC BLOOD PRESSURE: 187 MMHG | WEIGHT: 243 LBS

## 2022-07-21 DIAGNOSIS — R60.0 PERIPHERAL EDEMA: Primary | ICD-10-CM

## 2022-07-21 DIAGNOSIS — M17.0 PRIMARY OSTEOARTHRITIS OF BOTH KNEES: Primary | ICD-10-CM

## 2022-07-21 DIAGNOSIS — R06.02 SHORTNESS OF BREATH: ICD-10-CM

## 2022-07-21 LAB
ALBUMIN SERPL BCP-MCNC: 3.7 G/DL (ref 3.5–5.2)
ALP SERPL-CCNC: 98 U/L (ref 55–135)
ALT SERPL W/O P-5'-P-CCNC: 8 U/L (ref 10–44)
ANION GAP SERPL CALC-SCNC: 12 MMOL/L (ref 8–16)
AST SERPL-CCNC: 14 U/L (ref 10–40)
BASOPHILS # BLD AUTO: 0.08 K/UL (ref 0–0.2)
BASOPHILS NFR BLD: 0.6 % (ref 0–1.9)
BILIRUB SERPL-MCNC: 0.5 MG/DL (ref 0.1–1)
BNP SERPL-MCNC: 158 PG/ML (ref 0–99)
BUN SERPL-MCNC: 19 MG/DL (ref 8–23)
CALCIUM SERPL-MCNC: 9.2 MG/DL (ref 8.7–10.5)
CHLORIDE SERPL-SCNC: 106 MMOL/L (ref 95–110)
CO2 SERPL-SCNC: 26 MMOL/L (ref 23–29)
CREAT SERPL-MCNC: 1 MG/DL (ref 0.5–1.4)
DIFFERENTIAL METHOD: ABNORMAL
EOSINOPHIL # BLD AUTO: 0.7 K/UL (ref 0–0.5)
EOSINOPHIL NFR BLD: 5.5 % (ref 0–8)
ERYTHROCYTE [DISTWIDTH] IN BLOOD BY AUTOMATED COUNT: 13.4 % (ref 11.5–14.5)
EST. GFR  (AFRICAN AMERICAN): >60 ML/MIN/1.73 M^2
EST. GFR  (NON AFRICAN AMERICAN): 54 ML/MIN/1.73 M^2
GLUCOSE SERPL-MCNC: 123 MG/DL (ref 70–110)
HCT VFR BLD AUTO: 43.3 % (ref 37–48.5)
HGB BLD-MCNC: 14.1 G/DL (ref 12–16)
IMM GRANULOCYTES # BLD AUTO: 0.1 K/UL (ref 0–0.04)
IMM GRANULOCYTES NFR BLD AUTO: 0.8 % (ref 0–0.5)
LYMPHOCYTES # BLD AUTO: 2.3 K/UL (ref 1–4.8)
LYMPHOCYTES NFR BLD: 18.3 % (ref 18–48)
MCH RBC QN AUTO: 31.3 PG (ref 27–31)
MCHC RBC AUTO-ENTMCNC: 32.6 G/DL (ref 32–36)
MCV RBC AUTO: 96 FL (ref 82–98)
MONOCYTES # BLD AUTO: 0.9 K/UL (ref 0.3–1)
MONOCYTES NFR BLD: 7.5 % (ref 4–15)
NEUTROPHILS # BLD AUTO: 8.3 K/UL (ref 1.8–7.7)
NEUTROPHILS NFR BLD: 67.3 % (ref 38–73)
NRBC BLD-RTO: 0 /100 WBC
PLATELET # BLD AUTO: 259 K/UL (ref 150–450)
PMV BLD AUTO: 10.5 FL (ref 9.2–12.9)
POTASSIUM SERPL-SCNC: 4.1 MMOL/L (ref 3.5–5.1)
PROT SERPL-MCNC: 7.1 G/DL (ref 6–8.4)
RBC # BLD AUTO: 4.5 M/UL (ref 4–5.4)
SARS-COV-2 RDRP RESP QL NAA+PROBE: NEGATIVE
SODIUM SERPL-SCNC: 144 MMOL/L (ref 136–145)
TROPONIN I SERPL DL<=0.01 NG/ML-MCNC: <0.006 NG/ML (ref 0–0.03)
WBC # BLD AUTO: 12.33 K/UL (ref 3.9–12.7)

## 2022-07-21 PROCEDURE — 94640 AIRWAY INHALATION TREATMENT: CPT

## 2022-07-21 PROCEDURE — 3288F PR FALLS RISK ASSESSMENT DOCUMENTED: ICD-10-PCS | Mod: CPTII,S$GLB,, | Performed by: FAMILY MEDICINE

## 2022-07-21 PROCEDURE — 20611 DRAIN/INJ JOINT/BURSA W/US: CPT | Mod: 50,S$GLB,, | Performed by: FAMILY MEDICINE

## 2022-07-21 PROCEDURE — 1125F PR PAIN SEVERITY QUANTIFIED, PAIN PRESENT: ICD-10-PCS | Mod: CPTII,S$GLB,, | Performed by: FAMILY MEDICINE

## 2022-07-21 PROCEDURE — 3288F FALL RISK ASSESSMENT DOCD: CPT | Mod: CPTII,S$GLB,, | Performed by: FAMILY MEDICINE

## 2022-07-21 PROCEDURE — 25000242 PHARM REV CODE 250 ALT 637 W/ HCPCS: Performed by: EMERGENCY MEDICINE

## 2022-07-21 PROCEDURE — 1159F MED LIST DOCD IN RCRD: CPT | Mod: CPTII,S$GLB,, | Performed by: FAMILY MEDICINE

## 2022-07-21 PROCEDURE — 20611 LARGE JOINT ASPIRATION/INJECTION: BILATERAL KNEE: ICD-10-PCS | Mod: 50,S$GLB,, | Performed by: FAMILY MEDICINE

## 2022-07-21 PROCEDURE — 3075F PR MOST RECENT SYSTOLIC BLOOD PRESS GE 130-139MM HG: ICD-10-PCS | Mod: CPTII,S$GLB,, | Performed by: FAMILY MEDICINE

## 2022-07-21 PROCEDURE — 1159F PR MEDICATION LIST DOCUMENTED IN MEDICAL RECORD: ICD-10-PCS | Mod: CPTII,S$GLB,, | Performed by: FAMILY MEDICINE

## 2022-07-21 PROCEDURE — 93010 ELECTROCARDIOGRAM REPORT: CPT | Mod: ,,, | Performed by: INTERNAL MEDICINE

## 2022-07-21 PROCEDURE — 99999 PR PBB SHADOW E&M-EST. PATIENT-LVL IV: CPT | Mod: PBBFAC,,, | Performed by: FAMILY MEDICINE

## 2022-07-21 PROCEDURE — 3075F SYST BP GE 130 - 139MM HG: CPT | Mod: CPTII,S$GLB,, | Performed by: FAMILY MEDICINE

## 2022-07-21 PROCEDURE — 80053 COMPREHEN METABOLIC PANEL: CPT | Performed by: PHYSICIAN ASSISTANT

## 2022-07-21 PROCEDURE — U0002 COVID-19 LAB TEST NON-CDC: HCPCS | Performed by: PHYSICIAN ASSISTANT

## 2022-07-21 PROCEDURE — 93010 EKG 12-LEAD: ICD-10-PCS | Mod: ,,, | Performed by: INTERNAL MEDICINE

## 2022-07-21 PROCEDURE — 85025 COMPLETE CBC W/AUTO DIFF WBC: CPT | Performed by: PHYSICIAN ASSISTANT

## 2022-07-21 PROCEDURE — 1125F AMNT PAIN NOTED PAIN PRSNT: CPT | Mod: CPTII,S$GLB,, | Performed by: FAMILY MEDICINE

## 2022-07-21 PROCEDURE — 99999 PR PBB SHADOW E&M-EST. PATIENT-LVL IV: ICD-10-PCS | Mod: PBBFAC,,, | Performed by: FAMILY MEDICINE

## 2022-07-21 PROCEDURE — 99499 NO LOS: ICD-10-PCS | Mod: S$GLB,,, | Performed by: FAMILY MEDICINE

## 2022-07-21 PROCEDURE — 1101F PR PT FALLS ASSESS DOC 0-1 FALLS W/OUT INJ PAST YR: ICD-10-PCS | Mod: CPTII,S$GLB,, | Performed by: FAMILY MEDICINE

## 2022-07-21 PROCEDURE — 99285 EMERGENCY DEPT VISIT HI MDM: CPT | Mod: 25

## 2022-07-21 PROCEDURE — 83880 ASSAY OF NATRIURETIC PEPTIDE: CPT | Performed by: PHYSICIAN ASSISTANT

## 2022-07-21 PROCEDURE — 99499 UNLISTED E&M SERVICE: CPT | Mod: S$GLB,,, | Performed by: FAMILY MEDICINE

## 2022-07-21 PROCEDURE — 3078F DIAST BP <80 MM HG: CPT | Mod: CPTII,S$GLB,, | Performed by: FAMILY MEDICINE

## 2022-07-21 PROCEDURE — 93005 ELECTROCARDIOGRAM TRACING: CPT

## 2022-07-21 PROCEDURE — 84484 ASSAY OF TROPONIN QUANT: CPT | Performed by: PHYSICIAN ASSISTANT

## 2022-07-21 PROCEDURE — 3078F PR MOST RECENT DIASTOLIC BLOOD PRESSURE < 80 MM HG: ICD-10-PCS | Mod: CPTII,S$GLB,, | Performed by: FAMILY MEDICINE

## 2022-07-21 PROCEDURE — 1101F PT FALLS ASSESS-DOCD LE1/YR: CPT | Mod: CPTII,S$GLB,, | Performed by: FAMILY MEDICINE

## 2022-07-21 RX ORDER — ALBUTEROL SULFATE 2.5 MG/.5ML
2.5 SOLUTION RESPIRATORY (INHALATION)
Status: COMPLETED | OUTPATIENT
Start: 2022-07-21 | End: 2022-07-21

## 2022-07-21 RX ORDER — TRIAMCINOLONE ACETONIDE 40 MG/ML
40 INJECTION, SUSPENSION INTRA-ARTICULAR; INTRAMUSCULAR
Status: DISCONTINUED | OUTPATIENT
Start: 2022-07-21 | End: 2022-07-21 | Stop reason: HOSPADM

## 2022-07-21 RX ORDER — ALBUTEROL SULFATE 2.5 MG/.5ML
2.5 SOLUTION RESPIRATORY (INHALATION) EVERY 4 HOURS
Status: DISCONTINUED | OUTPATIENT
Start: 2022-07-21 | End: 2022-07-21

## 2022-07-21 RX ADMIN — ALBUTEROL SULFATE 2.5 MG: 2.5 SOLUTION RESPIRATORY (INHALATION) at 05:07

## 2022-07-21 RX ADMIN — TRIAMCINOLONE ACETONIDE 40 MG: 40 INJECTION, SUSPENSION INTRA-ARTICULAR; INTRAMUSCULAR at 11:07

## 2022-07-21 NOTE — FIRST PROVIDER EVALUATION
" Emergency Department TeleTriage Encounter Note      CHIEF COMPLAINT    Chief Complaint   Patient presents with    Foot Swelling     Patient reports swelling to bilateral ankles and feet on the left side for "several days" and on the right side "just today."  Denies pain or discomfort.  Reports increased SOB on exertion over the same period of time.         VITAL SIGNS   Initial Vitals [07/21/22 1454]   BP Pulse Resp Temp SpO2   (!) 193/78 83 16 98.5 °F (36.9 °C) (!) 94 %      MAP       --            ALLERGIES    Review of patient's allergies indicates:  No Known Allergies    PROVIDER TRIAGE NOTE  This is a teletriage evaluation of a 77 y.o. female presenting to the ED complaining of foot swelling. Patient reports swelling to bilateral feet and ankles that has worsened over the last few days. She feels more short of breatht khushboo than she did 2 weeks ago.     Initial orders will be placed and care will be transferred to an alternate provider when patient is roomed for a full evaluation. Any additional orders and the final disposition will be determined by that provider.           ORDERS  Labs Reviewed   CBC W/ AUTO DIFFERENTIAL   COMPREHENSIVE METABOLIC PANEL   TROPONIN I   B-TYPE NATRIURETIC PEPTIDE   SARS-COV-2 RDRP GENE       ED Orders (720h ago, onward)    Start Ordered     Status Ordering Provider    07/21/22 1501 07/21/22 1501  Troponin I  STAT         Ordered TUSHAR PARIKH    07/21/22 1501 07/21/22 1501  Brain Natriuretic Peptide  STAT         Ordered TUSHAR PARIKH    07/21/22 1500 07/21/22 1501  CBC Auto Differential  STAT         Ordered TUSHAR PARIKH    07/21/22 1500 07/21/22 1501  Comprehensive Metabolic Panel  STAT         Ordered TUSHAR PARIKH    07/21/22 1500 07/21/22 1501  Pulse Oximetry Continuous  Continuous         Ordered TUSHAR PARIKH    07/21/22 1500 07/21/22 1501  Cardiac Monitoring - Adult  Continuous         Ordered TUSHAR PARIKH    07/21/22 1500 07/21/22 1501  EKG 12-lead  Once   "       Ordered TUSHAR PARIKH.    07/21/22 1500 07/21/22 1501  POCT COVID-19 Rapid Screening  Once         Ordered TUSHAR PARIKH.    07/21/22 1500 07/21/22 1501  X-Ray Chest 1 View  1 time imaging         Ordered TUSHAR PARIKH.            Virtual Visit Note: The provider triage portion of this emergency department evaluation and documentation was performed via [employer], a HIPAA-compliant telemedicine application, in concert with a tele-presenter in the room. A face to face patient evaluation with one of my colleagues will occur once the patient is placed in an emergency department room.      DISCLAIMER: This note was prepared with [employer] voice recognition transcription software. Garbled syntax, mangled pronouns, and other bizarre constructions may be attributed to that software system.

## 2022-07-21 NOTE — PROCEDURES
"Large Joint Aspiration/Injection: bilateral knee    Date/Time: 7/21/2022 11:45 AM  Performed by: Cj Caruso MD  Authorized by: Cj Caruso MD     Consent Done?:  Yes (Verbal)  Indications:  Pain  Site marked: the procedure site was marked    Timeout: prior to procedure the correct patient, procedure, and site was verified    Prep: patient was prepped and draped in usual sterile fashion      Details:  Needle Size:  20 G  Ultrasonic Guidance for needle placement?: Yes    Images are saved and documented.  Approach:  Lateral  Location:  Knee  Laterality:  Bilateral  Site:  Bilateral knee  Medications (Right):  40 mg triamcinolone acetonide 40 mg/mL  Medications (Left):  40 mg triamcinolone acetonide 40 mg/mL  Patient tolerance:  Patient tolerated the procedure well with no immediate complications     Description of ultrasound utilization for needle guidance:   Ultrasound guidance used for needle localization. Images saved and stored for documentation. The SUPRAPATELLAR BURSA / KNEE JOINT was visualized. Dynamic visualization of the 20g x 3.5" needle was continuous throughout the procedure.       "

## 2022-07-21 NOTE — PROGRESS NOTES
euflexxa bilateral knee 1/3  Lot number: [id number] [id number]  Expiration date: 2023-07-23    MEDICAL NECESSITY FOR VISCOSUPPLEMENTATION USE: After thorough evaluation of the patient, I have determined that viscosupplementation treatment is medically necessary. 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 Oral analgesics including NSAIDs have not adequately controlled the patient's symptoms. 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

## 2022-07-21 NOTE — ED PROVIDER NOTES
"Encounter Date: 7/21/2022       History     Chief Complaint   Patient presents with    Foot Swelling     Patient reports swelling to bilateral ankles and feet on the left side for "several days" and on the right side "just today."  Denies pain or discomfort.  Reports increased SOB on exertion over the same period of time.       Purvi Quiroga is a 77 y.o. female who  has a past medical history of Asthma, Essential hypertension (12/10/2018), Hiatal hernia, Insomnia, and Other osteoporosis without current pathological fracture (12/10/2018).    The patient presents to the ED due to leg swelling.  She reports symptoms have been ongoing for the last several weeks.  She was previously on Lasix but stopped taking it because she thinks it is not working.  She usually needs assistance with a walker to get around.  She feels mild shortness of breath which she attributes to her chronic asthma.  She denies any chest pain, fever, cough, abdominal pain, vomiting/diarrhea.  She has been evaluated by her PCP and a cardiologist in the past, and was told she has no heart issues.  She has been instructed on compression stockings in the past, but she states she has not like to wear them.        Review of patient's allergies indicates:  No Known Allergies  Past Medical History:   Diagnosis Date    Asthma     Essential hypertension 12/10/2018    Hiatal hernia     Insomnia     Other osteoporosis without current pathological fracture 12/10/2018     Past Surgical History:   Procedure Laterality Date    CATARACT EXTRACTION, BILATERAL      DILATION AND CURETTAGE OF UTERUS      KNEE ARTHROSCOPY       Family History   Problem Relation Age of Onset    Heart disease Father         fatal MI in mid 60s    Hypertension Father     Hypertension Sister     Cancer Brother         colon cancer    Hypertension Brother     Diabetes Neg Hx     Stroke Neg Hx     Hyperlipidemia Neg Hx      Social History     Tobacco Use    Smoking " status: Never Smoker    Smokeless tobacco: Never Used    Tobacco comment: 18 or 19 yo for 1 yr   Substance Use Topics    Alcohol use: Yes     Comment: once in a while a little whiskey (not even weekly)    Drug use: No     Review of Systems   Constitutional: Negative for chills and fever.   HENT: Negative for sore throat.    Respiratory: Positive for shortness of breath. Negative for cough.    Cardiovascular: Positive for leg swelling. Negative for chest pain and palpitations.   Gastrointestinal: Negative for abdominal pain, constipation, diarrhea, nausea and vomiting.   Genitourinary: Negative for dysuria, frequency and urgency.   Musculoskeletal: Negative for back pain.   Skin: Negative for rash and wound.   Neurological: Negative for syncope and weakness.   Hematological: Does not bruise/bleed easily.   Psychiatric/Behavioral: Negative for agitation, behavioral problems and confusion.       Physical Exam     Initial Vitals [07/21/22 1454]   BP Pulse Resp Temp SpO2   (!) 193/78 83 16 98.5 °F (36.9 °C) (!) 94 %      MAP       --         Physical Exam    Nursing note and vitals reviewed.  Constitutional: She appears well-developed and well-nourished. She is not diaphoretic. No distress.   Sitting up in chair, no distress.   HENT:   Head: Normocephalic and atraumatic.   Mouth/Throat: Oropharynx is clear and moist.   Eyes: EOM are normal. Pupils are equal, round, and reactive to light.   Neck: No tracheal deviation present.   Cardiovascular: Normal rate, regular rhythm, normal heart sounds and intact distal pulses.   Pulmonary/Chest: Breath sounds normal. No stridor. No respiratory distress. She has no wheezes.   Abdominal: Abdomen is soft. Bowel sounds are normal. She exhibits no distension and no mass. There is no abdominal tenderness.   Musculoskeletal:         General: Edema present. Normal range of motion.      Comments: Mild pitting edema to the tops of both feet.  Legs are obese with no pitting edema  appreciated.     Neurological: She is alert and oriented to person, place, and time. She has normal strength. No cranial nerve deficit or sensory deficit.   Skin: Skin is warm and dry. Capillary refill takes less than 2 seconds. No pallor.   Psychiatric: She has a normal mood and affect. Her behavior is normal. Thought content normal.         ED Course   Procedures  Labs Reviewed   CBC W/ AUTO DIFFERENTIAL - Abnormal; Notable for the following components:       Result Value    MCH 31.3 (*)     Immature Granulocytes 0.8 (*)     Gran # (ANC) 8.3 (*)     Immature Grans (Abs) 0.10 (*)     Eos # 0.7 (*)     All other components within normal limits   COMPREHENSIVE METABOLIC PANEL - Abnormal; Notable for the following components:    Glucose 123 (*)     ALT 8 (*)     eGFR if non  54 (*)     All other components within normal limits   B-TYPE NATRIURETIC PEPTIDE - Abnormal; Notable for the following components:     (*)     All other components within normal limits   TROPONIN I   SARS-COV-2 RNA AMPLIFICATION, QUAL     EKG Readings: (Independently Interpreted)   Initial Reading: No STEMI. Previous EKG: Compared with most recent EKG Rhythm: Normal Sinus Rhythm.   NSR, rate 83, no ST changes or ischemia, normal intervals.  Stable from 07/2021.       Imaging Results          X-Ray Chest 1 View (Final result)  Result time 07/21/22 18:43:30    Final result by Flaco Solorzano DO (07/21/22 18:43:30)                 Impression:      No acute cardiopulmonary abnormality.    Moderate hiatal hernia.      Electronically signed by: Flaco Solorzano  Date:    07/21/2022  Time:    18:43             Narrative:    EXAMINATION:  XR CHEST 1 VIEW    CLINICAL HISTORY:  shortness of breath;    TECHNIQUE:  Single frontal view of the chest was performed.    COMPARISON:  05/04/2017.    FINDINGS:  The lungs are well expanded and clear. No focal opacities are seen.  There is minimal subsegmental atelectasis in the left lung base.   The pleural spaces are clear.    The cardiac silhouette is unremarkable.  There is a moderate hiatal hernia.    The visualized osseous structures are intact.                               US Lower Extremity Veins Bilateral (Final result)  Result time 07/21/22 18:13:45    Final result by Flaco Solorzano DO (07/21/22 18:13:45)                 Impression:      No evidence of deep venous thrombosis in either lower extremity.      Electronically signed by: Flaco Solorzano  Date:    07/21/2022  Time:    18:13             Narrative:    EXAMINATION:  US LOWER EXTREMITY VEINS BILATERAL    CLINICAL HISTORY:  bilateral leg swelling;    TECHNIQUE:  Duplex and color flow Doppler and dynamic compression was performed of the bilateral lower extremity veins was performed.    COMPARISON:  None    FINDINGS:  Right thigh veins: The common femoral, femoral, popliteal, upper greater saphenous, and deep femoral veins are patent and free of thrombus. The veins are normally compressible and have normal phasic flow and augmentation response.    Right calf veins: The visualized calf veins are patent.    Left thigh veins: The common femoral, femoral, popliteal, upper greater saphenous, and deep femoral veins are patent and free of thrombus. The veins are normally compressible and have normal phasic flow and augmentation response.    Left calf veins: The visualized calf veins are patent.    Miscellaneous: None                                 Medications   albuterol sulfate nebulizer solution 2.5 mg (2.5 mg Nebulization Given 7/21/22 1713)     Medical Decision Making:   History:   Old Medical Records: I decided to obtain old medical records.  Old Records Summarized: other records and records from clinic visits.       <> Summary of Records: Seen by sports medicine earlier today, joint aspiration performed on both knees.  Initial Assessment:   77-year-old female with several weeks of bilateral leg swelling.  Will obtain cardiac evaluation, labs,  CXR, DVT ultrasound, and reassess.  Differential Diagnosis:   Differential Diagnosis includes, but is not limited to:  PE, MI/ACS, pneumothorax, pericardial effusion/tamonade, pneumonia, lung abscess, pericarditis/myocarditis, pleural effusion, lung mass, CHF exacerbation, asthma exacerbation, COPD exacerbation, aspirated/ingested foreign body, airway obstruction, CO poisoning, anemia, metabolic derangement, allergy/atopy, influenza, viral URI, viral syndrome.    Clinical Tests:   Lab Tests: Ordered and Reviewed  Radiological Study: Reviewed and Ordered  Medical Tests: Ordered and Reviewed  ED Management:  EKG without ischemia or arrhythmia.  Labs unremarkable, BNP and troponin unremarkable.  CXR without infiltrates, pulmonary edema, or other acute process.  Ultrasound negative for DVT.  Suspect leg swelling is multifactorial, including obesity, venous stasis, diet, and medication compliance issues.  Patient counseled extensively regarding low-salt diet, compression stockings, leg elevation.  She will follow up with her PCP as scheduled next week.  Given return precautions including worsening symptoms, shortness breath, or any other concerns.    On re-evaluation, the patient's status has improved.  After complete ED evaluation, clinical impression is most consistent with leg swelling.  PCP follow-up within 2-3 days was recommended.    After taking into careful account the patient's history, physical exam findings, as well as empirical and objective data obtained throughout ED workup, I feel no emergent medical condition has been identified. No further evaluation or admission was felt to be required, and the patient is stable for discharge from the ED. The patient and any additional family present were updated with test results, overall clinical impression, and recommended further plan of care, including discharge instructions as provided and outpatient follow-up for continued evaluation and management as needed. All  questions were answered. The patient expressed understanding and agreed with current plan for discharge and follow-up plan of care. Strict ED return precautions were provided, including return/worsening of current symptoms, new symptoms, or any other concerns.                        Clinical Impression:   Final diagnoses:  [R06.02] Shortness of breath  [R60.9] Peripheral edema (Primary)          ED Disposition Condition    Discharge Stable        ED Prescriptions     None        Follow-up Information     Follow up With Specialties Details Why Contact Info Additional Information    Palomo Burgos MD Internal Medicine Schedule an appointment as soon as possible for a visit   [address] [facility] [address]  [phone]       [facility] Cardiology Schedule an appointment as soon as possible for a visit   [address] [facility] [address]  [facility] [address] [phone] Please park in [address] or [address] and use [address].           Arash Olguin MD  07/22/22 2635

## 2022-07-22 NOTE — ED NOTES
Full assessment deferred to provider. Patient AAOx4 at time of discharge with +3 swelling to bilateral ankles and sparse areas of petechia to bilateral lower extremities. Patient able to speak in complete sentences with no SOB at time of discharge.

## 2022-07-23 ENCOUNTER — NURSE TRIAGE (OUTPATIENT)
Dept: ADMINISTRATIVE | Facility: CLINIC | Age: 77
End: 2022-07-23
Payer: MEDICARE

## 2022-07-24 NOTE — TELEPHONE ENCOUNTER
Patient c.o bilateral feet swelling and was seen in the ED on 7/21/22. Patient states she has a follow-up appointment with her PCP on Monday, 7/25/22 and was advised to see a Cardiologist.     Patient states her right foot is swollen more than her left. Patient also states that her left ankle has been swollen for a long time.     Care Advice given to Go to ED/C for assessment and treatment. Patient states understanding of care advice.    Reason for Disposition   [1] Swollen foot AND [2] no fever  (Exceptions: localized bump from bunions, calluses, insect bite, sting)   [1] Thigh, calf, or ankle swelling AND [2] bilateral AND [3] 1 side is more swollen    Additional Information   Negative: Followed a foot injury   Negative: Diabetes   Negative: Ankle pain is main symptom   Negative: Thigh or calf pain is main symptom   Negative: Entire foot is cool or blue in comparison to other foot   Negative: Purple or black skin on foot or toe   Negative: [1] Red area or streak AND [2] fever   Negative: [1] Swollen foot AND [2] fever   Negative: Patient sounds very sick or weak to the triager   Negative: [1] SEVERE pain (e.g., excruciating, unable to do any normal activities) AND [2] not improved after 2 hours of pain medicine   Negative: [1] Looks infected (spreading redness, pus) AND [2] large red area (> 2 in. or 5 cm)   Negative: Looks like a boil, infected sore, or deep ulcer   Negative: [1] Redness of the skin AND [2] no fever   Negative: Fever   Negative: Patient sounds very sick or weak to the triager   Negative: Difficulty breathing   Negative: Entire foot is cool or blue in comparison to other side   Negative: Chest pain   Negative: Followed an ankle injury   Negative: Ankle pain is main symptom   Negative: Swelling of both ankles (i.e., pedal edema)   Negative: Swelling of calf or leg   Negative: Small area of LOCALIZED swelling followed an insect bite to the area    Protocols used: ST FOOT  PAIN-A-AH, ANKLE SWELLING-A-AH

## 2022-07-25 ENCOUNTER — OFFICE VISIT (OUTPATIENT)
Dept: INTERNAL MEDICINE | Facility: CLINIC | Age: 77
End: 2022-07-25
Payer: MEDICARE

## 2022-07-25 VITALS
TEMPERATURE: 98 F | OXYGEN SATURATION: 97 % | DIASTOLIC BLOOD PRESSURE: 92 MMHG | HEIGHT: 55 IN | BODY MASS INDEX: 56.33 KG/M2 | WEIGHT: 243.38 LBS | RESPIRATION RATE: 18 BRPM | HEART RATE: 81 BPM | SYSTOLIC BLOOD PRESSURE: 148 MMHG

## 2022-07-25 DIAGNOSIS — R60.0 LOCALIZED EDEMA: ICD-10-CM

## 2022-07-25 DIAGNOSIS — E78.00 PURE HYPERCHOLESTEROLEMIA: Chronic | ICD-10-CM

## 2022-07-25 DIAGNOSIS — I20.89 ANGINAL EQUIVALENT: ICD-10-CM

## 2022-07-25 DIAGNOSIS — N18.31 STAGE 3A CHRONIC KIDNEY DISEASE: Chronic | ICD-10-CM

## 2022-07-25 DIAGNOSIS — M79.89 SWELLING OF BOTH LOWER EXTREMITIES: ICD-10-CM

## 2022-07-25 DIAGNOSIS — R07.9 CHEST PAIN, UNSPECIFIED TYPE: ICD-10-CM

## 2022-07-25 DIAGNOSIS — I10 ESSENTIAL HYPERTENSION: Primary | Chronic | ICD-10-CM

## 2022-07-25 PROCEDURE — 1126F PR PAIN SEVERITY QUANTIFIED, NO PAIN PRESENT: ICD-10-PCS | Mod: CPTII,S$GLB,, | Performed by: INTERNAL MEDICINE

## 2022-07-25 PROCEDURE — 3077F SYST BP >= 140 MM HG: CPT | Mod: CPTII,S$GLB,, | Performed by: INTERNAL MEDICINE

## 2022-07-25 PROCEDURE — 3077F PR MOST RECENT SYSTOLIC BLOOD PRESSURE >= 140 MM HG: ICD-10-PCS | Mod: CPTII,S$GLB,, | Performed by: INTERNAL MEDICINE

## 2022-07-25 PROCEDURE — 1160F PR REVIEW ALL MEDS BY PRESCRIBER/CLIN PHARMACIST DOCUMENTED: ICD-10-PCS | Mod: CPTII,S$GLB,, | Performed by: INTERNAL MEDICINE

## 2022-07-25 PROCEDURE — 99999 PR PBB SHADOW E&M-EST. PATIENT-LVL V: CPT | Mod: PBBFAC,,, | Performed by: INTERNAL MEDICINE

## 2022-07-25 PROCEDURE — 1160F RVW MEDS BY RX/DR IN RCRD: CPT | Mod: CPTII,S$GLB,, | Performed by: INTERNAL MEDICINE

## 2022-07-25 PROCEDURE — 1159F PR MEDICATION LIST DOCUMENTED IN MEDICAL RECORD: ICD-10-PCS | Mod: CPTII,S$GLB,, | Performed by: INTERNAL MEDICINE

## 2022-07-25 PROCEDURE — 99214 OFFICE O/P EST MOD 30 MIN: CPT | Mod: S$GLB,,, | Performed by: INTERNAL MEDICINE

## 2022-07-25 PROCEDURE — 99499 UNLISTED E&M SERVICE: CPT | Mod: S$GLB,,, | Performed by: INTERNAL MEDICINE

## 2022-07-25 PROCEDURE — 1101F PR PT FALLS ASSESS DOC 0-1 FALLS W/OUT INJ PAST YR: ICD-10-PCS | Mod: CPTII,S$GLB,, | Performed by: INTERNAL MEDICINE

## 2022-07-25 PROCEDURE — 1159F MED LIST DOCD IN RCRD: CPT | Mod: CPTII,S$GLB,, | Performed by: INTERNAL MEDICINE

## 2022-07-25 PROCEDURE — 3080F PR MOST RECENT DIASTOLIC BLOOD PRESSURE >= 90 MM HG: ICD-10-PCS | Mod: CPTII,S$GLB,, | Performed by: INTERNAL MEDICINE

## 2022-07-25 PROCEDURE — 1126F AMNT PAIN NOTED NONE PRSNT: CPT | Mod: CPTII,S$GLB,, | Performed by: INTERNAL MEDICINE

## 2022-07-25 PROCEDURE — 3080F DIAST BP >= 90 MM HG: CPT | Mod: CPTII,S$GLB,, | Performed by: INTERNAL MEDICINE

## 2022-07-25 PROCEDURE — 99999 PR PBB SHADOW E&M-EST. PATIENT-LVL V: ICD-10-PCS | Mod: PBBFAC,,, | Performed by: INTERNAL MEDICINE

## 2022-07-25 PROCEDURE — 3288F PR FALLS RISK ASSESSMENT DOCUMENTED: ICD-10-PCS | Mod: CPTII,S$GLB,, | Performed by: INTERNAL MEDICINE

## 2022-07-25 PROCEDURE — 99499 RISK ADDL DX/OHS AUDIT: ICD-10-PCS | Mod: S$GLB,,, | Performed by: INTERNAL MEDICINE

## 2022-07-25 PROCEDURE — 3288F FALL RISK ASSESSMENT DOCD: CPT | Mod: CPTII,S$GLB,, | Performed by: INTERNAL MEDICINE

## 2022-07-25 PROCEDURE — 99214 PR OFFICE/OUTPT VISIT, EST, LEVL IV, 30-39 MIN: ICD-10-PCS | Mod: S$GLB,,, | Performed by: INTERNAL MEDICINE

## 2022-07-25 PROCEDURE — 1101F PT FALLS ASSESS-DOCD LE1/YR: CPT | Mod: CPTII,S$GLB,, | Performed by: INTERNAL MEDICINE

## 2022-07-25 RX ORDER — BUDESONIDE, GLYCOPYRROLATE, AND FORMOTEROL FUMARATE 160; 9; 4.8 UG/1; UG/1; UG/1
AEROSOL, METERED RESPIRATORY (INHALATION)
COMMUNITY
Start: 2022-02-04

## 2022-07-25 RX ORDER — FUROSEMIDE 40 MG/1
40 TABLET ORAL DAILY
Qty: 30 TABLET | Refills: 0 | Status: SHIPPED | OUTPATIENT
Start: 2022-07-25 | End: 2023-02-02

## 2022-07-25 RX ORDER — AMLODIPINE BESYLATE 10 MG/1
10 TABLET ORAL DAILY
Qty: 90 TABLET | Refills: 3 | Status: SHIPPED | OUTPATIENT
Start: 2022-07-25 | End: 2023-05-24

## 2022-07-25 RX ORDER — ERGOCALCIFEROL 1.25 MG/1
50000 CAPSULE ORAL
Status: ON HOLD | COMMUNITY
End: 2024-01-15 | Stop reason: HOSPADM

## 2022-07-25 NOTE — PROGRESS NOTES
Subjective:       Patient ID: Purvi Quiroga is a 77 y.o. female.    Chief Complaint: Follow-up    HPI     77-year-old female here for follow-up.    HTN - Patient is currently on amlodipine 5 mg, irbesartan 150 mg BID. She does not regularly check her BP at home - it runs 140s-150s systolic. Side effects of medications note: none. Denies headaches, blurred vision, chest pain, shortness of breath, nausea.    She reports that her feet are swollen.  She went to the ER on Thursday. Her tests were normal.     She has been getting occasional chest pain at random.  She has never felt like an elephant is on her chest.  She does not feel like she is having a heart attack.  This has been intermittent for months.  The pain lasts for 35-40 seconds.    HLD - Patient is currently on lipitor 20 mg.  Her last lipid panel was   Cholesterol   Date Value Ref Range Status   05/17/2022 158 120 - 199 mg/dL Final     Comment:     The National Cholesterol Education Program (NCEP) has set the  following guidelines (reference ranges) for Cholesterol:  Optimal.....................<200 mg/dL  Borderline High.............200-239 mg/dL  High........................> or = 240 mg/dL       Triglycerides   Date Value Ref Range Status   05/17/2022 131 30 - 150 mg/dL Final     Comment:     The National Cholesterol Education Program (NCEP) has set the  following guidelines (reference values) for triglycerides:  Normal......................<150 mg/dL  Borderline High.............150-199 mg/dL  High........................200-499 mg/dL       HDL   Date Value Ref Range Status   05/17/2022 56 40 - 75 mg/dL Final     Comment:     The National Cholesterol Education Program (NCEP) has set the  following guidelines (reference values) for HDL Cholesterol:  Low...............<40 mg/dL  Optimal...........>60 mg/dL       LDL Cholesterol   Date Value Ref Range Status   05/17/2022 75.8 63.0 - 159.0 mg/dL Final     Comment:     The National Cholesterol Education  Program (NCEP) has set the  following guidelines (reference values) for LDL Cholesterol:  Optimal.......................<130 mg/dL  Borderline High...............130-159 mg/dL  High..........................160-189 mg/dL  Very High.....................>190 mg/dL     .  Side effects of the medication: none.    Review of Systems      Objective:      Physical Exam  Vitals reviewed.   Constitutional:       Appearance: She is well-developed.   HENT:      Head: Normocephalic and atraumatic.      Mouth/Throat:      Pharynx: No oropharyngeal exudate.   Eyes:      General: No scleral icterus.        Right eye: No discharge.         Left eye: No discharge.      Pupils: Pupils are equal, round, and reactive to light.   Neck:      Thyroid: No thyromegaly.      Trachea: No tracheal deviation.   Cardiovascular:      Rate and Rhythm: Normal rate and regular rhythm.      Heart sounds: Normal heart sounds. No murmur heard.    No friction rub. No gallop.   Pulmonary:      Effort: Pulmonary effort is normal. No respiratory distress.      Breath sounds: Normal breath sounds. No wheezing or rales.   Chest:      Chest wall: No tenderness.   Abdominal:      General: Bowel sounds are normal. There is no distension.      Palpations: Abdomen is soft. There is no mass.      Tenderness: There is no abdominal tenderness. There is no guarding or rebound.   Musculoskeletal:         General: Swelling (1+ pitting bilateral LE edema) present. No tenderness. Normal range of motion.      Cervical back: Normal range of motion and neck supple.   Skin:     General: Skin is warm and dry.      Coloration: Skin is not pale.      Findings: No erythema or rash.   Neurological:      Mental Status: She is alert and oriented to person, place, and time.   Psychiatric:         Behavior: Behavior normal.         Assessment:       1. Essential hypertension    2. Pure hypercholesterolemia    3. Stage 3a chronic kidney disease    4. Swelling of both lower  extremities  - Echo Saline Bubble? No; Future  - CV US Lower Extremity Veins Bilateral Insufficiency; Future  - Ambulatory referral/consult to Cardiology; Future    5. Localized edema   - CV US Lower Extremity Veins Bilateral Insufficiency; Future    6. Chest pain, unspecified type  - Ambulatory referral/consult to Cardiology; Future    7. Anginal equivalent      Plan:       1.  Continue irbesartan 150 mg twice daily, Lopressor 50 mg b.i.d., increase amlodipine to 10 mg. Return to clinic in 1 month or sooner if needed.  2. Continue Lipitor 20 mg.  3. Monitor.  4/5.  Check ultrasound lower extremities, echo.  Increase Lasix to 40 mg daily for the next week, then go back to daily.  6. Refer to cardiology.  7. Refer to cardiology.

## 2022-08-04 ENCOUNTER — OFFICE VISIT (OUTPATIENT)
Dept: SPORTS MEDICINE | Facility: CLINIC | Age: 77
End: 2022-08-04
Payer: MEDICARE

## 2022-08-04 VITALS — TEMPERATURE: 98 F | HEIGHT: 55 IN | BODY MASS INDEX: 55.08 KG/M2 | WEIGHT: 238 LBS

## 2022-08-04 DIAGNOSIS — M17.0 PRIMARY OSTEOARTHRITIS OF BOTH KNEES: Primary | ICD-10-CM

## 2022-08-04 PROCEDURE — 20611 LARGE JOINT ASPIRATION/INJECTION: BILATERAL KNEE: ICD-10-PCS | Mod: 50,S$GLB,, | Performed by: FAMILY MEDICINE

## 2022-08-04 PROCEDURE — 1101F PR PT FALLS ASSESS DOC 0-1 FALLS W/OUT INJ PAST YR: ICD-10-PCS | Mod: CPTII,S$GLB,, | Performed by: FAMILY MEDICINE

## 2022-08-04 PROCEDURE — 1160F RVW MEDS BY RX/DR IN RCRD: CPT | Mod: CPTII,S$GLB,, | Performed by: FAMILY MEDICINE

## 2022-08-04 PROCEDURE — 1159F PR MEDICATION LIST DOCUMENTED IN MEDICAL RECORD: ICD-10-PCS | Mod: CPTII,S$GLB,, | Performed by: FAMILY MEDICINE

## 2022-08-04 PROCEDURE — 1160F PR REVIEW ALL MEDS BY PRESCRIBER/CLIN PHARMACIST DOCUMENTED: ICD-10-PCS | Mod: CPTII,S$GLB,, | Performed by: FAMILY MEDICINE

## 2022-08-04 PROCEDURE — 1125F PR PAIN SEVERITY QUANTIFIED, PAIN PRESENT: ICD-10-PCS | Mod: CPTII,S$GLB,, | Performed by: FAMILY MEDICINE

## 2022-08-04 PROCEDURE — 99999 PR PBB SHADOW E&M-EST. PATIENT-LVL IV: ICD-10-PCS | Mod: PBBFAC,,, | Performed by: FAMILY MEDICINE

## 2022-08-04 PROCEDURE — 99499 UNLISTED E&M SERVICE: CPT | Mod: S$GLB,,, | Performed by: FAMILY MEDICINE

## 2022-08-04 PROCEDURE — 99499 NO LOS: ICD-10-PCS | Mod: S$GLB,,, | Performed by: FAMILY MEDICINE

## 2022-08-04 PROCEDURE — 20611 DRAIN/INJ JOINT/BURSA W/US: CPT | Mod: 50,S$GLB,, | Performed by: FAMILY MEDICINE

## 2022-08-04 PROCEDURE — 1101F PT FALLS ASSESS-DOCD LE1/YR: CPT | Mod: CPTII,S$GLB,, | Performed by: FAMILY MEDICINE

## 2022-08-04 PROCEDURE — 3288F PR FALLS RISK ASSESSMENT DOCUMENTED: ICD-10-PCS | Mod: CPTII,S$GLB,, | Performed by: FAMILY MEDICINE

## 2022-08-04 PROCEDURE — 3288F FALL RISK ASSESSMENT DOCD: CPT | Mod: CPTII,S$GLB,, | Performed by: FAMILY MEDICINE

## 2022-08-04 PROCEDURE — 99999 PR PBB SHADOW E&M-EST. PATIENT-LVL IV: CPT | Mod: PBBFAC,,, | Performed by: FAMILY MEDICINE

## 2022-08-04 PROCEDURE — 1159F MED LIST DOCD IN RCRD: CPT | Mod: CPTII,S$GLB,, | Performed by: FAMILY MEDICINE

## 2022-08-04 PROCEDURE — 1125F AMNT PAIN NOTED PAIN PRSNT: CPT | Mod: CPTII,S$GLB,, | Performed by: FAMILY MEDICINE

## 2022-08-04 NOTE — PROGRESS NOTES
Euflexxa bilateral knee 2/3  Lot number: [id number]  Expiration date: 2023-07-23    MEDICAL NECESSITY FOR VISCOSUPPLEMENTATION USE: After thorough evaluation of the patient, I have determined that viscosupplementation treatment is medically necessary. 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 Oral analgesics including NSAIDs have not adequately controlled the patient's symptoms. 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

## 2022-08-04 NOTE — PROCEDURES
"Large Joint Aspiration/Injection: bilateral knee    Date/Time: 8/4/2022 11:45 AM  Performed by: Cj Caruso MD  Authorized by: Cj Caruso MD     Consent Done?:  Yes (Verbal)  Indications:  Pain  Site marked: the procedure site was marked    Timeout: prior to procedure the correct patient, procedure, and site was verified    Prep: patient was prepped and draped in usual sterile fashion      Details:  Needle Size:  20 G  Ultrasonic Guidance for needle placement?: Yes    Images are saved and documented.  Approach:  Lateral  Location:  Knee  Laterality:  Bilateral  Site:  Bilateral knee  Medications (Right):  20 mg sodium hyaluronate (EUFLEXXA) 10 mg/mL(mw 2.4 -3.6 million)  Medications (Left):  20 mg sodium hyaluronate (EUFLEXXA) 10 mg/mL(mw 2.4 -3.6 million)  Patient tolerance:  Patient tolerated the procedure well with no immediate complications     Description of ultrasound utilization for needle guidance:   Ultrasound guidance used for needle localization. Images saved and stored for documentation. The SUPRAPATELLAR BURSA / KNEE JOINT was visualized. Dynamic visualization of the 20g x 3.5" needle was continuous throughout the procedure.       "

## 2022-08-10 ENCOUNTER — TELEPHONE (OUTPATIENT)
Dept: SPORTS MEDICINE | Facility: CLINIC | Age: 77
End: 2022-08-10
Payer: MEDICARE

## 2022-08-10 NOTE — TELEPHONE ENCOUNTER
----- Message from Annel Elizondo sent at 8/10/2022  9:23 AM CDT -----  Regarding: call back  Name of Who is Calling:JASVIR QUARLES [[id number]]          What is the request in detail: Patient is requesting a call back           Can the clinic reply by [facility]: no           What Number to Call Back [facility]: [phone]

## 2022-08-10 NOTE — TELEPHONE ENCOUNTER
Returned call to patient.  Patient wanted to let Dr. Caruso's office know that she cancelled her last Euflexxa injection.  Patient states she will call the office to reschedule.

## 2022-08-11 ENCOUNTER — TELEPHONE (OUTPATIENT)
Dept: SPORTS MEDICINE | Facility: CLINIC | Age: 77
End: 2022-08-11
Payer: MEDICARE

## 2022-08-11 NOTE — TELEPHONE ENCOUNTER
----- Message from Maria Teresa Montana MA sent at 8/11/2022 10:57 AM CDT -----    ----- Message -----  From: Ryann Hernandez  Sent: 8/11/2022  10:55 AM CDT  To: Wesley PEREZ Staff        Name of Who is Calling: JASVIR QUARLES [[id number]]      What is the request in detail: Pt returned call to the office to schedule appt.Please contact to further discuss and advise.          Can the clinic reply by [facility]: N      [facility]: [phone]

## 2022-08-24 ENCOUNTER — OFFICE VISIT (OUTPATIENT)
Dept: NEUROLOGY | Facility: CLINIC | Age: 77
End: 2022-08-24
Payer: MEDICARE

## 2022-08-24 VITALS
HEIGHT: 55 IN | DIASTOLIC BLOOD PRESSURE: 88 MMHG | BODY MASS INDEX: 55.32 KG/M2 | SYSTOLIC BLOOD PRESSURE: 150 MMHG | HEART RATE: 78 BPM

## 2022-08-24 DIAGNOSIS — G43.009 MIGRAINE WITHOUT AURA AND WITHOUT STATUS MIGRAINOSUS, NOT INTRACTABLE: Primary | ICD-10-CM

## 2022-08-24 PROCEDURE — 99999 PR PBB SHADOW E&M-EST. PATIENT-LVL II: CPT | Mod: PBBFAC,,, | Performed by: NEUROMUSCULOSKELETAL MEDICINE & OMM

## 2022-08-24 PROCEDURE — 3079F DIAST BP 80-89 MM HG: CPT | Mod: CPTII,S$GLB,, | Performed by: NEUROMUSCULOSKELETAL MEDICINE & OMM

## 2022-08-24 PROCEDURE — 99214 PR OFFICE/OUTPT VISIT, EST, LEVL IV, 30-39 MIN: ICD-10-PCS | Mod: S$GLB,,, | Performed by: NEUROMUSCULOSKELETAL MEDICINE & OMM

## 2022-08-24 PROCEDURE — 99214 OFFICE O/P EST MOD 30 MIN: CPT | Mod: S$GLB,,, | Performed by: NEUROMUSCULOSKELETAL MEDICINE & OMM

## 2022-08-24 PROCEDURE — 1126F AMNT PAIN NOTED NONE PRSNT: CPT | Mod: CPTII,S$GLB,, | Performed by: NEUROMUSCULOSKELETAL MEDICINE & OMM

## 2022-08-24 PROCEDURE — 3077F PR MOST RECENT SYSTOLIC BLOOD PRESSURE >= 140 MM HG: ICD-10-PCS | Mod: CPTII,S$GLB,, | Performed by: NEUROMUSCULOSKELETAL MEDICINE & OMM

## 2022-08-24 PROCEDURE — 99999 PR PBB SHADOW E&M-EST. PATIENT-LVL II: ICD-10-PCS | Mod: PBBFAC,,, | Performed by: NEUROMUSCULOSKELETAL MEDICINE & OMM

## 2022-08-24 PROCEDURE — 3077F SYST BP >= 140 MM HG: CPT | Mod: CPTII,S$GLB,, | Performed by: NEUROMUSCULOSKELETAL MEDICINE & OMM

## 2022-08-24 PROCEDURE — 3079F PR MOST RECENT DIASTOLIC BLOOD PRESSURE 80-89 MM HG: ICD-10-PCS | Mod: CPTII,S$GLB,, | Performed by: NEUROMUSCULOSKELETAL MEDICINE & OMM

## 2022-08-24 PROCEDURE — 1126F PR PAIN SEVERITY QUANTIFIED, NO PAIN PRESENT: ICD-10-PCS | Mod: CPTII,S$GLB,, | Performed by: NEUROMUSCULOSKELETAL MEDICINE & OMM

## 2022-08-29 ENCOUNTER — HOSPITAL ENCOUNTER (OUTPATIENT)
Dept: CARDIOLOGY | Facility: HOSPITAL | Age: 77
Discharge: HOME | End: 2022-08-29
Attending: INTERNAL MEDICINE
Payer: MEDICARE

## 2022-08-29 DIAGNOSIS — M79.89 SWELLING OF BOTH LOWER EXTREMITIES: ICD-10-CM

## 2022-08-29 DIAGNOSIS — R60.0 LOCALIZED EDEMA: ICD-10-CM

## 2022-08-29 LAB
LEFT GIAC DIA: 0.64 MM
LEFT GREAT SAPHENOUS DISTAL THIGH DIA: 0.56 CM
LEFT GREAT SAPHENOUS JUNCTION DIA: 0.57 CM
LEFT GREAT SAPHENOUS KNEE DIA: 0.22 CM
LEFT GREAT SAPHENOUS KNEE REFLUX: 5023 MS
LEFT GREAT SAPHENOUS MIDDLE THIGH DIA: 0.49 CM
LEFT GREAT SAPHENOUS PROXIMAL CALF DIA: 0.25 CM
LEFT GREAT SAPHENOUS PROXIMAL CALF REFLUX: 4789 MS
LEFT SMALL SAPHENOUS KNEE DIA: 0.29 CM
LEFT SMALL SAPHENOUS SPJ DIA: 0.32 CM
RIGHT GREAT SAPHENOUS DISTAL THIGH DIA: 0.77 CM
RIGHT GREAT SAPHENOUS JUNCTION DIA: 0.96 CM
RIGHT GREAT SAPHENOUS KNEE DIA: 0.42 CM
RIGHT GREAT SAPHENOUS MIDDLE THIGH DIA: 0.56 CM
RIGHT GREAT SAPHENOUS PROXIMAL CALF DIA: 0.32 CM
RIGHT SMALL SAPHENOUS KNEE DIA: 0.4 CM
RIGHT SMALL SAPHENOUS SPJ DIA: 0.34 CM

## 2022-08-29 PROCEDURE — 93970 CV US LOWER VENOUS INSUFFICIENCY BILATERAL (CUPID ONLY): ICD-10-PCS | Mod: 26,,, | Performed by: INTERNAL MEDICINE

## 2022-08-29 PROCEDURE — 93970 EXTREMITY STUDY: CPT | Mod: TC

## 2022-08-29 PROCEDURE — 93970 EXTREMITY STUDY: CPT | Mod: 26,,, | Performed by: INTERNAL MEDICINE

## 2022-08-31 ENCOUNTER — TELEPHONE (OUTPATIENT)
Dept: SPORTS MEDICINE | Facility: CLINIC | Age: 77
End: 2022-08-31
Payer: MEDICARE

## 2022-08-31 DIAGNOSIS — Z78.0 MENOPAUSE: ICD-10-CM

## 2022-08-31 NOTE — TELEPHONE ENCOUNTER
Spoke to patient. She stated she was unable to schedule and reschedule her third Euflexxa injection due to personal issues as well as other health related things that have come up. I tried to schedule her third injection but her referral has .  She asked if she could get a cortisone injection. I explained that since we just did one in July that it would be too soon to do it again and that she would have to wait. She explained that someone told her she could get those injections every 3 weeks. I explained that unfortunately that information was incorrect and she would have to wait 12 weeks between those cortisone injections. She displayed understanding of that logic and would call back later.      Sara Mckinnon  Clinical Assistant to Dr. Cj Caruso

## 2022-09-08 ENCOUNTER — HOSPITAL ENCOUNTER (OUTPATIENT)
Dept: CARDIOLOGY | Facility: HOSPITAL | Age: 77
Discharge: HOME | End: 2022-09-08
Attending: INTERNAL MEDICINE
Payer: MEDICARE

## 2022-09-08 ENCOUNTER — OFFICE VISIT (OUTPATIENT)
Dept: INTERNAL MEDICINE | Facility: CLINIC | Age: 77
End: 2022-09-08
Payer: MEDICARE

## 2022-09-08 VITALS
OXYGEN SATURATION: 93 % | WEIGHT: 242.5 LBS | SYSTOLIC BLOOD PRESSURE: 110 MMHG | HEIGHT: 55 IN | TEMPERATURE: 98 F | HEART RATE: 67 BPM | DIASTOLIC BLOOD PRESSURE: 70 MMHG | RESPIRATION RATE: 21 BRPM | BODY MASS INDEX: 56.12 KG/M2

## 2022-09-08 VITALS
SYSTOLIC BLOOD PRESSURE: 110 MMHG | DIASTOLIC BLOOD PRESSURE: 80 MMHG | WEIGHT: 238 LBS | HEIGHT: 55 IN | HEART RATE: 71 BPM | BODY MASS INDEX: 55.08 KG/M2

## 2022-09-08 DIAGNOSIS — G62.9 NEUROPATHY: ICD-10-CM

## 2022-09-08 DIAGNOSIS — E66.01 MORBID OBESITY: Chronic | ICD-10-CM

## 2022-09-08 DIAGNOSIS — N18.31 STAGE 3A CHRONIC KIDNEY DISEASE: Chronic | ICD-10-CM

## 2022-09-08 DIAGNOSIS — G43.009 MIGRAINE WITHOUT AURA AND WITHOUT STATUS MIGRAINOSUS, NOT INTRACTABLE: ICD-10-CM

## 2022-09-08 DIAGNOSIS — M79.89 SWELLING OF BOTH LOWER EXTREMITIES: ICD-10-CM

## 2022-09-08 DIAGNOSIS — E78.00 PURE HYPERCHOLESTEROLEMIA: Chronic | ICD-10-CM

## 2022-09-08 DIAGNOSIS — I10 ESSENTIAL HYPERTENSION: Primary | Chronic | ICD-10-CM

## 2022-09-08 LAB
ASCENDING AORTA: 2.86 CM
AV INDEX (PROSTH): 0.58
AV MEAN GRADIENT: 2 MMHG
AV PEAK GRADIENT: 4 MMHG
AV VALVE AREA: 1.3 CM2
AV VELOCITY RATIO: 0.62
BSA FOR ECHO PROCEDURE: 2.05 M2
CV ECHO LV RWT: 0.35 CM
DOP CALC AO PEAK VEL: 1.04 M/S
DOP CALC AO VTI: 22.72 CM
DOP CALC LVOT AREA: 2.2 CM2
DOP CALC LVOT DIAMETER: 1.69 CM
DOP CALC LVOT PEAK VEL: 0.64 M/S
DOP CALC LVOT STROKE VOLUME: 29.62 CM3
DOP CALC RVOT PEAK VEL: 0.85 M/S
DOP CALC RVOT VTI: 14.78 CM
DOP CALCLVOT PEAK VEL VTI: 13.21 CM
E WAVE DECELERATION TIME: 173.24 MSEC
E/A RATIO: 1.28
E/E' RATIO: 20 M/S
ECHO LV POSTERIOR WALL: 0.74 CM (ref 0.6–1.1)
EJECTION FRACTION: 55 %
FRACTIONAL SHORTENING: 29 % (ref 28–44)
INTERVENTRICULAR SEPTUM: 0.74 CM (ref 0.6–1.1)
LEFT ATRIUM SIZE: 2.77 CM
LEFT INTERNAL DIMENSION IN SYSTOLE: 3.01 CM (ref 2.1–4)
LEFT VENTRICLE DIASTOLIC VOLUME INDEX: 42.29 ML/M2
LEFT VENTRICLE DIASTOLIC VOLUME: 79.93 ML
LEFT VENTRICLE MASS INDEX: 49 G/M2
LEFT VENTRICLE SYSTOLIC VOLUME INDEX: 18.7 ML/M2
LEFT VENTRICLE SYSTOLIC VOLUME: 35.43 ML
LEFT VENTRICULAR INTERNAL DIMENSION IN DIASTOLE: 4.23 CM (ref 3.5–6)
LEFT VENTRICULAR MASS: 92.52 G
LV LATERAL E/E' RATIO: 16.67 M/S
LV SEPTAL E/E' RATIO: 25 M/S
MV A" WAVE DURATION": 15.41 MSEC
MV PEAK A VEL: 0.39 M/S
MV PEAK E VEL: 0.5 M/S
MV STENOSIS PRESSURE HALF TIME: 50.24 MS
MV VALVE AREA P 1/2 METHOD: 4.38 CM2
PISA TR MAX VEL: 1.99 M/S
PULM VEIN S/D RATIO: 1.36
PV MEAN GRADIENT: 1.54 MMHG
PV PEAK D VEL: 0.25 M/S
PV PEAK S VEL: 0.34 M/S
PV PEAK VELOCITY: 0.95 CM/S
SINUS: 2.61 CM
STJ: 2.45 CM
TDI LATERAL: 0.03 M/S
TDI SEPTAL: 0.02 M/S
TDI: 0.03 M/S
TR MAX PG: 16 MMHG
TRICUSPID ANNULAR PLANE SYSTOLIC EXCURSION: 2.3 CM

## 2022-09-08 PROCEDURE — 99999 PR PBB SHADOW E&M-EST. PATIENT-LVL V: CPT | Mod: PBBFAC,,, | Performed by: INTERNAL MEDICINE

## 2022-09-08 PROCEDURE — 93306 TTE W/DOPPLER COMPLETE: CPT

## 2022-09-08 PROCEDURE — 3288F FALL RISK ASSESSMENT DOCD: CPT | Mod: CPTII,S$GLB,, | Performed by: INTERNAL MEDICINE

## 2022-09-08 PROCEDURE — 3288F PR FALLS RISK ASSESSMENT DOCUMENTED: ICD-10-PCS | Mod: CPTII,S$GLB,, | Performed by: INTERNAL MEDICINE

## 2022-09-08 PROCEDURE — 1160F PR REVIEW ALL MEDS BY PRESCRIBER/CLIN PHARMACIST DOCUMENTED: ICD-10-PCS | Mod: CPTII,S$GLB,, | Performed by: INTERNAL MEDICINE

## 2022-09-08 PROCEDURE — 1159F MED LIST DOCD IN RCRD: CPT | Mod: CPTII,S$GLB,, | Performed by: INTERNAL MEDICINE

## 2022-09-08 PROCEDURE — 3078F PR MOST RECENT DIASTOLIC BLOOD PRESSURE < 80 MM HG: ICD-10-PCS | Mod: CPTII,S$GLB,, | Performed by: INTERNAL MEDICINE

## 2022-09-08 PROCEDURE — 1159F PR MEDICATION LIST DOCUMENTED IN MEDICAL RECORD: ICD-10-PCS | Mod: CPTII,S$GLB,, | Performed by: INTERNAL MEDICINE

## 2022-09-08 PROCEDURE — 3074F PR MOST RECENT SYSTOLIC BLOOD PRESSURE < 130 MM HG: ICD-10-PCS | Mod: CPTII,S$GLB,, | Performed by: INTERNAL MEDICINE

## 2022-09-08 PROCEDURE — 1160F RVW MEDS BY RX/DR IN RCRD: CPT | Mod: CPTII,S$GLB,, | Performed by: INTERNAL MEDICINE

## 2022-09-08 PROCEDURE — 1101F PT FALLS ASSESS-DOCD LE1/YR: CPT | Mod: CPTII,S$GLB,, | Performed by: INTERNAL MEDICINE

## 2022-09-08 PROCEDURE — 3074F SYST BP LT 130 MM HG: CPT | Mod: CPTII,S$GLB,, | Performed by: INTERNAL MEDICINE

## 2022-09-08 PROCEDURE — 1125F AMNT PAIN NOTED PAIN PRSNT: CPT | Mod: CPTII,S$GLB,, | Performed by: INTERNAL MEDICINE

## 2022-09-08 PROCEDURE — 99999 PR PBB SHADOW E&M-EST. PATIENT-LVL V: ICD-10-PCS | Mod: PBBFAC,,, | Performed by: INTERNAL MEDICINE

## 2022-09-08 PROCEDURE — 1125F PR PAIN SEVERITY QUANTIFIED, PAIN PRESENT: ICD-10-PCS | Mod: CPTII,S$GLB,, | Performed by: INTERNAL MEDICINE

## 2022-09-08 PROCEDURE — 99214 OFFICE O/P EST MOD 30 MIN: CPT | Mod: S$GLB,,, | Performed by: INTERNAL MEDICINE

## 2022-09-08 PROCEDURE — 93306 ECHO (CUPID ONLY): ICD-10-PCS | Mod: 26,,, | Performed by: INTERNAL MEDICINE

## 2022-09-08 PROCEDURE — 99214 PR OFFICE/OUTPT VISIT, EST, LEVL IV, 30-39 MIN: ICD-10-PCS | Mod: S$GLB,,, | Performed by: INTERNAL MEDICINE

## 2022-09-08 PROCEDURE — 1101F PR PT FALLS ASSESS DOC 0-1 FALLS W/OUT INJ PAST YR: ICD-10-PCS | Mod: CPTII,S$GLB,, | Performed by: INTERNAL MEDICINE

## 2022-09-08 PROCEDURE — 93306 TTE W/DOPPLER COMPLETE: CPT | Mod: 26,,, | Performed by: INTERNAL MEDICINE

## 2022-09-08 PROCEDURE — 3078F DIAST BP <80 MM HG: CPT | Mod: CPTII,S$GLB,, | Performed by: INTERNAL MEDICINE

## 2022-09-08 RX ORDER — PREDNISONE 20 MG/1
40 TABLET ORAL DAILY
Qty: 10 TABLET | Refills: 0 | Status: SHIPPED | OUTPATIENT
Start: 2022-09-08 | End: 2022-09-13

## 2022-09-08 RX ORDER — GABAPENTIN 300 MG/1
600 CAPSULE ORAL 3 TIMES DAILY
Qty: 540 CAPSULE | Refills: 0 | Status: SHIPPED | OUTPATIENT
Start: 2022-09-08 | End: 2023-01-02 | Stop reason: SDUPTHER

## 2022-09-08 NOTE — PROGRESS NOTES
Subjective:       Patient ID: Purvi Quiroga is a 77 y.o. female.    Chief Complaint: Follow-up (1m)    HPI    77-year-old female here for one-month follow-up.    HTN - Patient is currently on Amlodipine 10 mg, Lopressor 50 mg b.i.d., irbesartan 150 mg. She does not regularly check her BP at home, and it runs 130s-153/not sure. Side effects of medications note: none. Denies headaches, blurred vision, chest pain, shortness of breath, nausea.  Her BP changes depending on what she eats.  She has the food there 5 days a week.  For the noon meal, she eats stuff that she should not be eating.  It is a mandatory lunch program - has to pay $100 a month for this.    She has neuropathy of her feet.  She was diagnosed at [facility]. Her neurologist retired and needs someone to treat her neuropathy.    She reports that she has congestion that has gradually gotten worse.  She has to go and do a breathing treatment right away.  She is going to see pulmonary Monday.    Her right calf muscle is very tight.  It is not swollen, but is extremely tight.  To stretch out her leg completely, it hurts.  It hurts when she pulls her leg up.  She was doing PT 1-2 months ago.  She still has some visits approved by the insurance.  She was having so many other issues, that she put this on hold for a while.  This was prescribed twice a week by sports medicine, but has transportation issues.  She has not kept up with the exercises for her knees the last couple of weeks.    HLD - Patient is currently on lipitor 20 mg.  Her last lipid panel was   Cholesterol   Date Value Ref Range Status   05/17/2022 158 120 - 199 mg/dL Final     Comment:     The National Cholesterol Education Program (NCEP) has set the  following guidelines (reference ranges) for Cholesterol:  Optimal.....................<200 mg/dL  Borderline High.............200-239 mg/dL  High........................> or = 240 mg/dL       Triglycerides   Date Value Ref Range Status    05/17/2022 131 30 - 150 mg/dL Final     Comment:     The National Cholesterol Education Program (NCEP) has set the  following guidelines (reference values) for triglycerides:  Normal......................<150 mg/dL  Borderline High.............150-199 mg/dL  High........................200-499 mg/dL       HDL   Date Value Ref Range Status   05/17/2022 56 40 - 75 mg/dL Final     Comment:     The National Cholesterol Education Program (NCEP) has set the  following guidelines (reference values) for HDL Cholesterol:  Low...............<40 mg/dL  Optimal...........>60 mg/dL       LDL Cholesterol   Date Value Ref Range Status   05/17/2022 75.8 63.0 - 159.0 mg/dL Final     Comment:     The National Cholesterol Education Program (NCEP) has set the  following guidelines (reference values) for LDL Cholesterol:  Optimal.......................<130 mg/dL  Borderline High...............130-159 mg/dL  High..........................160-189 mg/dL  Very High.....................>190 mg/dL     .  Side effects of the medication: none.    Review of Systems      Objective:      Physical Exam  Vitals reviewed.   Constitutional:       Appearance: She is well-developed.   HENT:      Head: Normocephalic and atraumatic.      Mouth/Throat:      Pharynx: No oropharyngeal exudate.   Eyes:      General: No scleral icterus.        Right eye: No discharge.         Left eye: No discharge.      Pupils: Pupils are equal, round, and reactive to light.   Neck:      Thyroid: No thyromegaly.      Trachea: No tracheal deviation.   Cardiovascular:      Rate and Rhythm: Normal rate and regular rhythm.      Heart sounds: Normal heart sounds. No murmur heard.    No friction rub. No gallop.   Pulmonary:      Effort: Pulmonary effort is normal. No respiratory distress.      Breath sounds: Normal breath sounds. No wheezing or rales.   Chest:      Chest wall: No tenderness.   Abdominal:      General: Bowel sounds are normal. There is no distension.       Palpations: Abdomen is soft. There is no mass.      Tenderness: There is no abdominal tenderness. There is no guarding or rebound.   Musculoskeletal:         General: No tenderness. Normal range of motion.      Cervical back: Normal range of motion and neck supple.   Skin:     General: Skin is warm and dry.      Coloration: Skin is not pale.      Findings: No erythema or rash.   Neurological:      Mental Status: She is alert and oriented to person, place, and time.   Psychiatric:         Behavior: Behavior normal.       Assessment:       1. Essential hypertension    2. Pure hypercholesterolemia    3. Stage 3a chronic kidney disease    4. Morbid obesity    5. Neuropathy    6. Migraine without aura and without status migrainosus, not intractable  - gabapentin (NEURONTIN) 300 MG capsule; Take 2 capsules (600 mg total) by mouth 3 (three) times daily. TAKE 1 CAPSULE BY MOUTH 3  TIMES DAILY  Dispense: 540 capsule; Refill: 0      Plan:       1. Continue Amlodipine 10 mg, Lopressor 50 mg b.i.d., irbesartan 150 mg.  2. Continue sign 3.  Monitor  4. Discussed exercise and diet.    5/6. Increase gabapentin to 600 mg 3 times daily.  Counseled patient on how to.

## 2022-09-16 ENCOUNTER — TELEPHONE (OUTPATIENT)
Dept: INTERNAL MEDICINE | Facility: CLINIC | Age: 77
End: 2022-09-16
Payer: MEDICARE

## 2022-09-16 NOTE — TELEPHONE ENCOUNTER
PT states she thinks she may have Infection in throat and a sinus infection    PT has been having soar throat, congestion, cough for several days      PT states that she has not taken anything OTC    Pt states that these symptoms have been going on for 1 day     LOV:  09/08/22

## 2022-09-16 NOTE — TELEPHONE ENCOUNTER
----- Message from Marj Garcia sent at 9/16/2022  7:40 AM CDT -----  Contact: Self/[phone]  1MEDICALADVICE     Patient is calling for Medical Advice regarding: Infection in throat /sinus infection    How long has patient had these symptoms:1 day     [facility] - [facility] - [address]   [place] [address]  Phone: [phone] Fax: [phone]        Would like response via [employer]:  no     Comments:

## 2022-11-02 ENCOUNTER — OFFICE VISIT (OUTPATIENT)
Dept: CARDIOLOGY | Facility: CLINIC | Age: 77
End: 2022-11-02
Payer: MEDICARE

## 2022-11-02 VITALS
HEART RATE: 81 BPM | HEIGHT: 55 IN | BODY MASS INDEX: 55.08 KG/M2 | SYSTOLIC BLOOD PRESSURE: 113 MMHG | RESPIRATION RATE: 20 BRPM | WEIGHT: 238 LBS | DIASTOLIC BLOOD PRESSURE: 67 MMHG

## 2022-11-02 DIAGNOSIS — R07.9 CHEST PAIN, UNSPECIFIED TYPE: ICD-10-CM

## 2022-11-02 DIAGNOSIS — E78.00 PURE HYPERCHOLESTEROLEMIA: Chronic | ICD-10-CM

## 2022-11-02 DIAGNOSIS — I87.2 VENOUS INSUFFICIENCY: ICD-10-CM

## 2022-11-02 DIAGNOSIS — I10 ESSENTIAL HYPERTENSION: Primary | Chronic | ICD-10-CM

## 2022-11-02 DIAGNOSIS — I89.0 LYMPHEDEMA: ICD-10-CM

## 2022-11-02 DIAGNOSIS — M79.89 SWELLING OF BOTH LOWER EXTREMITIES: ICD-10-CM

## 2022-11-02 DIAGNOSIS — E66.01 MORBID OBESITY: Chronic | ICD-10-CM

## 2022-11-02 PROCEDURE — 3288F PR FALLS RISK ASSESSMENT DOCUMENTED: ICD-10-PCS | Mod: CPTII,S$GLB,, | Performed by: INTERNAL MEDICINE

## 2022-11-02 PROCEDURE — 99999 PR PBB SHADOW E&M-EST. PATIENT-LVL V: ICD-10-PCS | Mod: PBBFAC,,, | Performed by: INTERNAL MEDICINE

## 2022-11-02 PROCEDURE — 1159F MED LIST DOCD IN RCRD: CPT | Mod: CPTII,S$GLB,, | Performed by: INTERNAL MEDICINE

## 2022-11-02 PROCEDURE — 1126F AMNT PAIN NOTED NONE PRSNT: CPT | Mod: CPTII,S$GLB,, | Performed by: INTERNAL MEDICINE

## 2022-11-02 PROCEDURE — 3074F SYST BP LT 130 MM HG: CPT | Mod: CPTII,S$GLB,, | Performed by: INTERNAL MEDICINE

## 2022-11-02 PROCEDURE — 1159F PR MEDICATION LIST DOCUMENTED IN MEDICAL RECORD: ICD-10-PCS | Mod: CPTII,S$GLB,, | Performed by: INTERNAL MEDICINE

## 2022-11-02 PROCEDURE — 3078F DIAST BP <80 MM HG: CPT | Mod: CPTII,S$GLB,, | Performed by: INTERNAL MEDICINE

## 2022-11-02 PROCEDURE — 1160F PR REVIEW ALL MEDS BY PRESCRIBER/CLIN PHARMACIST DOCUMENTED: ICD-10-PCS | Mod: CPTII,S$GLB,, | Performed by: INTERNAL MEDICINE

## 2022-11-02 PROCEDURE — 1126F PR PAIN SEVERITY QUANTIFIED, NO PAIN PRESENT: ICD-10-PCS | Mod: CPTII,S$GLB,, | Performed by: INTERNAL MEDICINE

## 2022-11-02 PROCEDURE — 3078F PR MOST RECENT DIASTOLIC BLOOD PRESSURE < 80 MM HG: ICD-10-PCS | Mod: CPTII,S$GLB,, | Performed by: INTERNAL MEDICINE

## 2022-11-02 PROCEDURE — 99215 OFFICE O/P EST HI 40 MIN: CPT | Mod: S$GLB,,, | Performed by: INTERNAL MEDICINE

## 2022-11-02 PROCEDURE — 1101F PT FALLS ASSESS-DOCD LE1/YR: CPT | Mod: CPTII,S$GLB,, | Performed by: INTERNAL MEDICINE

## 2022-11-02 PROCEDURE — 99999 PR PBB SHADOW E&M-EST. PATIENT-LVL V: CPT | Mod: PBBFAC,,, | Performed by: INTERNAL MEDICINE

## 2022-11-02 PROCEDURE — 1160F RVW MEDS BY RX/DR IN RCRD: CPT | Mod: CPTII,S$GLB,, | Performed by: INTERNAL MEDICINE

## 2022-11-02 PROCEDURE — 3288F FALL RISK ASSESSMENT DOCD: CPT | Mod: CPTII,S$GLB,, | Performed by: INTERNAL MEDICINE

## 2022-11-02 PROCEDURE — 3074F PR MOST RECENT SYSTOLIC BLOOD PRESSURE < 130 MM HG: ICD-10-PCS | Mod: CPTII,S$GLB,, | Performed by: INTERNAL MEDICINE

## 2022-11-02 PROCEDURE — 1101F PR PT FALLS ASSESS DOC 0-1 FALLS W/OUT INJ PAST YR: ICD-10-PCS | Mod: CPTII,S$GLB,, | Performed by: INTERNAL MEDICINE

## 2022-11-02 PROCEDURE — 99215 PR OFFICE/OUTPT VISIT, EST, LEVL V, 40-54 MIN: ICD-10-PCS | Mod: S$GLB,,, | Performed by: INTERNAL MEDICINE

## 2022-11-02 NOTE — PROGRESS NOTES
HISTORY:    77-year-old female with history of hypertension, hyperlipidemia, venous insufficiency, obesity, and asthma presenting for initial evaluation by me.    The patient comes in to discuss symptoms for venous insufficiency. The patient describes a heaviness in her legs with associated edema. Worst when she is in a sitting position for long periods of time. This has been present for years, worse in the last 4-5 years. Has to wear sandals at this time. Lasix helps a bit. Cannot tolerate compression stockings.     Activity levels mild and limited by LE pain, arthritis, and asthma. Requires a walker to ambulate.    Bps controlled on below regimen.     Patient also, notes chronic chest pain that in non-exertional and occurs 1x/month to a few x/week. Lasts seconds at a time with no clear triggers. Has been present for years. Also has chronic PALUMBO. Has a chronic history of asthma. Had PET stress 2021 for similar symptoms and they are less frequent at this time then before.     MEDICATIONS:      Current Outpatient Medications:     albuterol (PROVENTIL/VENTOLIN HFA) 90 mcg/actuation inhaler, INHALE 1-2 PUFFS INTO THE LUNGS EVERY 6 HOURS AS NEEDED FOR SHORTNESS OF BREATH, Disp: 8.5 g, Rfl: 1    albuterol-ipratropium (DUO-NEB) 2.5 mg-0.5 mg/3 mL nebulizer solution, USE 1 VIAL via NEBULIZER EVERY 4 HOURS AS NEEDED SHORTNESS OF BREATH, Disp: , Rfl:     alendronate (FOSAMAX) 70 MG tablet, Take one tablet 30 minutes before any food or medication once weekly on same day. , Disp: 3 tablet, Rfl: 2    amLODIPine (NORVASC) 10 MG tablet, Take 1 tablet (10 mg total) by mouth once daily., Disp: 90 tablet, Rfl: 3    atorvastatin (LIPITOR) 20 MG tablet, TAKE 1 TABLET BY MOUTH ONCE DAILY, Disp: 90 tablet, Rfl: 2    BREZTRI AEROSPHERE 160-9-4.8 mcg/actuation HFAA, INHALE 2 puffs TWICE DAILY. RINSE MOUTH AND throat AFTER USE., Disp: , Rfl:     budesonide-glycopyr-formoterol 160-9-4.8 mcg/actuation HFAA, Inhale into the lungs., Disp: ,  Rfl:     calcium carbonate 650 mg calcium (1,625 mg) tablet, Take 1 tablet (650 mg total) by mouth once daily., Disp: 30 tablet, Rfl: 11    ergocalciferol (ERGOCALCIFEROL) 50,000 unit Cap, Take 50,000 Units by mouth., Disp: , Rfl:     fluticasone propionate (FLONASE) 50 mcg/actuation nasal spray, 1 spray (50 mcg total) by Each Nostril route once daily., Disp: 16 g, Rfl: 0    furosemide (LASIX) 20 MG tablet, Take 1 tablet (20 mg total) by mouth once daily., Disp: 30 tablet, Rfl: 11    furosemide (LASIX) 40 MG tablet, Take 1 tablet (40 mg total) by mouth once daily., Disp: 30 tablet, Rfl: 0    gabapentin (NEURONTIN) 300 MG capsule, Take 2 capsules (600 mg total) by mouth 3 (three) times daily. TAKE 1 CAPSULE BY MOUTH 3  TIMES DAILY, Disp: 540 capsule, Rfl: 0    hydrOXYzine pamoate (VISTARIL) 50 MG Cap, TAKE ONE CAPSULE BY MOUTH THREE TIMES DAILY AS NEEDED, Disp: 90 capsule, Rfl: 1    irbesartan (AVAPRO) 150 MG tablet, TAKE 1 TABLET BY MOUTH  TWICE DAILY, Disp: 180 tablet, Rfl: 3    melatonin 5 mg Cap, Take 1 capsule (5 mg total) by mouth nightly as needed (insomnia)., Disp: 90 each, Rfl: 1    metoprolol tartrate (LOPRESSOR) 50 MG tablet, TAKE 1 TABLET BY MOUTH  TWICE DAILY, Disp: 180 tablet, Rfl: 2    montelukast (SINGULAIR) 10 mg tablet, TAKE 1 TABLET BY MOUTH IN  THE EVENING, Disp: 90 tablet, Rfl: 2    omeprazole (PRILOSEC) 20 MG capsule, TAKE 1 CAPSULE BY MOUTH  TWICE DAILY, Disp: 180 capsule, Rfl: 3    ondansetron (ZOFRAN-ODT) 8 MG TbDL, PLACE 1 TABLET UNDER THE TONGUE EVERY TWELVE HOURS AS NEEDED, Disp: 30 tablet, Rfl: 2    potassium chloride (MICRO-K) 10 MEQ CpSR, TAKE ONE CAPSULE BY MOUTH EVERY MORNING with meals, Disp: 90 capsule, Rfl: 3    sodium hyaluronate, EUFLEXXA, (EUFLEXXA) 10 mg/mL(mw 2.4 -3.6 million) injection, Inject into the articular space., Disp: , Rfl:     SYMBICORT 160-4.5 mcg/actuation HFAA, INHALE 2 PUFFS BY MOUTH EVERY MORNING AND EVERY EVENING. RINSE MOUTH AND THROAT AFTER USE., Disp: 1  Inhaler, Rfl: 5    traZODone (DESYREL) 50 MG tablet, Take 1 tablet (50 mg total) by mouth nightly as needed for Insomnia., Disp: 90 tablet, Rfl: 3    vitamin D (VITAMIN D3) 1000 units Tab, Take 1 tablet (1,000 Units total) by mouth once daily., Disp: 30 tablet, Rfl: 11    amitriptyline (ELAVIL) 10 MG tablet, Take 1 tablet (10 mg total) by mouth every evening., Disp: 30 tablet, Rfl: 3    Current Facility-Administered Medications:     DOBUTamine 1000 mg in D5W 250 mL infusion (premix titrating), 10 mcg/kg/min, Intravenous, Continuous, Rui Damian NP, Last Rate: 16.1 mL/hr at 05/11/21 1404, 10 mcg/kg/min at 05/11/21 1404      PHYSICAL EXAM:    Vitals:    11/02/22 1320   BP: 113/67   Pulse: 81   Resp: 20       NAD, A+Ox3.  No jvd, no bruit.  RRR nml s1,s2. No murmurs.  CTA B no wheezes or crackles.  2+ B radial and 1+ B DP/PT. B LE edema and spider veins. Chronic skin changes.    LABS/STUDIES (imaging reviewed during clinic visit):    July 2022 hemoglobin 14.1.  Creatinine 1.0 with a BUN of 19.  Albumin 3.5.  LDL 75 and HDL 56.  .  EKG 2022 sinus rhythm with no Q-waves or ST changes.    TTE September 2022 normal LV size and function with EF 55%.  PET stress 2021 normal LVEF with no defects.  Venous duplex August 2022.  No evidence of DVT bilaterally.  Left GSV reflux present.      ASSESSMENT & PLAN:    1. Essential hypertension    2. Swelling of both lower extremities    3. Chest pain, unspecified type    4. Venous insufficiency    5. Pure hypercholesterolemia    6. Morbid obesity    7. Lymphedema        Orders Placed This Encounter    Ambulatory referral/consult to Physical/Occupational Therapy        Pt has C3-4a venous insufficiency. Refractory to compression stocking use. Diuretics didn't seem to help. Remains on furosmide 20x1. Can consider left GSV ablation given us findings.     Has overlaying secondary lymphedema as well. Will refer to PT for lymphedema therapy. Recommend increasing walking  exercise.     CP chronic with a normal LVEF this year and a negative PET stress last year.    BPs controlled on current regimen.         Alissa Love MD

## 2022-11-28 ENCOUNTER — TELEPHONE (OUTPATIENT)
Dept: INTERNAL MEDICINE | Facility: CLINIC | Age: 77
End: 2022-11-28
Payer: MEDICARE

## 2022-11-28 NOTE — TELEPHONE ENCOUNTER
Do we get her in thur or Friday or adjust medications?      ----- Message from Alina Schreiber sent at 11/28/2022  2:33 PM CST -----  Contact: pt [phone]  1MEDICALADVICE     Patient is calling for Medical Advice regarding: Patient states feet are swollen: patient cannot wear shoes. Localized on top of feet.     How long has patient had these symptoms: on going     Pharmacy name and phone#:      [facility] - [place], [place] - [address] [facility] [address]  Phone: [phone] Fax: [phone]          Would like response via [employer]:  call back     Comments:

## 2022-12-06 ENCOUNTER — NURSE TRIAGE (OUTPATIENT)
Dept: ADMINISTRATIVE | Facility: CLINIC | Age: 77
End: 2022-12-06
Payer: MEDICARE

## 2022-12-06 ENCOUNTER — HOSPITAL ENCOUNTER (EMERGENCY)
Facility: HOSPITAL | Age: 77
Discharge: HOME | End: 2022-12-06
Attending: EMERGENCY MEDICINE
Payer: MEDICARE

## 2022-12-06 VITALS
DIASTOLIC BLOOD PRESSURE: 74 MMHG | HEIGHT: 55 IN | WEIGHT: 240 LBS | HEART RATE: 100 BPM | TEMPERATURE: 98 F | BODY MASS INDEX: 55.54 KG/M2 | RESPIRATION RATE: 15 BRPM | OXYGEN SATURATION: 97 % | SYSTOLIC BLOOD PRESSURE: 159 MMHG

## 2022-12-06 DIAGNOSIS — R35.1 NOCTURIA: Primary | ICD-10-CM

## 2022-12-06 DIAGNOSIS — R11.0 NAUSEA: ICD-10-CM

## 2022-12-06 LAB
ALBUMIN SERPL BCP-MCNC: 4.3 G/DL (ref 3.5–5.2)
ALP SERPL-CCNC: 100 U/L (ref 55–135)
ALT SERPL W/O P-5'-P-CCNC: 15 U/L (ref 10–44)
ANION GAP SERPL CALC-SCNC: 15 MMOL/L (ref 8–16)
AST SERPL-CCNC: 19 U/L (ref 10–40)
BASOPHILS # BLD AUTO: 0.06 K/UL (ref 0–0.2)
BASOPHILS NFR BLD: 0.4 % (ref 0–1.9)
BILIRUB SERPL-MCNC: 1 MG/DL (ref 0.1–1)
BILIRUB UR QL STRIP: NEGATIVE
BUN SERPL-MCNC: 17 MG/DL (ref 8–23)
CALCIUM SERPL-MCNC: 9.9 MG/DL (ref 8.7–10.5)
CHLORIDE SERPL-SCNC: 104 MMOL/L (ref 95–110)
CLARITY UR: CLEAR
CO2 SERPL-SCNC: 21 MMOL/L (ref 23–29)
COLOR UR: YELLOW
CREAT SERPL-MCNC: 0.9 MG/DL (ref 0.5–1.4)
CTP QC/QA: YES
CTP QC/QA: YES
DIFFERENTIAL METHOD: ABNORMAL
EOSINOPHIL # BLD AUTO: 0 K/UL (ref 0–0.5)
EOSINOPHIL NFR BLD: 0.1 % (ref 0–8)
ERYTHROCYTE [DISTWIDTH] IN BLOOD BY AUTOMATED COUNT: 13.2 % (ref 11.5–14.5)
EST. GFR  (NO RACE VARIABLE): >60 ML/MIN/1.73 M^2
GLUCOSE SERPL-MCNC: 128 MG/DL (ref 70–110)
GLUCOSE UR QL STRIP: NEGATIVE
HCT VFR BLD AUTO: 45 % (ref 37–48.5)
HGB BLD-MCNC: 15.2 G/DL (ref 12–16)
HGB UR QL STRIP: NEGATIVE
IMM GRANULOCYTES # BLD AUTO: 0.09 K/UL (ref 0–0.04)
IMM GRANULOCYTES NFR BLD AUTO: 0.7 % (ref 0–0.5)
KETONES UR QL STRIP: NEGATIVE
LEUKOCYTE ESTERASE UR QL STRIP: NEGATIVE
LIPASE SERPL-CCNC: 17 U/L (ref 4–60)
LYMPHOCYTES # BLD AUTO: 1.8 K/UL (ref 1–4.8)
LYMPHOCYTES NFR BLD: 13.1 % (ref 18–48)
MCH RBC QN AUTO: 31.6 PG (ref 27–31)
MCHC RBC AUTO-ENTMCNC: 33.8 G/DL (ref 32–36)
MCV RBC AUTO: 94 FL (ref 82–98)
MONOCYTES # BLD AUTO: 0.9 K/UL (ref 0.3–1)
MONOCYTES NFR BLD: 6.8 % (ref 4–15)
NEUTROPHILS # BLD AUTO: 10.5 K/UL (ref 1.8–7.7)
NEUTROPHILS NFR BLD: 78.9 % (ref 38–73)
NITRITE UR QL STRIP: NEGATIVE
NRBC BLD-RTO: 0 /100 WBC
PH UR STRIP: 8 [PH] (ref 5–8)
PLATELET # BLD AUTO: 286 K/UL (ref 150–450)
PMV BLD AUTO: 10.8 FL (ref 9.2–12.9)
POC MOLECULAR INFLUENZA A AGN: NEGATIVE
POC MOLECULAR INFLUENZA B AGN: NEGATIVE
POTASSIUM SERPL-SCNC: 4.7 MMOL/L (ref 3.5–5.1)
PROT SERPL-MCNC: 7.8 G/DL (ref 6–8.4)
PROT UR QL STRIP: ABNORMAL
RBC # BLD AUTO: 4.81 M/UL (ref 4–5.4)
SARS-COV-2 RDRP RESP QL NAA+PROBE: NEGATIVE
SODIUM SERPL-SCNC: 140 MMOL/L (ref 136–145)
SP GR UR STRIP: 1.01 (ref 1–1.03)
TROPONIN I SERPL DL<=0.01 NG/ML-MCNC: <0.006 NG/ML (ref 0–0.03)
URN SPEC COLLECT METH UR: ABNORMAL
UROBILINOGEN UR STRIP-ACNC: NEGATIVE EU/DL
WBC # BLD AUTO: 13.35 K/UL (ref 3.9–12.7)

## 2022-12-06 PROCEDURE — 85025 COMPLETE CBC W/AUTO DIFF WBC: CPT | Performed by: EMERGENCY MEDICINE

## 2022-12-06 PROCEDURE — 63600175 PHARM REV CODE 636 W HCPCS: Performed by: EMERGENCY MEDICINE

## 2022-12-06 PROCEDURE — 87635 SARS-COV-2 COVID-19 AMP PRB: CPT | Performed by: EMERGENCY MEDICINE

## 2022-12-06 PROCEDURE — 93010 ELECTROCARDIOGRAM REPORT: CPT | Mod: ,,, | Performed by: INTERNAL MEDICINE

## 2022-12-06 PROCEDURE — 93005 ELECTROCARDIOGRAM TRACING: CPT

## 2022-12-06 PROCEDURE — 96374 THER/PROPH/DIAG INJ IV PUSH: CPT

## 2022-12-06 PROCEDURE — 80053 COMPREHEN METABOLIC PANEL: CPT | Performed by: EMERGENCY MEDICINE

## 2022-12-06 PROCEDURE — 96361 HYDRATE IV INFUSION ADD-ON: CPT

## 2022-12-06 PROCEDURE — 81003 URINALYSIS AUTO W/O SCOPE: CPT | Performed by: EMERGENCY MEDICINE

## 2022-12-06 PROCEDURE — 99285 EMERGENCY DEPT VISIT HI MDM: CPT | Mod: 25

## 2022-12-06 PROCEDURE — 84484 ASSAY OF TROPONIN QUANT: CPT | Performed by: EMERGENCY MEDICINE

## 2022-12-06 PROCEDURE — 83690 ASSAY OF LIPASE: CPT | Performed by: EMERGENCY MEDICINE

## 2022-12-06 PROCEDURE — 87502 INFLUENZA DNA AMP PROBE: CPT

## 2022-12-06 PROCEDURE — 93010 EKG 12-LEAD: ICD-10-PCS | Mod: ,,, | Performed by: INTERNAL MEDICINE

## 2022-12-06 RX ORDER — ONDANSETRON 2 MG/ML
4 INJECTION INTRAMUSCULAR; INTRAVENOUS
Status: COMPLETED | OUTPATIENT
Start: 2022-12-06 | End: 2022-12-06

## 2022-12-06 RX ORDER — ONDANSETRON 4 MG/1
4 TABLET, ORALLY DISINTEGRATING ORAL EVERY 6 HOURS PRN
Qty: 12 TABLET | Refills: 0 | Status: ON HOLD | OUTPATIENT
Start: 2022-12-06 | End: 2024-01-15 | Stop reason: HOSPADM

## 2022-12-06 RX ORDER — SODIUM CHLORIDE, SODIUM LACTATE, POTASSIUM CHLORIDE, CALCIUM CHLORIDE 600; 310; 30; 20 MG/100ML; MG/100ML; MG/100ML; MG/100ML
INJECTION, SOLUTION INTRAVENOUS CONTINUOUS
Status: DISCONTINUED | OUTPATIENT
Start: 2022-12-06 | End: 2022-12-07 | Stop reason: HOSPADM

## 2022-12-06 RX ADMIN — ONDANSETRON 4 MG: 2 INJECTION INTRAMUSCULAR; INTRAVENOUS at 01:12

## 2022-12-06 RX ADMIN — SODIUM CHLORIDE, SODIUM LACTATE, POTASSIUM CHLORIDE, AND CALCIUM CHLORIDE: .6; .31; .03; .02 INJECTION, SOLUTION INTRAVENOUS at 01:12

## 2022-12-06 NOTE — ED NOTES
Pt assisted to room. Attempted to help pt into bed. Pt stated that she needs more assistance than 1 nurse can provide. RN x2 and Ed tech to BS to help. Pt refused to let staff help her into bed and sat on the floor and began to scream. Pt redirected and assisted into bed. Side rails up x 2. Bed in low locked position. Call bell given to pt.

## 2022-12-06 NOTE — ED NOTES
Pt yelling from restroom for help. RN and tech responded. Pt stated that she needs someone to remain in the restroom with her for duration of shower. Pt asked what she needs assistance with.  Pt declined needing immediate assistance. States she wants someone to remain in the restroom while she showers. Pt reassured that someone will remain right outside of door in case pt needs any assistance. ED tech started shower and laid out hygiene items.

## 2022-12-06 NOTE — ED NOTES
Pt shower complete. Pt assisted to hallway chair pending tele triage. Walker supplied for pt to use per request.

## 2022-12-06 NOTE — TELEPHONE ENCOUNTER
Patient states she is urinating very frequently. She is asking for a prescription and the ability to get it today b/c she is scheduled to see him on Thursday  Reason for Disposition   Urinating more frequently than usual (i.e., frequency)    Additional Information   Negative: Shock suspected (e.g., cold/pale/clammy skin, too weak to stand, low BP, rapid pulse)   Negative: Sounds like a life-threatening emergency to the triager   Negative: Followed a female genital area injury (e.g., vagina, vulva)   Negative: Followed a male genital area injury (e.g., penis, scrotum)   Negative: Vaginal discharge   Negative: Pus (white, yellow) or bloody discharge from end of penis   Negative: [1] Taking antibiotic for urinary tract infection (UTI) AND [2] female   Negative: [1] Taking antibiotic for urinary tract infection (UTI) AND [2] male   Negative: [1] Pain or burning with passing urine (urination) AND [2] female   Negative: [1] Pain or burning with passing urine (urination) AND [2] male   Negative: Pain or itching in the vulvar area   Negative: Pain in scrotum is main symptom   Negative: Blood in the urine is main symptom   Negative: Symptoms arising from use of a urinary catheter (e.g., coude, Jennings)   Negative: [1] Pain or burning with passing urine (urination) AND [2] pregnant   Negative: [1] Pain or burning with passing urine (urination) AND [2] postpartum (from 0 to 6 weeks after delivery)   Negative: [1] Unable to urinate (or only a few drops) > 4 hours AND [2] bladder feels very full (e.g., palpable bladder or strong urge to urinate)   Negative: [1] Decreased urination and [2] drinking very little AND [2] dehydration suspected (e.g., dark urine, no urine > 12 hours, very dry mouth, very lightheaded)   Negative: Patient sounds very sick or weak to the triager   Negative: Fever > 100.4 F (38.0 C)   Negative: Side (flank) or lower back pain present   Negative: [1] Can't control passage of urine (i.e., urinary  incontinence) AND [2] new-onset (< 2 weeks) or worsening    Protocols used: Urinary Symptoms-A-AH

## 2022-12-06 NOTE — ED NOTES
Pt has active bed bug infestation. Pt provided with hygiene items and shown to shower. Clothes and belongings double red bagged. Pt provided with walker for after shower. ED  tech to bathroom for assistance.

## 2022-12-06 NOTE — ED NOTES
Pt called for assistance. ED responded. Pt stated that she was finished in the shower. ED tech noted that the pt had not showered and encouraged he to do so. Pt began to shower. ED tech closed door and remained outside restroom.

## 2023-01-01 NOTE — ED PROVIDER NOTES
Encounter Date: 12/6/2022    SCRIBE #1 NOTE: I, Valentin Veronica, am scribing for, and in the presence of,  Nat Solorzano MD. I have scribed the following portions of the note - Other sections scribed: HPI, SARABJIT,.     History     Chief Complaint   Patient presents with    Urinary Frequency     Brought to ED from [facility] for urinary frequency.      Purvi Quiroga is a 77 y.o. female who presents to the ED with urinary frequency that started last night after drinking 2 20 oz Diet Cokes. She reports she was urinating every 5 to 10 minutes for several hours, until symptoms started to gradually resolve at 0400 this morning. Patient complains of associated weakness, nausea, and sleep disturbance due to her symptoms. She denies vomiting. She reports she has been unable to take her daily medications due to her nausea.     The history is provided by the patient. No  was used.   Review of patient's allergies indicates:  No Known Allergies  Past Medical History:   Diagnosis Date    Asthma     Essential hypertension 12/10/2018    Hiatal hernia     Insomnia     Other osteoporosis without current pathological fracture 12/10/2018     Past Surgical History:   Procedure Laterality Date    CATARACT EXTRACTION, BILATERAL      DILATION AND CURETTAGE OF UTERUS      KNEE ARTHROSCOPY       Family History   Problem Relation Age of Onset    Heart disease Father         fatal MI in mid 60s    Hypertension Father     Hypertension Sister     Cancer Brother         colon cancer    Hypertension Brother     Diabetes Neg Hx     Stroke Neg Hx     Hyperlipidemia Neg Hx      Social History     Tobacco Use    Smoking status: Never    Smokeless tobacco: Never    Tobacco comments:     18 or 21 yo for 1 yr   Substance Use Topics    Alcohol use: Yes     Comment: once in a while a little whiskey (not even weekly)    Drug use: No     Review of Systems   Constitutional:  Negative for fever.   HENT:  Negative for sore throat.     Respiratory:  Negative for shortness of breath.    Cardiovascular:  Negative for chest pain.   Gastrointestinal:  Positive for nausea. Negative for vomiting.   Genitourinary:  Positive for frequency. Negative for dysuria.   Musculoskeletal:  Negative for back pain.   Skin:  Negative for rash.   Neurological:  Positive for weakness.   Hematological:  Does not bruise/bleed easily.   Psychiatric/Behavioral:  Positive for sleep disturbance.      Physical Exam     Initial Vitals [12/06/22 0952]   BP Pulse Resp Temp SpO2   (!) 148/78 90 20 98.1 °F (36.7 °C) 97 %      MAP       --         Physical Exam    Nursing note and vitals reviewed.  Constitutional: She appears well-developed and well-nourished.   HENT:   Head: Normocephalic and atraumatic.   Mouth/Throat: Oropharynx is clear and moist.   Eyes: Conjunctivae and EOM are normal. Pupils are equal, round, and reactive to light.   Neck: Neck supple.   Normal range of motion.  Cardiovascular:  Normal rate, regular rhythm, normal heart sounds and intact distal pulses.     Exam reveals no gallop and no friction rub.       No murmur heard.  Pulmonary/Chest: Breath sounds normal.   Abdominal: Abdomen is soft. Bowel sounds are normal. She exhibits no distension. There is no abdominal tenderness. There is no rebound and no guarding.   Musculoskeletal:         General: No tenderness or edema. Normal range of motion.      Cervical back: Normal range of motion and neck supple.     Lymphadenopathy:     She has no cervical adenopathy.   Neurological: She is alert and oriented to person, place, and time. She has normal strength and normal reflexes.   Skin: Skin is warm and dry.   Psychiatric: She has a normal mood and affect. Her behavior is normal. Judgment and thought content normal.         ED Course   Procedures  Labs Reviewed   URINALYSIS, REFLEX TO URINE CULTURE - Abnormal; Notable for the following components:       Result Value    Protein, UA Trace (*)     All other  components within normal limits    Narrative:     Specimen Source->Urine   CBC W/ AUTO DIFFERENTIAL - Abnormal; Notable for the following components:    WBC 13.35 (*)     MCH 31.6 (*)     Immature Granulocytes 0.7 (*)     Gran # (ANC) 10.5 (*)     Immature Grans (Abs) 0.09 (*)     Gran % 78.9 (*)     Lymph % 13.1 (*)     All other components within normal limits   COMPREHENSIVE METABOLIC PANEL - Abnormal; Notable for the following components:    CO2 21 (*)     Glucose 128 (*)     All other components within normal limits   TROPONIN I   LIPASE   SARS-COV-2 RDRP GENE   POCT INFLUENZA A/B MOLECULAR     EKG Readings: (Independently Interpreted)   Initial: 1249. Rhythm: Normal Sinus Rhythm. Heart Rate: 98. Ectopy: No Ectopy. Conduction: Normal. ST Segments: Normal ST Segments. T Waves: Normal. Clinical Impression: Normal Sinus Rhythm   ECG Results              EKG 12-lead (In process)  Result time 12/06/22 15:28:11      In process by Interface, Lab In [facility] (12/06/22 15:28:11)                   Narrative:    Test Reason : R11.0,    Vent. Rate : 098 BPM     Atrial Rate : 098 BPM     P-R Int : 170 ms          QRS Dur : 070 ms      QT Int : 358 ms       P-R-T Axes : 034 028 026 degrees     QTc Int : 457 ms    Normal sinus rhythm  Low voltage QRS  Borderline Abnormal ECG  When compared with ECG of 21-JUL-2022 18:19,  No significant change was found    Referred By: [email or web address]   SELF           Confirmed By:                                   Imaging Results              X-Ray Chest AP Portable (Final result)  Result time 12/06/22 13:51:27      Final result by Mynor Buck MD (12/06/22 13:51:27)                   Impression:      1. Interstitial findings are accentuated by shallow inspiratory effort and habitus, early edema however not excluded.  No large focal consolidation.      Electronically signed by: Mynor Buck MD  Date:    12/06/2022  Time:    13:51               Narrative:    EXAMINATION:  XR CHEST AP  PORTABLE    CLINICAL HISTORY:  nausea;    TECHNIQUE:  Single frontal view of the chest was performed.    COMPARISON:  07/21/2022    FINDINGS:  The cardiomediastinal silhouette is not enlarged, magnified by technique.  There is no pleural effusion.  The trachea is midline.  The lungs are symmetrically expanded bilaterally with coarse interstitial attenuation accentuated by shallow inspiratory effort and habitus.  There is right basilar subsegmental atelectasis..  No large focal consolidation seen.  There is no pneumothorax.  The osseous structures are remarkable for degenerative changes..                                       Medications   lactated ringers infusion (0 mL/hr Intravenous Stopped 12/6/22 1510)   ondansetron injection 4 mg (4 mg Intravenous Given 12/6/22 1315)     Medical Decision Making:   ED Management:  The patient's workup is normal.  She was given Zofran and has no more vomiting.  She will be discharged at this time.        Scribe Attestation:   Scribe #1: I performed the above scribed service and the documentation accurately describes the services I performed. I attest to the accuracy of the note.                   Clinical Impression:   Final diagnoses:  [R11.0] Nausea  [R35.1] Nocturia (Primary)        ED Disposition Condition    Discharge Stable        I, Nat Solorzano, personally performed the services described in this documentation. All medical record entries made by the scribe were at my direction and in my presence.  I have reviewed the chart and agree that the record reflects my personal performance and is accurate and complete. Nat Solorzano M.D. 1:40 PM12/06/2022   ED Prescriptions    None       Follow-up Information       Follow up With Specialties Details Why Contact Info    Palomo Burgos MD Internal Medicine Schedule an appointment as soon as possible for a visit  As needed [address] [facility] [address]  [phone]               Nat Solorzano MD  12/06/22 1246     Statement Selected

## 2023-01-02 ENCOUNTER — TELEPHONE (OUTPATIENT)
Dept: INTERNAL MEDICINE | Facility: CLINIC | Age: 78
End: 2023-01-02
Payer: MEDICARE

## 2023-01-02 DIAGNOSIS — G43.009 MIGRAINE WITHOUT AURA AND WITHOUT STATUS MIGRAINOSUS, NOT INTRACTABLE: ICD-10-CM

## 2023-01-02 RX ORDER — GABAPENTIN 300 MG/1
600 CAPSULE ORAL 3 TIMES DAILY
Qty: 540 CAPSULE | Refills: 0 | Status: SHIPPED | OUTPATIENT
Start: 2023-01-02 | End: 2023-01-03 | Stop reason: SDUPTHER

## 2023-01-03 RX ORDER — POTASSIUM CHLORIDE 750 MG/1
CAPSULE, EXTENDED RELEASE ORAL
Qty: 90 CAPSULE | Refills: 2 | Status: SHIPPED | OUTPATIENT
Start: 2023-01-03 | End: 2023-01-03 | Stop reason: SDUPTHER

## 2023-01-03 RX ORDER — POTASSIUM CHLORIDE 750 MG/1
CAPSULE, EXTENDED RELEASE ORAL
Qty: 90 CAPSULE | Refills: 2 | Status: SHIPPED | OUTPATIENT
Start: 2023-01-03

## 2023-01-03 RX ORDER — GABAPENTIN 300 MG/1
600 CAPSULE ORAL 3 TIMES DAILY
Qty: 540 CAPSULE | Refills: 0 | Status: SHIPPED | OUTPATIENT
Start: 2023-01-03 | End: 2023-01-06 | Stop reason: SDUPTHER

## 2023-01-06 DIAGNOSIS — G43.009 MIGRAINE WITHOUT AURA AND WITHOUT STATUS MIGRAINOSUS, NOT INTRACTABLE: ICD-10-CM

## 2023-01-06 NOTE — TELEPHONE ENCOUNTER
----- Message from Camilla Blevins sent at 1/6/2023 11:38 AM CST -----  Contact: Pt [phone]  Requesting an RX refill or new RX.  Is this a refill or new RX: refill   RX name and strength (copy/paste from chart):  gabapentin (NEURONTIN) 300 MG capsule  Is this a 30 day or 90 day RX:   Pharmacy name and phone # (copy/paste from chart):  [facility] - [place] [address] [address] [facility]   Phone:  [phone]  Fax:  [phone]    The doctors have asked that we provide their patients with the following 2 reminders -- prescription refills can take up to 72 hours, and a friendly reminder that in the future you can use your [facility] account to request refills: yes   Pt states [facility] has not received the refill request for the medication

## 2023-01-07 RX ORDER — GABAPENTIN 300 MG/1
600 CAPSULE ORAL 3 TIMES DAILY
Qty: 540 CAPSULE | Refills: 0 | Status: SHIPPED | OUTPATIENT
Start: 2023-01-07 | End: 2023-01-26 | Stop reason: SDUPTHER

## 2023-01-24 ENCOUNTER — TELEPHONE (OUTPATIENT)
Dept: SPORTS MEDICINE | Facility: CLINIC | Age: 78
End: 2023-01-24
Payer: MEDICARE

## 2023-01-24 DIAGNOSIS — M25.561 CHRONIC PAIN OF BOTH KNEES: ICD-10-CM

## 2023-01-24 DIAGNOSIS — M17.0 PRIMARY OSTEOARTHRITIS OF BOTH KNEES: Primary | ICD-10-CM

## 2023-01-24 DIAGNOSIS — M25.562 CHRONIC PAIN OF BOTH KNEES: ICD-10-CM

## 2023-01-24 DIAGNOSIS — G89.29 CHRONIC PAIN OF BOTH KNEES: ICD-10-CM

## 2023-01-24 NOTE — TELEPHONE ENCOUNTER
----- Message from Seema Marroquin sent at 1/24/2023  1:00 PM CST -----  Contact: pt  Pt  calling in regards to injection appt       Confirmed patient's contact info below:  Contact Name: Purvi Quiorga  Phone Number: [phone]

## 2023-01-26 ENCOUNTER — PATIENT OUTREACH (OUTPATIENT)
Dept: ADMINISTRATIVE | Facility: CLINIC | Age: 78
End: 2023-01-26
Payer: MEDICARE

## 2023-01-26 DIAGNOSIS — G43.009 MIGRAINE WITHOUT AURA AND WITHOUT STATUS MIGRAINOSUS, NOT INTRACTABLE: ICD-10-CM

## 2023-01-26 NOTE — PROGRESS NOTES
C3 nurse attempted to contact Purvi Quiroga  for a TCC post hospital discharge follow up call. The patient is unable to conduct the call @ this time. The patient requested a callback.    The patient does not have a scheduled HOSFU appointment within 5-7 days post hospital discharge date 01/25/23. Message sent to Physician staff to assist with HOSFU appointment scheduling.

## 2023-01-27 RX ORDER — GABAPENTIN 300 MG/1
600 CAPSULE ORAL 3 TIMES DAILY
Qty: 540 CAPSULE | Refills: 0 | Status: SHIPPED | OUTPATIENT
Start: 2023-01-27 | End: 2023-09-28

## 2023-01-27 NOTE — PROGRESS NOTES
Patient was discharge from [facility] on 01/13/2023 and sent to Skilled Nursing Facility 01/13/23 and discharge to home on 01/25/23 with . Do not qualify for a TCC call. No Post Discharge TCC call completed.

## 2023-02-02 ENCOUNTER — OFFICE VISIT (OUTPATIENT)
Dept: INTERNAL MEDICINE | Facility: CLINIC | Age: 78
End: 2023-02-02
Payer: MEDICARE

## 2023-02-02 ENCOUNTER — LAB VISIT (OUTPATIENT)
Dept: LAB | Facility: HOSPITAL | Age: 78
End: 2023-02-02
Attending: INTERNAL MEDICINE
Payer: MEDICARE

## 2023-02-02 VITALS
OXYGEN SATURATION: 98 % | DIASTOLIC BLOOD PRESSURE: 62 MMHG | HEART RATE: 81 BPM | HEIGHT: 55 IN | TEMPERATURE: 98 F | WEIGHT: 248.25 LBS | RESPIRATION RATE: 17 BRPM | BODY MASS INDEX: 57.45 KG/M2 | SYSTOLIC BLOOD PRESSURE: 118 MMHG

## 2023-02-02 DIAGNOSIS — Z09 HOSPITAL DISCHARGE FOLLOW-UP: Primary | ICD-10-CM

## 2023-02-02 DIAGNOSIS — E66.01 MORBID OBESITY: ICD-10-CM

## 2023-02-02 DIAGNOSIS — M79.89 SWELLING OF LOWER EXTREMITY: ICD-10-CM

## 2023-02-02 DIAGNOSIS — I20.89 ANGINAL EQUIVALENT: ICD-10-CM

## 2023-02-02 DIAGNOSIS — J11.1 FLU: ICD-10-CM

## 2023-02-02 DIAGNOSIS — N18.31 STAGE 3A CHRONIC KIDNEY DISEASE: Chronic | ICD-10-CM

## 2023-02-02 DIAGNOSIS — J45.909 ASTHMA, UNSPECIFIED ASTHMA SEVERITY, UNSPECIFIED WHETHER COMPLICATED, UNSPECIFIED WHETHER PERSISTENT: ICD-10-CM

## 2023-02-02 LAB
ALBUMIN SERPL BCP-MCNC: 3.6 G/DL (ref 3.5–5.2)
ALP SERPL-CCNC: 90 U/L (ref 55–135)
ALT SERPL W/O P-5'-P-CCNC: 14 U/L (ref 10–44)
ANION GAP SERPL CALC-SCNC: 11 MMOL/L (ref 8–16)
AST SERPL-CCNC: 13 U/L (ref 10–40)
BILIRUB SERPL-MCNC: 0.6 MG/DL (ref 0.1–1)
BUN SERPL-MCNC: 26 MG/DL (ref 8–23)
CALCIUM SERPL-MCNC: 9.8 MG/DL (ref 8.7–10.5)
CHLORIDE SERPL-SCNC: 103 MMOL/L (ref 95–110)
CO2 SERPL-SCNC: 26 MMOL/L (ref 23–29)
CREAT SERPL-MCNC: 1.1 MG/DL (ref 0.5–1.4)
EST. GFR  (NO RACE VARIABLE): 51.4 ML/MIN/1.73 M^2
GLUCOSE SERPL-MCNC: 94 MG/DL (ref 70–110)
MAGNESIUM SERPL-MCNC: 2.2 MG/DL (ref 1.6–2.6)
POTASSIUM SERPL-SCNC: 4.4 MMOL/L (ref 3.5–5.1)
PROT SERPL-MCNC: 6.7 G/DL (ref 6–8.4)
SODIUM SERPL-SCNC: 140 MMOL/L (ref 136–145)

## 2023-02-02 PROCEDURE — 1126F PR PAIN SEVERITY QUANTIFIED, NO PAIN PRESENT: ICD-10-PCS | Mod: CPTII,S$GLB,, | Performed by: INTERNAL MEDICINE

## 2023-02-02 PROCEDURE — 83735 ASSAY OF MAGNESIUM: CPT | Performed by: INTERNAL MEDICINE

## 2023-02-02 PROCEDURE — 99214 PR OFFICE/OUTPT VISIT, EST, LEVL IV, 30-39 MIN: ICD-10-PCS | Mod: S$GLB,,, | Performed by: INTERNAL MEDICINE

## 2023-02-02 PROCEDURE — 1101F PR PT FALLS ASSESS DOC 0-1 FALLS W/OUT INJ PAST YR: ICD-10-PCS | Mod: CPTII,S$GLB,, | Performed by: INTERNAL MEDICINE

## 2023-02-02 PROCEDURE — 80053 COMPREHEN METABOLIC PANEL: CPT | Performed by: INTERNAL MEDICINE

## 2023-02-02 PROCEDURE — 1101F PT FALLS ASSESS-DOCD LE1/YR: CPT | Mod: CPTII,S$GLB,, | Performed by: INTERNAL MEDICINE

## 2023-02-02 PROCEDURE — 1159F MED LIST DOCD IN RCRD: CPT | Mod: CPTII,S$GLB,, | Performed by: INTERNAL MEDICINE

## 2023-02-02 PROCEDURE — 3288F FALL RISK ASSESSMENT DOCD: CPT | Mod: CPTII,S$GLB,, | Performed by: INTERNAL MEDICINE

## 2023-02-02 PROCEDURE — 1160F RVW MEDS BY RX/DR IN RCRD: CPT | Mod: CPTII,S$GLB,, | Performed by: INTERNAL MEDICINE

## 2023-02-02 PROCEDURE — 1126F AMNT PAIN NOTED NONE PRSNT: CPT | Mod: CPTII,S$GLB,, | Performed by: INTERNAL MEDICINE

## 2023-02-02 PROCEDURE — 36415 COLL VENOUS BLD VENIPUNCTURE: CPT | Mod: PO | Performed by: INTERNAL MEDICINE

## 2023-02-02 PROCEDURE — 3074F SYST BP LT 130 MM HG: CPT | Mod: CPTII,S$GLB,, | Performed by: INTERNAL MEDICINE

## 2023-02-02 PROCEDURE — 1160F PR REVIEW ALL MEDS BY PRESCRIBER/CLIN PHARMACIST DOCUMENTED: ICD-10-PCS | Mod: CPTII,S$GLB,, | Performed by: INTERNAL MEDICINE

## 2023-02-02 PROCEDURE — 3288F PR FALLS RISK ASSESSMENT DOCUMENTED: ICD-10-PCS | Mod: CPTII,S$GLB,, | Performed by: INTERNAL MEDICINE

## 2023-02-02 PROCEDURE — 99999 PR PBB SHADOW E&M-EST. PATIENT-LVL III: ICD-10-PCS | Mod: PBBFAC,,, | Performed by: INTERNAL MEDICINE

## 2023-02-02 PROCEDURE — 3078F PR MOST RECENT DIASTOLIC BLOOD PRESSURE < 80 MM HG: ICD-10-PCS | Mod: CPTII,S$GLB,, | Performed by: INTERNAL MEDICINE

## 2023-02-02 PROCEDURE — 3074F PR MOST RECENT SYSTOLIC BLOOD PRESSURE < 130 MM HG: ICD-10-PCS | Mod: CPTII,S$GLB,, | Performed by: INTERNAL MEDICINE

## 2023-02-02 PROCEDURE — 99999 PR PBB SHADOW E&M-EST. PATIENT-LVL III: CPT | Mod: PBBFAC,,, | Performed by: INTERNAL MEDICINE

## 2023-02-02 PROCEDURE — 3078F DIAST BP <80 MM HG: CPT | Mod: CPTII,S$GLB,, | Performed by: INTERNAL MEDICINE

## 2023-02-02 PROCEDURE — 99214 OFFICE O/P EST MOD 30 MIN: CPT | Mod: S$GLB,,, | Performed by: INTERNAL MEDICINE

## 2023-02-02 PROCEDURE — 1159F PR MEDICATION LIST DOCUMENTED IN MEDICAL RECORD: ICD-10-PCS | Mod: CPTII,S$GLB,, | Performed by: INTERNAL MEDICINE

## 2023-02-02 RX ORDER — AMLODIPINE BESYLATE 5 MG/1
5 TABLET ORAL
COMMUNITY
Start: 2023-01-25 | End: 2023-10-09

## 2023-02-02 RX ORDER — FUROSEMIDE 40 MG/1
40 TABLET ORAL 2 TIMES DAILY
Qty: 180 TABLET | Refills: 3 | Status: SHIPPED | OUTPATIENT
Start: 2023-02-02 | End: 2024-02-02

## 2023-02-02 RX ORDER — FUROSEMIDE 20 MG/1
20 TABLET ORAL
COMMUNITY
Start: 2022-11-13 | End: 2023-02-02

## 2023-02-02 RX ORDER — ONDANSETRON 8 MG/1
TABLET, ORALLY DISINTEGRATING ORAL
COMMUNITY
Start: 2023-01-09 | End: 2023-05-01

## 2023-02-02 NOTE — PROGRESS NOTES
"Subjective:       Patient ID: Purvi Quiroga is a 78 y.o. female.    Chief Complaint: Hospital Follow Up    HPI    78-year-old female here for hospital follow-up.  Discharge a week ago.    Family and/or Caretaker present at visit?  No.  Diagnostic tests reviewed/disposition: I have reviewed all completed as well as pending diagnostic tests at the time of discharge.  Disease/illness education: Flu  Home health/community services discussion/referrals: Patient has home health established at [facility] (or something like that) .   Establishment or re-establishment of referral orders for community resources: No other necessary community resources.   Discussion with other health care providers: No discussion with other health care providers necessary.  I reviewed and reconciled the medications from hospital discharge.    Patient went to the emergency is cough, nausea, weakness.  Patient was noted to be flu positive with a negative chest x-ray.  Patient was treated with Tamiflu, nebulizers, and steroids and discharged to [facility].    She is having a lot of issues and problems with asthma.  She has an appointment with Dr. Castro coming up.  She is on breztri and albuterol as needed.  She uses her albuterol inhaler/nebulizer every 2-3 hours.    Her feet swelled up while she was in the hospital and her feet have gone down slowly since she was out of the hospital.  She cannot take the lasix until she is home.  The lasix helps to get rid of the swelling when she takes it.      Discharge summary:  Per admitting provider, Dr Mg, "Purvi Quiroga is a 78 y.o. female with CKD 3A, Essential HTN, HLD, LUX, Moderate persistent Asthma (Sees Dr. Castro), GERD presents to the Er with persistent cough, nausea and weakness. Body aches and chills with ?fevers over the past few days. Had been rx oral abx but no improvement. Initially sputum was yellow now frothy. NO Diarrhea. No chest pain. Noted " "worsening PALUMBO/SOB.   CXR in ER with no PNA but noted FLU positive."    Hospital Course:     Patient admitted with asthma exacerbation secondary to flu. She was treated with tamiflu, nebulizer treatments and steroids. She slowly improved. Patient was working with Pt/OT but had some significant weakness. She was discharged to [facility] to continue therapies.    Discharge from [facility]:  Hospital Course: Per Dr. Zamora's H and P:  "Purvi Quiroga is a 78 y.o. female with has a past medical history of Anxiety, Arthritis, Chronic kidney disease, stage 3 (CMS/HCC), Essential hypertension, Gastroesophageal reflux disease, Hiatal hernia with gastroesophageal reflux, Hyperlipemia, Hypertension, Insomnia, Migraine without aura, Moderate persistent asthma, uncomplicated, Morbid obesity (CMS/HCC), LUX (obstructive sleep apnea), and Osteoporosis. who presented to [facility] on 1/13/2023 for debility. Patient admitted to [facility] with asthma exacerbation secondary to flu. She was treated with tamiflu, nebulizer treatments and steroids. She slowly improved. Patient was working with Pt/OT but had some significant weakness." After initial stabilization in the acute care setting patient was transferred to the skilled nursing unit for PT, OT, and general medical management.Overall patient has done well in the skilled nursing unit without any significant adverse events. For full details of inpatient stay please see initial H and P. For additional details please discharge dictation from the acute care setting and the assessment and plan section below.    Review of Systems      Objective:      Physical Exam  Vitals reviewed.   Constitutional:       Appearance: She is well-developed.   HENT:      Head: Normocephalic and atraumatic.      Mouth/Throat:      Pharynx: No oropharyngeal exudate.   Eyes:      General: No scleral icterus.        Right eye: No discharge.         Left eye: No discharge.      Pupils: Pupils are equal, round, and reactive to light. "   Neck:      Thyroid: No thyromegaly.      Trachea: No tracheal deviation.   Cardiovascular:      Rate and Rhythm: Normal rate and regular rhythm.      Heart sounds: Normal heart sounds. No murmur heard.    No friction rub. No gallop.   Pulmonary:      Effort: Pulmonary effort is normal. No respiratory distress.      Breath sounds: Normal breath sounds. No wheezing or rales.   Chest:      Chest wall: No tenderness.   Abdominal:      General: Bowel sounds are normal. There is no distension.      Palpations: Abdomen is soft. There is no mass.      Tenderness: There is no abdominal tenderness. There is no guarding or rebound.   Musculoskeletal:         General: No tenderness. Normal range of motion.      Cervical back: Normal range of motion and neck supple.   Skin:     General: Skin is warm and dry.      Coloration: Skin is not pale.      Findings: No erythema or rash.   Neurological:      Mental Status: She is alert and oriented to person, place, and time.   Psychiatric:         Behavior: Behavior normal.       Assessment:       1. Hospital discharge follow-up    2. Flu    3. Asthma, unspecified asthma severity, unspecified whether complicated, unspecified whether persistent  - Ambulatory referral/consult to Allergy; Future    4. Morbid obesity    5. Anginal equivalent    6. Swelling of lower extremity  - Comprehensive Metabolic Panel; Future  - Magnesium; Future    7. Stage 3a chronic kidney disease      Plan:       1/2.  Completed course of Tamiflu.    3.  Still having trouble with breathing.  Requests 2nd opinion for her asthma.  Refer to allergy.  Continue breztri 160-9-4.8 mcg.  4. Monitor.  5. Monitor.  6. Check CMP, magnesium  7. Monitor.

## 2023-02-03 ENCOUNTER — TELEPHONE (OUTPATIENT)
Dept: SPORTS MEDICINE | Facility: CLINIC | Age: 78
End: 2023-02-03
Payer: MEDICARE

## 2023-02-03 NOTE — TELEPHONE ENCOUNTER
----- Message from Yogesh Michel sent at 2/3/2023  9:55 AM CST -----  Regarding: GEL INJECTIONS  Contact: Self  Pt ask for a call regarding needing to push her gel injections back 1 week.      Contact info   [phone] (byhx)

## 2023-02-03 NOTE — TELEPHONE ENCOUNTER
----- Message from Samreen Mehta sent at 2/3/2023 10:08 AM CST -----  Regarding: PT'S RETURNING A CALL FROM RAMOS REGARDING INJECTIONS  Contact: pt  Confirmed contact info below:  Contact Name: Purvi Reauthier  Phone Number: [phone]

## 2023-02-07 ENCOUNTER — OFFICE VISIT (OUTPATIENT)
Dept: ALLERGY | Facility: CLINIC | Age: 78
End: 2023-02-07
Payer: MEDICARE

## 2023-02-07 VITALS — WEIGHT: 248.25 LBS | OXYGEN SATURATION: 95 % | BODY MASS INDEX: 57.45 KG/M2 | HEART RATE: 91 BPM | HEIGHT: 55 IN

## 2023-02-07 DIAGNOSIS — D72.19 OTHER EOSINOPHILIA: ICD-10-CM

## 2023-02-07 DIAGNOSIS — J31.0 CHRONIC RHINITIS: Primary | ICD-10-CM

## 2023-02-07 DIAGNOSIS — J45.50 SEVERE PERSISTENT ASTHMA WITHOUT COMPLICATION: ICD-10-CM

## 2023-02-07 PROCEDURE — 1159F MED LIST DOCD IN RCRD: CPT | Mod: CPTII,S$GLB,, | Performed by: ALLERGY & IMMUNOLOGY

## 2023-02-07 PROCEDURE — 1126F PR PAIN SEVERITY QUANTIFIED, NO PAIN PRESENT: ICD-10-PCS | Mod: CPTII,S$GLB,, | Performed by: ALLERGY & IMMUNOLOGY

## 2023-02-07 PROCEDURE — 1160F RVW MEDS BY RX/DR IN RCRD: CPT | Mod: CPTII,S$GLB,, | Performed by: ALLERGY & IMMUNOLOGY

## 2023-02-07 PROCEDURE — 99999 PR PBB SHADOW E&M-EST. PATIENT-LVL V: CPT | Mod: PBBFAC,,, | Performed by: ALLERGY & IMMUNOLOGY

## 2023-02-07 PROCEDURE — 1126F AMNT PAIN NOTED NONE PRSNT: CPT | Mod: CPTII,S$GLB,, | Performed by: ALLERGY & IMMUNOLOGY

## 2023-02-07 PROCEDURE — 1159F PR MEDICATION LIST DOCUMENTED IN MEDICAL RECORD: ICD-10-PCS | Mod: CPTII,S$GLB,, | Performed by: ALLERGY & IMMUNOLOGY

## 2023-02-07 PROCEDURE — 99999 PR PBB SHADOW E&M-EST. PATIENT-LVL V: ICD-10-PCS | Mod: PBBFAC,,, | Performed by: ALLERGY & IMMUNOLOGY

## 2023-02-07 PROCEDURE — 99204 OFFICE O/P NEW MOD 45 MIN: CPT | Mod: S$GLB,,, | Performed by: ALLERGY & IMMUNOLOGY

## 2023-02-07 PROCEDURE — 1160F PR REVIEW ALL MEDS BY PRESCRIBER/CLIN PHARMACIST DOCUMENTED: ICD-10-PCS | Mod: CPTII,S$GLB,, | Performed by: ALLERGY & IMMUNOLOGY

## 2023-02-07 PROCEDURE — 99204 PR OFFICE/OUTPT VISIT, NEW, LEVL IV, 45-59 MIN: ICD-10-PCS | Mod: S$GLB,,, | Performed by: ALLERGY & IMMUNOLOGY

## 2023-02-07 RX ORDER — MEPOLIZUMAB 100 MG/ML
100 INJECTION, SOLUTION SUBCUTANEOUS
Qty: 1 ML | Refills: 12 | Status: SHIPPED | OUTPATIENT
Start: 2023-02-07

## 2023-02-07 NOTE — PROGRESS NOTES
Subjective:       Patient ID: Purvi Quiroga is a 78 y.o. female.    Chief Complaint:  Asthma (Cough, SOB, Wheezing) and Allergies (congestion)      79 yo woman presents for consult from Dr Burgos for asthma. She states she has had asthma about 4 years. She was just in hospital with flu and asthma exacerbation but for past year hs not felt controlled. She sees pulm who changed her from Symbicort to Breztri 2 puff BID but still uses albuterol all throughout day, every couple hours. Helps but very short lived. Also uses duo neb through day. Has been on prednisone off and on and helps. Over last year just feels the meds are not enough. She has sinus issues. Mostly when pollen levels are high. Has pressure, congestion, drainage and flare asthma. No food, insect or latex allergy. Does have HTN, osteoporosis, high cholesterol. Labs have shown elevated eos 100-700s in past      Environmental History: see history section for home environment  Review of Systems   Constitutional:  Positive for fatigue. Negative for appetite change, chills and fever.   HENT:  Positive for congestion, postnasal drip, rhinorrhea and sinus pressure. Negative for ear discharge, ear pain, facial swelling, nosebleeds, sneezing, sore throat, trouble swallowing and voice change.    Eyes:  Negative for discharge, redness, itching and visual disturbance.   Respiratory:  Positive for cough, chest tightness, shortness of breath and wheezing. Negative for choking.    Cardiovascular:  Negative for chest pain, palpitations and leg swelling.   Gastrointestinal:  Negative for abdominal distention, abdominal pain, constipation, diarrhea, nausea and vomiting.   Genitourinary:  Negative for difficulty urinating.   Musculoskeletal:  Positive for arthralgias. Negative for gait problem, joint swelling and myalgias.   Skin:  Negative for color change and rash.   Neurological:  Negative for dizziness, syncope, weakness, light-headedness and headaches.    Hematological:  Negative for adenopathy. Does not bruise/bleed easily.   Psychiatric/Behavioral:  Negative for agitation, behavioral problems, confusion and sleep disturbance. The patient is not nervous/anxious.       Objective:      Physical Exam  Vitals and nursing note reviewed.   Constitutional:       General: She is not in acute distress.     Appearance: Normal appearance. She is not ill-appearing.   HENT:      Head: Normocephalic.      Nose: No rhinorrhea.   Eyes:      General:         Right eye: No discharge.         Left eye: No discharge.      Conjunctiva/sclera: Conjunctivae normal.   Pulmonary:      Effort: Pulmonary effort is normal. No respiratory distress.   Abdominal:      General: There is no distension.   Skin:     General: Skin is warm and dry.      Findings: No erythema or rash.   Neurological:      Mental Status: She is alert and oriented to person, place, and time.   Psychiatric:         Mood and Affect: Mood normal.         Behavior: Behavior normal.         Thought Content: Thought content normal.         Judgment: Judgment normal.       Laboratory:   none performed   Assessment:       1. Chronic rhinitis    2. Severe persistent asthma without complication    3. 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

## 2023-02-07 NOTE — PATIENT INSTRUCTIONS
Use the Breztri 2 puffs twice a day as doing  Continue the albuterol inhaler or nebulizer as needed for chest tightness and short of breath    Will start a biologic medications (injections) _ Nucala, Fasenra or Dupixent depending on insurance approval

## 2023-02-09 ENCOUNTER — SPECIALTY PHARMACY (OUTPATIENT)
Dept: PHARMACY | Facility: CLINIC | Age: 78
End: 2023-02-09
Payer: MEDICARE

## 2023-02-09 NOTE — TELEPHONE ENCOUNTER
Patient returned welcome call. Aware [facility] will reach out for update with Nucala. Patient states that she plans on going into the office for first Nucala dose. Will inform assigned rph.

## 2023-02-09 NOTE — TELEPHONE ENCOUNTER
PA submitted via CMM. Key: [id number]    Brit, this is Sayra Mnuguia with [facility].  We are working on your Nucala prescription that your doctor has sent us. We will be working with your insurance to get this approved for you. We will be calling you along the way with updates on your medication.  If you have any questions, you can reach us at [phone].    Welcome call outcome: Left voicemail

## 2023-02-13 NOTE — TELEPHONE ENCOUNTER
Request Reference Number: [id number]. NUCALA INJ 100MG/ML is approved through 08/09/2023    Test claim: $0    Forward to BI

## 2023-02-13 NOTE — TELEPHONE ENCOUNTER
Benefit Investigation    Nucala    [facility]   LIS: Level 1  Estimated copay: $0   Pt is in initial phase  [facility] in network    FA not required. Forward to initial.

## 2023-02-15 ENCOUNTER — TELEPHONE (OUTPATIENT)
Dept: ALLERGY | Facility: CLINIC | Age: 78
End: 2023-02-15
Payer: MEDICARE

## 2023-02-15 ENCOUNTER — SPECIALTY PHARMACY (OUTPATIENT)
Dept: PHARMACY | Facility: CLINIC | Age: 78
End: 2023-02-15
Payer: MEDICARE

## 2023-02-15 DIAGNOSIS — J45.50 SEVERE PERSISTENT ASTHMA, UNCOMPLICATED: Primary | ICD-10-CM

## 2023-02-15 NOTE — TELEPHONE ENCOUNTER
----- Message from Clair Galloway sent at 2/15/2023 12:55 PM CST -----  Regarding: questions about shots  Contact: [phone]  Pt calling in requesting a call back about questions she has about a shot please call to discuss further

## 2023-02-17 NOTE — TELEPHONE ENCOUNTER
Per Tish Veronica LPN pt does not want to proceed with therapy.  Closing out referral of Julianaala at [facility] at this time.

## 2023-02-22 ENCOUNTER — OFFICE VISIT (OUTPATIENT)
Dept: SPORTS MEDICINE | Facility: CLINIC | Age: 78
End: 2023-02-22
Payer: MEDICARE

## 2023-02-22 VITALS — HEIGHT: 55 IN | BODY MASS INDEX: 57.39 KG/M2 | TEMPERATURE: 98 F | WEIGHT: 248 LBS

## 2023-02-22 DIAGNOSIS — M17.0 PRIMARY OSTEOARTHRITIS OF BOTH KNEES: Primary | ICD-10-CM

## 2023-02-22 PROCEDURE — 1125F PR PAIN SEVERITY QUANTIFIED, PAIN PRESENT: ICD-10-PCS | Mod: CPTII,S$GLB,, | Performed by: FAMILY MEDICINE

## 2023-02-22 PROCEDURE — 1160F RVW MEDS BY RX/DR IN RCRD: CPT | Mod: CPTII,S$GLB,, | Performed by: FAMILY MEDICINE

## 2023-02-22 PROCEDURE — 99499 NO LOS: ICD-10-PCS | Mod: S$GLB,,, | Performed by: FAMILY MEDICINE

## 2023-02-22 PROCEDURE — 20611 DRAIN/INJ JOINT/BURSA W/US: CPT | Mod: 50,S$GLB,, | Performed by: FAMILY MEDICINE

## 2023-02-22 PROCEDURE — 1159F PR MEDICATION LIST DOCUMENTED IN MEDICAL RECORD: ICD-10-PCS | Mod: CPTII,S$GLB,, | Performed by: FAMILY MEDICINE

## 2023-02-22 PROCEDURE — 1159F MED LIST DOCD IN RCRD: CPT | Mod: CPTII,S$GLB,, | Performed by: FAMILY MEDICINE

## 2023-02-22 PROCEDURE — 1125F AMNT PAIN NOTED PAIN PRSNT: CPT | Mod: CPTII,S$GLB,, | Performed by: FAMILY MEDICINE

## 2023-02-22 PROCEDURE — 99999 PR PBB SHADOW E&M-EST. PATIENT-LVL IV: ICD-10-PCS | Mod: PBBFAC,,, | Performed by: FAMILY MEDICINE

## 2023-02-22 PROCEDURE — 20611 LARGE JOINT ASPIRATION/INJECTION: BILATERAL KNEE: ICD-10-PCS | Mod: 50,S$GLB,, | Performed by: FAMILY MEDICINE

## 2023-02-22 PROCEDURE — 1160F PR REVIEW ALL MEDS BY PRESCRIBER/CLIN PHARMACIST DOCUMENTED: ICD-10-PCS | Mod: CPTII,S$GLB,, | Performed by: FAMILY MEDICINE

## 2023-02-22 PROCEDURE — 99499 UNLISTED E&M SERVICE: CPT | Mod: S$GLB,,, | Performed by: FAMILY MEDICINE

## 2023-02-22 PROCEDURE — 99999 PR PBB SHADOW E&M-EST. PATIENT-LVL IV: CPT | Mod: PBBFAC,,, | Performed by: FAMILY MEDICINE

## 2023-02-22 PROCEDURE — 3288F FALL RISK ASSESSMENT DOCD: CPT | Mod: CPTII,S$GLB,, | Performed by: FAMILY MEDICINE

## 2023-02-22 PROCEDURE — 1101F PT FALLS ASSESS-DOCD LE1/YR: CPT | Mod: CPTII,S$GLB,, | Performed by: FAMILY MEDICINE

## 2023-02-22 PROCEDURE — 3288F PR FALLS RISK ASSESSMENT DOCUMENTED: ICD-10-PCS | Mod: CPTII,S$GLB,, | Performed by: FAMILY MEDICINE

## 2023-02-22 PROCEDURE — 1101F PR PT FALLS ASSESS DOC 0-1 FALLS W/OUT INJ PAST YR: ICD-10-PCS | Mod: CPTII,S$GLB,, | Performed by: FAMILY MEDICINE

## 2023-02-22 NOTE — PROGRESS NOTES
Euflexxa  bilateral knee 1/3  Lot number: [id number]  Expiration date: 2024-04-07    MEDICAL NECESSITY FOR VISCOSUPPLEMENTATION USE: After thorough evaluation of the patient, I have determined that viscosupplementation treatment is medically necessary. 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 Oral analgesics including NSAIDs have not adequately controlled the patient's symptoms. 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

## 2023-02-22 NOTE — PROCEDURES
"Large Joint Aspiration/Injection: bilateral knee    Date/Time: 2/22/2023 1:15 PM  Performed by: Cj Caruso MD  Authorized by: Cj Caruso MD     Consent Done?:  Yes (Verbal)  Indications:  Pain  Site marked: the procedure site was marked    Timeout: prior to procedure the correct patient, procedure, and site was verified    Prep: patient was prepped and draped in usual sterile fashion      Details:  Needle Size:  20 G  Ultrasonic Guidance for needle placement?: Yes    Images are saved and documented.  Approach:  Lateral  Location:  Knee  Laterality:  Bilateral  Site:  Bilateral knee  Medications (Right):  20 mg sodium hyaluronate (EUFLEXXA) 10 mg/mL(mw 2.4 -3.6 million)  Medications (Left):  20 mg sodium hyaluronate (EUFLEXXA) 10 mg/mL(mw 2.4 -3.6 million)  Patient tolerance:  Patient tolerated the procedure well with no immediate complications     Description of ultrasound utilization for needle guidance:   Ultrasound guidance used for needle localization. Images saved and stored for documentation. The SUPRAPATELLAR BURSA / KNEE JOINT was visualized. Dynamic visualization of the 20g x 3.5" needle was continuous throughout the procedure.   "

## 2023-02-24 PROBLEM — K21.9 GASTROESOPHAGEAL REFLUX DISEASE: Chronic | Status: ACTIVE | Noted: 2021-05-26

## 2023-02-27 NOTE — TELEPHONE ENCOUNTER
Care Due:                  Date            Visit Type   Department     Provider  --------------------------------------------------------------------------------                                [facility] INTERNAL  Last Visit: 02-      FOLLOW UP    MEDICINE       Palomo Burgos                               -                              PRIMARY      [facility] INTERNAL  Next Visit: 03-      CARE ([facility])   MEDICINE       Palomo Burgos                                                            Last  Test          Frequency    Reason                     Performed    Due Date  --------------------------------------------------------------------------------    Lipid Panel.  12 months..  atorvastatin.............  05- 05-    [facility] Embedded Care Gaps. Reference number: [id number]. 2/27/2023   9:27:53 AM CST

## 2023-02-27 NOTE — TELEPHONE ENCOUNTER
Refill Routing Note   Medication(s) are not appropriate for processing by [facility] for the following reason(s):       Drug-disease interaction: venous insufficiency    [facility] action(s):  Defer   Requires labs : Yes    Medication Therapy Plan: Lipid panel due 5/12/23    Appointments  past 12m or future 3m with PCP    Date Provider   Last Visit   2/2/2023 Palomo Burgos MD   Next Visit   3/8/2023 Palomo Burgos MD   ED visits in past 90 days: 1        Note composed:5:43 PM 02/27/2023

## 2023-02-27 NOTE — TELEPHONE ENCOUNTER
----- Message from Petrona Zuniga sent at 2/27/2023  8:13 AM CST -----  Contact: [phone]  Requesting an RX refill or new RX.  Is this a refill or new RX: refill  RX name and strength (copy/paste from chart):  metoprolol tartrate (LOPRESSOR) 50 MG tablet  Is this a 30 day or 90 day RX: 90  Pharmacy name and phone # (copy/paste from chart):    [facility] - [place], [place] - [address] [facility] [address]  Phone: [phone] Fax: [phone]  The doctors have asked that we provide their patients with the following 2 reminders -- prescription refills can take up to 72 hours, and a friendly reminder that in the future you can use your [facility] account to request refills: yes

## 2023-02-28 RX ORDER — METOPROLOL TARTRATE 50 MG/1
50 TABLET ORAL 2 TIMES DAILY
Qty: 180 TABLET | Refills: 3 | Status: SHIPPED | OUTPATIENT
Start: 2023-02-28

## 2023-03-01 ENCOUNTER — OFFICE VISIT (OUTPATIENT)
Dept: SPORTS MEDICINE | Facility: CLINIC | Age: 78
End: 2023-03-01
Payer: MEDICARE

## 2023-03-01 VITALS — WEIGHT: 241 LBS | TEMPERATURE: 98 F | BODY MASS INDEX: 55.78 KG/M2 | HEIGHT: 55 IN

## 2023-03-01 DIAGNOSIS — M17.0 PRIMARY OSTEOARTHRITIS OF BOTH KNEES: Primary | ICD-10-CM

## 2023-03-01 DIAGNOSIS — G89.29 CHRONIC PAIN OF BOTH KNEES: ICD-10-CM

## 2023-03-01 DIAGNOSIS — M25.561 CHRONIC PAIN OF BOTH KNEES: ICD-10-CM

## 2023-03-01 DIAGNOSIS — M25.562 CHRONIC PAIN OF BOTH KNEES: ICD-10-CM

## 2023-03-01 PROCEDURE — 20611 LARGE JOINT ASPIRATION/INJECTION: BILATERAL KNEE: ICD-10-PCS | Mod: 50,S$GLB,, | Performed by: FAMILY MEDICINE

## 2023-03-01 PROCEDURE — 1160F RVW MEDS BY RX/DR IN RCRD: CPT | Mod: CPTII,S$GLB,, | Performed by: FAMILY MEDICINE

## 2023-03-01 PROCEDURE — 1159F PR MEDICATION LIST DOCUMENTED IN MEDICAL RECORD: ICD-10-PCS | Mod: CPTII,S$GLB,, | Performed by: FAMILY MEDICINE

## 2023-03-01 PROCEDURE — 99999 PR PBB SHADOW E&M-EST. PATIENT-LVL IV: CPT | Mod: PBBFAC,,, | Performed by: FAMILY MEDICINE

## 2023-03-01 PROCEDURE — 1101F PT FALLS ASSESS-DOCD LE1/YR: CPT | Mod: CPTII,S$GLB,, | Performed by: FAMILY MEDICINE

## 2023-03-01 PROCEDURE — 1159F MED LIST DOCD IN RCRD: CPT | Mod: CPTII,S$GLB,, | Performed by: FAMILY MEDICINE

## 2023-03-01 PROCEDURE — 1125F PR PAIN SEVERITY QUANTIFIED, PAIN PRESENT: ICD-10-PCS | Mod: CPTII,S$GLB,, | Performed by: FAMILY MEDICINE

## 2023-03-01 PROCEDURE — 20611 DRAIN/INJ JOINT/BURSA W/US: CPT | Mod: 50,S$GLB,, | Performed by: FAMILY MEDICINE

## 2023-03-01 PROCEDURE — 3288F PR FALLS RISK ASSESSMENT DOCUMENTED: ICD-10-PCS | Mod: CPTII,S$GLB,, | Performed by: FAMILY MEDICINE

## 2023-03-01 PROCEDURE — 3288F FALL RISK ASSESSMENT DOCD: CPT | Mod: CPTII,S$GLB,, | Performed by: FAMILY MEDICINE

## 2023-03-01 PROCEDURE — 1160F PR REVIEW ALL MEDS BY PRESCRIBER/CLIN PHARMACIST DOCUMENTED: ICD-10-PCS | Mod: CPTII,S$GLB,, | Performed by: FAMILY MEDICINE

## 2023-03-01 PROCEDURE — 1101F PR PT FALLS ASSESS DOC 0-1 FALLS W/OUT INJ PAST YR: ICD-10-PCS | Mod: CPTII,S$GLB,, | Performed by: FAMILY MEDICINE

## 2023-03-01 PROCEDURE — 1125F AMNT PAIN NOTED PAIN PRSNT: CPT | Mod: CPTII,S$GLB,, | Performed by: FAMILY MEDICINE

## 2023-03-01 PROCEDURE — 99999 PR PBB SHADOW E&M-EST. PATIENT-LVL IV: ICD-10-PCS | Mod: PBBFAC,,, | Performed by: FAMILY MEDICINE

## 2023-03-01 PROCEDURE — 99499 NO LOS: ICD-10-PCS | Mod: S$GLB,,, | Performed by: FAMILY MEDICINE

## 2023-03-01 PROCEDURE — 99499 UNLISTED E&M SERVICE: CPT | Mod: S$GLB,,, | Performed by: FAMILY MEDICINE

## 2023-03-01 NOTE — PROGRESS NOTES
Euflexxa  bilateral knee 2/3  Lot number: [id number]   Expiration date: 2023-12-03    MEDICAL NECESSITY FOR VISCOSUPPLEMENTATION USE: After thorough evaluation of the patient, I have determined that viscosupplementation treatment is medically necessary. 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 Oral analgesics including NSAIDs have not adequately controlled the patient's symptoms. 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

## 2023-03-01 NOTE — PROCEDURES
"Large Joint Aspiration/Injection: bilateral knee    Date/Time: 3/1/2023 1:15 PM  Performed by: Cj Caruso MD  Authorized by: Cj Caruso MD     Consent Done?:  Yes (Verbal)  Indications:  Pain  Site marked: the procedure site was marked    Timeout: prior to procedure the correct patient, procedure, and site was verified    Prep: patient was prepped and draped in usual sterile fashion      Details:  Needle Size:  20 G  Ultrasonic Guidance for needle placement?: Yes    Images are saved and documented.  Approach:  Lateral  Location:  Knee  Laterality:  Bilateral  Site:  Bilateral knee  Medications (Right):  20 mg sodium hyaluronate (EUFLEXXA) 10 mg/mL(mw 2.4 -3.6 million)  Medications (Left):  20 mg sodium hyaluronate (EUFLEXXA) 10 mg/mL(mw 2.4 -3.6 million)  Patient tolerance:  Patient tolerated the procedure well with no immediate complications     Description of ultrasound utilization for needle guidance:   Ultrasound guidance used for needle localization. Images saved and stored for documentation. The SUPRAPATELLAR BURSA / KNEE JOINT was visualized. Dynamic visualization of the 20g x 3.5" needle was continuous throughout the procedure.   "

## 2023-03-09 ENCOUNTER — OFFICE VISIT (OUTPATIENT)
Dept: SPORTS MEDICINE | Facility: CLINIC | Age: 78
End: 2023-03-09
Payer: MEDICARE

## 2023-03-09 VITALS
DIASTOLIC BLOOD PRESSURE: 72 MMHG | HEIGHT: 55 IN | HEART RATE: 72 BPM | WEIGHT: 241 LBS | BODY MASS INDEX: 55.78 KG/M2 | SYSTOLIC BLOOD PRESSURE: 111 MMHG

## 2023-03-09 DIAGNOSIS — M17.0 PRIMARY OSTEOARTHRITIS OF BOTH KNEES: Primary | ICD-10-CM

## 2023-03-09 DIAGNOSIS — G89.29 CHRONIC PAIN OF BOTH KNEES: ICD-10-CM

## 2023-03-09 DIAGNOSIS — M25.562 CHRONIC PAIN OF BOTH KNEES: ICD-10-CM

## 2023-03-09 DIAGNOSIS — M25.561 CHRONIC PAIN OF BOTH KNEES: ICD-10-CM

## 2023-03-09 PROCEDURE — 3288F PR FALLS RISK ASSESSMENT DOCUMENTED: ICD-10-PCS | Mod: CPTII,S$GLB,, | Performed by: FAMILY MEDICINE

## 2023-03-09 PROCEDURE — 1125F PR PAIN SEVERITY QUANTIFIED, PAIN PRESENT: ICD-10-PCS | Mod: CPTII,S$GLB,, | Performed by: FAMILY MEDICINE

## 2023-03-09 PROCEDURE — 20611 LARGE JOINT ASPIRATION/INJECTION: BILATERAL KNEE: ICD-10-PCS | Mod: 50,S$GLB,, | Performed by: FAMILY MEDICINE

## 2023-03-09 PROCEDURE — 20611 DRAIN/INJ JOINT/BURSA W/US: CPT | Mod: 50,S$GLB,, | Performed by: FAMILY MEDICINE

## 2023-03-09 PROCEDURE — 3074F PR MOST RECENT SYSTOLIC BLOOD PRESSURE < 130 MM HG: ICD-10-PCS | Mod: CPTII,S$GLB,, | Performed by: FAMILY MEDICINE

## 2023-03-09 PROCEDURE — 99499 NO LOS: ICD-10-PCS | Mod: S$GLB,,, | Performed by: FAMILY MEDICINE

## 2023-03-09 PROCEDURE — 3074F SYST BP LT 130 MM HG: CPT | Mod: CPTII,S$GLB,, | Performed by: FAMILY MEDICINE

## 2023-03-09 PROCEDURE — 99999 PR PBB SHADOW E&M-EST. PATIENT-LVL IV: CPT | Mod: PBBFAC,,, | Performed by: FAMILY MEDICINE

## 2023-03-09 PROCEDURE — 1125F AMNT PAIN NOTED PAIN PRSNT: CPT | Mod: CPTII,S$GLB,, | Performed by: FAMILY MEDICINE

## 2023-03-09 PROCEDURE — 1160F RVW MEDS BY RX/DR IN RCRD: CPT | Mod: CPTII,S$GLB,, | Performed by: FAMILY MEDICINE

## 2023-03-09 PROCEDURE — 3288F FALL RISK ASSESSMENT DOCD: CPT | Mod: CPTII,S$GLB,, | Performed by: FAMILY MEDICINE

## 2023-03-09 PROCEDURE — 99499 UNLISTED E&M SERVICE: CPT | Mod: S$GLB,,, | Performed by: FAMILY MEDICINE

## 2023-03-09 PROCEDURE — 1160F PR REVIEW ALL MEDS BY PRESCRIBER/CLIN PHARMACIST DOCUMENTED: ICD-10-PCS | Mod: CPTII,S$GLB,, | Performed by: FAMILY MEDICINE

## 2023-03-09 PROCEDURE — 1159F PR MEDICATION LIST DOCUMENTED IN MEDICAL RECORD: ICD-10-PCS | Mod: CPTII,S$GLB,, | Performed by: FAMILY MEDICINE

## 2023-03-09 PROCEDURE — 3078F DIAST BP <80 MM HG: CPT | Mod: CPTII,S$GLB,, | Performed by: FAMILY MEDICINE

## 2023-03-09 PROCEDURE — 3078F PR MOST RECENT DIASTOLIC BLOOD PRESSURE < 80 MM HG: ICD-10-PCS | Mod: CPTII,S$GLB,, | Performed by: FAMILY MEDICINE

## 2023-03-09 PROCEDURE — 99999 PR PBB SHADOW E&M-EST. PATIENT-LVL IV: ICD-10-PCS | Mod: PBBFAC,,, | Performed by: FAMILY MEDICINE

## 2023-03-09 PROCEDURE — 1101F PT FALLS ASSESS-DOCD LE1/YR: CPT | Mod: CPTII,S$GLB,, | Performed by: FAMILY MEDICINE

## 2023-03-09 PROCEDURE — 1159F MED LIST DOCD IN RCRD: CPT | Mod: CPTII,S$GLB,, | Performed by: FAMILY MEDICINE

## 2023-03-09 PROCEDURE — 1101F PR PT FALLS ASSESS DOC 0-1 FALLS W/OUT INJ PAST YR: ICD-10-PCS | Mod: CPTII,S$GLB,, | Performed by: FAMILY MEDICINE

## 2023-03-09 NOTE — PROGRESS NOTES
Euflexxa  bilateral knee 3/3  Lot number: [id number]  Expiration date: 2024-04-07    MEDICAL NECESSITY FOR VISCOSUPPLEMENTATION USE: After thorough evaluation of the patient, I have determined that viscosupplementation treatment is medically necessary. 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 Oral analgesics including NSAIDs have not adequately controlled the patient's symptoms. 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

## 2023-03-09 NOTE — PROCEDURES
"Large Joint Aspiration/Injection: bilateral knee    Date/Time: 3/9/2023 1:15 PM  Performed by: Cj Caruso MD  Authorized by: Cj Caruso MD     Consent Done?:  Yes (Verbal)  Indications:  Pain  Site marked: the procedure site was marked    Timeout: prior to procedure the correct patient, procedure, and site was verified    Prep: patient was prepped and draped in usual sterile fashion      Details:  Needle Size:  20 G  Ultrasonic Guidance for needle placement?: Yes    Images are saved and documented.  Approach:  Lateral  Location:  Knee  Laterality:  Bilateral  Site:  Bilateral knee  Medications (Right):  20 mg sodium hyaluronate (EUFLEXXA) 10 mg/mL(mw 2.4 -3.6 million)  Medications (Left):  20 mg sodium hyaluronate (EUFLEXXA) 10 mg/mL(mw 2.4 -3.6 million)  Patient tolerance:  Patient tolerated the procedure well with no immediate complications     Description of ultrasound utilization for needle guidance:   Ultrasound guidance used for needle localization. Images saved and stored for documentation. The SUPRAPATELLAR BURSA / KNEE JOINT was visualized. Dynamic visualization of the 20g x 3.5" needle was continuous throughout the procedure.   "

## 2023-03-22 ENCOUNTER — TELEPHONE (OUTPATIENT)
Dept: SPORTS MEDICINE | Facility: CLINIC | Age: 78
End: 2023-03-22
Payer: MEDICARE

## 2023-05-01 DIAGNOSIS — R11.0 NAUSEA: ICD-10-CM

## 2023-05-01 RX ORDER — ONDANSETRON 8 MG/1
TABLET, ORALLY DISINTEGRATING ORAL
Qty: 30 TABLET | Refills: 2 | Status: SHIPPED | OUTPATIENT
Start: 2023-05-01 | End: 2024-01-08

## 2023-05-01 NOTE — TELEPHONE ENCOUNTER
Care Due:                  Date            Visit Type   Department     Provider  --------------------------------------------------------------------------------                                [facility] INTER[facility]  Last Visit: 02-      FOLLOW UP    MEDICINE       Palomo Burgos                               -                              PRIMARY      [facility] INTERNAL  Next Visit: 05-      CARE ([facility])   MEDICINE       Palomo Burgos                                                            Last  Test          Frequency    Reason                     Performed    Due Date  --------------------------------------------------------------------------------    Lipid Panel.  12 months..  atorvastatin.............  05- 05-    [facility] Embedded Care Due Messages. Reference number: [id number].   5/01/2023 9:39:30 AM CDT

## 2023-05-24 RX ORDER — AMLODIPINE BESYLATE 10 MG/1
TABLET ORAL
Qty: 90 TABLET | Refills: 2 | Status: SHIPPED | OUTPATIENT
Start: 2023-05-24

## 2023-06-02 ENCOUNTER — TELEPHONE (OUTPATIENT)
Dept: SPORTS MEDICINE | Facility: CLINIC | Age: 78
End: 2023-06-02
Payer: MEDICARE

## 2023-06-02 NOTE — TELEPHONE ENCOUNTER
Spoke c pt. Due to transportation issues, she wanted to begin her renal therapy the same day as her next appt c Dr. Caruso on 06/12/23. Advised that she will need to reach out to her PCP or nephrologist regarding her kidney treatment as  is not treating her for that condition. Offered to send message to her PCP, Dr. Burgos, she stated that that was not necessary. Confirmed upcoming appt date, time, location c Dr. Caruso. Pt will call c additional questions/concerns in interim. Pt expressed understanding & was thankful.

## 2023-06-02 NOTE — TELEPHONE ENCOUNTER
----- Message from Jennyfer Riddle sent at 6/2/2023  2:41 PM CDT -----  Contact: pt [phone]  Pt is calling to speak with someone in provider office in regards to the nurse getting approval for renal therapy approved by [facility] for the date of 06/12/23 which is the same date that she sees the provider pt states she relies on transportation and this would be helpful for her even if she has to change her appt time with the provider pt is asking for a return call please call pt at  [phone]

## 2023-06-08 RX ORDER — TRAZODONE HYDROCHLORIDE 50 MG/1
50 TABLET ORAL NIGHTLY
Qty: 90 TABLET | Refills: 2 | Status: SHIPPED | OUTPATIENT
Start: 2023-06-08 | End: 2023-06-08 | Stop reason: SDUPTHER

## 2023-06-08 RX ORDER — TRAZODONE HYDROCHLORIDE 50 MG/1
50 TABLET ORAL NIGHTLY
Qty: 90 TABLET | Refills: 2 | Status: SHIPPED | OUTPATIENT
Start: 2023-06-08

## 2023-06-08 NOTE — TELEPHONE ENCOUNTER
No care due was identified.  [facility] Embedded Care Due Messages. Reference number: [id number].   6/08/2023 10:19:57 AM CDT

## 2023-06-08 NOTE — TELEPHONE ENCOUNTER
----- Message from Camilla Blevins sent at 6/8/2023  8:48 AM CDT -----  Contact: Pt [phone]  Requesting an RX refill or new RX.  Is this a refill or new RX: refill   RX name and strength (copy/paste from chart):  traZODone (DESYREL) 100 MG tablet  Is this a 30 day or 90 day RX:   Pharmacy name and phone # (copy/paste from chart):  [facility] - [place], [place] - [address]   Phone:  [phone]  Fax:  [phone]        The doctors have asked that we provide their patients with the following 2 reminders -- prescription refills can take up to 72 hours, and a friendly reminder that in the future you can use your [facility] account to request refills: yes     Pt states she would like to increase the dosage to 100 mg she has been taking 2 due to one was not helping

## 2023-06-08 NOTE — TELEPHONE ENCOUNTER
No care due was identified.  [facility] Embedded Care Due Messages. Reference number: [id number].   6/08/2023 9:07:54 AM CDT

## 2023-06-08 NOTE — TELEPHONE ENCOUNTER
----- Message from Jacquelyn Macedo sent at 6/8/2023  9:46 AM CDT -----  Contact: [phone]  PLEASE SEND Rx raZODone (DESYREL) 50 MG tablet 90 tablet 2 6/8/2023  No  Sig - Route: Take 1 tablet (50 mg total) by mouth every evening. - Oral  Sent to pharmacy as: traZODone (DESYREL) 50 MG tablet    TO PHARMACY       [facility] - [place], [place] - [address] [facility] [address]  Phone: [phone] Fax: [phone]      ---------------------------------------------------------------------------------------------------------------------------------------      traZODone (DESYREL) 50 MG tablet 90 tablet 2 6/8/2023  No  Sig - Route: Take 1 tablet (50 mg total) by mouth every evening. - Oral  Sent to pharmacy as: traZODone (DESYREL) 50 MG tablet  Class: Normal  Order: [id number]  Date/Time Signed: 6/8/2023 09:09      E-Prescribing Status: Receipt confirmed by pharmacy (6/8/2023  9:09 AM CDT)    Pharmacy      [facility] - [place], [place] - [address]

## 2023-06-15 RX ORDER — MONTELUKAST SODIUM 10 MG/1
TABLET ORAL
Qty: 90 TABLET | Refills: 2 | Status: SHIPPED | OUTPATIENT
Start: 2023-06-15 | End: 2024-04-01

## 2023-06-15 NOTE — TELEPHONE ENCOUNTER
Refill Decision Note   Purvi Quiroga  is requesting a refill authorization.  Brief Assessment and Rationale for Refill:  Approve     Medication Therapy Plan:         Comments:     Note composed:2:06 PM 06/15/2023             Appointments     Last Visit   2/2/2023 Palomo Burgos MD   Next Visit   Visit date not found Palomo Burgos MD

## 2023-06-15 NOTE — TELEPHONE ENCOUNTER
No care due was identified.  [facility] Embedded Care Due Messages. Reference number: [id number].   6/15/2023 1:45:15 PM CDT

## 2023-06-16 DIAGNOSIS — I10 ESSENTIAL HYPERTENSION: Primary | ICD-10-CM

## 2023-06-16 DIAGNOSIS — R73.01 IMPAIRED FASTING BLOOD SUGAR: ICD-10-CM

## 2023-06-16 DIAGNOSIS — E78.00 PURE HYPERCHOLESTEROLEMIA: ICD-10-CM

## 2023-06-16 RX ORDER — MONTELUKAST SODIUM 10 MG/1
TABLET ORAL
Qty: 90 TABLET | Refills: 3 | OUTPATIENT
Start: 2023-06-16

## 2023-06-16 NOTE — TELEPHONE ENCOUNTER
No care due was identified.  [facility] Embedded Care Due Messages. Reference number: [id number].   6/16/2023 8:55:31 AM CDT

## 2023-06-16 NOTE — TELEPHONE ENCOUNTER
Refill Decision Note   Purvi Quiroga  is requesting a refill authorization.  Brief Assessment and Rationale for Refill:  Quick Discontinue     Medication Therapy Plan:       Medication Reconciliation Completed: No   Comments:     No Care Gaps recommended.     Duplicate Pended Encounter(s)/ Last Prescribed Details:    Pharmacy    [facility] - [place], [place] - [address]   [address], [facility] [address]   Phone:  [phone]  Fax:  [phone]   FLORES #:  --   JOSEPHINE Reason: --     Outpatient Medication Detail     Disp Refills Start End JOSEPHINE   montelukast (SINGULAIR) 10 mg tablet 90 tablet 2 6/15/2023  No   Sig: TAKE 1 TABLET BY MOUTH IN  THE EVENING   Sent to pharmacy as: montelukast (SINGULAIR) 10 mg tablet   Class: Normal   Notes to Pharmacy: Requesting 1 year supply   Order: [id number]   Cosign for Ordering: Accepted by Palomo Burgos MD on 6/15/2023  2:11 PM   Date/Time Signed: 6/15/2023 14:06       E-Prescribing Status: Receipt confirmed by pharmacy (6/15/2023  2:06 PM CDT)     Ordering Encounter Report    Associated Reports   View Encounter            Note composed:11:22 AM 06/16/2023   Note composed:11:22 AM 06/16/2023

## 2023-08-07 ENCOUNTER — TELEPHONE (OUTPATIENT)
Dept: INTERNAL MEDICINE | Facility: CLINIC | Age: 78
End: 2023-08-07
Payer: MEDICARE

## 2023-08-07 NOTE — TELEPHONE ENCOUNTER
----- Message from Kassidy Rees sent at 8/7/2023  1:37 PM CDT -----  Contact: pt [phone]  Requesting that her lab orders be sent to [facility].    Also requests a call.    Thank you

## 2023-08-22 ENCOUNTER — TELEPHONE (OUTPATIENT)
Dept: SPORTS MEDICINE | Facility: CLINIC | Age: 78
End: 2023-08-22
Payer: MEDICARE

## 2023-08-22 DIAGNOSIS — M17.0 PRIMARY OSTEOARTHRITIS OF BOTH KNEES: Primary | ICD-10-CM

## 2023-08-22 NOTE — TELEPHONE ENCOUNTER
Spoke c pt. Scheduled R/L knee Euflexxa series. Confirmed appt date, time, location. Pt will call c additional questions/concerns in interim. Pt expressed understanding & was thankful.    Appt info mailed per pt's request.

## 2023-08-22 NOTE — TELEPHONE ENCOUNTER
----- Message from Silvia Pritchett sent at 8/22/2023  2:47 PM CDT -----  Regarding: pt called  Name of Who is Calling: JASVIR QUARLES [[id number]]      What is the request in detail: pt is requesting to see if its time for her 6 month injection for both knees . She would like an appt. Please advise       Can the clinic reply by [facility]: No      What Number to Call Back if not in [facility]: Telephone Information:         [phone]

## 2023-08-27 ENCOUNTER — PATIENT MESSAGE (OUTPATIENT)
Dept: INTERNAL MEDICINE | Facility: CLINIC | Age: 78
End: 2023-08-27
Payer: MEDICARE

## 2023-08-30 NOTE — PROGRESS NOTES
Euflexxa knee joint bilateral 1/3  Lot number: [id number]  Expiration date: 09/07/24    MEDICAL NECESSITY FOR VISCOSUPPLEMENTATION USE: After thorough evaluation of the patient, I have determined that viscosupplementation treatment is medically necessary. 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 Oral analgesics including NSAIDs have not adequately controlled the patient's symptoms. 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

## 2023-08-31 RX ORDER — ALBUTEROL SULFATE 90 UG/1
AEROSOL, METERED RESPIRATORY (INHALATION)
Qty: 8.5 G | Refills: 1 | Status: SHIPPED | OUTPATIENT
Start: 2023-08-31 | End: 2023-08-31 | Stop reason: SDUPTHER

## 2023-08-31 RX ORDER — ALBUTEROL SULFATE 90 UG/1
AEROSOL, METERED RESPIRATORY (INHALATION)
Qty: 8.5 G | Refills: 5 | Status: SHIPPED | OUTPATIENT
Start: 2023-08-31

## 2023-08-31 NOTE — TELEPHONE ENCOUNTER
----- Message from Melodie Pleitez sent at 8/31/2023  1:51 PM CDT -----  Contact: [phone]  Requesting an RX refill or new RX.  Is this a refill or new RX: refill  RX name and strength (copy/paste from chart): albuterol (PROVENTIL/VENTOLIN HFA) 90 mcg/actuation inhaler   Is this a 30 day or 90 day RX: 30  Pharmacy name and phone # (copy/paste from chart):      [facility] - [place], [place] - [address] [facility] [address]  Phone: [phone] Fax: [phone]       The doctors have asked that we provide their patients with the following 2 reminders -- prescription refills can take up to 72 hours, and a friendly reminder that in the future you can use your [facility] account to request refills: yes

## 2023-08-31 NOTE — TELEPHONE ENCOUNTER
No care due was identified.  [facility] Embedded Care Due Messages. Reference number: [id number].   8/31/2023 2:23:15 PM CDT

## 2023-08-31 NOTE — TELEPHONE ENCOUNTER
No care due was identified.  [facility] Embedded Care Due Messages. Reference number: [id number].   8/31/2023 2:00:36 PM CDT

## 2023-09-12 ENCOUNTER — OFFICE VISIT (OUTPATIENT)
Dept: SPORTS MEDICINE | Facility: CLINIC | Age: 78
End: 2023-09-12
Payer: MEDICARE

## 2023-09-12 VITALS — HEIGHT: 55 IN | BODY MASS INDEX: 53.92 KG/M2 | TEMPERATURE: 98 F | WEIGHT: 233 LBS

## 2023-09-12 DIAGNOSIS — M17.0 PRIMARY OSTEOARTHRITIS OF BOTH KNEES: Primary | ICD-10-CM

## 2023-09-12 PROCEDURE — 99999 PR PBB SHADOW E&M-EST. PATIENT-LVL II: ICD-10-PCS | Mod: PBBFAC,,, | Performed by: FAMILY MEDICINE

## 2023-09-12 PROCEDURE — 99499 NO LOS: ICD-10-PCS | Mod: S$GLB,,, | Performed by: FAMILY MEDICINE

## 2023-09-12 PROCEDURE — 20611 DRAIN/INJ JOINT/BURSA W/US: CPT | Mod: 50,S$GLB,, | Performed by: FAMILY MEDICINE

## 2023-09-12 PROCEDURE — 99499 UNLISTED E&M SERVICE: CPT | Mod: S$GLB,,, | Performed by: FAMILY MEDICINE

## 2023-09-12 PROCEDURE — 99999 PR PBB SHADOW E&M-EST. PATIENT-LVL II: CPT | Mod: PBBFAC,,, | Performed by: FAMILY MEDICINE

## 2023-09-12 PROCEDURE — 20611 LARGE JOINT ASPIRATION/INJECTION: BILATERAL KNEE: ICD-10-PCS | Mod: 50,S$GLB,, | Performed by: FAMILY MEDICINE

## 2023-09-12 NOTE — PROCEDURES
"Large Joint Aspiration/Injection: bilateral knee    Date/Time: 9/12/2023 11:30 AM    Performed by: Cj Caruso MD  Authorized by: Cj Caruso MD    Consent Done?:  Yes (Verbal)  Indications:  Pain  Site marked: the procedure site was marked    Timeout: prior to procedure the correct patient, procedure, and site was verified    Prep: patient was prepped and draped in usual sterile fashion      Details:  Needle Size:  25 G  Ultrasonic Guidance for needle placement?: Yes    Images are saved and documented.  Approach:  Lateral  Location:  Knee  Laterality:  Bilateral  Site:  Bilateral knee  Medications (Right):  10 mg sodium hyaluronate (EUFLEXXA) 10 mg/mL(mw 2.4 -3.6 million)  Medications (Left):  10 mg sodium hyaluronate (EUFLEXXA) 10 mg/mL(mw 2.4 -3.6 million)  Patient tolerance:  Patient tolerated the procedure well with no immediate complications     Description of ultrasound utilization for needle guidance:   Ultrasound guidance used for needle localization. Images saved and stored for documentation. The SUPRAPATELLAR BURSA / KNEE JOINT was visualized. Dynamic visualization of the 25g x 1.5" needle was continuous throughout the procedure.     "

## 2023-09-13 NOTE — PROGRESS NOTES
Euflexxa knee joint bilateral 2/3  Lot number: [id number]  Expiration date: 09/15/2024    MEDICAL NECESSITY FOR VISCOSUPPLEMENTATION USE: After thorough evaluation of the patient, I have determined that viscosupplementation treatment is medically necessary. 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 Oral analgesics including NSAIDs have not adequately controlled the patient's symptoms. 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

## 2023-09-18 ENCOUNTER — TELEPHONE (OUTPATIENT)
Dept: SPORTS MEDICINE | Facility: CLINIC | Age: 78
End: 2023-09-18
Payer: MEDICARE

## 2023-09-18 NOTE — TELEPHONE ENCOUNTER
----- Message from Seema Marroquin sent at 9/18/2023 11:46 AM CDT -----  Regarding: injection appt  Contact: pt  Pt calling to reschedule her injection for next Tuesday of the following week     Confirmed patient's contact info below:  Contact Name: Purvi Quiroga  Phone Number: [phone]

## 2023-09-19 NOTE — PROGRESS NOTES
Euflexxa knee joint bilateral 3/3  Lot number: [id number]  Expiration date: 09/15/2024    MEDICAL NECESSITY FOR VISCOSUPPLEMENTATION USE: After thorough evaluation of the patient, I have determined that viscosupplementation treatment is medically necessary. 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 Oral analgesics including NSAIDs have not adequately controlled the patient's symptoms. 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

## 2023-09-20 DIAGNOSIS — Z78.0 MENOPAUSE: ICD-10-CM

## 2023-09-20 RX ORDER — IRBESARTAN 150 MG/1
TABLET ORAL
Qty: 180 TABLET | Refills: 1 | Status: SHIPPED | OUTPATIENT
Start: 2023-09-20

## 2023-09-20 NOTE — TELEPHONE ENCOUNTER
Refill Decision Note   Purvi Quiroga  is requesting a refill authorization.  Brief Assessment and Rationale for Refill:  Approve     Medication Therapy Plan:         Comments:     Note composed:8:15 AM 09/20/2023             Appointments     Last Visit   2/2/2023 Palomo Burgos MD   Next Visit   10/5/2023 Palomo Burgos MD

## 2023-09-20 NOTE — TELEPHONE ENCOUNTER
No care due was identified.  [facility] Embedded Care Due Messages. Reference number: [id number].   9/20/2023 7:18:55 AM CDT

## 2023-09-26 ENCOUNTER — OFFICE VISIT (OUTPATIENT)
Dept: SPORTS MEDICINE | Facility: CLINIC | Age: 78
End: 2023-09-26
Payer: MEDICARE

## 2023-09-26 VITALS — WEIGHT: 231.5 LBS | BODY MASS INDEX: 53.58 KG/M2 | TEMPERATURE: 98 F | HEIGHT: 55 IN

## 2023-09-26 DIAGNOSIS — M17.0 PRIMARY OSTEOARTHRITIS OF BOTH KNEES: Primary | ICD-10-CM

## 2023-09-26 PROCEDURE — 99999 PR PBB SHADOW E&M-EST. PATIENT-LVL IV: CPT | Mod: PBBFAC,,, | Performed by: FAMILY MEDICINE

## 2023-09-26 PROCEDURE — 99499 UNLISTED E&M SERVICE: CPT | Mod: S$GLB,,, | Performed by: FAMILY MEDICINE

## 2023-09-26 PROCEDURE — 99999 PR PBB SHADOW E&M-EST. PATIENT-LVL IV: ICD-10-PCS | Mod: PBBFAC,,, | Performed by: FAMILY MEDICINE

## 2023-09-26 PROCEDURE — 99499 NO LOS: ICD-10-PCS | Mod: S$GLB,,, | Performed by: FAMILY MEDICINE

## 2023-09-28 DIAGNOSIS — G43.009 MIGRAINE WITHOUT AURA AND WITHOUT STATUS MIGRAINOSUS, NOT INTRACTABLE: ICD-10-CM

## 2023-09-28 RX ORDER — GABAPENTIN 300 MG/1
600 CAPSULE ORAL 3 TIMES DAILY
Qty: 540 CAPSULE | Refills: 0 | Status: SHIPPED | OUTPATIENT
Start: 2023-09-28 | End: 2024-04-01

## 2023-09-28 NOTE — TELEPHONE ENCOUNTER
No care due was identified.  [facility] Embedded Care Due Messages. Reference number: [id number].   9/28/2023 12:34:10 PM CDT

## 2023-10-03 ENCOUNTER — OFFICE VISIT (OUTPATIENT)
Dept: SPORTS MEDICINE | Facility: CLINIC | Age: 78
End: 2023-10-03
Payer: MEDICARE

## 2023-10-03 VITALS — BODY MASS INDEX: 53.46 KG/M2 | TEMPERATURE: 98 F | HEIGHT: 55 IN | WEIGHT: 231 LBS

## 2023-10-03 DIAGNOSIS — M17.0 PRIMARY OSTEOARTHRITIS OF BOTH KNEES: Primary | ICD-10-CM

## 2023-10-03 PROCEDURE — 99999 PR PBB SHADOW E&M-EST. PATIENT-LVL II: ICD-10-PCS | Mod: PBBFAC,,, | Performed by: FAMILY MEDICINE

## 2023-10-03 PROCEDURE — 99499 NO LOS: ICD-10-PCS | Mod: S$GLB,,, | Performed by: FAMILY MEDICINE

## 2023-10-03 PROCEDURE — 99499 UNLISTED E&M SERVICE: CPT | Mod: S$GLB,,, | Performed by: FAMILY MEDICINE

## 2023-10-03 PROCEDURE — 20611 LARGE JOINT ASPIRATION/INJECTION: BILATERAL KNEE: ICD-10-PCS | Mod: 50,S$GLB,, | Performed by: FAMILY MEDICINE

## 2023-10-03 PROCEDURE — 20611 DRAIN/INJ JOINT/BURSA W/US: CPT | Mod: 50,S$GLB,, | Performed by: FAMILY MEDICINE

## 2023-10-03 PROCEDURE — 99999 PR PBB SHADOW E&M-EST. PATIENT-LVL II: CPT | Mod: PBBFAC,,, | Performed by: FAMILY MEDICINE

## 2023-10-03 NOTE — PROCEDURES
"Large Joint Aspiration/Injection: bilateral knee    Date/Time: 10/3/2023 10:15 AM    Performed by: Cj Caruso MD  Authorized by: Cj Caruso MD    Consent Done?:  Yes (Verbal)  Indications:  Pain  Site marked: the procedure site was marked    Timeout: prior to procedure the correct patient, procedure, and site was verified    Prep: patient was prepped and draped in usual sterile fashion      Details:  Needle Size:  25 G  Ultrasonic Guidance for needle placement?: Yes    Images are saved and documented.  Approach:  Lateral  Location:  Knee  Laterality:  Bilateral  Site:  Bilateral knee  Medications (Right):  10 mg sodium hyaluronate (EUFLEXXA) 10 mg/mL(mw 2.4 -3.6 million)  Medications (Left):  10 mg sodium hyaluronate (EUFLEXXA) 10 mg/mL(mw 2.4 -3.6 million)  Patient tolerance:  Patient tolerated the procedure well with no immediate complications     Description of ultrasound utilization for needle guidance:   Ultrasound guidance used for needle localization. Images saved and stored for documentation. The SUPRAPATELLAR BURSA / KNEE JOINT was visualized. Dynamic visualization of the 25g x 1.5" needle was continuous throughout the procedure.     "

## 2023-10-05 DIAGNOSIS — I10 ESSENTIAL HYPERTENSION: Primary | ICD-10-CM

## 2023-10-05 DIAGNOSIS — R73.01 IMPAIRED FASTING BLOOD SUGAR: ICD-10-CM

## 2023-10-05 DIAGNOSIS — E78.00 PURE HYPERCHOLESTEROLEMIA: ICD-10-CM

## 2023-10-09 ENCOUNTER — OFFICE VISIT (OUTPATIENT)
Dept: INTERNAL MEDICINE | Facility: CLINIC | Age: 78
End: 2023-10-09
Payer: MEDICARE

## 2023-10-09 ENCOUNTER — LAB VISIT (OUTPATIENT)
Dept: LAB | Facility: HOSPITAL | Age: 78
End: 2023-10-09
Attending: INTERNAL MEDICINE
Payer: MEDICARE

## 2023-10-09 ENCOUNTER — TELEPHONE (OUTPATIENT)
Dept: INTERNAL MEDICINE | Facility: CLINIC | Age: 78
End: 2023-10-09

## 2023-10-09 VITALS
RESPIRATION RATE: 20 BRPM | HEART RATE: 84 BPM | SYSTOLIC BLOOD PRESSURE: 112 MMHG | TEMPERATURE: 97 F | HEIGHT: 55 IN | OXYGEN SATURATION: 96 % | BODY MASS INDEX: 54.34 KG/M2 | WEIGHT: 234.81 LBS | DIASTOLIC BLOOD PRESSURE: 74 MMHG

## 2023-10-09 DIAGNOSIS — N18.31 STAGE 3A CHRONIC KIDNEY DISEASE: Chronic | ICD-10-CM

## 2023-10-09 DIAGNOSIS — R73.01 IMPAIRED FASTING BLOOD SUGAR: ICD-10-CM

## 2023-10-09 DIAGNOSIS — R73.01 IMPAIRED FASTING BLOOD SUGAR: Chronic | ICD-10-CM

## 2023-10-09 DIAGNOSIS — I10 ESSENTIAL HYPERTENSION: Primary | Chronic | ICD-10-CM

## 2023-10-09 DIAGNOSIS — I10 ESSENTIAL HYPERTENSION: ICD-10-CM

## 2023-10-09 DIAGNOSIS — E78.00 PURE HYPERCHOLESTEROLEMIA: ICD-10-CM

## 2023-10-09 DIAGNOSIS — E66.01 MORBID OBESITY: Chronic | ICD-10-CM

## 2023-10-09 DIAGNOSIS — R00.2 PALPITATIONS: ICD-10-CM

## 2023-10-09 DIAGNOSIS — G47.00 INSOMNIA, UNSPECIFIED TYPE: Chronic | ICD-10-CM

## 2023-10-09 DIAGNOSIS — G62.9 NEUROPATHY: ICD-10-CM

## 2023-10-09 PROBLEM — E78.5 HYPERLIPIDEMIA: Chronic | Status: RESOLVED | Noted: 2019-07-18 | Resolved: 2023-10-09

## 2023-10-09 LAB
BASOPHILS # BLD AUTO: 0.05 K/UL (ref 0–0.2)
BASOPHILS NFR BLD: 0.3 % (ref 0–1.9)
DIFFERENTIAL METHOD: ABNORMAL
EOSINOPHIL # BLD AUTO: 0 K/UL (ref 0–0.5)
EOSINOPHIL NFR BLD: 0 % (ref 0–8)
ERYTHROCYTE [DISTWIDTH] IN BLOOD BY AUTOMATED COUNT: 26.7 % (ref 11.5–14.5)
HCT VFR BLD AUTO: 34 % (ref 37–48.5)
HGB BLD-MCNC: 9.6 G/DL (ref 12–16)
IMM GRANULOCYTES # BLD AUTO: 0.07 K/UL (ref 0–0.04)
IMM GRANULOCYTES NFR BLD AUTO: 0.4 % (ref 0–0.5)
LYMPHOCYTES # BLD AUTO: 2.3 K/UL (ref 1–4.8)
LYMPHOCYTES NFR BLD: 15 % (ref 18–48)
MCH RBC QN AUTO: 22.5 PG (ref 27–31)
MCHC RBC AUTO-ENTMCNC: 28.2 G/DL (ref 32–36)
MCV RBC AUTO: 80 FL (ref 82–98)
MONOCYTES # BLD AUTO: 1.2 K/UL (ref 0.3–1)
MONOCYTES NFR BLD: 7.7 % (ref 4–15)
NEUTROPHILS # BLD AUTO: 12 K/UL (ref 1.8–7.7)
NEUTROPHILS NFR BLD: 76.6 % (ref 38–73)
NRBC BLD-RTO: 0 /100 WBC
PLATELET # BLD AUTO: 377 K/UL (ref 150–450)
PMV BLD AUTO: 9.9 FL (ref 9.2–12.9)
RBC # BLD AUTO: 4.27 M/UL (ref 4–5.4)
WBC # BLD AUTO: 15.65 K/UL (ref 3.9–12.7)

## 2023-10-09 PROCEDURE — 1126F PR PAIN SEVERITY QUANTIFIED, NO PAIN PRESENT: ICD-10-PCS | Mod: CPTII,S$GLB,, | Performed by: INTERNAL MEDICINE

## 2023-10-09 PROCEDURE — 1160F RVW MEDS BY RX/DR IN RCRD: CPT | Mod: CPTII,S$GLB,, | Performed by: INTERNAL MEDICINE

## 2023-10-09 PROCEDURE — 83036 HEMOGLOBIN GLYCOSYLATED A1C: CPT | Performed by: INTERNAL MEDICINE

## 2023-10-09 PROCEDURE — 84443 ASSAY THYROID STIM HORMONE: CPT | Performed by: INTERNAL MEDICINE

## 2023-10-09 PROCEDURE — 3078F PR MOST RECENT DIASTOLIC BLOOD PRESSURE < 80 MM HG: ICD-10-PCS | Mod: CPTII,S$GLB,, | Performed by: INTERNAL MEDICINE

## 2023-10-09 PROCEDURE — 93005 ELECTROCARDIOGRAM TRACING: CPT | Mod: S$GLB,,, | Performed by: INTERNAL MEDICINE

## 2023-10-09 PROCEDURE — 1159F MED LIST DOCD IN RCRD: CPT | Mod: CPTII,S$GLB,, | Performed by: INTERNAL MEDICINE

## 2023-10-09 PROCEDURE — 93005 EKG 12-LEAD: ICD-10-PCS | Mod: S$GLB,,, | Performed by: INTERNAL MEDICINE

## 2023-10-09 PROCEDURE — 3074F SYST BP LT 130 MM HG: CPT | Mod: CPTII,S$GLB,, | Performed by: INTERNAL MEDICINE

## 2023-10-09 PROCEDURE — 1101F PT FALLS ASSESS-DOCD LE1/YR: CPT | Mod: CPTII,S$GLB,, | Performed by: INTERNAL MEDICINE

## 2023-10-09 PROCEDURE — 3074F PR MOST RECENT SYSTOLIC BLOOD PRESSURE < 130 MM HG: ICD-10-PCS | Mod: CPTII,S$GLB,, | Performed by: INTERNAL MEDICINE

## 2023-10-09 PROCEDURE — 1160F PR REVIEW ALL MEDS BY PRESCRIBER/CLIN PHARMACIST DOCUMENTED: ICD-10-PCS | Mod: CPTII,S$GLB,, | Performed by: INTERNAL MEDICINE

## 2023-10-09 PROCEDURE — 99214 OFFICE O/P EST MOD 30 MIN: CPT | Mod: S$GLB,,, | Performed by: INTERNAL MEDICINE

## 2023-10-09 PROCEDURE — 93010 ELECTROCARDIOGRAM REPORT: CPT | Mod: S$GLB,,, | Performed by: INTERNAL MEDICINE

## 2023-10-09 PROCEDURE — 1159F PR MEDICATION LIST DOCUMENTED IN MEDICAL RECORD: ICD-10-PCS | Mod: CPTII,S$GLB,, | Performed by: INTERNAL MEDICINE

## 2023-10-09 PROCEDURE — 99214 PR OFFICE/OUTPT VISIT, EST, LEVL IV, 30-39 MIN: ICD-10-PCS | Mod: S$GLB,,, | Performed by: INTERNAL MEDICINE

## 2023-10-09 PROCEDURE — 93010 EKG 12-LEAD: ICD-10-PCS | Mod: S$GLB,,, | Performed by: INTERNAL MEDICINE

## 2023-10-09 PROCEDURE — 99999 PR PBB SHADOW E&M-EST. PATIENT-LVL V: CPT | Mod: PBBFAC,,, | Performed by: INTERNAL MEDICINE

## 2023-10-09 PROCEDURE — 3078F DIAST BP <80 MM HG: CPT | Mod: CPTII,S$GLB,, | Performed by: INTERNAL MEDICINE

## 2023-10-09 PROCEDURE — 3288F FALL RISK ASSESSMENT DOCD: CPT | Mod: CPTII,S$GLB,, | Performed by: INTERNAL MEDICINE

## 2023-10-09 PROCEDURE — 80061 LIPID PANEL: CPT | Performed by: INTERNAL MEDICINE

## 2023-10-09 PROCEDURE — 99999 PR PBB SHADOW E&M-EST. PATIENT-LVL V: ICD-10-PCS | Mod: PBBFAC,,, | Performed by: INTERNAL MEDICINE

## 2023-10-09 PROCEDURE — 80053 COMPREHEN METABOLIC PANEL: CPT | Performed by: INTERNAL MEDICINE

## 2023-10-09 PROCEDURE — 1126F AMNT PAIN NOTED NONE PRSNT: CPT | Mod: CPTII,S$GLB,, | Performed by: INTERNAL MEDICINE

## 2023-10-09 PROCEDURE — 36415 COLL VENOUS BLD VENIPUNCTURE: CPT | Mod: PO | Performed by: INTERNAL MEDICINE

## 2023-10-09 PROCEDURE — 3288F PR FALLS RISK ASSESSMENT DOCUMENTED: ICD-10-PCS | Mod: CPTII,S$GLB,, | Performed by: INTERNAL MEDICINE

## 2023-10-09 PROCEDURE — 1101F PR PT FALLS ASSESS DOC 0-1 FALLS W/OUT INJ PAST YR: ICD-10-PCS | Mod: CPTII,S$GLB,, | Performed by: INTERNAL MEDICINE

## 2023-10-09 PROCEDURE — 85025 COMPLETE CBC W/AUTO DIFF WBC: CPT | Performed by: INTERNAL MEDICINE

## 2023-10-09 RX ORDER — DIPHENOXYLATE HYDROCHLORIDE AND ATROPINE SULFATE 2.5; .025 MG/1; MG/1
1 TABLET ORAL 4 TIMES DAILY PRN
Qty: 20 TABLET | Refills: 0 | Status: SHIPPED | OUTPATIENT
Start: 2023-10-09 | End: 2023-10-19

## 2023-10-09 NOTE — PROGRESS NOTES
Subjective:       Patient ID: Purvi Quiroga is a 78 y.o. female.    Chief Complaint: b/p concerns    HPI    79-year-old female here for follow-up.    HTN - Patient is currently on Norvasc 10 mg, irbesartan 150 mg, Lopressor 50 mg b.i.d.. She does check her BP at home, and it runs 175-200/80s-90s. Side effects of medications note: none. Denies headaches, blurred vision, chest pain, shortness of breath, nausea.  Her BPs have stabilized in the last two weeks.  It had been going up and down quite a bit.  She had gone to the ER at  for HTN.  By the time she got to , her BPs were normalized with the ambulance ride.  This happened over the course of 1-2 months.  She still has problems with her breathing.  She went to take the test for COPD.  This test showed she does not have COPD.  This is the chemicals that are used in her apartment.  She reports that she can barely walk because her knees are so bad off.  She had injections in her knees two weeks ago.  Her diet has not changed.  It was one time normal and another time high.  It went back and forth. There were times her BP was low in the 80s systolic.  She would hold the medication until it was in the 120s.  She splits taking the irbesartan and norvasc.  She reports that the  said that the home does not add salt to the food that comes from the preparing company.    Patient has insomnia and is on trazodone 50 mg daily.    She has palpitations where her heart beats really fast.      Review of Systems      Objective:      Physical Exam  Vitals reviewed.   Constitutional:       Appearance: She is well-developed.   HENT:      Head: Normocephalic and atraumatic.      Mouth/Throat:      Pharynx: No oropharyngeal exudate.   Eyes:      General: No scleral icterus.        Right eye: No discharge.         Left eye: No discharge.      Pupils: Pupils are equal, round, and reactive to light.   Neck:      Thyroid: No thyromegaly.      Trachea: No tracheal  deviation.   Cardiovascular:      Rate and Rhythm: Normal rate and regular rhythm.      Heart sounds: Normal heart sounds. No murmur heard.     No friction rub. No gallop.   Pulmonary:      Effort: Pulmonary effort is normal. No respiratory distress.      Breath sounds: Normal breath sounds. No wheezing or rales.   Chest:      Chest wall: No tenderness.   Abdominal:      General: Bowel sounds are normal. There is no distension.      Palpations: Abdomen is soft. There is no mass.      Tenderness: There is no abdominal tenderness. There is no guarding or rebound.   Musculoskeletal:         General: No tenderness. Normal range of motion.      Cervical back: Normal range of motion and neck supple.   Skin:     General: Skin is warm and dry.      Coloration: Skin is not pale.      Findings: No erythema or rash.   Neurological:      Mental Status: She is alert and oriented to person, place, and time.   Psychiatric:         Behavior: Behavior normal.         Assessment:       1. Essential hypertension    2. Stage 3a chronic kidney disease    3. Morbid obesity    4. Impaired fasting blood sugar    5. Insomnia, unspecified type    6. Neuropathy  - Ambulatory referral/consult to Neurology; Future    7. Palpitations  - IN OFFICE EKG 12-LEAD (to Muse)  - Holter monitor - 24 hour; Future      Plan:       1. Continue amlodipine 10 mg, irbesartan 150 mg, Lopressor 50 mg b.i.d..  2.  Monitor  3.  Monitor   4. Monitor.  5.  Continue trazodone 50 mg nightly as needed.  6.  Refer to notch.    7. Check 24 hour Holter monitor, EKG.  Check CBC, CMP, TSH.

## 2023-10-10 DIAGNOSIS — D64.9 ANEMIA, UNSPECIFIED TYPE: Primary | ICD-10-CM

## 2023-10-10 LAB
ALBUMIN SERPL BCP-MCNC: 3.7 G/DL (ref 3.5–5.2)
ALP SERPL-CCNC: 84 U/L (ref 55–135)
ALT SERPL W/O P-5'-P-CCNC: 11 U/L (ref 10–44)
ANION GAP SERPL CALC-SCNC: 12 MMOL/L (ref 8–16)
AST SERPL-CCNC: 15 U/L (ref 10–40)
BILIRUB SERPL-MCNC: 0.4 MG/DL (ref 0.1–1)
BUN SERPL-MCNC: 31 MG/DL (ref 8–23)
CALCIUM SERPL-MCNC: 9.5 MG/DL (ref 8.7–10.5)
CHLORIDE SERPL-SCNC: 102 MMOL/L (ref 95–110)
CHOLEST SERPL-MCNC: 135 MG/DL (ref 120–199)
CHOLEST/HDLC SERPL: 3 {RATIO} (ref 2–5)
CO2 SERPL-SCNC: 25 MMOL/L (ref 23–29)
CREAT SERPL-MCNC: 1.8 MG/DL (ref 0.5–1.4)
EST. GFR  (NO RACE VARIABLE): 28.5 ML/MIN/1.73 M^2
ESTIMATED AVG GLUCOSE: 100 MG/DL (ref 68–131)
GLUCOSE SERPL-MCNC: 101 MG/DL (ref 70–110)
HBA1C MFR BLD: 5.1 % (ref 4–5.6)
HDLC SERPL-MCNC: 45 MG/DL (ref 40–75)
HDLC SERPL: 33.3 % (ref 20–50)
LDLC SERPL CALC-MCNC: 67.8 MG/DL (ref 63–159)
NONHDLC SERPL-MCNC: 90 MG/DL
POTASSIUM SERPL-SCNC: 4.9 MMOL/L (ref 3.5–5.1)
PROT SERPL-MCNC: 6.6 G/DL (ref 6–8.4)
SODIUM SERPL-SCNC: 139 MMOL/L (ref 136–145)
TRIGL SERPL-MCNC: 111 MG/DL (ref 30–150)
TSH SERPL DL<=0.005 MIU/L-ACNC: 1.47 UIU/ML (ref 0.4–4)

## 2023-10-10 NOTE — PROGRESS NOTES
Your electrolytes, liver function, thyroid function, cholesterol are normal.  A1c indicates you are not diabetic.  Your blood counts have decreased significantly since last check.  Your kidney function has decreased significantly since last check.  I am going to order further workup of the low blood counts including labs and a colonoscopy.  Because of the decreased renal function, I recommend you go to the emergency room.

## 2023-10-12 RX ORDER — HYDROXYZINE PAMOATE 50 MG/1
CAPSULE ORAL
Qty: 90 CAPSULE | Refills: 1 | Status: SHIPPED | OUTPATIENT
Start: 2023-10-12 | End: 2024-01-29

## 2023-10-12 NOTE — TELEPHONE ENCOUNTER
Refill Routing Note   Medication(s) are not appropriate for processing by [facility] for the following reason(s):      Medication outside of protocol    [facility] action(s):  Route Care Due:  None identified            Appointments  past 12m or future 3m with PCP    Date Provider   Last Visit   10/9/2023 Palomo Burgos MD   Next Visit   Visit date not found Palomo Burgos MD   ED visits in past 90 days: 0        Note composed:10:56 AM 10/12/2023

## 2023-10-17 ENCOUNTER — TELEPHONE (OUTPATIENT)
Dept: INTERNAL MEDICINE | Facility: CLINIC | Age: 78
End: 2023-10-17
Payer: MEDICARE

## 2023-10-17 NOTE — TELEPHONE ENCOUNTER
----- Message from Melodie Pleitez sent at 10/16/2023 12:47 PM CDT -----  Contact: [phone]  1MEDICALADVICE     Patient is calling for Medical Advice regarding:questions for the nurse     How long has patient had these symptoms:    Pharmacy name and phone#:    Would like response via Vi[employer]:  no     Comments:   Pt is asking for home health she states she wants this for one thing   She states with bathing please advise she states once a wekk or so

## 2023-10-17 NOTE — TELEPHONE ENCOUNTER
I do not think HH aids are covered any longer.  Can we verify with insurance whether they would cover an aid for bathing?  She might have to hire someone to get this done.

## 2023-10-20 ENCOUNTER — TELEPHONE (OUTPATIENT)
Dept: INTERNAL MEDICINE | Facility: CLINIC | Age: 78
End: 2023-10-20
Payer: MEDICARE

## 2023-10-20 ENCOUNTER — TELEPHONE (OUTPATIENT)
Dept: ENDOSCOPY | Facility: HOSPITAL | Age: 78
End: 2023-10-20

## 2023-10-20 ENCOUNTER — CLINICAL SUPPORT (OUTPATIENT)
Dept: ENDOSCOPY | Facility: HOSPITAL | Age: 78
End: 2023-10-20
Attending: INTERNAL MEDICINE
Payer: MEDICARE

## 2023-10-20 DIAGNOSIS — D64.9 ANEMIA, UNSPECIFIED TYPE: Primary | ICD-10-CM

## 2023-10-20 DIAGNOSIS — D64.9 ANEMIA, UNSPECIFIED TYPE: ICD-10-CM

## 2023-10-20 NOTE — TELEPHONE ENCOUNTER
"Unable to reach patient by phone. According to notes from endoscopy dept today, they attempted to schedule "urgent colonscopy for 10/24 but pt would not commit to schedule due to time and date conflicts.   "

## 2023-10-20 NOTE — TELEPHONE ENCOUNTER
Spoke to patient to schedule Urgent colonoscopy. Patient states the colonoscopy is not urgent. Offered her date of 10/24 and she stated she could not do it before 11/6. Offered patient next available date and she stated she could not arrive at 6 am and she would not be able to arrive before 9 am. No more availability to schedule patient at this time. Provided pt with department number [phone] and made patient a new PAT appointment.

## 2023-10-20 NOTE — TELEPHONE ENCOUNTER
----- Message from Katia Nix sent at 10/20/2023  2:01 PM CDT -----  Contact: [phone]  1MEDICALADVICE     Patient is calling for Medical Advice regarding:  PT SAYS SHE WAS TOLD COLOSCOPY  WAS CRITICAL AND NEEDED ONE BY SOMEONE ELSE BUT WANTS TO KNOW IF DOCTOR FEELS THE SAME WAY     How long has patient had these symptoms:    Pharmacy name and phone#:    Would like response via Satispayhart:  CALL BACK     Comments:

## 2023-10-20 NOTE — TELEPHONE ENCOUNTER
----- Message from Lillian Garcia sent at 10/20/2023  2:28 PM CDT -----  Contact: Purvi   Purvi would vaughn a call back with questions about the instructions for the halter monitor

## 2023-10-21 NOTE — TELEPHONE ENCOUNTER
This is for iron deficiency anemia.  Yes, patient needs to get the scope done as soon as possible.

## 2023-10-26 ENCOUNTER — HOSPITAL ENCOUNTER (OUTPATIENT)
Dept: CARDIOLOGY | Facility: HOSPITAL | Age: 78
Discharge: HOME | End: 2023-10-26
Attending: INTERNAL MEDICINE
Payer: MEDICARE

## 2023-10-26 DIAGNOSIS — R00.2 PALPITATIONS: ICD-10-CM

## 2023-10-26 PROCEDURE — 93227 XTRNL ECG REC<48 HR R&I: CPT | Mod: ,,, | Performed by: INTERNAL MEDICINE

## 2023-10-26 PROCEDURE — 93226 XTRNL ECG REC<48 HR SCAN A/R: CPT

## 2023-10-26 PROCEDURE — 93227 HOLTER MONITOR - 24 HOUR (CUPID ONLY): ICD-10-PCS | Mod: ,,, | Performed by: INTERNAL MEDICINE

## 2023-10-29 NOTE — PROGRESS NOTES
Euflexxa Bryant knee 2/3  LOT NO: [id number]  EXP DATE: 2020.12.15     Frieda Orozco at answering service of consult

## 2023-10-30 ENCOUNTER — TELEPHONE (OUTPATIENT)
Dept: INTERNAL MEDICINE | Facility: CLINIC | Age: 78
End: 2023-10-30
Payer: MEDICARE

## 2023-10-30 DIAGNOSIS — G62.9 NEUROPATHY: Primary | Chronic | ICD-10-CM

## 2023-10-30 LAB
OHS CV EVENT MONITOR DAY: 0
OHS CV HOLTER LENGTH DECIMAL HOURS: 24
OHS CV HOLTER LENGTH HOURS: 24
OHS CV HOLTER LENGTH MINUTES: 0
OHS CV HOLTER SINUS AVERAGE HR: 90
OHS CV HOLTER SINUS MAX HR: 145
OHS CV HOLTER SINUS MIN HR: 60

## 2023-10-30 NOTE — TELEPHONE ENCOUNTER
----- Message from Alexis Rust sent at 10/27/2023  1:35 PM CDT -----  Contact: Pt [phone]  Per [facility], the patient said they are faxing an order for home health. The patient wants a call back once you receive it.    Thank you

## 2023-10-30 NOTE — TELEPHONE ENCOUNTER
Lvm with our fax number and see if they could refax. We have not received any orders.    PATIENT NEEDS ASSISTANCE TO LEAVE RESIDENCE:

## 2023-11-02 RX ORDER — ALENDRONATE SODIUM 70 MG/1
TABLET ORAL
Qty: 8 TABLET | Refills: 5 | Status: SHIPPED | OUTPATIENT
Start: 2023-11-02

## 2023-11-02 NOTE — TELEPHONE ENCOUNTER
Care Due:                  Date            Visit Type   Department     Provider  --------------------------------------------------------------------------------                                EP -                              PRIMARY      MET INTERNAL  Last Visit: 10-      CARE ([facility])   MEDICINE       Palomo Burgos  Next Visit: None Scheduled  None         None Found                                                            Last  Test          Frequency    Reason                     Performed    Due Date  --------------------------------------------------------------------------------    Mg Level....  12 months..  alendronate..............  02- 01-    Phosphate...  12 months..  alendronate..............  Not Found    Overdue    Vitamin D...  12 months..  alendronate..............  Not Found    Overdue    [employer] Embedded Care Due Messages. Reference number: [id number].   11/02/2023 2:22:02 PM CDT

## 2023-11-02 NOTE — TELEPHONE ENCOUNTER
"----- Message from Livia Church sent at 11/2/2023  2:00 PM CDT -----  Contact: Pt [phone]  Patient is requesting a call back from the nurse to discuss the following    1 Home Health orders    2 change 's (appt) with different  @ [place] office    3 prescription for "Fosamax" sent to     [facility] - [place], [place] - [address] [facility] [address]  Phone: [phone] Fax: [phone]      Please call and advise.    Thank You          "

## 2023-11-02 NOTE — TELEPHONE ENCOUNTER
Spoke to pt. Pt stated her [employer] insurance sending out a form for Home Health to be signed. People stated need Home Health due to needing help with taking bath.       LOV 10/9/23  LRF 11/14/22

## 2023-11-03 ENCOUNTER — TELEPHONE (OUTPATIENT)
Dept: INTERNAL MEDICINE | Facility: CLINIC | Age: 78
End: 2023-11-03
Payer: MEDICARE

## 2023-11-03 NOTE — TELEPHONE ENCOUNTER
----- Message from Aaliyah Morfin sent at 11/3/2023  1:24 PM CDT -----  Contact: [place]/ [phone]  Patient is calling for Medical Advice regarding: Danica/ with [facility]/ [phone] is calling to request HH for patient for nurses aide, patient states she is unable to take a bath without assistant. [facility] need orders for hh please fax to .

## 2023-11-08 NOTE — TELEPHONE ENCOUNTER
Case management referral entered.  Also, ok for social work via home health to assist with this as well.

## 2023-11-08 NOTE — TELEPHONE ENCOUNTER
----- Message from Anastasia Javier sent at 11/8/2023  8:10 AM CST -----  Contact: Danica [facility] [phone]  Type: Orders Request    What orders/ testing are being requested? Home Health     Is there a future appointment scheduled for the patient with PCP? No     When? No     Would you prefer a response via [employer]?    Comments: [facility]  Danica [phone]

## 2023-11-08 NOTE — TELEPHONE ENCOUNTER
Spoke to Danica with [employer]. The pt is requesting a HH aid to help bathe her. She said [employer] will deny this. The pt would need a  that can help with finding a Personal Care Assistant in the community to help with bathing for long term.

## 2023-11-10 ENCOUNTER — TELEPHONE (OUTPATIENT)
Dept: ENDOSCOPY | Facility: HOSPITAL | Age: 78
End: 2023-11-10

## 2023-11-10 ENCOUNTER — CLINICAL SUPPORT (OUTPATIENT)
Dept: ENDOSCOPY | Facility: HOSPITAL | Age: 78
End: 2023-11-10
Attending: INTERNAL MEDICINE
Payer: MEDICARE

## 2023-11-10 ENCOUNTER — PATIENT OUTREACH (OUTPATIENT)
Dept: ADMINISTRATIVE | Facility: OTHER | Age: 78
End: 2023-11-10
Payer: MEDICARE

## 2023-11-10 DIAGNOSIS — D64.9 ANEMIA, UNSPECIFIED TYPE: ICD-10-CM

## 2023-11-10 NOTE — PROGRESS NOTES
CHW - Case Closure    This Community Health Worker spoke to patient via telephone today.     Pt/Caregiver reported: pt stated she uses [employer] but doesn't like there one week in advance policy to schedule rides , pt stated she called  and she does have transportation benefits but their vans are handicap accessible, and takes a cab   During conversation pt became agitated and stated CHW called while she was doing something and hung up the phone    Pt/Caregiver denied any additional needs at this time and agrees with episode closure at this time.  Provided patient with Community Health Worker's contact information and encouraged him/her to contact this Community Health Worker if additional needs arise.

## 2023-11-10 NOTE — TELEPHONE ENCOUNTER
Contacted patient to schedule colonoscopy. Patient wants to get procedure done at [facility]. # [phone] given to call. Also offered to be transferred to location-pt refused. States she will call. Understanding verbalized.

## 2023-11-10 NOTE — PLAN OF CARE
Contacted patient to schedule colonoscopy. Patient wants to get procedure done at [facility]. # [phone] given to call. Also offered to be transferred to location-pt refused. States she will call. Understanding verbalized.

## 2023-11-15 RX ORDER — ATORVASTATIN CALCIUM 20 MG/1
TABLET, FILM COATED ORAL
Qty: 90 TABLET | Refills: 3 | Status: SHIPPED | OUTPATIENT
Start: 2023-11-15

## 2023-11-15 NOTE — TELEPHONE ENCOUNTER
Refill Routing Note   Medication(s) are not appropriate for processing by [facility] for the following reason(s):        No active prescription written by provider    [facility] action(s):  Defer               Appointments  past 12m or future 3m with PCP    Date Provider   Last Visit   10/9/2023 Palomo Burgos MD   Next Visit   Visit date not found Palomo Burgos MD   ED visits in past 90 days: 0        Note composed:7:42 AM 11/15/2023

## 2023-11-15 NOTE — TELEPHONE ENCOUNTER
No care due was identified.  [facility] Embedded Care Due Messages. Reference number: [id number].   11/15/2023 7:18:44 AM CST

## 2023-11-20 ENCOUNTER — TELEPHONE (OUTPATIENT)
Dept: INTERNAL MEDICINE | Facility: CLINIC | Age: 78
End: 2023-11-20
Payer: MEDICARE

## 2023-11-20 DIAGNOSIS — G62.9 NEUROPATHY: Primary | Chronic | ICD-10-CM

## 2023-11-20 DIAGNOSIS — R26.89 IMBALANCE: ICD-10-CM

## 2023-11-20 DIAGNOSIS — R53.1 DECREASED STRENGTH: ICD-10-CM

## 2023-11-20 NOTE — TELEPHONE ENCOUNTER
Requesting order for home therapy and aide for safety, imbalance and muscle weakness. Please print. Will send to [employer] [employer].

## 2023-11-30 ENCOUNTER — TELEPHONE (OUTPATIENT)
Dept: INTERNAL MEDICINE | Facility: CLINIC | Age: 78
End: 2023-11-30
Payer: MEDICARE

## 2023-11-30 ENCOUNTER — HOSPITAL ENCOUNTER (EMERGENCY)
Facility: HOSPITAL | Age: 78
Discharge: HOME | End: 2023-11-30
Attending: EMERGENCY MEDICINE
Payer: MEDICARE

## 2023-11-30 VITALS
TEMPERATURE: 97 F | OXYGEN SATURATION: 97 % | RESPIRATION RATE: 17 BRPM | DIASTOLIC BLOOD PRESSURE: 79 MMHG | SYSTOLIC BLOOD PRESSURE: 189 MMHG | HEART RATE: 74 BPM

## 2023-11-30 DIAGNOSIS — M79.604 PAIN IN BOTH LOWER EXTREMITIES: Primary | ICD-10-CM

## 2023-11-30 DIAGNOSIS — M79.605 PAIN IN BOTH LOWER EXTREMITIES: Primary | ICD-10-CM

## 2023-11-30 PROCEDURE — 99284 EMERGENCY DEPT VISIT MOD MDM: CPT

## 2023-11-30 PROCEDURE — 96372 THER/PROPH/DIAG INJ SC/IM: CPT | Performed by: EMERGENCY MEDICINE

## 2023-11-30 PROCEDURE — 63600175 PHARM REV CODE 636 W HCPCS: Performed by: EMERGENCY MEDICINE

## 2023-11-30 PROCEDURE — 25000003 PHARM REV CODE 250: Performed by: EMERGENCY MEDICINE

## 2023-11-30 RX ORDER — ACETAMINOPHEN 500 MG
1000 TABLET ORAL EVERY 6 HOURS PRN
Qty: 112 TABLET | Refills: 0 | Status: SHIPPED | OUTPATIENT
Start: 2023-11-30 | End: 2023-12-14

## 2023-11-30 RX ORDER — LIDOCAINE 50 MG/G
1 PATCH TOPICAL ONCE
Status: DISCONTINUED | OUTPATIENT
Start: 2023-11-30 | End: 2023-11-30 | Stop reason: HOSPADM

## 2023-11-30 RX ORDER — LIDOCAINE 50 MG/G
1 PATCH TOPICAL
Status: DISCONTINUED | OUTPATIENT
Start: 2023-11-30 | End: 2023-11-30 | Stop reason: HOSPADM

## 2023-11-30 RX ORDER — ACETAMINOPHEN 500 MG
1000 TABLET ORAL
Status: COMPLETED | OUTPATIENT
Start: 2023-11-30 | End: 2023-11-30

## 2023-11-30 RX ORDER — KETOROLAC TROMETHAMINE 30 MG/ML
30 INJECTION, SOLUTION INTRAMUSCULAR; INTRAVENOUS
Status: COMPLETED | OUTPATIENT
Start: 2023-11-30 | End: 2023-11-30

## 2023-11-30 RX ORDER — NAPROXEN 500 MG/1
500 TABLET ORAL 2 TIMES DAILY WITH MEALS
Qty: 28 TABLET | Refills: 0 | Status: SHIPPED | OUTPATIENT
Start: 2023-11-30 | End: 2023-12-14

## 2023-11-30 RX ORDER — GABAPENTIN 300 MG/1
300 CAPSULE ORAL ONCE
Status: COMPLETED | OUTPATIENT
Start: 2023-11-30 | End: 2023-11-30

## 2023-11-30 RX ADMIN — LIDOCAINE 1 PATCH: 50 PATCH CUTANEOUS at 07:11

## 2023-11-30 RX ADMIN — ACETAMINOPHEN 1000 MG: 500 TABLET ORAL at 07:11

## 2023-11-30 RX ADMIN — GABAPENTIN 300 MG: 300 CAPSULE ORAL at 07:11

## 2023-11-30 RX ADMIN — KETOROLAC TROMETHAMINE 30 MG: 30 INJECTION, SOLUTION INTRAMUSCULAR at 07:11

## 2023-11-30 NOTE — ED NOTES
Currently awaiting on ride home. Ale, social set up transport for patient. Awaiting arrival. Placed pt in lobby while awaiting transport. Pt v/u

## 2023-11-30 NOTE — TELEPHONE ENCOUNTER
It is unlikely that we will be able to get patient into rehab outpatient.  Please have her make an appointment to follow-up.

## 2023-11-30 NOTE — PLAN OF CARE
CarePort Alert: YES response from [facility] re: Referral [id number] for patient in [facility] EDOTF-MATT: Yes, willing to call and follow up.    [facility] Alert: YES response from [facility] re: Referral [id number] for patient in [facility] EDOTF-MATT: Yes, willing to accept patient Good morning.  I reached out to her this morning, and we are working on getting her in with a skilled order from her doctor or have home health help us get her in for threapy.           11/30/23 1213   Post-Acute Status   Post-Acute Authorization Placement   Post-Acute Placement Status Pending post-acute provider review/more information requested

## 2023-11-30 NOTE — PLAN OF CARE
SW notified of pt's visit to ED. SW spoke with pt at bedside. SW explained placement process criteria, and provided pychoeducation. Pt is agreeable to return home with the Home Health set up, work with her home health  as well as to call [facility]  for a tour and information.       11/30/23 0970   Post-Acute Status   Post-Acute Authorization Placement   Post-Acute Placement Status Referrals Sent   Discharge Delays None known at this time   Discharge Plan   Discharge Plan A Home;Home Health   Discharge Plan B Skilled Nursing Facility

## 2023-11-30 NOTE — TELEPHONE ENCOUNTER
----- Message from Rachael Morfin sent at 11/30/2023  8:50 AM CST -----  Contact: Self  [phone]  Would like to receive medical advice.    Would they like a call back or a response via [facility]:  call back    Additional information:  Pt is calling to r/s ER follow up appt in clinic or virtually. Pt states appt was canceled and would like r/s appt.

## 2023-11-30 NOTE — ED PROVIDER NOTES
"Encounter Date: 11/30/2023       History     Chief Complaint   Patient presents with    Leg Pain     Brought in by [facility] from home. States she had a fall in her bathroom on Saturday injuring right foot and lower leg. Was seen at  on Monday. States xrays were negative. States she was unable to get lidocaine patch prescription filled due to insurance reasons. Patient reports increased pain to leg.      78-year-old female past medical history as below brought in by EMS from home with concern of acute on chronic lower extremity pain.  Patient says that she was had persistent pain in her bilateral lower extremities ever since recent hospital discharge.  EMS says that pt was able to ambulate unassisted with her cane and get onto the EMS stretcher herself. Says that she was not able to get the lidocaine patches filled due to issues with prior authorization and also has not been taking the mobic or prednisone that she was advised to take.  Does take gabapentin 300 mg TID. Says that she received a call from a home health nurse telling her she might need to have her primary care doctor order an MRI for further workup of her pain.  Patient denies any falls or trauma since hospital discharge 2 days ago.  Denies any back pain, fever, chills, abdominal pain, nausea, vomiting, diarrhea.  Says that she does not feel like she can go home the way she is feeling.  When asked about SNF which she previously refused, patient says that she now wants to go there.  Says she was an appointment with her primary care doctor today at 1:20 p.m. but "I can't go like this."     The history is provided by the patient and the EMS personnel.     Review of patient's allergies indicates:  No Known Allergies  Past Medical History:   Diagnosis Date    Asthma     Essential hypertension 12/10/2018    Hiatal hernia     Insomnia     Other osteoporosis without current pathological fracture 12/10/2018     Past Surgical History:   Procedure Laterality Date    " CATARACT EXTRACTION, BILATERAL      DILATION AND CURETTAGE OF UTERUS      KNEE ARTHROSCOPY      TONSILLECTOMY       Family History   Problem Relation Age of Onset    Heart disease Father         fatal MI in mid 60s    Hypertension Father     Hypertension Sister     Cancer Brother         colon cancer    Hypertension Brother     Diabetes Neg Hx     Stroke Neg Hx     Hyperlipidemia Neg Hx      Social History     Tobacco Use    Smoking status: Never    Smokeless tobacco: Never    Tobacco comments:     18 or 19 yo for 1 yr   Substance Use Topics    Alcohol use: Not Currently    Drug use: No     Review of Systems    Physical Exam     Initial Vitals [11/30/23 0633]   BP Pulse Resp Temp SpO2   (!) 189/79 74 17 97.4 °F (36.3 °C) 97 %      MAP       --         Physical Exam    Nursing note and vitals reviewed.  Constitutional: She appears well-developed. She is not diaphoretic. No distress.   HENT:   Head: Normocephalic and atraumatic.   Eyes: EOM are normal. Pupils are equal, round, and reactive to light.   Neck:   Normal range of motion.  Cardiovascular:  Normal rate.           Pulmonary/Chest: No respiratory distress.   Abdominal: Abdomen is soft. She exhibits no distension. There is no abdominal tenderness.   Musculoskeletal:         General: Normal range of motion.      Cervical back: Normal range of motion.      Comments: FROM bilateral LE pain. Reproducible diffuse tenderness to bilateral LE worse on the right, no overlying erythema, warmth, duskiness. 2+ DP and PT pulses bilaterally. No extremity deformity. Bilateral LE NVI. Hyperesthesia to distal RLE.     Neurological: She is alert and oriented to person, place, and time.   Skin: Skin is warm and dry.   Psychiatric: She has a normal mood and affect.         ED Course   Procedures  Labs Reviewed - No data to display       Imaging Results    None          Medications   LIDOcaine 5 % patch 1 patch (1 patch Transdermal Patch Applied 11/30/23 0700)   LIDOcaine 5 % patch  "1 patch (1 patch Transdermal Patch Applied 11/30/23 0758)   ketorolac injection 30 mg (30 mg Intramuscular Given 11/30/23 0700)   acetaminophen tablet 1,000 mg (1,000 mg Oral Given 11/30/23 0700)   gabapentin capsule 300 mg (300 mg Oral Given 11/30/23 0700)     Medical Decision Making  78-year-old female past medical history as above brought in by EMS from home with complaint of acute on chronic bilateral lower extremity pain for which she was recently hospitalized and discharged 2 days ago after a mechanical ground level fall.  Ddx includes but not limited to fracture, sprain, strain, contusion, arthritis, cellulitis, acute limb ischemia, neuropathy.  X-rays reviewed from [facility] admission without acute pathology and patient denies any new falls or trauma since then.  Pt had HH set up by [facility] after refusing SNF placement at that admission. Now requesting SNF placement.  Seen by social work in the ER, see documentation in workup tab about social work plan.  Patient discharged with strict return precautions, symptomatic therapy, outpatient follow up.    No acute emergent medical condition has been identified. The patient appears to be low risk for an emergent medical condition is appropriate for discharge with outpatient f/u as detailed in discharge instructions for reevaluation and possible continued outpatient workup and management. I have discussed the workup with the patient, who has verbalized understanding of the plan and need for outpatient follow-up.  This evaluation does not preclude the development of an emergent condition so in addition, I have advised the patient that they can return to the ED at any time with worsening or change of their symptoms, or with any other medical complaint.       Amount and/or Complexity of Data Reviewed  Independent Historian: EMS  External Data Reviewed: notes.     Details: 11/28/23 discharge summary OSH "77 y/o female with hx of anxiety, arthritis, CKD 3, HTN, GERD, " "hyperlipidemia migraine HA, asthma, LUX, who presented to [facility] ER on 11/28/23 with reports that she had slipped and fell out of her shower chair. She stated that she caught her fall by grabbing onto the shower hose and rails of the chair. She reported that she may have twisted her right leg and right foot causing her right LE ,right foot And back pain. She denies striking her head, nausea, vomiting, visual changes. In the ER, pt had an x-ray of her right femur, right foot, lumbar spine and right tib/fib that are all negative for fracture. Pt was followed by PT/OT.Case management ordered pt a rolling walker. However, pt stated to me and to her nurse "I am not going to wait for the walker." "I want to go home and I want to go home right now!" I explained to the pt the importance of using the rolling walker due to pt having to climb up one to two steps to get into her home. However, pt stated again, "I am not going to wait for the walker." "I want to go home and I want to go home right now!" Pt banging on the bedrails.Case management has set up , home PT. The rolling walker was delivered to the pt and pt stated "I don't want this walker." "I can get it cheaper at [facility]." Pt did not take the walker that was delivered to her. Pts condition did improved and pt is stable. Pt was discharged to her home with , home PT. Pt   Was given rx for lidoderm patches, losartan, mobic, prednisone 40 mg every day x4 days, tramadol. "    Risk  OTC drugs.  Prescription drug management.               ED Course as of 11/30/23 1025   Thu Nov 30, 2023   0651 11/27/23 creatinine 1.16 at OSH [AT]   0913 Pt says her pain feels improved. Spoke with ALINA Aguilera who will talk to patient to provide resources.  [AT]   0976 Pt seen by ALINA who gave pt her card and will electronically fax today's visit with a referral to her preferred choice, [facility] and two others in the area. They will call her to follow up with her PCP, financials, length of stay " agreement, and bed availability.  [AT]   1000 Pt says she just received a call from her pharmacy that her lidocaine patches were approved. Also informed patient that she can get 4% lidocaine patches over the counter.  [AT]      ED Course User Index  [AT] Ana Burns MD                             Clinical Impression:  Final diagnoses:  [M79.604, M79.605] Pain in both lower extremities (Primary)          ED Disposition Condition    Discharge Stable          ED Prescriptions       Medication Sig Dispense Start Date End Date Auth. Provider    naproxen (NAPROSYN) 500 MG tablet Take 1 tablet (500 mg total) by mouth 2 (two) times daily with meals. for 14 days 28 tablet 11/30/2023 12/14/2023 Ana Burns MD    acetaminophen (TYLENOL) 500 MG tablet Take 2 tablets (1,000 mg total) by mouth every 6 (six) hours as needed for Pain. 112 tablet 11/30/2023 12/14/2023 Ana Burns MD          Follow-up Information       Follow up With Specialties Details Why Contact Info    Palomo Burgos MD Internal Medicine Schedule an appointment as soon as possible for a visit in 2 days  [address] [facility] [address]  [phone]      [facility] Emergency Dept Emergency Medicine  As needed, If symptoms worsen [address] [facility] [address] [phone]             Ana Burns MD  11/30/23 5454

## 2023-11-30 NOTE — DISCHARGE INSTRUCTIONS
You can purchase lidocaine 4% patches over the counter. Continue taking your Gabapentin 300 mg three time daily as prescribed. You were prescribed Mobic by [facility] but it does not appear you filled this or started taking it. Instead of Mobic, Naprosyn was sent to your pharmacy as well as Tylenol.     Thank you for choosing [facility]! We appreciate you trusting us with your medical care.     It is important to remember that some problems are difficult to diagnose and may not be found during your first visit. Be sure to follow up with your primary care doctor and review any labs/imaging that was performed during your visit with them. If you do not have a primary care doctor, you may contact the one listed on your discharge paperwork, or you may also call the [facility] Appointment Desk at [phone] to schedule an appointment.     All medications may potentially have side effects and it is impossible to predict which medications may give you side effects. If you feel that you are having a negative effect of any medication you should immediately stop taking them and seek medical attention.  Do not drive or make any important decisions for 24 hours if you have received any pain medications, sedatives or mood altering drugs during your ER visit.    We will be happy to take care of you for all of your future medical needs. You may return to the ER at any time for any new/concerning symptoms, worsening condition, or failure to improve. We hope you feel better soon.     Ana Burns MD  Board Certified Emergency Medicine Physician

## 2023-11-30 NOTE — TELEPHONE ENCOUNTER
----- Message from Rachael Morfin sent at 11/30/2023 12:22 PM CST -----  Contact: Self  [phone]  Would like to receive medical advice.    Would they like a call back or a response via [facility]:  call back    Additional information: Calling to speak with  the nurse regarding if provider can placed a referral for rehab from a fall.

## 2023-12-01 ENCOUNTER — TELEPHONE (OUTPATIENT)
Dept: INTERNAL MEDICINE | Facility: CLINIC | Age: 78
End: 2023-12-01
Payer: MEDICARE

## 2023-12-01 DIAGNOSIS — R53.1 STRENGTH LOSS OF: ICD-10-CM

## 2023-12-01 DIAGNOSIS — M25.669 DECREASED RANGE OF MOTION OF KNEE, UNSPECIFIED LATERALITY: ICD-10-CM

## 2023-12-01 DIAGNOSIS — E66.01 MORBID OBESITY: ICD-10-CM

## 2023-12-01 DIAGNOSIS — M54.16 LUMBAR RADICULOPATHY: Primary | ICD-10-CM

## 2023-12-01 NOTE — TELEPHONE ENCOUNTER
Need R53.1 decreased strength and   M25.661, M25.662 decreased ROM to both knees to diagnosis for Skilled nursing referral

## 2023-12-01 NOTE — TELEPHONE ENCOUNTER
----- Message from Octaviano Lara sent at 12/1/2023  3:16 PM CST -----  Contact: [phone]  1MEDICALADVICE     Patient is calling for Medical Advice regarding:    Pt states that she was was suppose to have a 1:30 phone call and not a virtual. Would like to speak with nurse to r/s. Please advise.

## 2023-12-01 NOTE — TELEPHONE ENCOUNTER
Spoke with  at [facility]. She intitated 3 way conference call with pt to discuss appt and skilled care.    requests:  Dr Burgos to put in order for inpatient skilled nursing consult and home physical therapy to assess pt and send note today when she is seen to initiate home health care.  Med necessity, orders and notes will be faxed to [facility] once received.   It was explained to pt that this may take a few days due to coordination needed of all parts and insurance approval.

## 2023-12-01 NOTE — TELEPHONE ENCOUNTER
----- Message from Anastasia Javier sent at 12/1/2023  9:54 AM CST -----  Contact:  [phone]   from [facility] is calling to request a virtual visit for the patient. Pt. State she would like the staff contacted the  to notify the appointment date.     And also she is requesting skilled Nursing  care.     state it is urgent.       Please call and advise.   [facility] .   Yeny Hobbs  [phone]

## 2023-12-05 ENCOUNTER — NURSE TRIAGE (OUTPATIENT)
Dept: ADMINISTRATIVE | Facility: CLINIC | Age: 78
End: 2023-12-05
Payer: MEDICARE

## 2023-12-05 NOTE — TELEPHONE ENCOUNTER
"Pt called, states dropped showerhead on right foot/leg > 1 week ago. Seen in  ER 11/27, d/c after x-rays showed no fractures. Cancelled appt that was last Thursday with PCP Dr. Burgos, went to [facility] that night, d/c for f/u with PCP. Has PCP office appt Thursday but is asking for pain medication to be called in before then. Reports she has used naprosyn and 5% lidocaine patches, the latter of which has helped. Currently reports 5/10 pain in leg and 0/10 in foot at rest with lidocaine patch on. Advised to be seen within 24 hours. States unable to leave home tomorrow, further advised to be seen in  if unable to make it to office. While discussing care advice for pain management and modified RICE method to assist with injury more than 1 week out, pt states "I can't go on all night, I'm sick, I have to hang up" and ended call before full care advice could be discussed.    Reason for Disposition   [1] Limp when walking AND [2] due to a direct blow or crushing injury    Additional Information   Followed a foot injury   Negative: Serious injury with multiple fractures (broken bones)   Negative: [1] Major bleeding (e.g., actively dripping or spurting) AND [2] can't be stopped   Negative: Amputation   Negative: Looks like a dislocated joint (very crooked or deformed)   Negative: Sounds like a life-threatening emergency to the triager   Negative: Bullet wound, stabbed by knife, or other serious penetrating wound   Negative: Skin is split open or gaping (or length > 1/2 inch or 12 mm)   Negative: [1] Bleeding AND [2] won't stop after 10 minutes of direct pressure (using correct technique)   Negative: [1] Dirt in the wound AND [2] not removed with 15 minutes of scrubbing   Negative: Can't stand (bear weight) or walk   Negative: [1] Numbness (new loss of sensation) of toe(s) AND [2] present now   Negative: Sounds like a serious injury to the triager   Negative: [1] SEVERE pain AND [2] not improved 2 hours after pain " medicine/ice packs   Negative: Suspicious history for the injury   Negative: [1] Limp when walking AND [2] due to a twisted ankle or foot    Protocols used: Foot Pain-A-AH, Ankle and Foot Injury-A-AH

## 2023-12-06 ENCOUNTER — HOSPITAL ENCOUNTER (EMERGENCY)
Facility: HOSPITAL | Age: 78
Discharge: HOME | End: 2023-12-07
Attending: EMERGENCY MEDICINE
Payer: MEDICARE

## 2023-12-06 VITALS
SYSTOLIC BLOOD PRESSURE: 167 MMHG | TEMPERATURE: 98 F | OXYGEN SATURATION: 99 % | DIASTOLIC BLOOD PRESSURE: 85 MMHG | RESPIRATION RATE: 18 BRPM | BODY MASS INDEX: 54.39 KG/M2 | WEIGHT: 234 LBS | HEART RATE: 67 BPM

## 2023-12-06 DIAGNOSIS — M79.671 RIGHT FOOT PAIN: Primary | ICD-10-CM

## 2023-12-06 DIAGNOSIS — S96.911A STRAIN OF RIGHT FOOT, INITIAL ENCOUNTER: ICD-10-CM

## 2023-12-06 PROCEDURE — 99284 EMERGENCY DEPT VISIT MOD MDM: CPT

## 2023-12-07 ENCOUNTER — TELEPHONE (OUTPATIENT)
Dept: SPORTS MEDICINE | Facility: CLINIC | Age: 78
End: 2023-12-07
Payer: MEDICARE

## 2023-12-07 ENCOUNTER — TELEPHONE (OUTPATIENT)
Dept: INTERNAL MEDICINE | Facility: CLINIC | Age: 78
End: 2023-12-07
Payer: MEDICARE

## 2023-12-07 PROCEDURE — 25000003 PHARM REV CODE 250: Performed by: EMERGENCY MEDICINE

## 2023-12-07 RX ORDER — METHOCARBAMOL 500 MG/1
500 TABLET, FILM COATED ORAL
Status: COMPLETED | OUTPATIENT
Start: 2023-12-07 | End: 2023-12-07

## 2023-12-07 RX ADMIN — METHOCARBAMOL TABLETS 500 MG: 500 TABLET, COATED ORAL at 01:12

## 2023-12-07 NOTE — ED PROVIDER NOTES
"Encounter Date: 12/6/2023       History     Chief Complaint   Patient presents with    Leg Pain     Brought in by [facility] from home for complaints of continued right foot and leg pain after dropping shower head on foot one week ago. Patient has bruising noted to lateral side of right lower leg. Currently has two lidocaine patches applied to leg. Requesting additional x rays.      Patient is a 77 yo F who presents to the ED via EMS with the complaint of R foot pain.  Pt states she dropped a "very large heavy steel shower head" on my R foot approximately one week ago.   Pt states she went to [facility] immediately after this occurred and had negative plain films.  Pt was seen in this ED subsequently after for continued pain and was discharged with rx for Naproxen and lidocaine patches.  Pt reports unbearable R foot pain and states she cannot bear weight.  She has been using Naproxen and lidocaine patches as instructed.  She denies any new injuries.         Review of patient's allergies indicates:  No Known Allergies  Past Medical History:   Diagnosis Date    Asthma     Essential hypertension 12/10/2018    Hiatal hernia     Insomnia     Other osteoporosis without current pathological fracture 12/10/2018     Past Surgical History:   Procedure Laterality Date    CATARACT EXTRACTION, BILATERAL      DILATION AND CURETTAGE OF UTERUS      KNEE ARTHROSCOPY      TONSILLECTOMY       Family History   Problem Relation Age of Onset    Heart disease Father         fatal MI in mid 60s    Hypertension Father     Hypertension Sister     Cancer Brother         colon cancer    Hypertension Brother     Diabetes Neg Hx     Stroke Neg Hx     Hyperlipidemia Neg Hx      Social History     Tobacco Use    Smoking status: Never    Smokeless tobacco: Never    Tobacco comments:     18 or 19 yo for 1 yr   Substance Use Topics    Alcohol use: Not Currently    Drug use: No     Review of Systems   Constitutional:  Negative for chills and fever. "   Respiratory:  Negative for cough and shortness of breath.    Cardiovascular:  Negative for chest pain, palpitations and leg swelling.   Gastrointestinal:  Negative for abdominal pain, nausea and vomiting.   Musculoskeletal:  Positive for arthralgias. Negative for neck pain and neck stiffness.   Skin:  Negative for pallor and rash.   Neurological:  Negative for dizziness and headaches.   Psychiatric/Behavioral:  Negative for agitation and confusion.        Physical Exam     Initial Vitals [12/06/23 2305]   BP Pulse Resp Temp SpO2   (!) 167/85 67 18 97.8 °F (36.6 °C) 99 %      MAP       --         Physical Exam    Nursing note and vitals reviewed.  Constitutional: She appears well-developed and well-nourished.   HENT:   Head: Normocephalic and atraumatic.   Right Ear: External ear normal.   Left Ear: External ear normal.   Eyes: EOM are normal. Pupils are equal, round, and reactive to light.   Musculoskeletal:         General: Tenderness present. No edema. Normal range of motion.      Right foot: Normal range of motion and normal capillary refill. Tenderness and bony tenderness present. No laceration or crepitus. Normal pulse.      Comments: Scant bruising noted to area of the distal R 2nd and 3rd metatarsals; no skin changes, erythema, gross deformity, swelling, edema, neurovascular compromise. DP pulse is 2+      Neurological: She is alert and oriented to person, place, and time. She has normal strength.   Skin: Skin is warm. Capillary refill takes less than 2 seconds.   Psychiatric: She has a normal mood and affect.         ED Course   Procedures  Labs Reviewed - No data to display       Imaging Results              CT Foot Without Contrast Right (Final result)  Result time 12/07/23 01:28:49      Final result by Mane Jackson MD (12/07/23 01:28:49)                   Impression:      Negative CT of the right foot for displaced or stress fracture.    Lateral malleolar soft tissue swelling.  Correlate for  trauma versus infection.      Electronically signed by: Mane Jackson  Date:    12/07/2023  Time:    01:28               Narrative:    EXAMINATION:  CT FOOT WITHOUT CONTRAST RIGHT    CLINICAL HISTORY:  Foot pain, persistent post-traumatic;Foot pain, stress fracture suspected, neg xray;Foot pain, chronic, osseous injury suspected;    TECHNIQUE:  Axial CT images of the right foot were obtained.  Oblique coronal is in sagittals were reformatted.    COMPARISON:  None    FINDINGS:  Soft tissue swelling along the lateral ankle in the subcutaneous region without drainable fluid collection.    The foot appears intact without evidence of displaced fracture.  No sclerosis or periosteal reaction to suggest stress fracture.  Metatarsals and phalanges appear maintained.  No fluid collection in the plantar or interosseous muscles.                                       Medications - No data to display  Medical Decision Making  See HPI     Amount and/or Complexity of Data Reviewed  Radiology: ordered. Decision-making details documented in ED Course.               ED Course as of 12/07/23 0136   Thu Dec 07, 2023   0132 CT Foot Without Contrast Right  Negative CT of the right foot for displaced or stress fracture.      [LC]      ED Course User Index  [LC] Wali Carias MD               Medical Decision Making:   Initial Assessment:   See HPI   Differential Diagnosis:   Occult fracture, osteomyelitis, cellulitis, abscess,  Clinical Tests:   Radiological Study: Ordered and Reviewed  ED Management:  - pt denies any pain to the lateral malleolus on the R   - CT of the R foot negative for osseous injury  - no emergent intervention indicated at this time  - pt stable for discharge   - No further intervention is indicated at this time after having taken into account the patient's history, physical exam findings, and empirical and objective data obtained during the patient's emergency department workup.   - The patient is at low  risk for an emergent medical condition at this time, and I am of the belief that that it is safe to discharge the patient from the emergency department.   - The patient is instructed to follow up as outpatient as indicated on the discharge paperwork.    - I have discussed the specifics of the workup with the patient and the patient has verbalized understanding of the details of the workup, the diagnosis, the treatment plan, and the need for outpatient follow-up.    - Although the patient has no emergent etiology today this does not preclude the development of an emergent condition so, in addition, I have advised the patient that they can return to the ED and/or activate EMS at any time with worsening of their symptoms, change of their symptoms, or with any other medical complaint.    - The patient remained comfortable and stable during their visit in the ED.    - Discharge and follow-up instructions discussed with the patient who expressed understanding and willingness to comply with my recommendations.  - Results of all emergency department tests  discussed thoroughly with patient; all patient questions answered; pt in agreement with plan  - Pt instructed to follow up with PCP in 2-3 days for recheck of today's complaints  - Pt given strict emergency department return precautions for any new or worsening of symptoms  - Pt discharged from the emergency department in stable condition, in no acute distress                Clinical Impression:  Final diagnoses:  [M79.671] Right foot pain (Primary)  [S96.911A] Strain of right foot, initial encounter          ED Disposition Condition    Discharge Stable          ED Prescriptions    None       Follow-up Information       Follow up With Specialties Details Why Contact Info    Palomo Burgos MD Internal Medicine Schedule an appointment as soon as possible for a visit   [address] [facility] [address]  [phone]               Wali Carias MD  12/07/23  [id number]

## 2023-12-07 NOTE — TELEPHONE ENCOUNTER
----- Message from Amira Man sent at 12/7/2023  1:40 PM CST -----  Contact: [phone]@patient  Good afternoon patient would like a call back to discuss getting some pain med called in for her today. Patient says she dropped something on her foot and is in pain patient does have a apt tomorrow. Please give patient a call back  [phone]

## 2023-12-07 NOTE — DISCHARGE INSTRUCTIONS
The CT scan of your foot is NEGATIVE (does note demonstrate) any fracture (break) or there significant bony injury. There is no overt finding of infection or infectious process noted.

## 2023-12-07 NOTE — TELEPHONE ENCOUNTER
----- Message from Erika Clay sent at 12/7/2023  9:47 AM CST -----  Contact: Jose Martin Sheldon/[phone]/emergency contact  Jose Martin called in regards needing to find out if Dr Burgos's nurse will assist patient in finding an assistant living for temporary therapy rehab. Please call and advise. Thank you.

## 2023-12-07 NOTE — TELEPHONE ENCOUNTER
Spoke with ms Philippe, notified that SNF are being processed but Physical therapy notes are needed before [facility] can complete this process. Notifed that Home health orders will need to be put in tomorrow if she is not already being seen. Explained at length that pt has had multiple xrays, Ct scans and has no fractures/broken bones just soft tissue injury/bruising for which she has been given medication.    This nurse discussed with Ms Philippe that [facility] , this nurse and Ms Loja had a conference call on Dec 1st where it was explained that SNF order process could take a week or longer due to steps involved. Notified that she will be re-evaluated tomorrow and further meds if needed and HH orders

## 2023-12-07 NOTE — TELEPHONE ENCOUNTER
Vm left, pt was advises she would have to be seen to get pain medication. Has appt tomorrow with Dr Valenzuela. Dr Burgos not in clinic and unable to send meds due to jury duty. Advised to rest, ice and elevate foot along with OTC lidoderm or Rx patches and naproxen she was prescribed.

## 2023-12-07 NOTE — TELEPHONE ENCOUNTER
Pt state had foot injury, dropped steel on her foot; in great pain--reason for ER visits. pt also requesting home health orders and assisted living.     Pt advised she can get fit in on tomorrow's schedule for 9am w/RAJIV Alexandre

## 2023-12-07 NOTE — TELEPHONE ENCOUNTER
----- Message from Melodie Pleitez sent at 12/7/2023  8:06 AM CST -----  Contact: [phone]  [email or web address]     Patient is calling for Medical Advice regarding: pt can not make the appt today     How long has patient had these symptoms:    Pharmacy name and phone#:    Would like response via [facility]:  no     Comments:  Pt was ihn the ED until 5 this morning and is asking if she can be seen tomorrow with you or anyone else in your office please advise

## 2023-12-07 NOTE — TELEPHONE ENCOUNTER
Spoke c pt. Informed pt that Dr. Caruso is out of the office this week. She stated that she has been to 3 emergency rooms & then hung up the phone.

## 2023-12-07 NOTE — TELEPHONE ENCOUNTER
----- Message from Seema Marroquin sent at 12/7/2023  1:50 PM CST -----  Pt calling in regards to dropping shower head on her foot , wants something for her pain prescribed till she sees Dr in the morning     Confirmed patient's contact info below:  Contact Name: Purvi Quiroga  Phone Number: [phone]        [facility] [id number]  [facility] [phone]

## 2023-12-08 ENCOUNTER — OFFICE VISIT (OUTPATIENT)
Dept: INTERNAL MEDICINE | Facility: CLINIC | Age: 78
End: 2023-12-08
Payer: MEDICARE

## 2023-12-08 VITALS
DIASTOLIC BLOOD PRESSURE: 86 MMHG | HEART RATE: 66 BPM | OXYGEN SATURATION: 100 % | WEIGHT: 238 LBS | SYSTOLIC BLOOD PRESSURE: 134 MMHG | RESPIRATION RATE: 16 BRPM | TEMPERATURE: 99 F | BODY MASS INDEX: 55.08 KG/M2 | HEIGHT: 55 IN

## 2023-12-08 DIAGNOSIS — I10 ESSENTIAL HYPERTENSION: Chronic | ICD-10-CM

## 2023-12-08 DIAGNOSIS — S99.921D FOOT INJURY, RIGHT, SUBSEQUENT ENCOUNTER: Primary | ICD-10-CM

## 2023-12-08 DIAGNOSIS — Z74.09 IMPAIRED FUNCTIONAL MOBILITY, BALANCE, GAIT, AND ENDURANCE: ICD-10-CM

## 2023-12-08 DIAGNOSIS — N18.31 STAGE 3A CHRONIC KIDNEY DISEASE: ICD-10-CM

## 2023-12-08 PROCEDURE — 1160F PR REVIEW ALL MEDS BY PRESCRIBER/CLIN PHARMACIST DOCUMENTED: ICD-10-PCS | Mod: CPTII,S$GLB,, | Performed by: NURSE PRACTITIONER

## 2023-12-08 PROCEDURE — 3079F DIAST BP 80-89 MM HG: CPT | Mod: CPTII,S$GLB,, | Performed by: NURSE PRACTITIONER

## 2023-12-08 PROCEDURE — 99999 PR PBB SHADOW E&M-EST. PATIENT-LVL III: CPT | Mod: PBBFAC,,, | Performed by: NURSE PRACTITIONER

## 2023-12-08 PROCEDURE — 3075F PR MOST RECENT SYSTOLIC BLOOD PRESS GE 130-139MM HG: ICD-10-PCS | Mod: CPTII,S$GLB,, | Performed by: NURSE PRACTITIONER

## 2023-12-08 PROCEDURE — 99214 PR OFFICE/OUTPT VISIT, EST, LEVL IV, 30-39 MIN: ICD-10-PCS | Mod: S$GLB,,, | Performed by: NURSE PRACTITIONER

## 2023-12-08 PROCEDURE — 1101F PR PT FALLS ASSESS DOC 0-1 FALLS W/OUT INJ PAST YR: ICD-10-PCS | Mod: CPTII,S$GLB,, | Performed by: NURSE PRACTITIONER

## 2023-12-08 PROCEDURE — 1159F MED LIST DOCD IN RCRD: CPT | Mod: CPTII,S$GLB,, | Performed by: NURSE PRACTITIONER

## 2023-12-08 PROCEDURE — 99999 PR PBB SHADOW E&M-EST. PATIENT-LVL III: ICD-10-PCS | Mod: PBBFAC,,, | Performed by: NURSE PRACTITIONER

## 2023-12-08 PROCEDURE — 3075F SYST BP GE 130 - 139MM HG: CPT | Mod: CPTII,S$GLB,, | Performed by: NURSE PRACTITIONER

## 2023-12-08 PROCEDURE — 1125F AMNT PAIN NOTED PAIN PRSNT: CPT | Mod: CPTII,S$GLB,, | Performed by: NURSE PRACTITIONER

## 2023-12-08 PROCEDURE — 3079F PR MOST RECENT DIASTOLIC BLOOD PRESSURE 80-89 MM HG: ICD-10-PCS | Mod: CPTII,S$GLB,, | Performed by: NURSE PRACTITIONER

## 2023-12-08 PROCEDURE — 3288F PR FALLS RISK ASSESSMENT DOCUMENTED: ICD-10-PCS | Mod: CPTII,S$GLB,, | Performed by: NURSE PRACTITIONER

## 2023-12-08 PROCEDURE — 1160F RVW MEDS BY RX/DR IN RCRD: CPT | Mod: CPTII,S$GLB,, | Performed by: NURSE PRACTITIONER

## 2023-12-08 PROCEDURE — 1159F PR MEDICATION LIST DOCUMENTED IN MEDICAL RECORD: ICD-10-PCS | Mod: CPTII,S$GLB,, | Performed by: NURSE PRACTITIONER

## 2023-12-08 PROCEDURE — 3288F FALL RISK ASSESSMENT DOCD: CPT | Mod: CPTII,S$GLB,, | Performed by: NURSE PRACTITIONER

## 2023-12-08 PROCEDURE — 1125F PR PAIN SEVERITY QUANTIFIED, PAIN PRESENT: ICD-10-PCS | Mod: CPTII,S$GLB,, | Performed by: NURSE PRACTITIONER

## 2023-12-08 PROCEDURE — 99214 OFFICE O/P EST MOD 30 MIN: CPT | Mod: S$GLB,,, | Performed by: NURSE PRACTITIONER

## 2023-12-08 PROCEDURE — 1101F PT FALLS ASSESS-DOCD LE1/YR: CPT | Mod: CPTII,S$GLB,, | Performed by: NURSE PRACTITIONER

## 2023-12-08 RX ORDER — TRAMADOL HYDROCHLORIDE 50 MG/1
50 TABLET ORAL 2 TIMES DAILY PRN
Qty: 14 TABLET | Refills: 0 | Status: SHIPPED | OUTPATIENT
Start: 2023-12-08 | End: 2023-12-13 | Stop reason: SDUPTHER

## 2023-12-08 NOTE — PROGRESS NOTES
Subjective:       Patient ID: Purvi Quiroga is a 78 y.o. female.    Chief Complaint: Foot Injury (Pt in shower chair at home, had shower head fell on right foot; pt c/o severe foot pain. Went to [facility] in [place] [address] ) and Hospital Follow Up      Patient is a 78 y.o. female who traditionally follows with Palomo Burgos MD presenting today for follow up on fall.     Patient lives alone in an efficiency apartment in a senior living community. She is currently in a wheelchair. Reports difficulty with showering at home.   Suffered a fall on 11/27/23 and injured her right foot.   She presented to ER on that day, 11/30/23 and again on 12/06/23.   CT of foot was normal - today she notes is the first day that she has had some improvement in her pain. Was 10/10 previously. She is currently in a wheelchair.   Reports home health/PT is coming out tomorrow but is requesting help with showering at home.   Given her pain is improving she is no longer interested in SNF placement and I do not feel she would be a candidate.     Review of patient's allergies indicates:  No Known Allergies    Medication List with Changes/Refills   New Medications    TRAMADOL (ULTRAM) 50 MG TABLET    Take 1 tablet (50 mg total) by mouth 2 (two) times daily as needed for Pain.   Current Medications    ACETAMINOPHEN (TYLENOL) 500 MG TABLET    Take 2 tablets (1,000 mg total) by mouth every 6 (six) hours as needed for Pain.    ALBUTEROL (PROVENTIL/VENTOLIN HFA) 90 MCG/ACTUATION INHALER    INHALE 1-2 PUFFS INTO THE LUNGS EVERY 6 HOURS AS NEEDED FOR SHORTNESS OF BREATH    ALBUTEROL-IPRATROPIUM (DUO-NEB) 2.5 MG-0.5 MG/3 ML NEBULIZER SOLUTION    USE 1 VIAL via NEBULIZER EVERY 4 HOURS AS NEEDED SHORTNESS OF BREATH    ALENDRONATE (FOSAMAX) 70 MG TABLET    TAKE 1 TABLET BY MOUTH 1/2  HOUR BEFORE ANY FOOD OR  MEDICATION ONCE WEEKLY ON  SAME DAY    AMITRIPTYLINE (ELAVIL) 10 MG TABLET    Take 1 tablet (10 mg total) by mouth every evening.    AMLODIPINE (NORVASC)  10 MG TABLET    TAKE 1 TABLET BY MOUTH ONCE DAILY    ATORVASTATIN (LIPITOR) 20 MG TABLET    1 by mouth daily in evening    BREZTRI AEROSPHERE 160-9-4.8 MCG/ACTUATION HFAA    INHALE 2 puffs TWICE DAILY. RINSE MOUTH AND throat AFTER USE.    BUDESONIDE-GLYCOPYR-FORMOTEROL 160-9-4.8 MCG/ACTUATION HFAA    Inhale into the lungs.    CALCIUM CARBONATE 650 MG CALCIUM (1,625 MG) TABLET    Take 1 tablet (650 mg total) by mouth once daily.    ERGOCALCIFEROL (ERGOCALCIFEROL) 50,000 UNIT CAP    Take 50,000 Units by mouth.    FLUTICASONE PROPIONATE (FLONASE) 50 MCG/ACTUATION NASAL SPRAY    1 spray (50 mcg total) by Each Nostril route once daily.    FUROSEMIDE (LASIX) 40 MG TABLET    Take 1 tablet (40 mg total) by mouth 2 (two) times a day.    GABAPENTIN (NEURONTIN) 300 MG CAPSULE    TAKE TWO CAPSULES BY MOUTH THREE TIMES DAILY    HYDROXYZINE PAMOATE (VISTARIL) 50 MG CAP    TAKE ONE CAPSULE BY MOUTH THREE TIMES DAILY AS NEEDED    IRBESARTAN (AVAPRO) 150 MG TABLET    TAKE 1 TABLET BY MOUTH  TWICE DAILY    MELATONIN 5 MG CAP    Take 1 capsule (5 mg total) by mouth nightly as needed (insomnia).    MEPOLIZUMAB 100 MG/ML ATIN    Inject 1 mL (100 mg total) into the skin every 28 days.    METOPROLOL TARTRATE (LOPRESSOR) 50 MG TABLET    Take 1 tablet (50 mg total) by mouth 2 (two) times daily.    MONTELUKAST (SINGULAIR) 10 MG TABLET    TAKE 1 TABLET BY MOUTH IN  THE EVENING    NAPROXEN (NAPROSYN) 500 MG TABLET    Take 1 tablet (500 mg total) by mouth 2 (two) times daily with meals. for 14 days    OMEPRAZOLE (PRILOSEC) 20 MG CAPSULE    TAKE 1 CAPSULE BY MOUTH TWICE  DAILY    ONDANSETRON (ZOFRAN-ODT) 4 MG TBDL    Take 1 tablet (4 mg total) by mouth every 6 (six) hours as needed (nausea).    ONDANSETRON (ZOFRAN-ODT) 8 MG TBDL    PLACE 1 TABLET UNDER THE TONGUE EVERY TWELVE HOURS AS NEEDED    POTASSIUM CHLORIDE (MICRO-K) 10 MEQ CPSR    One capsule by mouth every morning with meal    SODIUM HYALURONATE, EUFLEXXA, (EUFLEXXA) 10 MG/ML(MW 2.4 -3.6  "MILLION) INJECTION    Inject into the articular space.    TRAZODONE (DESYREL) 50 MG TABLET    Take 1 tablet (50 mg total) by mouth every evening.    VITAMIN D (VITAMIN D3) 1000 UNITS TAB    Take 1 tablet (1,000 Units total) by mouth once daily.     Medical, social and surgical history has been reviewed with the patient.     Review of Systems   Musculoskeletal:  Positive for falls and joint pain.        Objective:   /86 (BP Location: Right arm, Patient Position: Sitting, BP Method: Large (Manual))   Pulse 66   Temp 98.7 °F (37.1 °C) (Temporal)   Resp 16   Ht 4' 7" (1.397 m)   Wt 108 kg (238 lb)   SpO2 100%   BMI 55.32 kg/m²       Physical Exam  Vitals reviewed.   Constitutional:       Appearance: Normal appearance.   HENT:      Head: Normocephalic and atraumatic.   Pulmonary:      Effort: Pulmonary effort is normal.   Feet:      Comments: Resolving ecchymosis noted to dorsum of right foot  Skin:     General: Skin is warm and dry.   Neurological:      Mental Status: She is alert and oriented to person, place, and time.       I reviewed and independently interpreted the labs and imaging below:  Last Labs:  Glucose   Date Value Ref Range Status   10/09/2023 101 70 - 110 mg/dL Final   02/02/2023 94 70 - 110 mg/dL Final     BUN   Date Value Ref Range Status   10/09/2023 31 (H) 8 - 23 mg/dL Final   02/02/2023 26 (H) 8 - 23 mg/dL Final     Creatinine   Date Value Ref Range Status   10/09/2023 1.8 (H) 0.5 - 1.4 mg/dL Final   02/02/2023 1.1 0.5 - 1.4 mg/dL Final     Cholesterol   Date Value Ref Range Status   10/09/2023 135 120 - 199 mg/dL Final     Comment:     The National Cholesterol Education Program (NCEP) has set the  following guidelines (reference ranges) for Cholesterol:  Optimal.....................<200 mg/dL  Borderline High.............200-239 mg/dL  High........................> or = 240 mg/dL     05/17/2022 158 120 - 199 mg/dL Final     Comment:     The National Cholesterol Education Program (NCEP) " has set the  following guidelines (reference ranges) for Cholesterol:  Optimal.....................<200 mg/dL  Borderline High.............200-239 mg/dL  High........................> or = 240 mg/dL       Hemoglobin A1C   Date Value Ref Range Status   10/09/2023 5.1 4.0 - 5.6 % Final     Comment:     ADA Screening Guidelines:  5.7-6.4%  Consistent with prediabetes  >or=6.5%  Consistent with diabetes    High levels of fetal hemoglobin interfere with the HbA1C  assay. Heterozygous hemoglobin variants (HbS, HgC, etc)do  not significantly interfere with this assay.   However, presence of multiple variants may affect accuracy.     05/17/2022 5.4 4.0 - 5.6 % Final     Comment:     ADA Screening Guidelines:  5.7-6.4%  Consistent with prediabetes  >or=6.5%  Consistent with diabetes    High levels of fetal hemoglobin interfere with the HbA1C  assay. Heterozygous hemoglobin variants (HbS, HgC, etc)do  not significantly interfere with this assay.   However, presence of multiple variants may affect accuracy.       Hemoglobin   Date Value Ref Range Status   10/09/2023 9.6 (L) 12.0 - 16.0 g/dL Final   01/25/2023 11.7 (L) 12.0 - 16.0 gm/dL Final   01/24/2023 11.0 (L) 12.0 - 16.0 gm/dL Final   12/06/2022 15.2 12.0 - 16.0 g/dL Final     Hematocrit   Date Value Ref Range Status   10/09/2023 34.0 (L) 37.0 - 48.5 % Final   01/25/2023 34.6 (L) 37.0 - 47.0 % Final   01/24/2023 32.7 (L) 37.0 - 47.0 % Final   12/06/2022 45.0 37.0 - 48.5 % Final       Assessment and Plan:     1. Foot injury, right, subsequent encounter    Patient aware that foot is not broken and that MSK issues can take up to 6 weeks to fully heal. Will treat with Ultram given her CKD and HTN. Advised to continue Lidocaine patches and begin PT at home. Discuss possible home aide to come in once per week for showering - this pay be a self pay service. Patient verbalized understanding.     - traMADoL (ULTRAM) 50 mg tablet; Take 1 tablet (50 mg total) by mouth 2 (two) times  daily as needed for Pain.  Dispense: 14 tablet; Refill: 0    2. Essential hypertension  3. Stage 3a chronic kidney disease    Chronic, stable, continue current medications, follow up with PCP    4. Impaired functional mobility, balance, gait, and endurance    Patient to have PT coming to home tomorrow.

## 2023-12-12 ENCOUNTER — TELEPHONE (OUTPATIENT)
Dept: INTERNAL MEDICINE | Facility: CLINIC | Age: 78
End: 2023-12-12
Payer: MEDICARE

## 2023-12-12 DIAGNOSIS — S99.921D FOOT INJURY, RIGHT, SUBSEQUENT ENCOUNTER: ICD-10-CM

## 2023-12-12 NOTE — TELEPHONE ENCOUNTER
----- Message from Amira Man sent at 12/11/2023  4:51 PM CST -----  Contact: [phone]@patient  Good afternoon patient would like a call back to discuss getting her pain med up to a stronger dosage. Please give patient a call back [phone]

## 2023-12-12 NOTE — TELEPHONE ENCOUNTER
The patient last seen 12/8 in office.  Requesting a stronger medication.  Took tramadol and then took two Aleve's.    Please advise.

## 2023-12-13 RX ORDER — TRAMADOL HYDROCHLORIDE 50 MG/1
50 TABLET ORAL 2 TIMES DAILY PRN
Qty: 14 TABLET | Refills: 0 | Status: SHIPPED | OUTPATIENT
Start: 2023-12-13 | End: 2023-12-20

## 2023-12-13 NOTE — TELEPHONE ENCOUNTER
Called the pt she states she is uncomfortable but stable not suffering, but would like something if  you willing to provide.

## 2023-12-13 NOTE — TELEPHONE ENCOUNTER
Patient was seen in clinic on Friday 12/8/23 by nurse practictioner. Reports she is feeling better and no longer wants SNF inpatient referral because she if having less pain and feeling better.  nurse and PT service following pt in home and to initiate care per pt on tomorrow.  Returned call to [email or web address] with above information. Message left on Rebeca's confidential vm

## 2023-12-20 ENCOUNTER — TELEPHONE (OUTPATIENT)
Dept: INTERNAL MEDICINE | Facility: CLINIC | Age: 78
End: 2023-12-20
Payer: MEDICARE

## 2023-12-20 NOTE — TELEPHONE ENCOUNTER
----- Message from Louisa Lara sent at 12/20/2023  2:09 PM CST -----  Contact: nazanin [phone]  Nazanin from [facility] home care stated pt canceled home health services.    Please call and advise

## 2023-12-21 ENCOUNTER — TELEPHONE (OUTPATIENT)
Dept: GASTROENTEROLOGY | Facility: CLINIC | Age: 78
End: 2023-12-21
Payer: MEDICARE

## 2023-12-21 NOTE — TELEPHONE ENCOUNTER
Returned patients call to schedule a colonoscopy. Explained to patient that the schedule is booked until February. Patient added to March call back list.

## 2024-01-08 ENCOUNTER — HOSPITAL ENCOUNTER (INPATIENT)
Facility: HOSPITAL | Age: 79
LOS: 6 days | Discharge: NURSING, REHAB OR LONG-TERM CARE | DRG: 690 | End: 2024-01-15
Attending: STUDENT IN AN ORGANIZED HEALTH CARE EDUCATION/TRAINING PROGRAM | Admitting: FAMILY MEDICINE
Payer: MEDICARE

## 2024-01-08 DIAGNOSIS — R07.9 CHEST PAIN: ICD-10-CM

## 2024-01-08 DIAGNOSIS — R11.0 NAUSEA: ICD-10-CM

## 2024-01-08 DIAGNOSIS — I10 ESSENTIAL HYPERTENSION: ICD-10-CM

## 2024-01-08 DIAGNOSIS — R55 NEAR SYNCOPE: ICD-10-CM

## 2024-01-08 DIAGNOSIS — K92.1 GASTROINTESTINAL HEMORRHAGE WITH MELENA: ICD-10-CM

## 2024-01-08 DIAGNOSIS — R06.02 SHORTNESS OF BREATH: ICD-10-CM

## 2024-01-08 DIAGNOSIS — R53.1 GENERALIZED WEAKNESS: ICD-10-CM

## 2024-01-08 DIAGNOSIS — N30.00 ACUTE CYSTITIS WITHOUT HEMATURIA: Primary | ICD-10-CM

## 2024-01-08 LAB
ALBUMIN SERPL BCP-MCNC: 3.5 G/DL (ref 3.5–5.2)
ALP SERPL-CCNC: 84 U/L (ref 55–135)
ALT SERPL W/O P-5'-P-CCNC: 6 U/L (ref 10–44)
ANION GAP SERPL CALC-SCNC: 16 MMOL/L (ref 8–16)
AST SERPL-CCNC: 10 U/L (ref 10–40)
BACTERIA #/AREA URNS HPF: ABNORMAL /HPF
BASOPHILS # BLD AUTO: 0.1 K/UL (ref 0–0.2)
BASOPHILS NFR BLD: 0.6 % (ref 0–1.9)
BILIRUB SERPL-MCNC: 0.5 MG/DL (ref 0.1–1)
BILIRUB UR QL STRIP: ABNORMAL
BNP SERPL-MCNC: 70 PG/ML (ref 0–99)
BUN SERPL-MCNC: 22 MG/DL (ref 8–23)
CALCIUM SERPL-MCNC: 9.1 MG/DL (ref 8.7–10.5)
CHLORIDE SERPL-SCNC: 103 MMOL/L (ref 95–110)
CLARITY UR: ABNORMAL
CO2 SERPL-SCNC: 21 MMOL/L (ref 23–29)
COLOR UR: YELLOW
CREAT SERPL-MCNC: 1.7 MG/DL (ref 0.5–1.4)
DIFFERENTIAL METHOD BLD: ABNORMAL
EOSINOPHIL # BLD AUTO: 0.2 K/UL (ref 0–0.5)
EOSINOPHIL NFR BLD: 1.5 % (ref 0–8)
ERYTHROCYTE [DISTWIDTH] IN BLOOD BY AUTOMATED COUNT: 21.7 % (ref 11.5–14.5)
EST. GFR  (NO RACE VARIABLE): 30 ML/MIN/1.73 M^2
GLUCOSE SERPL-MCNC: 106 MG/DL (ref 70–110)
GLUCOSE UR QL STRIP: NEGATIVE
HCT VFR BLD AUTO: 26.2 % (ref 37–48.5)
HGB BLD-MCNC: 7.5 G/DL (ref 12–16)
HGB UR QL STRIP: NEGATIVE
HYALINE CASTS #/AREA URNS LPF: 7 /LPF
IMM GRANULOCYTES # BLD AUTO: 0.15 K/UL (ref 0–0.04)
IMM GRANULOCYTES NFR BLD AUTO: 0.9 % (ref 0–0.5)
KETONES UR QL STRIP: NEGATIVE
LEUKOCYTE ESTERASE UR QL STRIP: ABNORMAL
LYMPHOCYTES # BLD AUTO: 2.6 K/UL (ref 1–4.8)
LYMPHOCYTES NFR BLD: 15.9 % (ref 18–48)
MCH RBC QN AUTO: 21.1 PG (ref 27–31)
MCHC RBC AUTO-ENTMCNC: 28.6 G/DL (ref 32–36)
MCV RBC AUTO: 74 FL (ref 82–98)
MICROSCOPIC COMMENT: ABNORMAL
MONOCYTES # BLD AUTO: 1.1 K/UL (ref 0.3–1)
MONOCYTES NFR BLD: 6.6 % (ref 4–15)
NEUTROPHILS # BLD AUTO: 11.9 K/UL (ref 1.8–7.7)
NEUTROPHILS NFR BLD: 74.5 % (ref 38–73)
NITRITE UR QL STRIP: NEGATIVE
NRBC BLD-RTO: 0 /100 WBC
PH UR STRIP: 5 [PH] (ref 5–8)
PLATELET # BLD AUTO: 571 K/UL (ref 150–450)
PMV BLD AUTO: 9.6 FL (ref 9.2–12.9)
POTASSIUM SERPL-SCNC: 4.5 MMOL/L (ref 3.5–5.1)
PROT SERPL-MCNC: 6.6 G/DL (ref 6–8.4)
PROT UR QL STRIP: ABNORMAL
RBC # BLD AUTO: 3.55 M/UL (ref 4–5.4)
RBC #/AREA URNS HPF: 3 /HPF (ref 0–4)
SODIUM SERPL-SCNC: 140 MMOL/L (ref 136–145)
SP GR UR STRIP: 1.02 (ref 1–1.03)
SQUAMOUS #/AREA URNS HPF: 21 /HPF
TROPONIN I SERPL DL<=0.01 NG/ML-MCNC: <0.006 NG/ML (ref 0–0.03)
URN SPEC COLLECT METH UR: ABNORMAL
UROBILINOGEN UR STRIP-ACNC: ABNORMAL EU/DL
WBC # BLD AUTO: 16.04 K/UL (ref 3.9–12.7)
WBC #/AREA URNS HPF: 12 /HPF (ref 0–5)

## 2024-01-08 PROCEDURE — 85025 COMPLETE CBC W/AUTO DIFF WBC: CPT | Performed by: NURSE PRACTITIONER

## 2024-01-08 PROCEDURE — 93010 ELECTROCARDIOGRAM REPORT: CPT | Mod: ,,, | Performed by: INTERNAL MEDICINE

## 2024-01-08 PROCEDURE — 85025 COMPLETE CBC W/AUTO DIFF WBC: CPT | Mod: 91 | Performed by: STUDENT IN AN ORGANIZED HEALTH CARE EDUCATION/TRAINING PROGRAM

## 2024-01-08 PROCEDURE — 87086 URINE CULTURE/COLONY COUNT: CPT | Performed by: STUDENT IN AN ORGANIZED HEALTH CARE EDUCATION/TRAINING PROGRAM

## 2024-01-08 PROCEDURE — 87186 SC STD MICRODIL/AGAR DIL: CPT | Performed by: STUDENT IN AN ORGANIZED HEALTH CARE EDUCATION/TRAINING PROGRAM

## 2024-01-08 PROCEDURE — 81000 URINALYSIS NONAUTO W/SCOPE: CPT | Performed by: STUDENT IN AN ORGANIZED HEALTH CARE EDUCATION/TRAINING PROGRAM

## 2024-01-08 PROCEDURE — 99285 EMERGENCY DEPT VISIT HI MDM: CPT | Mod: 25

## 2024-01-08 PROCEDURE — 84484 ASSAY OF TROPONIN QUANT: CPT | Performed by: NURSE PRACTITIONER

## 2024-01-08 PROCEDURE — 80053 COMPREHEN METABOLIC PANEL: CPT | Performed by: NURSE PRACTITIONER

## 2024-01-08 PROCEDURE — 83880 ASSAY OF NATRIURETIC PEPTIDE: CPT | Performed by: NURSE PRACTITIONER

## 2024-01-08 PROCEDURE — 87077 CULTURE AEROBIC IDENTIFY: CPT | Mod: 59 | Performed by: STUDENT IN AN ORGANIZED HEALTH CARE EDUCATION/TRAINING PROGRAM

## 2024-01-08 PROCEDURE — 96365 THER/PROPH/DIAG IV INF INIT: CPT

## 2024-01-08 PROCEDURE — 93005 ELECTROCARDIOGRAM TRACING: CPT

## 2024-01-08 PROCEDURE — 87088 URINE BACTERIA CULTURE: CPT | Performed by: STUDENT IN AN ORGANIZED HEALTH CARE EDUCATION/TRAINING PROGRAM

## 2024-01-08 PROCEDURE — 25000003 PHARM REV CODE 250: Performed by: STUDENT IN AN ORGANIZED HEALTH CARE EDUCATION/TRAINING PROGRAM

## 2024-01-08 PROCEDURE — 63600175 PHARM REV CODE 636 W HCPCS: Performed by: STUDENT IN AN ORGANIZED HEALTH CARE EDUCATION/TRAINING PROGRAM

## 2024-01-08 RX ORDER — ONDANSETRON 8 MG/1
TABLET, ORALLY DISINTEGRATING ORAL
Qty: 30 TABLET | Refills: 2 | Status: SHIPPED | OUTPATIENT
Start: 2024-01-08

## 2024-01-08 RX ADMIN — CEFTRIAXONE SODIUM 1 G: 1 INJECTION, POWDER, FOR SOLUTION INTRAMUSCULAR; INTRAVENOUS at 10:01

## 2024-01-08 NOTE — Clinical Note
Diagnosis: Acute cystitis without hematuria [620230]   Future Attending Provider: STEPHANY HURTADO [[id number]]   Admitting Provider:: STEPHANY HURTADO [[id number]]

## 2024-01-08 NOTE — TELEPHONE ENCOUNTER
----- Message from Alina Schreiber sent at 1/8/2024 10:57 AM CST -----  Contact: [phone]  Requesting an RX refill or new RX.  Is this a refill or new RX: new  RX name and strength (copy/paste from chart):  ondansetron (ZOFRAN-ODT) 8 MG TbDL   Is this a 30 day or 90 day RX:   Pharmacy name and phone # (copy/paste from chart):      [facility] - [facility] [place] - [address] [facility] [address]  Phone: [phone] Fax: [phone]      The doctors have asked that we provide their patients with the following 2 reminders -- prescription refills can take up to 72 hours, and a friendly reminder that in the future you can use your [facility] account to request refills: yes

## 2024-01-08 NOTE — TELEPHONE ENCOUNTER
Care Due:                  Date            Visit Type   Department     Provider  --------------------------------------------------------------------------------                                EP -                              PRIMARY      MET INTERNAL  Last Visit: 10-      CARE ([facility])   MEDICINE       Palomo Burgos  Next Visit: None Scheduled  None         None Found                                                            Last  Test          Frequency    Reason                     Performed    Due Date  --------------------------------------------------------------------------------    Mg Level....  12 months..  alendronate..............  02- 01-    Phosphate...  12 months..  alendronate..............  Not Found    Overdue    Vitamin D...  12 months..  alendronate..............  Not Found    Overdue    [employer] Embedded Care Due Messages. Reference number: [id number].   1/08/2024 10:42:24 AM CST

## 2024-01-09 PROBLEM — N39.0 UTI (URINARY TRACT INFECTION): Status: ACTIVE | Noted: 2024-01-09

## 2024-01-09 PROBLEM — R55 PRE-SYNCOPE: Status: ACTIVE | Noted: 2024-01-09

## 2024-01-09 PROBLEM — N17.9 AKI (ACUTE KIDNEY INJURY): Status: ACTIVE | Noted: 2024-01-09

## 2024-01-09 PROBLEM — D64.9 ANEMIA: Status: ACTIVE | Noted: 2024-01-09

## 2024-01-09 LAB
ALBUMIN SERPL BCP-MCNC: 3.5 G/DL (ref 3.5–5.2)
ALP SERPL-CCNC: 83 U/L (ref 55–135)
ALT SERPL W/O P-5'-P-CCNC: 7 U/L (ref 10–44)
ANION GAP SERPL CALC-SCNC: 15 MMOL/L (ref 8–16)
AST SERPL-CCNC: 14 U/L (ref 10–40)
BASOPHILS # BLD AUTO: 0.11 K/UL (ref 0–0.2)
BASOPHILS # BLD AUTO: 0.11 K/UL (ref 0–0.2)
BASOPHILS NFR BLD: 0.6 % (ref 0–1.9)
BASOPHILS NFR BLD: 0.7 % (ref 0–1.9)
BILIRUB SERPL-MCNC: 0.5 MG/DL (ref 0.1–1)
BUN SERPL-MCNC: 32 MG/DL (ref 8–23)
CALCIUM SERPL-MCNC: 9.9 MG/DL (ref 8.7–10.5)
CHLORIDE SERPL-SCNC: 104 MMOL/L (ref 95–110)
CO2 SERPL-SCNC: 21 MMOL/L (ref 23–29)
CREAT SERPL-MCNC: 2.2 MG/DL (ref 0.5–1.4)
DIFFERENTIAL METHOD BLD: ABNORMAL
DIFFERENTIAL METHOD BLD: ABNORMAL
EOSINOPHIL # BLD AUTO: 0.2 K/UL (ref 0–0.5)
EOSINOPHIL # BLD AUTO: 0.2 K/UL (ref 0–0.5)
EOSINOPHIL NFR BLD: 0.8 % (ref 0–8)
EOSINOPHIL NFR BLD: 0.9 % (ref 0–8)
ERYTHROCYTE [DISTWIDTH] IN BLOOD BY AUTOMATED COUNT: 21.7 % (ref 11.5–14.5)
ERYTHROCYTE [DISTWIDTH] IN BLOOD BY AUTOMATED COUNT: 22.1 % (ref 11.5–14.5)
EST. GFR  (NO RACE VARIABLE): 22 ML/MIN/1.73 M^2
FERRITIN SERPL-MCNC: 8 NG/ML (ref 20–300)
GLUCOSE SERPL-MCNC: 128 MG/DL (ref 70–110)
HCT VFR BLD AUTO: 26.2 % (ref 37–48.5)
HCT VFR BLD AUTO: 26.6 % (ref 37–48.5)
HGB BLD-MCNC: 7.6 G/DL (ref 12–16)
HGB BLD-MCNC: 8 G/DL (ref 12–16)
IMM GRANULOCYTES # BLD AUTO: 0.14 K/UL (ref 0–0.04)
IMM GRANULOCYTES # BLD AUTO: 0.25 K/UL (ref 0–0.04)
IMM GRANULOCYTES NFR BLD AUTO: 0.9 % (ref 0–0.5)
IMM GRANULOCYTES NFR BLD AUTO: 1.3 % (ref 0–0.5)
IRON SERPL-MCNC: 12 UG/DL (ref 30–160)
LYMPHOCYTES # BLD AUTO: 2 K/UL (ref 1–4.8)
LYMPHOCYTES # BLD AUTO: 2.9 K/UL (ref 1–4.8)
LYMPHOCYTES NFR BLD: 12.5 % (ref 18–48)
LYMPHOCYTES NFR BLD: 14.9 % (ref 18–48)
MCH RBC QN AUTO: 21.3 PG (ref 27–31)
MCH RBC QN AUTO: 21.3 PG (ref 27–31)
MCHC RBC AUTO-ENTMCNC: 29 G/DL (ref 32–36)
MCHC RBC AUTO-ENTMCNC: 30.1 G/DL (ref 32–36)
MCV RBC AUTO: 71 FL (ref 82–98)
MCV RBC AUTO: 73 FL (ref 82–98)
MONOCYTES # BLD AUTO: 1.4 K/UL (ref 0.3–1)
MONOCYTES # BLD AUTO: 1.8 K/UL (ref 0.3–1)
MONOCYTES NFR BLD: 8.7 % (ref 4–15)
MONOCYTES NFR BLD: 9.2 % (ref 4–15)
NEUTROPHILS # BLD AUTO: 12.1 K/UL (ref 1.8–7.7)
NEUTROPHILS # BLD AUTO: 14.3 K/UL (ref 1.8–7.7)
NEUTROPHILS NFR BLD: 73.2 % (ref 38–73)
NEUTROPHILS NFR BLD: 76.3 % (ref 38–73)
NRBC BLD-RTO: 0 /100 WBC
NRBC BLD-RTO: 1 /100 WBC
PLATELET # BLD AUTO: 536 K/UL (ref 150–450)
PLATELET # BLD AUTO: 605 K/UL (ref 150–450)
PMV BLD AUTO: 9.4 FL (ref 9.2–12.9)
PMV BLD AUTO: 9.8 FL (ref 9.2–12.9)
POTASSIUM SERPL-SCNC: 4.8 MMOL/L (ref 3.5–5.1)
PROT SERPL-MCNC: 6.5 G/DL (ref 6–8.4)
RBC # BLD AUTO: 3.57 M/UL (ref 4–5.4)
RBC # BLD AUTO: 3.76 M/UL (ref 4–5.4)
SATURATED IRON: 2 % (ref 20–50)
SODIUM SERPL-SCNC: 140 MMOL/L (ref 136–145)
TOTAL IRON BINDING CAPACITY: 487 UG/DL (ref 250–450)
TRANSFERRIN SERPL-MCNC: 329 MG/DL (ref 200–375)
WBC # BLD AUTO: 15.82 K/UL (ref 3.9–12.7)
WBC # BLD AUTO: 19.58 K/UL (ref 3.9–12.7)

## 2024-01-09 PROCEDURE — 83540 ASSAY OF IRON: CPT | Performed by: STUDENT IN AN ORGANIZED HEALTH CARE EDUCATION/TRAINING PROGRAM

## 2024-01-09 PROCEDURE — 96361 HYDRATE IV INFUSION ADD-ON: CPT

## 2024-01-09 PROCEDURE — 25000003 PHARM REV CODE 250: Performed by: FAMILY MEDICINE

## 2024-01-09 PROCEDURE — 25000003 PHARM REV CODE 250: Performed by: STUDENT IN AN ORGANIZED HEALTH CARE EDUCATION/TRAINING PROGRAM

## 2024-01-09 PROCEDURE — 63600175 PHARM REV CODE 636 W HCPCS: Performed by: STUDENT IN AN ORGANIZED HEALTH CARE EDUCATION/TRAINING PROGRAM

## 2024-01-09 PROCEDURE — 63600175 PHARM REV CODE 636 W HCPCS: Performed by: INTERNAL MEDICINE

## 2024-01-09 PROCEDURE — 85025 COMPLETE CBC W/AUTO DIFF WBC: CPT | Performed by: STUDENT IN AN ORGANIZED HEALTH CARE EDUCATION/TRAINING PROGRAM

## 2024-01-09 PROCEDURE — 96366 THER/PROPH/DIAG IV INF ADDON: CPT

## 2024-01-09 PROCEDURE — 63600175 PHARM REV CODE 636 W HCPCS: Performed by: HOSPITALIST

## 2024-01-09 PROCEDURE — 94761 N-INVAS EAR/PLS OXIMETRY MLT: CPT

## 2024-01-09 PROCEDURE — 94640 AIRWAY INHALATION TREATMENT: CPT

## 2024-01-09 PROCEDURE — 11000001 HC ACUTE MED/SURG PRIVATE ROOM

## 2024-01-09 PROCEDURE — 82728 ASSAY OF FERRITIN: CPT | Performed by: STUDENT IN AN ORGANIZED HEALTH CARE EDUCATION/TRAINING PROGRAM

## 2024-01-09 PROCEDURE — 80053 COMPREHEN METABOLIC PANEL: CPT | Performed by: STUDENT IN AN ORGANIZED HEALTH CARE EDUCATION/TRAINING PROGRAM

## 2024-01-09 RX ORDER — PANTOPRAZOLE SODIUM 40 MG/1
40 TABLET, DELAYED RELEASE ORAL DAILY
Status: DISCONTINUED | OUTPATIENT
Start: 2024-01-09 | End: 2024-01-11

## 2024-01-09 RX ORDER — ONDANSETRON HYDROCHLORIDE 2 MG/ML
4 INJECTION, SOLUTION INTRAVENOUS EVERY 8 HOURS PRN
Status: DISCONTINUED | OUTPATIENT
Start: 2024-01-09 | End: 2024-01-15 | Stop reason: HOSPADM

## 2024-01-09 RX ORDER — ACETAMINOPHEN 500 MG
1000 TABLET ORAL EVERY 8 HOURS PRN
Status: DISCONTINUED | OUTPATIENT
Start: 2024-01-09 | End: 2024-01-15 | Stop reason: HOSPADM

## 2024-01-09 RX ORDER — IBUPROFEN 200 MG
16 TABLET ORAL
Status: DISCONTINUED | OUTPATIENT
Start: 2024-01-09 | End: 2024-01-15 | Stop reason: HOSPADM

## 2024-01-09 RX ORDER — ENOXAPARIN SODIUM 100 MG/ML
30 INJECTION SUBCUTANEOUS EVERY 24 HOURS
Status: DISCONTINUED | OUTPATIENT
Start: 2024-01-09 | End: 2024-01-11

## 2024-01-09 RX ORDER — ACETAMINOPHEN 325 MG/1
325 TABLET ORAL ONCE
Status: DISCONTINUED | OUTPATIENT
Start: 2024-01-09 | End: 2024-01-15 | Stop reason: HOSPADM

## 2024-01-09 RX ORDER — IBUPROFEN 200 MG
24 TABLET ORAL
Status: DISCONTINUED | OUTPATIENT
Start: 2024-01-09 | End: 2024-01-15 | Stop reason: HOSPADM

## 2024-01-09 RX ORDER — MONTELUKAST SODIUM 10 MG/1
10 TABLET ORAL NIGHTLY
Status: DISCONTINUED | OUTPATIENT
Start: 2024-01-09 | End: 2024-01-15 | Stop reason: HOSPADM

## 2024-01-09 RX ORDER — ONDANSETRON HYDROCHLORIDE 2 MG/ML
4 INJECTION, SOLUTION INTRAVENOUS ONCE
Status: COMPLETED | OUTPATIENT
Start: 2024-01-09 | End: 2024-01-09

## 2024-01-09 RX ORDER — ATORVASTATIN CALCIUM 20 MG/1
20 TABLET, FILM COATED ORAL DAILY
Status: DISCONTINUED | OUTPATIENT
Start: 2024-01-09 | End: 2024-01-15 | Stop reason: HOSPADM

## 2024-01-09 RX ORDER — LEVALBUTEROL INHALATION SOLUTION 0.63 MG/3ML
0.63 SOLUTION RESPIRATORY (INHALATION) EVERY 8 HOURS
Status: DISCONTINUED | OUTPATIENT
Start: 2024-01-10 | End: 2024-01-10

## 2024-01-09 RX ORDER — POLYETHYLENE GLYCOL 3350 17 G/17G
17 POWDER, FOR SOLUTION ORAL DAILY PRN
Status: DISCONTINUED | OUTPATIENT
Start: 2024-01-10 | End: 2024-01-15 | Stop reason: HOSPADM

## 2024-01-09 RX ORDER — SODIUM CHLORIDE, SODIUM LACTATE, POTASSIUM CHLORIDE, CALCIUM CHLORIDE 600; 310; 30; 20 MG/100ML; MG/100ML; MG/100ML; MG/100ML
INJECTION, SOLUTION INTRAVENOUS CONTINUOUS
Status: DISCONTINUED | OUTPATIENT
Start: 2024-01-09 | End: 2024-01-12

## 2024-01-09 RX ORDER — AMLODIPINE BESYLATE 5 MG/1
10 TABLET ORAL DAILY
Status: DISCONTINUED | OUTPATIENT
Start: 2024-01-10 | End: 2024-01-15 | Stop reason: HOSPADM

## 2024-01-09 RX ORDER — GLUCAGON 1 MG
1 KIT INJECTION
Status: DISCONTINUED | OUTPATIENT
Start: 2024-01-09 | End: 2024-01-15 | Stop reason: HOSPADM

## 2024-01-09 RX ORDER — NALOXONE HCL 0.4 MG/ML
0.02 VIAL (ML) INJECTION
Status: DISCONTINUED | OUTPATIENT
Start: 2024-01-09 | End: 2024-01-15 | Stop reason: HOSPADM

## 2024-01-09 RX ORDER — ENOXAPARIN SODIUM 100 MG/ML
40 INJECTION SUBCUTANEOUS EVERY 24 HOURS
Status: DISCONTINUED | OUTPATIENT
Start: 2024-01-09 | End: 2024-01-09

## 2024-01-09 RX ORDER — SODIUM CHLORIDE 0.9 % (FLUSH) 0.9 %
10 SYRINGE (ML) INJECTION EVERY 12 HOURS PRN
Status: DISCONTINUED | OUTPATIENT
Start: 2024-01-09 | End: 2024-01-15 | Stop reason: HOSPADM

## 2024-01-09 RX ADMIN — ACETAMINOPHEN 1000 MG: 500 TABLET ORAL at 05:01

## 2024-01-09 RX ADMIN — SODIUM CHLORIDE, POTASSIUM CHLORIDE, SODIUM LACTATE AND CALCIUM CHLORIDE: 600; 310; 30; 20 INJECTION, SOLUTION INTRAVENOUS at 10:01

## 2024-01-09 RX ADMIN — ONDANSETRON 4 MG: 2 INJECTION INTRAMUSCULAR; INTRAVENOUS at 07:01

## 2024-01-09 RX ADMIN — ACETAMINOPHEN 1000 MG: 500 TABLET ORAL at 09:01

## 2024-01-09 RX ADMIN — ACETAMINOPHEN 1000 MG: 500 TABLET ORAL at 02:01

## 2024-01-09 RX ADMIN — POLYETHYLENE GLYCOL 3350 17 G: 17 POWDER, FOR SOLUTION ORAL at 11:01

## 2024-01-09 RX ADMIN — ATORVASTATIN CALCIUM 20 MG: 20 TABLET, FILM COATED ORAL at 08:01

## 2024-01-09 RX ADMIN — ENOXAPARIN SODIUM 30 MG: 40 INJECTION SUBCUTANEOUS at 06:01

## 2024-01-09 RX ADMIN — ONDANSETRON 4 MG: 2 INJECTION INTRAMUSCULAR; INTRAVENOUS at 09:01

## 2024-01-09 RX ADMIN — PANTOPRAZOLE SODIUM 40 MG: 40 TABLET, DELAYED RELEASE ORAL at 02:01

## 2024-01-09 RX ADMIN — CEFTRIAXONE SODIUM 1 G: 1 INJECTION, POWDER, FOR SOLUTION INTRAMUSCULAR; INTRAVENOUS at 09:01

## 2024-01-09 RX ADMIN — SODIUM CHLORIDE, POTASSIUM CHLORIDE, SODIUM LACTATE AND CALCIUM CHLORIDE: 600; 310; 30; 20 INJECTION, SOLUTION INTRAVENOUS at 03:01

## 2024-01-09 RX ADMIN — LEVALBUTEROL HYDROCHLORIDE 0.63 MG: 0.63 SOLUTION RESPIRATORY (INHALATION) at 11:01

## 2024-01-09 NOTE — ED NOTES
"Patient assisted to bathroom via wheelchair. Prior to standing reports "I feel like I'm going to pass out." VSS. Patient with steady assist to bathroom but becomes tachypneic with rates of 28-30 and tachycardic in 130s with exertion but recovers shortly after sitting again. SpO2 remains 99% with ambulation.   "

## 2024-01-09 NOTE — ED NOTES
Pt BIBEMS with c/o episode of near-syncope while sitting in cafeteria of [facility]. Pt states she also had episode of R side CP this morning. Denies LOC, denies fall. Pt is AAOx4, in NAD, respirations unlabored.

## 2024-01-09 NOTE — ED NOTES
Patient assisted to bathroom with EDT via wheelchair and steady assist. Tolerated well. Patient reports appetite has returned and expresses readiness to eat.

## 2024-01-09 NOTE — PROGRESS NOTES
Pharmacist Renal Dose Adjustment Note    Purvi Quiroga is a 79 y.o. female being treated with the medication enoxaparin    Patient Data:    Vital Signs (Most Recent):  Temp: 98.2 °F (36.8 °C) (01/08/24 1343)  Pulse: 95 (01/08/24 2100)  Resp: 18 (01/08/24 2100)  BP: (!) 173/83 (01/08/24 2100)  SpO2: 99 % (01/08/24 2100) Vital Signs (72h Range):  Temp:  [98.2 °F (36.8 °C)]   Pulse:  [69-95]   Resp:  [18]   BP: (115-173)/(56-83)   SpO2:  [97 %-99 %]      Recent Labs   Lab 01/08/24  1512   CREATININE 1.7*     Serum creatinine: 1.7 mg/dL (H) 01/08/24 1512  Estimated creatinine clearance: 27.9 mL/min (A)    Medication:enoxaparin dose: 40mg frequency daily will be changed to medication:enoxaparin dose:30mg frequency:daily    Pharmacist's Name: Anahi Fan  Pharmacist's Extension: [phone]

## 2024-01-09 NOTE — PLAN OF CARE
SW spoke with pt at bedside. Pt lives by herself in senior housing at [facility]. Pt utilizes the noon day meal provided there 5x a week. Pt has a private chore helper that assists with laundry and trash. Pt uses rolator, would like PT at discharge, agreeable to home health if she qualifies. Pt's RN informed pt with SW in room that MD is looking to stabilize blood pressure medications before discharge home. Pt will need assistance with wheelchair van PFC.        01/09/24 1651   Discharge Assessment   Assessment Type Discharge Planning Assessment   Confirmed/corrected address, phone number and insurance Yes   Source of Information patient   People in Home alone   Facility Arrived From: [facility]   Do you expect to return to your current living situation? Yes   Do you have help at home or someone to help you manage your care at home? Yes   Who are your caregiver(s) and their phone number(s)? Private pay chore helper 3x a week   Prior to hospitilization cognitive status: Alert/Oriented   Current cognitive status: Alert/Oriented   Walking or Climbing Stairs Difficulty yes   Walking or Climbing Stairs ambulation difficulty, requires equipment   Equipment Currently Used at Home rollator;shower chair   Patient currently being followed by outpatient case management? Unable to determine (comments)   Do you currently have service(s) that help you manage your care at home? No   Do you take prescription medications? Yes   Do you have prescription coverage? Yes   Coverage [employer]   Do you have any problems affording any of your prescribed medications? No   Is the patient taking medications as prescribed? yes   Who is going to help you get home at discharge? Pt is agreeable to a wheel chair van with assistance to her 4th floor apartment   How do you get to doctors appointments? health plan transportation;family or friend will provide   Discharge Plan A Home   Discharge  Plan B Home Health;Home   Discharge Plan discussed with: Patient   Transition of Care Barriers Mobility   Stress   Do you feel stress - tense, restless, nervous, or anxious, or unable to sleep at night because your mind is troubled all the time - these days? Rather much   Social Connections   Are you , , , , never , or living with a partner? Never marrie   Alcohol Use   Q2: How many drinks containing alcohol do you have on a typical day when you are drinking? None   Q3: How often do you have six or more drinks on one occasion? Never       Future Appointments   Date Time Provider Department Center   1/24/2024 10:00 AM Palomo Burgos MD [facility]   3/26/2024  2:20 PM Meredith Torres MD [facility]  Sherri Figueroa [employer] Social Work  [phone]

## 2024-01-09 NOTE — CARE UPDATE
Awake, alert, report suprapubic in better.  Requesting for food  UA positive on ceftriaxone-continue pending urine culture  Consult PT/OT  Inpatient Upgrade Note    Purvi Quiroga has warranted treatment spanning two or more midnights of hospital level care for the management of  hypotension, presyncope, UTI . She continues to require IV antibiotics, IV fluids, daily labs, monitoring of vital signs, and medication adjustments. Her condition is also complicated by the following comorbidities: Hypertension and Obesity.

## 2024-01-09 NOTE — ED NOTES
Pt in [place] sitting in w/c. Informed about pt from damian mcnally.  Pt AAO and answers appropriately. JUAN JOSE

## 2024-01-09 NOTE — ASSESSMENT & PLAN NOTE
Most likely secondary to hypotension in the setting of BP meds vs infection.     - Will hold home BP meds for now and restart as needed.   - Start LR at 75cc/hr.   - See UTI plan below.   - orthostatics ordered.   - previous echo in 2022 unremarkable.

## 2024-01-09 NOTE — H&P
"  [facility]  History & Physical    Patient Name: Purvi Quiroga  MRN: [id number]  Patient Class: OP- Observation  Admission Date: 1/8/2024  Attending Physician: Rogelio Thorpe MD  Primary Care Provider: Palomo Burgos MD         Patient information was obtained from patient, past medical records, and ER records.     Subjective:     Principal Problem:Pre-syncope    Chief Complaint:   Chief Complaint   Patient presents with    Weakness     Became weak and felt as though she was going to pass out. Denies burning with urination. No bloody stool. Dizziness and weakness have been on and off for several weeks.        HPI: Ms. Purvi Quiroga is a 78 y/o white F w/ PMH HTN, HLD, neuropathy, and arthritis who was sent to the hospital from her nursing home after patient had a pre-syncopal event at her nursing home. Patient states she has had off and on weakness for weeks. Today while using her walker to get back to her bedroom patient felt a sudden onset feeling of lightheadiness and "feeling" of wanting to faint. It's feeling she has had in the past right before she faints after seeing blood. Denies any prodromal symptoms, nausea, vomiting, chest pain, palpitaitons, fevers, chills, diarrhea, dysuria, urinary frequency, suprapubic pain, flank pain, melana. States she has been eating and drinking ok but has decreased her food intake recently trying to lose weight. No changes to her home BP meds recently though she states she has had hypotensive episodes in the past. When EMS arrived patient SBP was noted to be in the 70s and patient was brought to the hospital.     In the ED, labs were notable for WBC 19, hgb 8, Cr 1.7 (baseline 0.8 as 1/2023), and positive UA (however elevated squamous epithelial cells). Patient received a dose of rocephin.         Past Medical History:   Diagnosis Date    Asthma     Essential hypertension 12/10/2018    Hiatal hernia     Insomnia     Other osteoporosis " without current pathological fracture 12/10/2018       Past Surgical History:   Procedure Laterality Date    CATARACT EXTRACTION, BILATERAL      DILATION AND CURETTAGE OF UTERUS      KNEE ARTHROSCOPY      TONSILLECTOMY         Review of patient's allergies indicates:  No Known Allergies    No current facility-administered medications on file prior to encounter.     Current Outpatient Medications on File Prior to Encounter   Medication Sig    albuterol (PROVENTIL/VENTOLIN HFA) 90 mcg/actuation inhaler INHALE 1-2 PUFFS INTO THE LUNGS EVERY 6 HOURS AS NEEDED FOR SHORTNESS OF BREATH    albuterol-ipratropium (DUO-NEB) 2.5 mg-0.5 mg/3 mL nebulizer solution USE 1 VIAL via NEBULIZER EVERY 4 HOURS AS NEEDED SHORTNESS OF BREATH    alendronate (FOSAMAX) 70 MG tablet TAKE 1 TABLET BY MOUTH 1/2  HOUR BEFORE ANY FOOD OR  MEDICATION ONCE WEEKLY ON  SAME DAY    amitriptyline (ELAVIL) 10 MG tablet Take 1 tablet (10 mg total) by mouth every evening.    amLODIPine (NORVASC) 10 MG tablet TAKE 1 TABLET BY MOUTH ONCE DAILY    atorvastatin (LIPITOR) 20 MG tablet 1 by mouth daily in evening    BREZTRI AEROSPHERE 160-9-4.8 mcg/actuation HFAA INHALE 2 puffs TWICE DAILY. RINSE MOUTH AND throat AFTER USE.    budesonide-glycopyr-formoterol 160-9-4.8 mcg/actuation HFAA Inhale into the lungs.    calcium carbonate 650 mg calcium (1,625 mg) tablet Take 1 tablet (650 mg total) by mouth once daily.    ergocalciferol (ERGOCALCIFEROL) 50,000 unit Cap Take 50,000 Units by mouth.    fluticasone propionate (FLONASE) 50 mcg/actuation nasal spray 1 spray (50 mcg total) by Each Nostril route once daily.    furosemide (LASIX) 40 MG tablet Take 1 tablet (40 mg total) by mouth 2 (two) times a day.    gabapentin (NEURONTIN) 300 MG capsule TAKE TWO CAPSULES BY MOUTH THREE TIMES DAILY    hydrOXYzine pamoate (VISTARIL) 50 MG Cap TAKE ONE CAPSULE BY MOUTH THREE TIMES DAILY AS NEEDED    irbesartan (AVAPRO) 150 MG tablet TAKE 1 TABLET BY MOUTH  TWICE DAILY     melatonin 5 mg Cap Take 1 capsule (5 mg total) by mouth nightly as needed (insomnia).    mepolizumab 100 mg/mL AtIn Inject 1 mL (100 mg total) into the skin every 28 days.    metoprolol tartrate (LOPRESSOR) 50 MG tablet Take 1 tablet (50 mg total) by mouth 2 (two) times daily.    montelukast (SINGULAIR) 10 mg tablet TAKE 1 TABLET BY MOUTH IN  THE EVENING    omeprazole (PRILOSEC) 20 MG capsule TAKE 1 CAPSULE BY MOUTH TWICE  DAILY    ondansetron (ZOFRAN-ODT) 4 MG TbDL Take 1 tablet (4 mg total) by mouth every 6 (six) hours as needed (nausea).    ondansetron (ZOFRAN-ODT) 8 MG TbDL PLACE 1 TABLET UNDER THE TONGUE EVERY TWELVE HOURS AS NEEDED    potassium chloride (MICRO-K) 10 MEQ CpSR One capsule by mouth every morning with meal    sodium hyaluronate, EUFLEXXA, (EUFLEXXA) 10 mg/mL(mw 2.4 -3.6 million) injection Inject into the articular space.    traZODone (DESYREL) 50 MG tablet Take 1 tablet (50 mg total) by mouth every evening.    vitamin D (VITAMIN D3) 1000 units Tab Take 1 tablet (1,000 Units total) by mouth once daily.     Family History       Problem Relation (Age of Onset)    Cancer Brother    Heart disease Father    Hypertension Father, Sister, Brother          Tobacco Use    Smoking status: Never    Smokeless tobacco: Never    Tobacco comments:     18 or 21 yo for 1 yr   Substance and Sexual Activity    Alcohol use: Not Currently    Drug use: No    Sexual activity: Not on file     Review of Systems 12 point ROS negative except for HPI  Objective:     Vital Signs (Most Recent):  Temp: 98.2 °F (36.8 °C) (01/08/24 1343)  Pulse: 95 (01/08/24 2100)  Resp: 18 (01/08/24 2100)  BP: (!) 173/83 (01/08/24 2100)  SpO2: 99 % (01/08/24 2100) Vital Signs (24h Range):  Temp:  [98.2 °F (36.8 °C)] 98.2 °F (36.8 °C)  Pulse:  [69-95] 95  Resp:  [18] 18  SpO2:  [97 %-99 %] 99 %  BP: (115-173)/(56-83) 173/83     Weight: 101.2 kg (223 lb)  Body mass index is 51.83 kg/m².     Physical Exam  Constitutional:       Comments: Alert,  Oriented, No acute distress   HENT:      Head: Normocephalic and atraumatic.   Eyes:      Conjunctiva/sclera: Conjunctivae normal.   Neck:      Vascular: No hepatojugular reflux or JVD.   Cardiovascular:      Rate and Rhythm: Normal rate and regular rhythm.   Pulmonary:      Comments: Normal effort, Clear to auscultation   Abdominal:      Comments: Soft, Non-tender, Non-distended   Musculoskeletal:      Cervical back: Normal range of motion.      Comments: No peripheral edema   Skin:     Comments: Dry, Intact, Warm, Capillary refill <2 seconds.    Neurological:      Mental Status: She is alert and oriented to person, place, and time.      Comments: Normal speech   Psychiatric:         Mood and Affect: Mood and affect normal.                Significant Labs: All pertinent labs within the past 24 hours have been reviewed.    Significant Imaging: I have reviewed all pertinent imaging results/findings within the past 24 hours.  Assessment/Plan:     * Pre-syncope  Most likely secondary to hypotension in the setting of BP meds vs infection.     - Will hold home BP meds for now and restart as needed.   - Start LR at 75cc/hr.   - See UTI plan below.   - orthostatics ordered.   - previous echo in 2022 unremarkable.       UTI (urinary tract infection)  Although patient is asymptomatic from UTI perspective and has possible contaiminated UA, with elevated WBC of 19 will continue rocephin for now.     - Continue rocephin 1gm daily.   - Ucx pending.     JOSE (acute kidney injury)  Most likely secondary to UTI.     - continue treatment as above.     Essential hypertension  Hold home BP meds for now.     Anemia  - iron panel ordered.     HLD (hyperlipidemia)  - continue statin        VTE Risk Mitigation (From admission, onward)           Ordered     Place sequential compression device  Until discontinued         01/09/24 0122     IP VTE HIGH RISK PATIENT  Once         01/09/24 0122                       On 01/09/2024, patient should  be placed in hospital observation services under my care.             Rogelio Thorpe MD  Department of Hospital Medicine  [facility] - Emergency Dept

## 2024-01-09 NOTE — ASSESSMENT & PLAN NOTE
Although patient is asymptomatic from UTI perspective and has possible contaiminated UA, with elevated WBC of 19 will continue rocephin for now.     - Continue rocephin 1gm daily.   - Ucx pending.

## 2024-01-09 NOTE — HPI
"Ms. Purvi Quiroga is a 80 y/o white F w/ PMH HTN, HLD, neuropathy, and arthritis who was sent to the hospital from her nursing home after patient had a pre-syncopal event at her nursing home. Patient states she has had off and on weakness for weeks. Today while using her walker to get back to her bedroom patient felt a sudden onset feeling of lightheadiness and "feeling" of wanting to faint. It's feeling she has had in the past right before she faints after seeing blood. Denies any prodromal symptoms, nausea, vomiting, chest pain, palpitaitons, fevers, chills, diarrhea, dysuria, urinary frequency, suprapubic pain, flank pain, melana. States she has been eating and drinking ok but has decreased her food intake recently trying to lose weight. No changes to her home BP meds recently though she states she has had hypotensive episodes in the past. When EMS arrived patient SBP was noted to be in the 70s and patient was brought to the hospital.     In the ED, labs were notable for WBC 19, hgb 8, Cr 1.7 (baseline 0.8 as 1/2023), and positive UA (however elevated squamous epithelial cells). Patient received a dose of rocephin.       "

## 2024-01-09 NOTE — SUBJECTIVE & OBJECTIVE
Past Medical History:   Diagnosis Date    Asthma     Essential hypertension 12/10/2018    Hiatal hernia     Insomnia     Other osteoporosis without current pathological fracture 12/10/2018       Past Surgical History:   Procedure Laterality Date    CATARACT EXTRACTION, BILATERAL      DILATION AND CURETTAGE OF UTERUS      KNEE ARTHROSCOPY      TONSILLECTOMY         Review of patient's allergies indicates:  No Known Allergies    No current facility-administered medications on file prior to encounter.     Current Outpatient Medications on File Prior to Encounter   Medication Sig    albuterol (PROVENTIL/VENTOLIN HFA) 90 mcg/actuation inhaler INHALE 1-2 PUFFS INTO THE LUNGS EVERY 6 HOURS AS NEEDED FOR SHORTNESS OF BREATH    albuterol-ipratropium (DUO-NEB) 2.5 mg-0.5 mg/3 mL nebulizer solution USE 1 VIAL via NEBULIZER EVERY 4 HOURS AS NEEDED SHORTNESS OF BREATH    alendronate (FOSAMAX) 70 MG tablet TAKE 1 TABLET BY MOUTH 1/2  HOUR BEFORE ANY FOOD OR  MEDICATION ONCE WEEKLY ON  SAME DAY    amitriptyline (ELAVIL) 10 MG tablet Take 1 tablet (10 mg total) by mouth every evening.    amLODIPine (NORVASC) 10 MG tablet TAKE 1 TABLET BY MOUTH ONCE DAILY    atorvastatin (LIPITOR) 20 MG tablet 1 by mouth daily in evening    BREZTRI AEROSPHERE 160-9-4.8 mcg/actuation HFAA INHALE 2 puffs TWICE DAILY. RINSE MOUTH AND throat AFTER USE.    budesonide-glycopyr-formoterol 160-9-4.8 mcg/actuation HFAA Inhale into the lungs.    calcium carbonate 650 mg calcium (1,625 mg) tablet Take 1 tablet (650 mg total) by mouth once daily.    ergocalciferol (ERGOCALCIFEROL) 50,000 unit Cap Take 50,000 Units by mouth.    fluticasone propionate (FLONASE) 50 mcg/actuation nasal spray 1 spray (50 mcg total) by Each Nostril route once daily.    furosemide (LASIX) 40 MG tablet Take 1 tablet (40 mg total) by mouth 2 (two) times a day.    gabapentin (NEURONTIN) 300 MG capsule TAKE TWO CAPSULES BY MOUTH THREE TIMES DAILY    hydrOXYzine pamoate (VISTARIL) 50 MG  Cap TAKE ONE CAPSULE BY MOUTH THREE TIMES DAILY AS NEEDED    irbesartan (AVAPRO) 150 MG tablet TAKE 1 TABLET BY MOUTH  TWICE DAILY    melatonin 5 mg Cap Take 1 capsule (5 mg total) by mouth nightly as needed (insomnia).    mepolizumab 100 mg/mL AtIn Inject 1 mL (100 mg total) into the skin every 28 days.    metoprolol tartrate (LOPRESSOR) 50 MG tablet Take 1 tablet (50 mg total) by mouth 2 (two) times daily.    montelukast (SINGULAIR) 10 mg tablet TAKE 1 TABLET BY MOUTH IN  THE EVENING    omeprazole (PRILOSEC) 20 MG capsule TAKE 1 CAPSULE BY MOUTH TWICE  DAILY    ondansetron (ZOFRAN-ODT) 4 MG TbDL Take 1 tablet (4 mg total) by mouth every 6 (six) hours as needed (nausea).    ondansetron (ZOFRAN-ODT) 8 MG TbDL PLACE 1 TABLET UNDER THE TONGUE EVERY TWELVE HOURS AS NEEDED    potassium chloride (MICRO-K) 10 MEQ CpSR One capsule by mouth every morning with meal    sodium hyaluronate, EUFLEXXA, (EUFLEXXA) 10 mg/mL(mw 2.4 -3.6 million) injection Inject into the articular space.    traZODone (DESYREL) 50 MG tablet Take 1 tablet (50 mg total) by mouth every evening.    vitamin D (VITAMIN D3) 1000 units Tab Take 1 tablet (1,000 Units total) by mouth once daily.     Family History       Problem Relation (Age of Onset)    Cancer Brother    Heart disease Father    Hypertension Father, Sister, Brother          Tobacco Use    Smoking status: Never    Smokeless tobacco: Never    Tobacco comments:     18 or 21 yo for 1 yr   Substance and Sexual Activity    Alcohol use: Not Currently    Drug use: No    Sexual activity: Not on file     Review of Systems 12 point ROS negative except for HPI  Objective:     Vital Signs (Most Recent):  Temp: 98.2 °F (36.8 °C) (01/08/24 1343)  Pulse: 95 (01/08/24 2100)  Resp: 18 (01/08/24 2100)  BP: (!) 173/83 (01/08/24 2100)  SpO2: 99 % (01/08/24 2100) Vital Signs (24h Range):  Temp:  [98.2 °F (36.8 °C)] 98.2 °F (36.8 °C)  Pulse:  [69-95] 95  Resp:  [18] 18  SpO2:  [97 %-99 %] 99 %  BP: (115-173)/(56-83)  173/83     Weight: 101.2 kg (223 lb)  Body mass index is 51.83 kg/m².     Physical Exam  Constitutional:       Comments: Alert, Oriented, No acute distress   HENT:      Head: Normocephalic and atraumatic.   Eyes:      Conjunctiva/sclera: Conjunctivae normal.   Neck:      Vascular: No hepatojugular reflux or JVD.   Cardiovascular:      Rate and Rhythm: Normal rate and regular rhythm.   Pulmonary:      Comments: Normal effort, Clear to auscultation   Abdominal:      Comments: Soft, Non-tender, Non-distended   Musculoskeletal:      Cervical back: Normal range of motion.      Comments: No peripheral edema   Skin:     Comments: Dry, Intact, Warm, Capillary refill <2 seconds.    Neurological:      Mental Status: She is alert and oriented to person, place, and time.      Comments: Normal speech   Psychiatric:         Mood and Affect: Mood and affect normal.                Significant Labs: All pertinent labs within the past 24 hours have been reviewed.    Significant Imaging: I have reviewed all pertinent imaging results/findings within the past 24 hours.

## 2024-01-10 LAB
ABO + RH BLD: NORMAL
ALBUMIN SERPL BCP-MCNC: 2.9 G/DL (ref 3.5–5.2)
ALP SERPL-CCNC: 72 U/L (ref 55–135)
ALT SERPL W/O P-5'-P-CCNC: 6 U/L (ref 10–44)
ANION GAP SERPL CALC-SCNC: 11 MMOL/L (ref 8–16)
ANISOCYTOSIS BLD QL SMEAR: SLIGHT
AST SERPL-CCNC: 11 U/L (ref 10–40)
BASOPHILS # BLD AUTO: 0.07 K/UL (ref 0–0.2)
BASOPHILS NFR BLD: 0.5 % (ref 0–1.9)
BILIRUB SERPL-MCNC: 0.3 MG/DL (ref 0.1–1)
BLD GP AB SCN CELLS X3 SERPL QL: NORMAL
BLD PROD TYP BPU: NORMAL
BLD PROD TYP BPU: NORMAL
BLOOD UNIT EXPIRATION DATE: NORMAL
BLOOD UNIT EXPIRATION DATE: NORMAL
BLOOD UNIT TYPE CODE: 5100
BLOOD UNIT TYPE CODE: 5100
BLOOD UNIT TYPE: NORMAL
BLOOD UNIT TYPE: NORMAL
BUN SERPL-MCNC: 42 MG/DL (ref 8–23)
CALCIUM SERPL-MCNC: 8.6 MG/DL (ref 8.7–10.5)
CHLORIDE SERPL-SCNC: 106 MMOL/L (ref 95–110)
CO2 SERPL-SCNC: 22 MMOL/L (ref 23–29)
CODING SYSTEM: NORMAL
CODING SYSTEM: NORMAL
CREAT SERPL-MCNC: 1.6 MG/DL (ref 0.5–1.4)
CROSSMATCH INTERPRETATION: NORMAL
CROSSMATCH INTERPRETATION: NORMAL
DIFFERENTIAL METHOD BLD: ABNORMAL
DISPENSE STATUS: NORMAL
DISPENSE STATUS: NORMAL
EOSINOPHIL # BLD AUTO: 0.6 K/UL (ref 0–0.5)
EOSINOPHIL NFR BLD: 4.1 % (ref 0–8)
ERYTHROCYTE [DISTWIDTH] IN BLOOD BY AUTOMATED COUNT: 21.5 % (ref 11.5–14.5)
EST. GFR  (NO RACE VARIABLE): 33 ML/MIN/1.73 M^2
GIANT PLATELETS BLD QL SMEAR: PRESENT
GLUCOSE SERPL-MCNC: 114 MG/DL (ref 70–110)
HCT VFR BLD AUTO: 20.3 % (ref 37–48.5)
HGB BLD-MCNC: 5.6 G/DL (ref 12–16)
HGB BLD-MCNC: 5.8 G/DL (ref 12–16)
HYPOCHROMIA BLD QL SMEAR: ABNORMAL
IMM GRANULOCYTES # BLD AUTO: 0.12 K/UL (ref 0–0.04)
IMM GRANULOCYTES NFR BLD AUTO: 0.9 % (ref 0–0.5)
LYMPHOCYTES # BLD AUTO: 2.5 K/UL (ref 1–4.8)
LYMPHOCYTES NFR BLD: 17.8 % (ref 18–48)
MCH RBC QN AUTO: 20.9 PG (ref 27–31)
MCHC RBC AUTO-ENTMCNC: 28.6 G/DL (ref 32–36)
MCV RBC AUTO: 73 FL (ref 82–98)
MONOCYTES # BLD AUTO: 1.8 K/UL (ref 0.3–1)
MONOCYTES NFR BLD: 13.1 % (ref 4–15)
NEUTROPHILS # BLD AUTO: 8.7 K/UL (ref 1.8–7.7)
NEUTROPHILS NFR BLD: 63.6 % (ref 38–73)
NRBC BLD-RTO: 0 /100 WBC
OVALOCYTES BLD QL SMEAR: ABNORMAL
PLATELET # BLD AUTO: 439 K/UL (ref 150–450)
PLATELET BLD QL SMEAR: ABNORMAL
PMV BLD AUTO: 9.7 FL (ref 9.2–12.9)
POIKILOCYTOSIS BLD QL SMEAR: SLIGHT
POLYCHROMASIA BLD QL SMEAR: ABNORMAL
POTASSIUM SERPL-SCNC: 4 MMOL/L (ref 3.5–5.1)
PROT SERPL-MCNC: 5.1 G/DL (ref 6–8.4)
RBC # BLD AUTO: 2.77 M/UL (ref 4–5.4)
SODIUM SERPL-SCNC: 139 MMOL/L (ref 136–145)
SPECIMEN OUTDATE: NORMAL
TRANS ERYTHROCYTES VOL PATIENT: NORMAL ML
TRANS ERYTHROCYTES VOL PATIENT: NORMAL ML
WBC # BLD AUTO: 13.75 K/UL (ref 3.9–12.7)

## 2024-01-10 PROCEDURE — 86920 COMPATIBILITY TEST SPIN: CPT | Performed by: STUDENT IN AN ORGANIZED HEALTH CARE EDUCATION/TRAINING PROGRAM

## 2024-01-10 PROCEDURE — 63600175 PHARM REV CODE 636 W HCPCS: Performed by: STUDENT IN AN ORGANIZED HEALTH CARE EDUCATION/TRAINING PROGRAM

## 2024-01-10 PROCEDURE — 36430 TRANSFUSION BLD/BLD COMPNT: CPT

## 2024-01-10 PROCEDURE — P9021 RED BLOOD CELLS UNIT: HCPCS | Performed by: STUDENT IN AN ORGANIZED HEALTH CARE EDUCATION/TRAINING PROGRAM

## 2024-01-10 PROCEDURE — 11000001 HC ACUTE MED/SURG PRIVATE ROOM

## 2024-01-10 PROCEDURE — 86901 BLOOD TYPING SEROLOGIC RH(D): CPT | Performed by: STUDENT IN AN ORGANIZED HEALTH CARE EDUCATION/TRAINING PROGRAM

## 2024-01-10 PROCEDURE — 25000003 PHARM REV CODE 250: Performed by: STUDENT IN AN ORGANIZED HEALTH CARE EDUCATION/TRAINING PROGRAM

## 2024-01-10 PROCEDURE — 85018 HEMOGLOBIN: CPT | Performed by: STUDENT IN AN ORGANIZED HEALTH CARE EDUCATION/TRAINING PROGRAM

## 2024-01-10 PROCEDURE — 25000003 PHARM REV CODE 250: Performed by: INTERNAL MEDICINE

## 2024-01-10 PROCEDURE — 94761 N-INVAS EAR/PLS OXIMETRY MLT: CPT

## 2024-01-10 PROCEDURE — 36415 COLL VENOUS BLD VENIPUNCTURE: CPT | Mod: XB | Performed by: STUDENT IN AN ORGANIZED HEALTH CARE EDUCATION/TRAINING PROGRAM

## 2024-01-10 PROCEDURE — 85025 COMPLETE CBC W/AUTO DIFF WBC: CPT | Performed by: STUDENT IN AN ORGANIZED HEALTH CARE EDUCATION/TRAINING PROGRAM

## 2024-01-10 PROCEDURE — 25000003 PHARM REV CODE 250: Performed by: FAMILY MEDICINE

## 2024-01-10 PROCEDURE — 25000242 PHARM REV CODE 250 ALT 637 W/ HCPCS: Performed by: STUDENT IN AN ORGANIZED HEALTH CARE EDUCATION/TRAINING PROGRAM

## 2024-01-10 PROCEDURE — 94640 AIRWAY INHALATION TREATMENT: CPT

## 2024-01-10 PROCEDURE — 80053 COMPREHEN METABOLIC PANEL: CPT | Performed by: STUDENT IN AN ORGANIZED HEALTH CARE EDUCATION/TRAINING PROGRAM

## 2024-01-10 PROCEDURE — 25000242 PHARM REV CODE 250 ALT 637 W/ HCPCS: Performed by: INTERNAL MEDICINE

## 2024-01-10 RX ORDER — LIDOCAINE 50 MG/G
1 PATCH TOPICAL
Status: DISCONTINUED | OUTPATIENT
Start: 2024-01-10 | End: 2024-01-15 | Stop reason: HOSPADM

## 2024-01-10 RX ORDER — HYDROCODONE BITARTRATE AND ACETAMINOPHEN 500; 5 MG/1; MG/1
TABLET ORAL
Status: DISCONTINUED | OUTPATIENT
Start: 2024-01-10 | End: 2024-01-15 | Stop reason: HOSPADM

## 2024-01-10 RX ORDER — LEVALBUTEROL INHALATION SOLUTION 0.63 MG/3ML
0.63 SOLUTION RESPIRATORY (INHALATION) EVERY 8 HOURS
Status: DISCONTINUED | OUTPATIENT
Start: 2024-01-10 | End: 2024-01-15 | Stop reason: HOSPADM

## 2024-01-10 RX ORDER — TALC
6 POWDER (GRAM) TOPICAL NIGHTLY PRN
Status: DISCONTINUED | OUTPATIENT
Start: 2024-01-10 | End: 2024-01-15 | Stop reason: HOSPADM

## 2024-01-10 RX ORDER — METOPROLOL TARTRATE 50 MG/1
50 TABLET ORAL 2 TIMES DAILY
Status: DISCONTINUED | OUTPATIENT
Start: 2024-01-10 | End: 2024-01-15 | Stop reason: HOSPADM

## 2024-01-10 RX ADMIN — ACETAMINOPHEN 1000 MG: 500 TABLET ORAL at 02:01

## 2024-01-10 RX ADMIN — ACETAMINOPHEN 1000 MG: 500 TABLET ORAL at 09:01

## 2024-01-10 RX ADMIN — LEVALBUTEROL HYDROCHLORIDE 0.63 MG: 0.63 SOLUTION RESPIRATORY (INHALATION) at 03:01

## 2024-01-10 RX ADMIN — Medication 6 MG: at 09:01

## 2024-01-10 RX ADMIN — METOPROLOL TARTRATE 50 MG: 50 TABLET, FILM COATED ORAL at 08:01

## 2024-01-10 RX ADMIN — AMLODIPINE BESYLATE 10 MG: 5 TABLET ORAL at 08:01

## 2024-01-10 RX ADMIN — CEFTRIAXONE SODIUM 1 G: 1 INJECTION, POWDER, FOR SOLUTION INTRAMUSCULAR; INTRAVENOUS at 09:01

## 2024-01-10 RX ADMIN — LEVALBUTEROL HYDROCHLORIDE 0.63 MG: 0.63 SOLUTION RESPIRATORY (INHALATION) at 07:01

## 2024-01-10 RX ADMIN — PANTOPRAZOLE SODIUM 40 MG: 40 TABLET, DELAYED RELEASE ORAL at 08:01

## 2024-01-10 RX ADMIN — LIDOCAINE 1 PATCH: 50 PATCH CUTANEOUS at 02:01

## 2024-01-10 RX ADMIN — METOPROLOL TARTRATE 50 MG: 50 TABLET, FILM COATED ORAL at 09:01

## 2024-01-10 RX ADMIN — LEVALBUTEROL HYDROCHLORIDE 0.63 MG: 0.63 SOLUTION RESPIRATORY (INHALATION) at 11:01

## 2024-01-10 RX ADMIN — ATORVASTATIN CALCIUM 20 MG: 20 TABLET, FILM COATED ORAL at 08:01

## 2024-01-10 RX ADMIN — POLYETHYLENE GLYCOL 3350 17 G: 17 POWDER, FOR SOLUTION ORAL at 09:01

## 2024-01-10 NOTE — PROGRESS NOTES
01/09/24 2228   Admission   Initial VN Admission Questions Complete   Communication Issues? None   Shift   Virtual Nurse - Patient Verbalized Approval Of Camera Use   Safety/Activity   Patient Rounds bed in low position;placement of personal items at bedside;call light in patient/parent reach;visualized patient;clutter free environment maintained   Safety Promotion/Fall Prevention assistive device/personal item within reach;instructed to call staff for mobility;side rails raised x 2

## 2024-01-10 NOTE — PLAN OF CARE
Pt on RA with documented sats.  The proper method of use, as well as anticipated side effects, of this aerosol treatment are discussed and demonstrated to the patient.   Will continue to monitor.

## 2024-01-10 NOTE — ASSESSMENT & PLAN NOTE
Although patient is asymptomatic from UTI perspective and has possible contaiminated UA, with elevated WBC of 19 will continue rocephin for now.     - Continue rocephin 1gm daily.   - Ucx with Gram-negative rods-continue antibiotic

## 2024-01-10 NOTE — PT/OT/SLP PROGRESS
Physical Therapy Visit Attempt      Patient Name:  Purvi Quiroga   MRN:  [id number]    Patient not seen today secondary to Therapist assessment, Other (Comment) (PT is contraindicated at this time due to decreasing H&H (Hemoglobin 5.6) ; nsg reports pt is to receive blood transfusion this date). Will hold PT at this time. Will follow-up tomorrow's date as appropriate.    1/10/2024

## 2024-01-10 NOTE — SUBJECTIVE & OBJECTIVE
"Interval History: awake an alert  Reporting down trending H/H-transfuse with 2 units- stool occult blood  JOSE-improving- continue IV fluid  Tachycardia/hypertension-resume home metoprolol  Urine culture with Gram-negative rods and susceptibility pending    Review of Systems   Constitutional:  Positive for activity change and fatigue.   Respiratory:  Negative for shortness of breath.    Gastrointestinal:  Positive for blood in stool.   Neurological:  Positive for weakness.     Objective:     Vital Signs (Most Recent):  Temp: 97.9 °F (36.6 °C) (01/10/24 0552)  Pulse: (!) 128 (01/10/24 0552)  Resp: 20 (01/10/24 0552)  BP: (!) 142/68 (01/10/24 0552)  SpO2: (!) 94 % (01/10/24 0552) Vital Signs (24h Range):  Temp:  [96.7 °F (35.9 °C)-98.3 °F (36.8 °C)] 97.9 °F (36.6 °C)  Pulse:  [] 128  Resp:  [20-28] 20  SpO2:  [94 %-99 %] 94 %  BP: (131-146)/(60-68) 142/68     Weight: 101.3 kg (223 lb 5.2 oz)  Body mass index is 51.91 kg/m².    Intake/Output Summary (Last 24 hours) at 1/10/2024 0705  Last data filed at 1/10/2024 0516  Gross per 24 hour   Intake --   Output 300 ml   Net -300 ml         Physical Exam  HENT:      Head: Normocephalic and atraumatic.   Cardiovascular:      Rate and Rhythm: Normal rate.   Pulmonary:      Effort: Pulmonary effort is normal.   Abdominal:      General: Bowel sounds are normal. There is no distension.      Palpations: Abdomen is soft.   Musculoskeletal:      Cervical back: Normal range of motion.      Right lower leg: No edema.      Left lower leg: No edema.   Skin:     General: Skin is warm.      Capillary Refill: Capillary refill takes less than 2 seconds.   Neurological:      Mental Status: She is alert and oriented to person, place, and time.   Psychiatric:         Mood and Affect: Mood normal.         Behavior: Behavior normal.             Significant Labs: A1C:   Recent Labs   Lab 10/09/23  1224   HGBA1C 5.1     ABGs: No results for input(s): "PH", "PCO2", "HCO3", "POCSATURATED", " ""BE", "TOTALHB", "COHB", "METHB", "O2HB", "POCFIO2", "PO2" in the last 48 hours.  Blood Culture: No results for input(s): "LABBLOO" in the last 48 hours.  CBC:   Recent Labs   Lab 01/08/24  2350 01/09/24  0934 01/10/24  0333 01/10/24  0530   WBC 19.58* 15.82* 13.75*  --    HGB 8.0* 7.6* 5.8* 5.6*   HCT 26.6* 26.2* 20.3*  --    * 536* 439  --      CMP:   Recent Labs   Lab 01/08/24  1512 01/09/24  0934 01/10/24  0333    140 139   K 4.5 4.8 4.0    104 106   CO2 21* 21* 22*    128* 114*   BUN 22 32* 42*   CREATININE 1.7* 2.2* 1.6*   CALCIUM 9.1 9.9 8.6*   PROT 6.6 6.5 5.1*   ALBUMIN 3.5 3.5 2.9*   BILITOT 0.5 0.5 0.3   ALKPHOS 84 83 72   AST 10 14 11   ALT 6* 7* 6*   ANIONGAP 16 15 11     Coagulation: No results for input(s): "PT", "INR", "APTT" in the last 48 hours.  Lactic Acid: No results for input(s): "LACTATE" in the last 48 hours.  Lipase: No results for input(s): "LIPASE" in the last 48 hours.  Magnesium: No results for input(s): "MG" in the last 48 hours.  Troponin:   Recent Labs   Lab 01/08/24  1512   TROPONINI <0.006     TSH:   Recent Labs   Lab 10/09/23  1224   TSH 1.466     Urine Culture:   Recent Labs   Lab 01/08/24 2129   LABURIN GRAM NEGATIVE LUIS MANUEL  >100,000 cfu/ml  Identification and susceptibility pending  *  GRAM NEGATIVE LUIS MANUEL  50,000 - 99,999 cfu/ml  Identification and susceptibility pending  *     Urine Studies:   Recent Labs   Lab 01/08/24 2129   COLORU Yellow   APPEARANCEUA Cloudy*   PHUR 5.0   SPECGRAV 1.020   PROTEINUA 1+*   GLUCUA Negative   KETONESU Negative   BILIRUBINUA 1+*   OCCULTUA Negative   NITRITE Negative   UROBILINOGEN 2.0-3.0*   LEUKOCYTESUR 2+*   RBCUA 3   WBCUA 12*   BACTERIA Moderate*   SQUAMEPITHEL 21   HYALINECASTS 7*       Significant Imaging: I have reviewed all pertinent imaging results/findings within the past 24 hours.  "

## 2024-01-10 NOTE — PROGRESS NOTES
"[facility] Medicine  Progress Note    Patient Name: Purvi Quiroga  MRN: [id number]  Patient Class: IP- Inpatient   Admission Date: 1/8/2024  Length of Stay: 1 days  Attending Physician: Liz King*  Primary Care Provider: Palomo Burgos MD        Subjective:     Principal Problem:Pre-syncope        HPI:  Ms. Purvi Quiroga is a 80 y/o white F w/ PMH HTN, HLD, neuropathy, and arthritis who was sent to the hospital from her nursing home after patient had a pre-syncopal event at her nursing home. Patient states she has had off and on weakness for weeks. Today while using her walker to get back to her bedroom patient felt a sudden onset feeling of lightheadiness and "feeling" of wanting to faint. It's feeling she has had in the past right before she faints after seeing blood. Denies any prodromal symptoms, nausea, vomiting, chest pain, palpitaitons, fevers, chills, diarrhea, dysuria, urinary frequency, suprapubic pain, flank pain, melana. States she has been eating and drinking ok but has decreased her food intake recently trying to lose weight. No changes to her home BP meds recently though she states she has had hypotensive episodes in the past. When EMS arrived patient SBP was noted to be in the 70s and patient was brought to the hospital.     In the ED, labs were notable for WBC 19, hgb 8, Cr 1.7 (baseline 0.8 as 1/2023), and positive UA (however elevated squamous epithelial cells). Patient received a dose of rocephin.         Overview/Hospital Course:  No notes on file    Interval History: awake an alert  Reporting down trending H/H-transfuse with 2 units- stool occult blood  JOSE-improving- continue IV fluid  Tachycardia/hypertension-resume home metoprolol  Urine culture with Gram-negative rods and susceptibility pending    Review of Systems   Constitutional:  Positive for activity change and fatigue.   Respiratory:  Negative for shortness of breath.    Gastrointestinal:  Positive " "for blood in stool.   Neurological:  Positive for weakness.     Objective:     Vital Signs (Most Recent):  Temp: 97.9 °F (36.6 °C) (01/10/24 0552)  Pulse: (!) 128 (01/10/24 0552)  Resp: 20 (01/10/24 0552)  BP: (!) 142/68 (01/10/24 0552)  SpO2: (!) 94 % (01/10/24 0552) Vital Signs (24h Range):  Temp:  [96.7 °F (35.9 °C)-98.3 °F (36.8 °C)] 97.9 °F (36.6 °C)  Pulse:  [] 128  Resp:  [20-28] 20  SpO2:  [94 %-99 %] 94 %  BP: (131-146)/(60-68) 142/68     Weight: 101.3 kg (223 lb 5.2 oz)  Body mass index is 51.91 kg/m².    Intake/Output Summary (Last 24 hours) at 1/10/2024 0705  Last data filed at 1/10/2024 0516  Gross per 24 hour   Intake --   Output 300 ml   Net -300 ml         Physical Exam  HENT:      Head: Normocephalic and atraumatic.   Cardiovascular:      Rate and Rhythm: Normal rate.   Pulmonary:      Effort: Pulmonary effort is normal.   Abdominal:      General: Bowel sounds are normal. There is no distension.      Palpations: Abdomen is soft.   Musculoskeletal:      Cervical back: Normal range of motion.      Right lower leg: No edema.      Left lower leg: No edema.   Skin:     General: Skin is warm.      Capillary Refill: Capillary refill takes less than 2 seconds.   Neurological:      Mental Status: She is alert and oriented to person, place, and time.   Psychiatric:         Mood and Affect: Mood normal.         Behavior: Behavior normal.             Significant Labs: A1C:   Recent Labs   Lab 10/09/23  1224   HGBA1C 5.1     ABGs: No results for input(s): "PH", "PCO2", "HCO3", "POCSATURATED", "BE", "TOTALHB", "COHB", "METHB", "O2HB", "POCFIO2", "PO2" in the last 48 hours.  Blood Culture: No results for input(s): "LABBLOO" in the last 48 hours.  CBC:   Recent Labs   Lab 01/08/24  2350 01/09/24  0934 01/10/24  0333 01/10/24  0530   WBC 19.58* 15.82* 13.75*  --    HGB 8.0* 7.6* 5.8* 5.6*   HCT 26.6* 26.2* 20.3*  --    * 536* 439  --      CMP:   Recent Labs   Lab 01/08/24  1512 01/09/24  0934 " "01/10/24  0333    140 139   K 4.5 4.8 4.0    104 106   CO2 21* 21* 22*    128* 114*   BUN 22 32* 42*   CREATININE 1.7* 2.2* 1.6*   CALCIUM 9.1 9.9 8.6*   PROT 6.6 6.5 5.1*   ALBUMIN 3.5 3.5 2.9*   BILITOT 0.5 0.5 0.3   ALKPHOS 84 83 72   AST 10 14 11   ALT 6* 7* 6*   ANIONGAP 16 15 11     Coagulation: No results for input(s): "PT", "INR", "APTT" in the last 48 hours.  Lactic Acid: No results for input(s): "LACTATE" in the last 48 hours.  Lipase: No results for input(s): "LIPASE" in the last 48 hours.  Magnesium: No results for input(s): "MG" in the last 48 hours.  Troponin:   Recent Labs   Lab 01/08/24  1512   TROPONINI <0.006     TSH:   Recent Labs   Lab 10/09/23  1224   TSH 1.466     Urine Culture:   Recent Labs   Lab 01/08/24 2129   LABURIN GRAM NEGATIVE LUIS MANUEL  >100,000 cfu/ml  Identification and susceptibility pending  *  GRAM NEGATIVE LUIS MANUEL  50,000 - 99,999 cfu/ml  Identification and susceptibility pending  *     Urine Studies:   Recent Labs   Lab 01/08/24 2129   COLORU Yellow   APPEARANCEUA Cloudy*   PHUR 5.0   SPECGRAV 1.020   PROTEINUA 1+*   GLUCUA Negative   KETONESU Negative   BILIRUBINUA 1+*   OCCULTUA Negative   NITRITE Negative   UROBILINOGEN 2.0-3.0*   LEUKOCYTESUR 2+*   RBCUA 3   WBCUA 12*   BACTERIA Moderate*   SQUAMEPITHEL 21   HYALINECASTS 7*       Significant Imaging: I have reviewed all pertinent imaging results/findings within the past 24 hours.    Assessment/Plan:      * Pre-syncope  Most likely secondary to hypotension in the setting of BP meds vs infection.     - Will hold home BP meds for now and restart as needed.   - Start LR at 75cc/hr.   - See UTI plan below.   - orthostatics ordered.   - previous echo in 2022 unremarkable.       Anemia  - iron panel ordered.   Transfuse with 2 unit on PRBC 1/10/2024  Stool occult blood  Trend H/H    JOSE (acute kidney injury)  Most likely secondary to UTI.     - continue treatment as above.     UTI (urinary tract infection)  Although " patient is asymptomatic from UTI perspective and has possible contaiminated UA, with elevated WBC of 19 will continue rocephin for now.     - Continue rocephin 1gm daily.   - Ucx with Gram-negative rods-continue antibiotic     HLD (hyperlipidemia)  - continue statin      Essential hypertension  Hold home BP meds for now.       VTE Risk Mitigation (From admission, onward)           Ordered     enoxaparin injection 30 mg  Every 24 hours         01/09/24 0331     Place sequential compression device  Until discontinued         01/09/24 0122     IP VTE HIGH RISK PATIENT  Once         01/09/24 0122                    Discharge Planning   NIRMALA: 1/12/2024     Code Status: Full Code   Is the patient medically ready for discharge?:     Reason for patient still in hospital (select all that apply): Patient trending condition  Discharge Plan A: Home                  Liz King MD  Department of Hospital Medicine   Ne[facility] - Telemetry

## 2024-01-10 NOTE — ED PROVIDER NOTES
NAME:  Purvi Quiroga  CSN:     [id number]  MRN:    [id number]  ADMIT DATE: 1/8/2024        eMERGENCY dEPARTMENT eNCOUnter    CHIEF COMPLAINT    Chief Complaint   Patient presents with    Weakness     Became weak and felt as though she was going to pass out. Denies burning with urination. No bloody stool. Dizziness and weakness have been on and off for several weeks.       HPI    Purvi Quiroga is a 79 y.o. female with a past medical history of  has a past medical history of Asthma, Essential hypertension (12/10/2018), Hiatal hernia, Insomnia, and Other osteoporosis without current pathological fracture (12/10/2018).     she presents to the ED due to worsening generalized weakness today.  States that she was in the cafeteria and could not ambulate are stand up.  Notes that she walks with a walker at baseline but this was more severe.  Felt as if she was going to pass out and told staff at which time they called an ambulance for further assessment.  Denies any nausea, vomiting, urinary symptoms, no fevers, chills, cough, congestion or chest pain.  Denies any recent hematochezia or melena.  States she was recently told she was anemic of unclear etiology.  Has never had colonoscopy in the past.  States it was not addressed further after that discussion.      HPI       PAST MEDICAL HISTORY  Past Medical History:   Diagnosis Date    Asthma     Essential hypertension 12/10/2018    Hiatal hernia     Insomnia     Other osteoporosis without current pathological fracture 12/10/2018       SURGICAL HISTORY    Past Surgical History:   Procedure Laterality Date    CATARACT EXTRACTION, BILATERAL      DILATION AND CURETTAGE OF UTERUS      KNEE ARTHROSCOPY      TONSILLECTOMY         FAMILY HISTORY    Family History   Problem Relation Age of Onset    Heart disease Father         fatal MI in mid 60s    Hypertension Father     Hypertension Sister     Cancer Brother         colon cancer    Hypertension Brother     Diabetes Neg Hx      Stroke Neg Hx     Hyperlipidemia Neg Hx        SOCIAL HISTORY    Social History     Socioeconomic History    Marital status: Single   Tobacco Use    Smoking status: Never    Smokeless tobacco: Never    Tobacco comments:     18 or 19 yo for 1 yr   Substance and Sexual Activity    Alcohol use: Not Currently    Drug use: No     Social Determinants of Health     Financial Resource Strain: Low Risk  (11/24/2023)    Overall Financial Resource Strain (CARDIA)     Difficulty of Paying Living Expenses: Not very hard   Food Insecurity: No Food Insecurity (11/24/2023)    Hunger Vital Sign     Worried About Running Out of Food in the Last Year: Never true     Ran Out of Food in the Last Year: Never true   Transportation Needs: Unmet Transportation Needs (11/24/2023)    PRAPARE - Transportation     Lack of Transportation (Medical): Yes     Lack of Transportation (Non-Medical): No   Physical Activity: Unknown (11/24/2023)    Exercise Vital Sign     Days of Exercise per Week: 0 days   Stress: Stress Concern Present (1/9/2024)    [employer] - Occupational Stress Questionnaire     Feeling of Stress : Rather much   Social Connections: Unknown (1/9/2024)    Social Connection and Isolation Panel [NHANES]     Frequency of Communication with Friends and Family: Three times a week     Frequency of Social Gatherings with Friends and Family: Once a week     Active Member of Clubs or Organizations: No     Attends Club or Organization Meetings: Never     Marital Status: Never    Housing Stability: Unknown (11/24/2023)    Housing Stability Vital Sign     Number of Places Lived in the Last Year: 1     Unstable Housing in the Last Year: No       MEDICATIONS  Current Outpatient Medications   Medication Instructions    albuterol (PROVENTIL/VENTOLIN HFA) 90 mcg/actuation inhaler INHALE 1-2 PUFFS INTO THE LUNGS EVERY 6 HOURS AS NEEDED FOR SHORTNESS OF BREATH    albuterol-ipratropium (DUO-NEB) 2.5 mg-0.5 mg/3  mL nebulizer solution USE 1 VIAL via NEBULIZER EVERY 4 HOURS AS NEEDED SHORTNESS OF BREATH    alendronate (FOSAMAX) 70 MG tablet TAKE 1 TABLET BY MOUTH 1/2  HOUR BEFORE ANY FOOD OR  MEDICATION ONCE WEEKLY ON  SAME DAY    amitriptyline (ELAVIL) 10 mg, Oral, Nightly    amLODIPine (NORVASC) 10 MG tablet TAKE 1 TABLET BY MOUTH ONCE DAILY    atorvastatin (LIPITOR) 20 MG tablet 1 by mouth daily in evening    BREZTRI AEROSPHERE 160-9-4.8 mcg/actuation HFAA INHALE 2 puffs TWICE DAILY. RINSE MOUTH AND throat AFTER USE.    budesonide-glycopyr-formoterol 160-9-4.8 mcg/actuation HFAA Inhalation    calcium carbonate 650 mg, Oral, Daily    ergocalciferol (ERGOCALCIFEROL) 50,000 Units, Oral    fluticasone propionate (FLONASE) 50 mcg, Each Nostril, Daily    furosemide (LASIX) 40 mg, Oral, 2 times daily    gabapentin (NEURONTIN) 600 mg, Oral, 3 times daily    hydrOXYzine pamoate (VISTARIL) 50 MG Cap TAKE ONE CAPSULE BY MOUTH THREE TIMES DAILY AS NEEDED    irbesartan (AVAPRO) 150 MG tablet TAKE 1 TABLET BY MOUTH  TWICE DAILY    melatonin 5 mg, Oral, Nightly PRN    mepolizumab 100 mg, Subcutaneous, Every 28 days    metoprolol tartrate (LOPRESSOR) 50 mg, Oral, 2 times daily    montelukast (SINGULAIR) 10 mg tablet TAKE 1 TABLET BY MOUTH IN  THE EVENING    omeprazole (PRILOSEC) 20 MG capsule TAKE 1 CAPSULE BY MOUTH TWICE  DAILY    ondansetron (ZOFRAN-ODT) 8 MG TbDL PLACE 1 TABLET UNDER THE TONGUE EVERY TWELVE HOURS AS NEEDED    ondansetron (ZOFRAN-ODT) 4 mg, Oral, Every 6 hours PRN    potassium chloride (MICRO-K) 10 MEQ CpSR One capsule by mouth every morning with meal    sodium hyaluronate, EUFLEXXA, (EUFLEXXA) 10 mg/mL(mw 2.4 -3.6 million) injection Intra-articular    traZODone (DESYREL) 50 mg, Oral, Nightly    vitamin D (VITAMIN D3) 1,000 Units, Oral, Daily       ALLERGIES    Review of patient's allergies indicates:  No Known Allergies      REVIEW OF SYSTEMS   Review of Systems       PHYSICAL EXAM    Reviewed Triage Note    VITAL  "SIGNS:   ED Triage Vitals   Enc Vitals Group      BP 01/08/24 1343 (!) 115/56      Pulse 01/08/24 1343 69      Resp 01/08/24 1343 18      Temp 01/08/24 1343 98.2 °F (36.8 °C)      Temp Source 01/09/24 1827 Oral      SpO2 01/08/24 1343 97 %      Weight 01/08/24 1343 223 lb      Height 01/08/24 1343 4' 7"      Head Circumference --       Peak Flow --       Pain Score --       Pain Loc --       Pain Edu? --       Excl. in GC? --        Patient Vitals for the past 24 hrs:   BP Temp Temp src Pulse Resp SpO2   01/09/24 2025 131/60 97.3 °F (36.3 °C) -- (!) 124 20 95 %   01/09/24 1827 (!) 146/68 96.7 °F (35.9 °C) Oral 110 (!) 22 96 %   01/09/24 1326 136/66 -- -- (!) 132 (!) 28 99 %   01/09/24 1324 136/64 -- -- 88 20 99 %   01/09/24 0700 132/62 -- -- 89 18 (!) 94 %       Physical Exam    Nursing note and vitals reviewed.  Constitutional: She appears well-developed and well-nourished.   HENT:   Head: Normocephalic and atraumatic.   Eyes: EOM are normal. Pupils are equal, round, and reactive to light.   Neck: Neck supple.   Normal range of motion.  Cardiovascular:  Normal rate and regular rhythm.           Pulmonary/Chest: Breath sounds normal. No respiratory distress.   Abdominal: Abdomen is soft. There is no abdominal tenderness.   Musculoskeletal:         General: Normal range of motion.      Cervical back: Normal range of motion and neck supple.     Neurological: She is alert and oriented to person, place, and time.   Skin: Skin is warm and dry.   Psychiatric: She has a normal mood and affect.                EKG     Interpreted by EM physician if performed:   Sinus rhythm at a rate of 69.  No ST elevation or depression noted.              LABS  Pertinent labs reviewed. (See chart for details)   Labs Reviewed   CBC W/ AUTO DIFFERENTIAL - Abnormal; Notable for the following components:       Result Value    WBC 16.04 (*)     RBC 3.55 (*)     Hemoglobin 7.5 (*)     Hematocrit 26.2 (*)     MCV 74 (*)     MCH 21.1 (*)     MCHC " 28.6 (*)     RDW 21.7 (*)     Platelets 571 (*)     Immature Granulocytes 0.9 (*)     Gran # (ANC) 11.9 (*)     Immature Grans (Abs) 0.15 (*)     Mono # 1.1 (*)     Gran % 74.5 (*)     Lymph % 15.9 (*)     All other components within normal limits   COMPREHENSIVE METABOLIC PANEL - Abnormal; Notable for the following components:    CO2 21 (*)     Creatinine 1.7 (*)     ALT 6 (*)     eGFR 30 (*)     All other components within normal limits   URINALYSIS, REFLEX TO URINE CULTURE - Abnormal; Notable for the following components:    Appearance, UA Cloudy (*)     Protein, UA 1+ (*)     Bilirubin (UA) 1+ (*)     Urobilinogen, UA 2.0-3.0 (*)     Leukocytes, UA 2+ (*)     All other components within normal limits    Narrative:     Specimen Source->Urine   URINALYSIS MICROSCOPIC - Abnormal; Notable for the following components:    WBC, UA 12 (*)     Bacteria Moderate (*)     Hyaline Casts, UA 7 (*)     All other components within normal limits    Narrative:     Specimen Source->Urine   CBC W/ AUTO DIFFERENTIAL - Abnormal; Notable for the following components:    WBC 19.58 (*)     RBC 3.76 (*)     Hemoglobin 8.0 (*)     Hematocrit 26.6 (*)     MCV 71 (*)     MCH 21.3 (*)     MCHC 30.1 (*)     RDW 22.1 (*)     Platelets 605 (*)     Immature Granulocytes 1.3 (*)     Gran # (ANC) 14.3 (*)     Immature Grans (Abs) 0.25 (*)     Mono # 1.8 (*)     nRBC 1 (*)     Gran % 73.2 (*)     Lymph % 14.9 (*)     All other components within normal limits   IRON AND TIBC - Abnormal; Notable for the following components:    Iron 12 (*)     TIBC 487 (*)     Saturated Iron 2 (*)     All other components within normal limits   FERRITIN - Abnormal; Notable for the following components:    Ferritin 8 (*)     All other components within normal limits   CBC W/ AUTO DIFFERENTIAL - Abnormal; Notable for the following components:    WBC 15.82 (*)     RBC 3.57 (*)     Hemoglobin 7.6 (*)     Hematocrit 26.2 (*)     MCV 73 (*)     MCH 21.3 (*)     MCHC 29.0  (*)     RDW 21.7 (*)     Platelets 536 (*)     Immature Granulocytes 0.9 (*)     Gran # (ANC) 12.1 (*)     Immature Grans (Abs) 0.14 (*)     Mono # 1.4 (*)     Gran % 76.3 (*)     Lymph % 12.5 (*)     All other components within normal limits   COMPREHENSIVE METABOLIC PANEL - Abnormal; Notable for the following components:    CO2 21 (*)     Glucose 128 (*)     BUN 32 (*)     Creatinine 2.2 (*)     ALT 7 (*)     eGFR 22 (*)     All other components within normal limits   CULTURE, URINE   B-TYPE NATRIURETIC PEPTIDE   TROPONIN I         RADIOLOGY          Imaging Results              X-Ray Chest 1 View (Final result)  Result time 01/08/24 14:51:28      Final result by Vitaliy Stratton III, MD (01/08/24 14:51:28)                   Impression:      No acute process seen.      Electronically signed by: Vitaliy Stratton MD  Date:    01/08/2024  Time:    14:51               Narrative:    EXAMINATION:  XR CHEST 1 VIEW    CLINICAL HISTORY:  Shortness of breath    FINDINGS:  Chest one view: There is cardiomegaly aortic plaque and DJD.  There is a hiatal hernia.  Lungs are clear.                                        PROCEDURES    Procedures      ED COURSE & MEDICAL DECISION MAKING    Pertinent Labs & Imaging studies reviewed. (See chart for details and specific orders.)          Summary of review of records:   Appears patient has been working outpatient with:  Syncope scheduling and added for colonoscopy in March due to recent anemia.  Last visit with her primary care visit was in December.  Foot pain was being managed at that time and given prescription of tramadol.  Known history of hypertension CKD.    Medical Decision Making  79-year-old female presents for evaluation of worsening generalized weakness today and near syncopal episode.  Denies any other focal symptoms.    Differential diagnosis includes but is not limited to:  Infection, anemia, volume depletion, electrolyte derangement    Problems Addressed:  Acute cystitis  without hematuria: acute illness or injury     Details: Urine appears to be infected and this may be the underlying cause of her current symptoms.  Treated with IV antibiotics with him for hospitalization until she feels improved.    Amount and/or Complexity of Data Reviewed  Labs: ordered.    Risk  Decision regarding hospitalization.                Medications   cefTRIAXone (ROCEPHIN) 1 g in dextrose 5 % in water (D5W) 100 mL IVPB (MB+) (0 g Intravenous Stopped 1/9/24 0053)   ondansetron injection 4 mg (4 mg Intravenous Given 1/9/24 2133)     Labs notable for a hemoglobin of 7.5 with leukocytosis.  BMP shows baseline CKD with a creatinine of 1.7 that is unchanged from previous.  Urine with possible infection with 2+ leuk moderate amount of bacteria.  Troponin negative.  BNP within normal limits.  Wound culture is pending.  Chest x-ray without acute abnormality.    Patient discussed with hospitalist who accepts patient for admission at this time due to generalized weakness secondary to likely urinary tract infection.  It does appear that she was worsening chronic anemia that will need to be monitored during this hospitalization             FINAL IMPRESSION    Final diagnoses:  [R55] Near syncope  [R06.02] Shortness of breath  [N30.00] Acute cystitis without hematuria (Primary)  [R53.1] Generalized weakness       DISPOSITION  Patient admitted in stable condition             DISCLAIMER: This note was prepared with [employer] voice recognition transcription software. Garbled syntax, mangled pronouns, and other bizarre constructions may be attributed to that software system.             Yasmine Canchola DO  01/09/24 3796

## 2024-01-10 NOTE — PLAN OF CARE
0913  Patient resting quietly in bed when CM rounded. No family present. Patient was admitted with weakness & pre-syncope. Pt scheduled to receive 2U PRBC for H&H 5.8/20.3 this AM. Order for occult blood noted. Awaiting collection & results.    Patient lives alone at [facility], uses a RW to assist with ambulation, & will need assistance with  van transportation at time of discharge. Pt requested  services following discharge, stated that she would like any new prescriptions sent to [facility], & requested that her friend, Emanuel Estevez ([phone]), be added to her emergency contacts.     Previously scheduled hospital follow up appointment with Dr Palomo Burgos (PCP) on 1/24/2024 at 1000 noted. Information added to the pt's discharge paperwork.    CM updated patient's whiteboard with CM name & contact information.       Will continue to follow.

## 2024-01-10 NOTE — ED NOTES
"Pt requesting to stop NS infusion, stating, "I may want more fluid tomorrow, but I do not want them all day everyday. They make me pee to much." Admit physician notified. Per physician, no changes to the order will be made. Pt can refuse continuous infusion. IV fluids stopped at this time.  "

## 2024-01-10 NOTE — PLAN OF CARE
Problem: Adult Inpatient Plan of Care  Goal: Plan of Care Review  Outcome: Ongoing, Progressing     Problem: Asthma Comorbidity  Goal: Maintenance of Asthma Control  Outcome: Ongoing, Progressing

## 2024-01-11 ENCOUNTER — ANESTHESIA (OUTPATIENT)
Dept: ENDOSCOPY | Facility: HOSPITAL | Age: 79
DRG: 690 | End: 2024-01-11
Payer: MEDICARE

## 2024-01-11 ENCOUNTER — ANESTHESIA EVENT (OUTPATIENT)
Dept: ENDOSCOPY | Facility: HOSPITAL | Age: 79
DRG: 690 | End: 2024-01-11
Payer: MEDICARE

## 2024-01-11 PROBLEM — K92.2 GI BLEED: Status: ACTIVE | Noted: 2024-01-11

## 2024-01-11 LAB
ALBUMIN SERPL BCP-MCNC: 3.1 G/DL (ref 3.5–5.2)
ALP SERPL-CCNC: 64 U/L (ref 55–135)
ALT SERPL W/O P-5'-P-CCNC: 8 U/L (ref 10–44)
ANION GAP SERPL CALC-SCNC: 11 MMOL/L (ref 8–16)
ANISOCYTOSIS BLD QL SMEAR: SLIGHT
AST SERPL-CCNC: 15 U/L (ref 10–40)
BACTERIA UR CULT: ABNORMAL
BACTERIA UR CULT: ABNORMAL
BASOPHILS # BLD AUTO: 0.1 K/UL (ref 0–0.2)
BASOPHILS NFR BLD: 0.8 % (ref 0–1.9)
BILIRUB SERPL-MCNC: 1.6 MG/DL (ref 0.1–1)
BUN SERPL-MCNC: 25 MG/DL (ref 8–23)
CALCIUM SERPL-MCNC: 8.9 MG/DL (ref 8.7–10.5)
CHLORIDE SERPL-SCNC: 109 MMOL/L (ref 95–110)
CO2 SERPL-SCNC: 24 MMOL/L (ref 23–29)
CREAT SERPL-MCNC: 1 MG/DL (ref 0.5–1.4)
DIFFERENTIAL METHOD BLD: ABNORMAL
EOSINOPHIL # BLD AUTO: 0.6 K/UL (ref 0–0.5)
EOSINOPHIL NFR BLD: 4.8 % (ref 0–8)
ERYTHROCYTE [DISTWIDTH] IN BLOOD BY AUTOMATED COUNT: 20.6 % (ref 11.5–14.5)
EST. GFR  (NO RACE VARIABLE): 57 ML/MIN/1.73 M^2
GLUCOSE SERPL-MCNC: 87 MG/DL (ref 70–110)
HCT VFR BLD AUTO: 27.4 % (ref 37–48.5)
HGB BLD-MCNC: 8.9 G/DL (ref 12–16)
HYPOCHROMIA BLD QL SMEAR: ABNORMAL
IMM GRANULOCYTES # BLD AUTO: 0.31 K/UL (ref 0–0.04)
IMM GRANULOCYTES NFR BLD AUTO: 2.4 % (ref 0–0.5)
LYMPHOCYTES # BLD AUTO: 2.4 K/UL (ref 1–4.8)
LYMPHOCYTES NFR BLD: 18.5 % (ref 18–48)
MCH RBC QN AUTO: 24.7 PG (ref 27–31)
MCHC RBC AUTO-ENTMCNC: 32.5 G/DL (ref 32–36)
MCV RBC AUTO: 76 FL (ref 82–98)
MONOCYTES # BLD AUTO: 1.4 K/UL (ref 0.3–1)
MONOCYTES NFR BLD: 11 % (ref 4–15)
NEUTROPHILS # BLD AUTO: 8 K/UL (ref 1.8–7.7)
NEUTROPHILS NFR BLD: 62.5 % (ref 38–73)
NRBC BLD-RTO: 1 /100 WBC
OB PNL STL: POSITIVE
OVALOCYTES BLD QL SMEAR: ABNORMAL
PLATELET # BLD AUTO: 360 K/UL (ref 150–450)
PLATELET BLD QL SMEAR: ABNORMAL
PMV BLD AUTO: 9.7 FL (ref 9.2–12.9)
POIKILOCYTOSIS BLD QL SMEAR: SLIGHT
POLYCHROMASIA BLD QL SMEAR: ABNORMAL
POTASSIUM SERPL-SCNC: 4.2 MMOL/L (ref 3.5–5.1)
PROT SERPL-MCNC: 5.6 G/DL (ref 6–8.4)
RBC # BLD AUTO: 3.6 M/UL (ref 4–5.4)
SODIUM SERPL-SCNC: 144 MMOL/L (ref 136–145)
WBC # BLD AUTO: 12.73 K/UL (ref 3.9–12.7)

## 2024-01-11 PROCEDURE — 63600175 PHARM REV CODE 636 W HCPCS: Performed by: INTERNAL MEDICINE

## 2024-01-11 PROCEDURE — 97161 PT EVAL LOW COMPLEX 20 MIN: CPT

## 2024-01-11 PROCEDURE — 85025 COMPLETE CBC W/AUTO DIFF WBC: CPT | Performed by: STUDENT IN AN ORGANIZED HEALTH CARE EDUCATION/TRAINING PROGRAM

## 2024-01-11 PROCEDURE — 36415 COLL VENOUS BLD VENIPUNCTURE: CPT | Performed by: STUDENT IN AN ORGANIZED HEALTH CARE EDUCATION/TRAINING PROGRAM

## 2024-01-11 PROCEDURE — 97530 THERAPEUTIC ACTIVITIES: CPT

## 2024-01-11 PROCEDURE — 25000003 PHARM REV CODE 250: Performed by: INTERNAL MEDICINE

## 2024-01-11 PROCEDURE — 82272 OCCULT BLD FECES 1-3 TESTS: CPT | Performed by: FAMILY MEDICINE

## 2024-01-11 PROCEDURE — 63600175 PHARM REV CODE 636 W HCPCS: Performed by: STUDENT IN AN ORGANIZED HEALTH CARE EDUCATION/TRAINING PROGRAM

## 2024-01-11 PROCEDURE — 11000001 HC ACUTE MED/SURG PRIVATE ROOM

## 2024-01-11 PROCEDURE — C9113 INJ PANTOPRAZOLE SODIUM, VIA: HCPCS | Performed by: FAMILY MEDICINE

## 2024-01-11 PROCEDURE — 25000003 PHARM REV CODE 250: Performed by: FAMILY MEDICINE

## 2024-01-11 PROCEDURE — 80053 COMPREHEN METABOLIC PANEL: CPT | Performed by: STUDENT IN AN ORGANIZED HEALTH CARE EDUCATION/TRAINING PROGRAM

## 2024-01-11 PROCEDURE — 25000003 PHARM REV CODE 250: Performed by: STUDENT IN AN ORGANIZED HEALTH CARE EDUCATION/TRAINING PROGRAM

## 2024-01-11 PROCEDURE — 30233N1 TRANSFUSION OF NONAUTOLOGOUS RED BLOOD CELLS INTO PERIPHERAL VEIN, PERCUTANEOUS APPROACH: ICD-10-PCS | Performed by: FAMILY MEDICINE

## 2024-01-11 PROCEDURE — 63600175 PHARM REV CODE 636 W HCPCS: Performed by: FAMILY MEDICINE

## 2024-01-11 PROCEDURE — 25000242 PHARM REV CODE 250 ALT 637 W/ HCPCS: Performed by: STUDENT IN AN ORGANIZED HEALTH CARE EDUCATION/TRAINING PROGRAM

## 2024-01-11 PROCEDURE — 94640 AIRWAY INHALATION TREATMENT: CPT

## 2024-01-11 PROCEDURE — 99223 1ST HOSP IP/OBS HIGH 75: CPT | Mod: ,,, | Performed by: INTERNAL MEDICINE

## 2024-01-11 PROCEDURE — 94761 N-INVAS EAR/PLS OXIMETRY MLT: CPT

## 2024-01-11 PROCEDURE — 97110 THERAPEUTIC EXERCISES: CPT

## 2024-01-11 RX ORDER — PANTOPRAZOLE SODIUM 40 MG/10ML
40 INJECTION, POWDER, LYOPHILIZED, FOR SOLUTION INTRAVENOUS 2 TIMES DAILY
Status: DISCONTINUED | OUTPATIENT
Start: 2024-01-11 | End: 2024-01-15 | Stop reason: HOSPADM

## 2024-01-11 RX ADMIN — PANTOPRAZOLE SODIUM 40 MG: 40 TABLET, DELAYED RELEASE ORAL at 08:01

## 2024-01-11 RX ADMIN — METOPROLOL TARTRATE 50 MG: 50 TABLET, FILM COATED ORAL at 10:01

## 2024-01-11 RX ADMIN — LEVALBUTEROL HYDROCHLORIDE 0.63 MG: 0.63 SOLUTION RESPIRATORY (INHALATION) at 03:01

## 2024-01-11 RX ADMIN — Medication 6 MG: at 10:01

## 2024-01-11 RX ADMIN — LEVALBUTEROL HYDROCHLORIDE 0.63 MG: 0.63 SOLUTION RESPIRATORY (INHALATION) at 08:01

## 2024-01-11 RX ADMIN — ATORVASTATIN CALCIUM 20 MG: 20 TABLET, FILM COATED ORAL at 08:01

## 2024-01-11 RX ADMIN — ACETAMINOPHEN 1000 MG: 500 TABLET ORAL at 06:01

## 2024-01-11 RX ADMIN — METOPROLOL TARTRATE 50 MG: 50 TABLET, FILM COATED ORAL at 08:01

## 2024-01-11 RX ADMIN — CEFTRIAXONE SODIUM 1 G: 1 INJECTION, POWDER, FOR SOLUTION INTRAMUSCULAR; INTRAVENOUS at 10:01

## 2024-01-11 RX ADMIN — ONDANSETRON 4 MG: 2 INJECTION INTRAMUSCULAR; INTRAVENOUS at 01:01

## 2024-01-11 RX ADMIN — PANTOPRAZOLE SODIUM 40 MG: 40 INJECTION, POWDER, FOR SOLUTION INTRAVENOUS at 10:01

## 2024-01-11 RX ADMIN — ACETAMINOPHEN 1000 MG: 500 TABLET ORAL at 10:01

## 2024-01-11 RX ADMIN — AMLODIPINE BESYLATE 10 MG: 5 TABLET ORAL at 08:01

## 2024-01-11 NOTE — ASSESSMENT & PLAN NOTE
Body mass index is 51.91 kg/m². Morbid obesity complicates all aspects of disease management from diagnostic modalities to treatment. Weight loss encouraged and health benefits explained to patient.

## 2024-01-11 NOTE — PT/OT/SLP EVAL
Physical Therapy Evaluation and Treatment    Patient Name:  Purvi Quiroga   MRN:  [id number]    Recommendations:     Discharge Recommendations: Moderate Intensity Therapy   Discharge Equipment Recommendations: to be determined by next level of care   Barriers to discharge: Decreased caregiver support and increased assist, impaired endurance    Assessment:     Purvi Quiroga is a 79 y.o. female admitted with a medical diagnosis of Pre-syncope.  She presents with the following impairments/functional limitations: weakness, gait instability, impaired balance, impaired endurance, impaired self care skills, impaired functional mobility, pain, decreased safety awareness, impaired cardiopulmonary response to activity, decreased lower extremity function .Pt ambulated ~12 ft x 2 with RW and CGA. Pt with increased RR, WOB, and fatigue with minimal activity. Pt c/o weakness and fatigue. SpO2 >96% on RA and HR 70s-90s. Recommending moderate intensity therapy.     Rehab Prognosis: Good; patient would benefit from acute skilled PT services to address these deficits and reach maximum level of function.    Recent Surgery: Procedure(s) (LRB):  EGD (ESOPHAGOGASTRODUODENOSCOPY) (N/A)      Plan:     During this hospitalization, patient to be seen 5 x/week to address the identified rehab impairments via gait training, therapeutic activities, therapeutic exercises, neuromuscular re-education and progress toward the following goals:    Plan of Care Expires:  02/11/24    Subjective     Chief Complaint: weakness, fatigue  Patient/Family Comments/goals: pt is interested in d/c to SNF then transitioning into care home  Pain/Comfort:  Pain Rating 1: 0/10  Location - Side 1: Bilateral  Location 1: knee  Pain Rating Post-Intervention 1:  (reports arthritic pain in B knees with activity, not rated)    Patients cultural, spiritual, Latter-day conflicts given the current situation: no    Living Environment:  Pt lives alone in [facility]   Prior to admission, patients level of function was Mod I with use of rollator for mobility and ADL's; pt denies any falls but endorses increased difficulty managing her own ADL's including toileting; pt ambulates short distances at a time and ambulates to dining guadarrama for meals; elevator access.  Equipment used at home: rollator, shower chair, cane, quad, bedside commode.  DME owned (not currently used):  [employer] .  Upon discharge, patient will have assistance from unknown.    Objective:     Communicated with nsg prior to session.  Patient found HOB elevated with    upon PT entry to room.    General Precautions: Standard, fall  Orthopedic Precautions:N/A   Braces: N/A  Respiratory Status: Room air    Exams:  Cognitive Exam:  Patient is oriented to Person, Place, Time, and Situation  Postural Exam:  Patient presented with the following abnormalities:    -       Rounded shoulders  Sensation:  denies any numbness/tingling  Skin Integrity/Edema:      -       Skin integrity: Visible skin intact  RLE ROM: WFL pt's needs  RLE Strength: grossly 4/5  LLE ROM: WFL for pt's needs  LLE Strength: grossly 4/5    Functional Mobility:  Bed Mobility:     Scooting: stand by assistance  Supine to Sit: stand by assistance  Sit to Supine: stand by assistance  Transfers:     Sit to Stand:  contact guard assistance with rolling walker  Bed <> BSC: CGA for stand pivot without AD  Gait: Pt ambulated ~12 ft x 2 with RW and CGA; pt demonstrates increased lateral sway B      AM-PAC 6 CLICK MOBILITY  Total Score:16       Treatment & Education:  8709-9799:  Obtained pt's information and began asking social hx and PLOF then pt reporting she is too tired to participate and requested therapist come back later; pt yelled/raised her voice with therapist stating she has arthritis and can't walk when therapist was attempting to obtain PLOF; therapist left per pt request as pt reports she was not agreeable to therapy at that  "time due to fatigue    8127-1816:  Therapist returned after pt stated to nurse "you can send her in now"  Pt agreeable to participate in therapy  Pt performed bed <> BSC t/f and required assist for hygiene  Pt ambulated X 2 trials and took seated rest break on couch   Pt has a modified technique for transitioning in/out bed; decreased safety awareness noted  Pt educated on PT POC  SpO2 >96% ; HR 70s-90s. Increased RR, WOB, and SOB - attempted PLB with poor carryover     Patient left HOB elevated with all lines intact, call button in reach, bed alarm on, and nsg notified.    GOALS:   Multidisciplinary Problems       Physical Therapy Goals          Problem: Physical Therapy    Goal Priority Disciplines Outcome Goal Variances Interventions   Physical Therapy Goal     PT, PT/OT Ongoing, Progressing     Description: Goals to be met by: 24     Patient will increase functional independence with mobility by performin. Supine to sit with Modified [place]  2. Sit to supine with Modified [place]  3. Sit to stand transfer with Modified [place]  4. Bed to chair transfer with Modified [place] using rollator  5. Gait  x 80 feet with Modified [place] using rollator.                          History:     Past Medical History:   Diagnosis Date    Asthma     Essential hypertension 12/10/2018    Hiatal hernia     Insomnia     Other osteoporosis without current pathological fracture 12/10/2018       Past Surgical History:   Procedure Laterality Date    CATARACT EXTRACTION, BILATERAL      DILATION AND CURETTAGE OF UTERUS      KNEE ARTHROSCOPY      TONSILLECTOMY         Time Tracking:     PT Received On: 24  PT Start Time: 1018   1120  PT Stop Time: 9157   1149  PT Total Time (min): 9 min + 29    Billable Minutes: Evaluation 9, Therapeutic Activity 19, and Therapeutic Exercise 10      2024  "

## 2024-01-11 NOTE — ASSESSMENT & PLAN NOTE
Although patient is asymptomatic from UTI perspective and has possible contaiminated UA, with elevated WBC of 19 will continue rocephin for now.     - Continue rocephin 1gm daily.   - Ucx with Gram-negative rods-with-E coli, Raoultella ornithinolytica both sensitive to continue antibiotic

## 2024-01-11 NOTE — ASSESSMENT & PLAN NOTE
- iron panel ordered.   Transfuse with 2 unit on PRBC 1/10/2024. Post transfusion H/H stable  Stool occult blood-positive  Trend H/H

## 2024-01-11 NOTE — PLAN OF CARE
Problem: Adult Inpatient Plan of Care  Goal: Plan of Care Review  Outcome: Ongoing, Progressing     Problem: UTI (Urinary Tract Infection)  Goal: Improved Infection Symptoms  Outcome: Ongoing, Progressing     Problem: Fall Injury Risk  Goal: Absence of Fall and Fall-Related Injury  Outcome: Ongoing, Progressing     Problem: Asthma Comorbidity  Goal: Maintenance of Asthma Control  Outcome: Ongoing, Progressing

## 2024-01-11 NOTE — PLAN OF CARE
Problem: Physical Therapy  Goal: Physical Therapy Goal  Description: Goals to be met by: 24     Patient will increase functional independence with mobility by performin. Supine to sit with Modified [place]  2. Sit to supine with Modified [place]  3. Sit to stand transfer with Modified [place]  4. Bed to chair transfer with Modified [place] using rollator  5. Gait  x 80 feet with Modified [place] using rollator.     Outcome: Ongoing, Progressing     PT Eval completed, note to follow. Pt ambulated ~12 ft x 2 with RW and CGA. Pt with increased RR, WOB, and fatigue with minimal activity. Pt c/o weakness and fatigue. SpO2 >96% on RA and HR 70s-90s. Recommending moderate intensity therapy.

## 2024-01-11 NOTE — SUBJECTIVE & OBJECTIVE
"Interval History: awake an alert  S/p 2 units PRBC- stool occult blood-positive-IV Protonix and consult GI  JOSE-resolved   Tachycardia/hypertension-resume home metoprolol  Urine culture with Gram-negative rods--E coli, Raoultella ornithinolytica both sensitive to ceftriaxone-continue      Review of Systems   Constitutional:  Positive for activity change and fatigue.   Respiratory:  Negative for shortness of breath.    Gastrointestinal:  Positive for blood in stool.   Neurological:  Positive for weakness.     Objective:     Vital Signs (Most Recent):  Temp: 97.7 °F (36.5 °C) (01/11/24 0755)  Pulse: 73 (01/11/24 0848)  Resp: 20 (01/11/24 0848)  BP: (!) 149/104 (01/11/24 0829)  SpO2: 96 % (01/11/24 0848) Vital Signs (24h Range):  Temp:  [97.7 °F (36.5 °C)-98.6 °F (37 °C)] 97.7 °F (36.5 °C)  Pulse:  [73-99] 73  Resp:  [14-20] 20  SpO2:  [91 %-97 %] 96 %  BP: (128-179)/() 149/104     Weight: 101.3 kg (223 lb 5.2 oz)  Body mass index is 51.91 kg/m².  No intake or output data in the 24 hours ending 01/11/24 0938        Physical Exam  HENT:      Head: Normocephalic and atraumatic.   Cardiovascular:      Rate and Rhythm: Normal rate.   Pulmonary:      Effort: Pulmonary effort is normal.   Abdominal:      General: Bowel sounds are normal. There is no distension.      Palpations: Abdomen is soft.   Musculoskeletal:      Cervical back: Normal range of motion.      Right lower leg: No edema.      Left lower leg: No edema.   Skin:     General: Skin is warm.      Capillary Refill: Capillary refill takes less than 2 seconds.   Neurological:      Mental Status: She is alert and oriented to person, place, and time.   Psychiatric:         Mood and Affect: Mood normal.         Behavior: Behavior normal.             Significant Labs: A1C:   Recent Labs   Lab 10/09/23  1224   HGBA1C 5.1       ABGs: No results for input(s): "PH", "PCO2", "HCO3", "POCSATURATED", "BE", "TOTALHB", "COHB", "METHB", "O2HB", "POCFIO2", "PO2" in the last " "48 hours.  Blood Culture: No results for input(s): "LABBLOO" in the last 48 hours.  CBC:   Recent Labs   Lab 01/10/24  0333 01/10/24  0530 01/11/24  0246   WBC 13.75*  --  12.73*   HGB 5.8* 5.6* 8.9*   HCT 20.3*  --  27.4*     --  360       CMP:   Recent Labs   Lab 01/10/24  0333 01/11/24  0246    144   K 4.0 4.2    109   CO2 22* 24   * 87   BUN 42* 25*   CREATININE 1.6* 1.0   CALCIUM 8.6* 8.9   PROT 5.1* 5.6*   ALBUMIN 2.9* 3.1*   BILITOT 0.3 1.6*   ALKPHOS 72 64   AST 11 15   ALT 6* 8*   ANIONGAP 11 11       Coagulation: No results for input(s): "PT", "INR", "APTT" in the last 48 hours.  Lactic Acid: No results for input(s): "LACTATE" in the last 48 hours.  Lipase: No results for input(s): "LIPASE" in the last 48 hours.  Magnesium: No results for input(s): "MG" in the last 48 hours.  Troponin:   No results for input(s): "TROPONINI", "TROPONINIHS" in the last 48 hours.    TSH:   Recent Labs   Lab 10/09/23  1224   TSH 1.466       Urine Culture:   No results for input(s): "LABURIN" in the last 48 hours.    Urine Studies:   No results for input(s): "COLORU", "APPEARANCEUA", "PHUR", "SPECGRAV", "PROTEINUA", "GLUCUA", "KETONESU", "BILIRUBINUA", "OCCULTUA", "NITRITE", "UROBILINOGEN", "LEUKOCYTESUR", "RBCUA", "WBCUA", "BACTERIA", "SQUAMEPITHEL", "HYALINECASTS" in the last 48 hours.    Invalid input(s): "WRIGHTSUR"      Significant Imaging: I have reviewed all pertinent imaging results/findings within the past 24 hours.  "

## 2024-01-11 NOTE — ASSESSMENT & PLAN NOTE
Stool occult blood positive  IV Protonix   S/p 2 unit PRBC  Post transfusion H/H stable  Hold anti coagulation   Consult GI

## 2024-01-11 NOTE — PROGRESS NOTES
"[facility] Medicine  Progress Note    Patient Name: Purvi Quiroga  MRN: [id number]  Patient Class: IP- Inpatient   Admission Date: 1/8/2024  Length of Stay: 2 days  Attending Physician: Liz King*  Primary Care Provider: Palomo Burgos MD        Subjective:     Principal Problem:Pre-syncope        HPI:  Ms. Purvi Quiroga is a 80 y/o white F w/ PMH HTN, HLD, neuropathy, and arthritis who was sent to the hospital from her nursing home after patient had a pre-syncopal event at her nursing home. Patient states she has had off and on weakness for weeks. Today while using her walker to get back to her bedroom patient felt a sudden onset feeling of lightheadiness and "feeling" of wanting to faint. It's feeling she has had in the past right before she faints after seeing blood. Denies any prodromal symptoms, nausea, vomiting, chest pain, palpitaitons, fevers, chills, diarrhea, dysuria, urinary frequency, suprapubic pain, flank pain, melana. States she has been eating and drinking ok but has decreased her food intake recently trying to lose weight. No changes to her home BP meds recently though she states she has had hypotensive episodes in the past. When EMS arrived patient SBP was noted to be in the 70s and patient was brought to the hospital.     In the ED, labs were notable for WBC 19, hgb 8, Cr 1.7 (baseline 0.8 as 1/2023), and positive UA (however elevated squamous epithelial cells). Patient received a dose of rocephin.         Overview/Hospital Course:  No notes on file    Interval History: awake an alert  S/p 2 units PRBC- stool occult blood-positive-IV Protonix and consult GI  JOSE-resolved   Tachycardia/hypertension-resume home metoprolol  Urine culture with Gram-negative rods--E coli, Raoultella ornithinolytica both sensitive to ceftriaxone-continue      Review of Systems   Constitutional:  Positive for activity change and fatigue.   Respiratory:  Negative for shortness of breath.  " "  Gastrointestinal:  Positive for blood in stool.   Neurological:  Positive for weakness.     Objective:     Vital Signs (Most Recent):  Temp: 97.7 °F (36.5 °C) (01/11/24 0755)  Pulse: 73 (01/11/24 0848)  Resp: 20 (01/11/24 0848)  BP: (!) 149/104 (01/11/24 0829)  SpO2: 96 % (01/11/24 0848) Vital Signs (24h Range):  Temp:  [97.7 °F (36.5 °C)-98.6 °F (37 °C)] 97.7 °F (36.5 °C)  Pulse:  [73-99] 73  Resp:  [14-20] 20  SpO2:  [91 %-97 %] 96 %  BP: (128-179)/() 149/104     Weight: 101.3 kg (223 lb 5.2 oz)  Body mass index is 51.91 kg/m².  No intake or output data in the 24 hours ending 01/11/24 0938        Physical Exam  HENT:      Head: Normocephalic and atraumatic.   Cardiovascular:      Rate and Rhythm: Normal rate.   Pulmonary:      Effort: Pulmonary effort is normal.   Abdominal:      General: Bowel sounds are normal. There is no distension.      Palpations: Abdomen is soft.   Musculoskeletal:      Cervical back: Normal range of motion.      Right lower leg: No edema.      Left lower leg: No edema.   Skin:     General: Skin is warm.      Capillary Refill: Capillary refill takes less than 2 seconds.   Neurological:      Mental Status: She is alert and oriented to person, place, and time.   Psychiatric:         Mood and Affect: Mood normal.         Behavior: Behavior normal.             Significant Labs: A1C:   Recent Labs   Lab 10/09/23  1224   HGBA1C 5.1       ABGs: No results for input(s): "PH", "PCO2", "HCO3", "POCSATURATED", "BE", "TOTALHB", "COHB", "METHB", "O2HB", "POCFIO2", "PO2" in the last 48 hours.  Blood Culture: No results for input(s): "LABBLOO" in the last 48 hours.  CBC:   Recent Labs   Lab 01/10/24  0333 01/10/24  0530 01/11/24  0246   WBC 13.75*  --  12.73*   HGB 5.8* 5.6* 8.9*   HCT 20.3*  --  27.4*     --  360       CMP:   Recent Labs   Lab 01/10/24  0333 01/11/24  0246    144   K 4.0 4.2    109   CO2 22* 24   * 87   BUN 42* 25*   CREATININE 1.6* 1.0   CALCIUM 8.6* " "8.9   PROT 5.1* 5.6*   ALBUMIN 2.9* 3.1*   BILITOT 0.3 1.6*   ALKPHOS 72 64   AST 11 15   ALT 6* 8*   ANIONGAP 11 11       Coagulation: No results for input(s): "PT", "INR", "APTT" in the last 48 hours.  Lactic Acid: No results for input(s): "LACTATE" in the last 48 hours.  Lipase: No results for input(s): "LIPASE" in the last 48 hours.  Magnesium: No results for input(s): "MG" in the last 48 hours.  Troponin:   No results for input(s): "TROPONINI", "TROPONINIHS" in the last 48 hours.    TSH:   Recent Labs   Lab 10/09/23  1224   TSH 1.466       Urine Culture:   No results for input(s): "LABURIN" in the last 48 hours.    Urine Studies:   No results for input(s): "COLORU", "APPEARANCEUA", "PHUR", "SPECGRAV", "PROTEINUA", "GLUCUA", "KETONESU", "BILIRUBINUA", "OCCULTUA", "NITRITE", "UROBILINOGEN", "LEUKOCYTESUR", "RBCUA", "WBCUA", "BACTERIA", "SQUAMEPITHEL", "HYALINECASTS" in the last 48 hours.    Invalid input(s): "WRIGHTSUR"      Significant Imaging: I have reviewed all pertinent imaging results/findings within the past 24 hours.    Assessment/Plan:      * Pre-syncope  Most likely secondary to hypotension in the setting of BP meds vs infection.     - Will hold home BP meds for now and restart as needed.   - Start LR at 75cc/hr.   - See UTI plan below.   - orthostatics ordered.   - previous echo in 2022 unremarkable.       GI bleed  Stool occult blood positive  IV Protonix   S/p 2 unit PRBC  Post transfusion H/H stable  Hold anti coagulation   Consult GI      Anemia  - iron panel ordered.   Transfuse with 2 unit on PRBC 1/10/2024. Post transfusion H/H stable  Stool occult blood-positive  Trend H/H    JOSE (acute kidney injury)  Most likely secondary to UTI.     - continue treatment as above.     UTI (urinary tract infection)  Although patient is asymptomatic from UTI perspective and has possible contaiminated UA, with elevated WBC of 19 will continue rocephin for now.     - Continue rocephin 1gm daily.   - Ucx with " Gram-negative rods-with-E coli, Raoultella ornithinolytica both sensitive to continue antibiotic     HLD (hyperlipidemia)  - continue statin      Morbid obesity  Body mass index is 51.91 kg/m². Morbid obesity complicates all aspects of disease management from diagnostic modalities to treatment. Weight loss encouraged and health benefits explained to patient.         Essential hypertension  Hold home BP meds for now.   Resume home meds      VTE Risk Mitigation (From admission, onward)           Ordered     Place sequential compression device  Until discontinued         01/09/24 0122     IP VTE HIGH RISK PATIENT  Once         01/09/24 0122                    Discharge Planning   NIRMALA: 1/12/2024     Code Status: Full Code   Is the patient medically ready for discharge?:     Reason for patient still in hospital (select all that apply): Patient trending condition  Discharge Plan A: Home                  Liz King MD  Department of Hospital Medicine   [place] [facility]

## 2024-01-11 NOTE — PLAN OF CARE
01/11/24 1030   Rounds   Attendance Nurse ;Provider   Discharge Plan A Home   Why the patient remains in the hospital Requires continued medical care   Transition of Care Barriers Transportation       1030  CM was informed by Dr Bernstein of (+) occult blood result, GI consult, order for 2U PRBC, & possible need for EGD. H&H 8.9/27.4 this AM.     1110  Patient resting quietly when CM rounded. No family present. CM informed the pt of above & instructed the pt not to eat until notified.     Pt stated that she is very weak & requested SNF placement following discharge. CM encouraged pt to work with PT/OT as much as possible. Awaiting PT/OT notes & recs. Pt stated that she was admitted to [facility] SNF in the past.     1340  SNF referrals sent via [employer]. Awaiting response.       Will continue to follow.

## 2024-01-12 LAB
ALBUMIN SERPL BCP-MCNC: 3.3 G/DL (ref 3.5–5.2)
ALP SERPL-CCNC: 61 U/L (ref 55–135)
ALT SERPL W/O P-5'-P-CCNC: 13 U/L (ref 10–44)
ANION GAP SERPL CALC-SCNC: 9 MMOL/L (ref 8–16)
AST SERPL-CCNC: 17 U/L (ref 10–40)
BASOPHILS # BLD AUTO: 0.1 K/UL (ref 0–0.2)
BASOPHILS NFR BLD: 0.8 % (ref 0–1.9)
BILIRUB SERPL-MCNC: 0.5 MG/DL (ref 0.1–1)
BUN SERPL-MCNC: 14 MG/DL (ref 8–23)
CALCIUM SERPL-MCNC: 8.9 MG/DL (ref 8.7–10.5)
CHLORIDE SERPL-SCNC: 109 MMOL/L (ref 95–110)
CO2 SERPL-SCNC: 23 MMOL/L (ref 23–29)
CREAT SERPL-MCNC: 0.9 MG/DL (ref 0.5–1.4)
DIFFERENTIAL METHOD BLD: ABNORMAL
EOSINOPHIL # BLD AUTO: 0.8 K/UL (ref 0–0.5)
EOSINOPHIL NFR BLD: 6.2 % (ref 0–8)
ERYTHROCYTE [DISTWIDTH] IN BLOOD BY AUTOMATED COUNT: 21.8 % (ref 11.5–14.5)
EST. GFR  (NO RACE VARIABLE): >60 ML/MIN/1.73 M^2
GLUCOSE SERPL-MCNC: 94 MG/DL (ref 70–110)
HCT VFR BLD AUTO: 28.5 % (ref 37–48.5)
HGB BLD-MCNC: 8.8 G/DL (ref 12–16)
IMM GRANULOCYTES # BLD AUTO: 0.18 K/UL (ref 0–0.04)
IMM GRANULOCYTES NFR BLD AUTO: 1.5 % (ref 0–0.5)
LYMPHOCYTES # BLD AUTO: 2.6 K/UL (ref 1–4.8)
LYMPHOCYTES NFR BLD: 21.8 % (ref 18–48)
MCH RBC QN AUTO: 24.3 PG (ref 27–31)
MCHC RBC AUTO-ENTMCNC: 30.9 G/DL (ref 32–36)
MCV RBC AUTO: 79 FL (ref 82–98)
MONOCYTES # BLD AUTO: 1.4 K/UL (ref 0.3–1)
MONOCYTES NFR BLD: 11.6 % (ref 4–15)
NEUTROPHILS # BLD AUTO: 7 K/UL (ref 1.8–7.7)
NEUTROPHILS NFR BLD: 58.1 % (ref 38–73)
NRBC BLD-RTO: 0 /100 WBC
PLATELET # BLD AUTO: 364 K/UL (ref 150–450)
PMV BLD AUTO: 9.8 FL (ref 9.2–12.9)
POTASSIUM SERPL-SCNC: 3.9 MMOL/L (ref 3.5–5.1)
PROT SERPL-MCNC: 5.8 G/DL (ref 6–8.4)
RBC # BLD AUTO: 3.62 M/UL (ref 4–5.4)
SODIUM SERPL-SCNC: 141 MMOL/L (ref 136–145)
WBC # BLD AUTO: 12.02 K/UL (ref 3.9–12.7)

## 2024-01-12 PROCEDURE — 88305 TISSUE EXAM BY PATHOLOGIST: CPT | Performed by: PATHOLOGY

## 2024-01-12 PROCEDURE — 94761 N-INVAS EAR/PLS OXIMETRY MLT: CPT

## 2024-01-12 PROCEDURE — 93010 ELECTROCARDIOGRAM REPORT: CPT | Mod: ,,, | Performed by: INTERNAL MEDICINE

## 2024-01-12 PROCEDURE — 85025 COMPLETE CBC W/AUTO DIFF WBC: CPT | Performed by: STUDENT IN AN ORGANIZED HEALTH CARE EDUCATION/TRAINING PROGRAM

## 2024-01-12 PROCEDURE — 43239 EGD BIOPSY SINGLE/MULTIPLE: CPT | Performed by: INTERNAL MEDICINE

## 2024-01-12 PROCEDURE — 93005 ELECTROCARDIOGRAM TRACING: CPT

## 2024-01-12 PROCEDURE — 63600175 PHARM REV CODE 636 W HCPCS: Performed by: FAMILY MEDICINE

## 2024-01-12 PROCEDURE — 11000001 HC ACUTE MED/SURG PRIVATE ROOM

## 2024-01-12 PROCEDURE — 25000003 PHARM REV CODE 250: Performed by: INTERNAL MEDICINE

## 2024-01-12 PROCEDURE — 63600175 PHARM REV CODE 636 W HCPCS: Performed by: INTERNAL MEDICINE

## 2024-01-12 PROCEDURE — 27201012 HC FORCEPS, HOT/COLD, DISP: Performed by: INTERNAL MEDICINE

## 2024-01-12 PROCEDURE — 25000003 PHARM REV CODE 250: Performed by: STUDENT IN AN ORGANIZED HEALTH CARE EDUCATION/TRAINING PROGRAM

## 2024-01-12 PROCEDURE — 0DB68ZX EXCISION OF STOMACH, VIA NATURAL OR ARTIFICIAL OPENING ENDOSCOPIC, DIAGNOSTIC: ICD-10-PCS | Performed by: INTERNAL MEDICINE

## 2024-01-12 PROCEDURE — 27000221 HC OXYGEN, UP TO 24 HOURS

## 2024-01-12 PROCEDURE — 80053 COMPREHEN METABOLIC PANEL: CPT | Performed by: STUDENT IN AN ORGANIZED HEALTH CARE EDUCATION/TRAINING PROGRAM

## 2024-01-12 PROCEDURE — C9113 INJ PANTOPRAZOLE SODIUM, VIA: HCPCS | Performed by: FAMILY MEDICINE

## 2024-01-12 PROCEDURE — 37000009 HC ANESTHESIA EA ADD 15 MINS: Performed by: INTERNAL MEDICINE

## 2024-01-12 PROCEDURE — 94640 AIRWAY INHALATION TREATMENT: CPT

## 2024-01-12 PROCEDURE — 63600175 PHARM REV CODE 636 W HCPCS: Performed by: STUDENT IN AN ORGANIZED HEALTH CARE EDUCATION/TRAINING PROGRAM

## 2024-01-12 PROCEDURE — D9220A PRA ANESTHESIA: Mod: ANES,,, | Performed by: ANESTHESIOLOGY

## 2024-01-12 PROCEDURE — 25000242 PHARM REV CODE 250 ALT 637 W/ HCPCS: Performed by: STUDENT IN AN ORGANIZED HEALTH CARE EDUCATION/TRAINING PROGRAM

## 2024-01-12 PROCEDURE — 99900035 HC TECH TIME PER 15 MIN (STAT)

## 2024-01-12 PROCEDURE — D9220A PRA ANESTHESIA: Mod: CRNA,,, | Performed by: NURSE ANESTHETIST, CERTIFIED REGISTERED

## 2024-01-12 PROCEDURE — 25000003 PHARM REV CODE 250: Performed by: FAMILY MEDICINE

## 2024-01-12 PROCEDURE — 88305 TISSUE EXAM BY PATHOLOGIST: CPT | Mod: 26,,, | Performed by: PATHOLOGY

## 2024-01-12 PROCEDURE — 37000008 HC ANESTHESIA 1ST 15 MINUTES: Performed by: INTERNAL MEDICINE

## 2024-01-12 PROCEDURE — 25000003 PHARM REV CODE 250: Performed by: NURSE ANESTHETIST, CERTIFIED REGISTERED

## 2024-01-12 PROCEDURE — 43239 EGD BIOPSY SINGLE/MULTIPLE: CPT | Mod: ,,, | Performed by: INTERNAL MEDICINE

## 2024-01-12 PROCEDURE — 36415 COLL VENOUS BLD VENIPUNCTURE: CPT | Performed by: STUDENT IN AN ORGANIZED HEALTH CARE EDUCATION/TRAINING PROGRAM

## 2024-01-12 RX ORDER — LIDOCAINE HYDROCHLORIDE 20 MG/ML
INJECTION, SOLUTION EPIDURAL; INFILTRATION; INTRACAUDAL; PERINEURAL
Status: DISCONTINUED | OUTPATIENT
Start: 2024-01-12 | End: 2024-01-12

## 2024-01-12 RX ORDER — PROPOFOL 10 MG/ML
VIAL (ML) INTRAVENOUS
Status: DISCONTINUED | OUTPATIENT
Start: 2024-01-12 | End: 2024-01-12

## 2024-01-12 RX ORDER — TOBRAMYCIN 40 MG/ML
80 INJECTION INTRAMUSCULAR; INTRAVENOUS ONCE
Status: CANCELLED | OUTPATIENT
Start: 2024-01-12 | End: 2024-01-12

## 2024-01-12 RX ADMIN — LIDOCAINE 1 PATCH: 50 PATCH CUTANEOUS at 03:01

## 2024-01-12 RX ADMIN — AMLODIPINE BESYLATE 10 MG: 5 TABLET ORAL at 08:01

## 2024-01-12 RX ADMIN — METOPROLOL TARTRATE 50 MG: 50 TABLET, FILM COATED ORAL at 09:01

## 2024-01-12 RX ADMIN — Medication 6 MG: at 09:01

## 2024-01-12 RX ADMIN — PANTOPRAZOLE SODIUM 40 MG: 40 INJECTION, POWDER, FOR SOLUTION INTRAVENOUS at 09:01

## 2024-01-12 RX ADMIN — PANTOPRAZOLE SODIUM 40 MG: 40 INJECTION, POWDER, FOR SOLUTION INTRAVENOUS at 08:01

## 2024-01-12 RX ADMIN — CEFTRIAXONE SODIUM 1 G: 1 INJECTION, POWDER, FOR SOLUTION INTRAMUSCULAR; INTRAVENOUS at 09:01

## 2024-01-12 RX ADMIN — LIDOCAINE HYDROCHLORIDE 50 MG: 20 INJECTION, SOLUTION EPIDURAL; INFILTRATION; INTRACAUDAL; PERINEURAL at 10:01

## 2024-01-12 RX ADMIN — Medication 20 MG: at 10:01

## 2024-01-12 RX ADMIN — ATORVASTATIN CALCIUM 20 MG: 20 TABLET, FILM COATED ORAL at 08:01

## 2024-01-12 RX ADMIN — Medication 50 MG: at 10:01

## 2024-01-12 RX ADMIN — LEVALBUTEROL HYDROCHLORIDE 0.63 MG: 0.63 SOLUTION RESPIRATORY (INHALATION) at 03:01

## 2024-01-12 RX ADMIN — SODIUM CHLORIDE: 0.9 INJECTION, SOLUTION INTRAVENOUS at 10:01

## 2024-01-12 RX ADMIN — METOPROLOL TARTRATE 50 MG: 50 TABLET, FILM COATED ORAL at 08:01

## 2024-01-12 RX ADMIN — ONDANSETRON 4 MG: 2 INJECTION INTRAMUSCULAR; INTRAVENOUS at 04:01

## 2024-01-12 RX ADMIN — LEVALBUTEROL HYDROCHLORIDE 0.63 MG: 0.63 SOLUTION RESPIRATORY (INHALATION) at 12:01

## 2024-01-12 RX ADMIN — LEVALBUTEROL HYDROCHLORIDE 0.63 MG: 0.63 SOLUTION RESPIRATORY (INHALATION) at 11:01

## 2024-01-12 RX ADMIN — LEVALBUTEROL HYDROCHLORIDE 0.63 MG: 0.63 SOLUTION RESPIRATORY (INHALATION) at 07:01

## 2024-01-12 NOTE — PLAN OF CARE
01/12/24 0930   Rounds   Attendance Provider;Nurse    Discharge Plan A Skilled Nursing Facility   Why the patient remains in the hospital Requires continued medical care   Transition of Care Barriers Transportation       0930  CM was informed by Dr Bernstein that the pt will have an EGD done today.     1230  CM received a call from the pt stated she had an EGD done this AM, needs pain medication, & questioned discharge status. CM informed the pt that she has been accepted to 4 SNF & that ins auth is needed prior to discharge. Patient stated that she would like to be admitted to [facility]. CM informed Sharlene ([phone]) w/[facility] of above.     Message sent to the pt's nurse, Gi, informing of pt's request for pain medication.    1245  Patient's face, sheet, H&P, and  consult requesting ins auth for SNF placement at [facility] manually faxed to Gianna ([phone]) w/PHN. Awaiting response.     1345  CM completed LOCET & faxed PASRR to the state. Awaiting 142.    1455  CM questioned Gianna w/PHN regarding ins auth status. Request for ins auth emailed to Gianna & Trang as requested.     1600  PASRR/142 sent to [facility] via [employer].       Will continue to follow.

## 2024-01-12 NOTE — PLAN OF CARE
Recovery complete. Patient recovered to baseline. Discharge instructions reviewed with patient. VSS.   Report called to ki

## 2024-01-12 NOTE — PLAN OF CARE
Report received from Gi RODRIGUEZ since midnight with small sip w/BP med this AM  Patent IV access

## 2024-01-12 NOTE — ASSESSMENT & PLAN NOTE
With occult stool positive but no reported overt bleeding.  Iron is low.    Recs  EGD tomorrow  PPI IV BID  Monitor stool output  Transfuse to goal    IF egd negative, likely will need expedited outpatient colonoscopy in coming weeks.

## 2024-01-12 NOTE — TRANSFER OF CARE
"Anesthesia Transfer of Care Note    Patient: Purvi Quiroga    Procedure(s) Performed: Procedure(s) (LRB):  EGD (ESOPHAGOGASTRODUODENOSCOPY) (N/A)    Patient location: GI    Anesthesia Type: general    Transport from OR: Transported from OR on room air with adequate spontaneous ventilation    Post pain: adequate analgesia    Post assessment: no apparent anesthetic complications    Post vital signs: stable    Level of consciousness: awake and alert    Nausea/Vomiting: no nausea/vomiting    Complications: none    Transfer of care protocol was followed      Last vitals: Visit Vitals  /81   Pulse 70   Temp 36.7 °C (98.1 °F) (Temporal)   Resp 17   Ht 4' 7" (1.397 m)   Wt 101.3 kg (223 lb 5.2 oz)   SpO2 98%   BMI 51.91 kg/m²     "

## 2024-01-12 NOTE — CONSULTS
[place] - Telemetry  Gastroenterology  Consult Note    Patient Name: Purvi Quiroga  MRN: [id number]  Admission Date: 1/8/2024  Hospital Length of Stay: 2 days  Code Status: Full Code   Attending Provider: Liz King*   Consulting Provider: Terrence Sanabria MD  Primary Care Physician: Palomo Burgos MD  Principal Problem:Pre-syncope    Inpatient consult to Gastroenterology  Consult performed by: Terrence Sanabria MD  Consult ordered by: Liz King MD        Subjective:     HPI:  This is 80 y/o lady hx arthritis and htn here admitted for 3 days with weakness and presyncope.  Found to have anemia and occult stool positive, thus GI consulted  Pt feeling weak, tired, lethargic, no chest pain, no fevers or chills. No vomiting. Denies seeing blood in stool. No black sttol.  Found to have anemia with occult stool positive and elevated bun.  Treating for GNR in urine as well.  Never had egd or colonoscopy    Past Medical History:   Diagnosis Date    Asthma     Essential hypertension 12/10/2018    Hiatal hernia     Insomnia     Other osteoporosis without current pathological fracture 12/10/2018       Past Surgical History:   Procedure Laterality Date    CATARACT EXTRACTION, BILATERAL      DILATION AND CURETTAGE OF UTERUS      KNEE ARTHROSCOPY      TONSILLECTOMY         Review of patient's allergies indicates:  No Known Allergies  Family History       Problem Relation (Age of Onset)    Cancer Brother    Heart disease Father    Hypertension Father, Sister, Brother          Tobacco Use    Smoking status: Never    Smokeless tobacco: Never    Tobacco comments:     18 or 19 yo for 1 yr   Substance and Sexual Activity    Alcohol use: Not Currently    Drug use: No    Sexual activity: Not on file     Review of Systems   Constitutional:  Positive for fatigue. Negative for chills and fever.   HENT:  Negative for mouth sores and nosebleeds.    Eyes:  Negative for pain and redness.   Respiratory:  Negative for  cough and shortness of breath.    Cardiovascular:  Negative for chest pain and palpitations.   Gastrointestinal:  Negative for nausea and vomiting.   Genitourinary:  Negative for dysuria and hematuria.   Musculoskeletal:  Negative for arthralgias and joint swelling.   Skin:  Positive for color change and pallor.   Neurological:  Positive for weakness. Negative for seizures and facial asymmetry.   Psychiatric/Behavioral:  Negative for agitation and confusion.      Objective:     Vital Signs (Most Recent):  Temp: 98 °F (36.7 °C) (01/11/24 1547)  Pulse: 84 (01/11/24 1547)  Resp: 18 (01/11/24 1547)  BP: (!) 173/73 (01/11/24 1547)  SpO2: 95 % (01/11/24 1547) Vital Signs (24h Range):  Temp:  [97.7 °F (36.5 °C)-98.1 °F (36.7 °C)] 98 °F (36.7 °C)  Pulse:  [73-84] 84  Resp:  [18-21] 18  SpO2:  [93 %-96 %] 95 %  BP: (140-191)/() 173/73     Weight: 101.3 kg (223 lb 5.2 oz) (01/09/24 2025)  Body mass index is 51.91 kg/m².    No intake or output data in the 24 hours ending 01/11/24 2149    Lines/Drains/Airways       Drain  Duration             Female External Urinary Catheter w/ Suction 01/09/24 2020 2 days              Peripheral Intravenous Line  Duration                  Peripheral IV - Single Lumen 01/09/24 Distal;Left;Posterior Forearm 2 days         Peripheral IV - Single Lumen 01/10/24 0945 20 G Anterior;Distal;Right Forearm 1 day                     Physical Exam  Vitals reviewed.   Constitutional:       General: She is not in acute distress.     Appearance: She is obese. She is not diaphoretic.   HENT:      Head: Normocephalic and atraumatic.   Eyes:      General: No scleral icterus.     Conjunctiva/sclera: Conjunctivae normal.   Cardiovascular:      Rate and Rhythm: Normal rate.      Heart sounds: Normal heart sounds.   Pulmonary:      Effort: Pulmonary effort is normal. No respiratory distress.      Breath sounds: Normal breath sounds. No stridor.   Abdominal:      General: There is no distension.       "Palpations: Abdomen is soft. There is no mass.      Tenderness: There is no abdominal tenderness. There is no guarding or rebound.   Musculoskeletal:         General: No tenderness or deformity.      Cervical back: Neck supple.   Lymphadenopathy:      Cervical: No cervical adenopathy.   Skin:     General: Skin is warm and dry.      Coloration: Skin is pale.      Findings: No rash.   Neurological:      Mental Status: She is alert and oriented to person, place, and time.      Gait: Gait normal.   Psychiatric:         Mood and Affect: Mood normal.         Behavior: Behavior normal.          Significant Labs:  CBC:   Recent Labs   Lab 01/10/24  0333 01/10/24  0530 01/11/24 0246   WBC 13.75*  --  12.73*   HGB 5.8* 5.6* 8.9*   HCT 20.3*  --  27.4*     --  360     BMP:   Recent Labs   Lab 01/11/24 0246   GLU 87      K 4.2      CO2 24   BUN 25*   CREATININE 1.0   CALCIUM 8.9     CMP:   Recent Labs   Lab 01/11/24 0246   GLU 87   CALCIUM 8.9   ALBUMIN 3.1*   PROT 5.6*      K 4.2   CO2 24      BUN 25*   CREATININE 1.0   ALKPHOS 64   ALT 8*   AST 15   BILITOT 1.6*     Coagulation: No results for input(s): "PT", "INR", "APTT" in the last 48 hours.    Significant Imaging:  Imaging results within the past 24 hours have been reviewed.  Assessment/Plan:     GI  GI bleed  With occult stool positive but no reported overt bleeding.  Iron is low.    Recs  EGD tomorrow  PPI IV BID  Monitor stool output  Transfuse to goal    IF egd negative, likely will need expedited outpatient colonoscopy in coming weeks.        Thank you for your consult. I will follow-up with patient. Please contact us if you have any additional questions.    Terrence Sanabria MD  Gastroenterology  [place] [facility]  "

## 2024-01-12 NOTE — SUBJECTIVE & OBJECTIVE
Past Medical History:   Diagnosis Date    Asthma     Essential hypertension 12/10/2018    Hiatal hernia     Insomnia     Other osteoporosis without current pathological fracture 12/10/2018       Past Surgical History:   Procedure Laterality Date    CATARACT EXTRACTION, BILATERAL      DILATION AND CURETTAGE OF UTERUS      KNEE ARTHROSCOPY      TONSILLECTOMY         Review of patient's allergies indicates:  No Known Allergies  Family History       Problem Relation (Age of Onset)    Cancer Brother    Heart disease Father    Hypertension Father, Sister, Brother          Tobacco Use    Smoking status: Never    Smokeless tobacco: Never    Tobacco comments:     18 or 19 yo for 1 yr   Substance and Sexual Activity    Alcohol use: Not Currently    Drug use: No    Sexual activity: Not on file     Review of Systems   Constitutional:  Positive for fatigue. Negative for chills and fever.   HENT:  Negative for mouth sores and nosebleeds.    Eyes:  Negative for pain and redness.   Respiratory:  Negative for cough and shortness of breath.    Cardiovascular:  Negative for chest pain and palpitations.   Gastrointestinal:  Negative for nausea and vomiting.   Genitourinary:  Negative for dysuria and hematuria.   Musculoskeletal:  Negative for arthralgias and joint swelling.   Skin:  Positive for color change and pallor.   Neurological:  Positive for weakness. Negative for seizures and facial asymmetry.   Psychiatric/Behavioral:  Negative for agitation and confusion.      Objective:     Vital Signs (Most Recent):  Temp: 98 °F (36.7 °C) (01/11/24 1547)  Pulse: 84 (01/11/24 1547)  Resp: 18 (01/11/24 1547)  BP: (!) 173/73 (01/11/24 1547)  SpO2: 95 % (01/11/24 1547) Vital Signs (24h Range):  Temp:  [97.7 °F (36.5 °C)-98.1 °F (36.7 °C)] 98 °F (36.7 °C)  Pulse:  [73-84] 84  Resp:  [18-21] 18  SpO2:  [93 %-96 %] 95 %  BP: (140-191)/() 173/73     Weight: 101.3 kg (223 lb 5.2 oz) (01/09/24 2025)  Body mass index is 51.91 kg/m².    No  intake or output data in the 24 hours ending 01/11/24 2149    Lines/Drains/Airways       Drain  Duration             Female External Urinary Catheter w/ Suction 01/09/24 2020 2 days              Peripheral Intravenous Line  Duration                  Peripheral IV - Single Lumen 01/09/24 Distal;Left;Posterior Forearm 2 days         Peripheral IV - Single Lumen 01/10/24 0945 20 G Anterior;Distal;Right Forearm 1 day                     Physical Exam  Vitals reviewed.   Constitutional:       General: She is not in acute distress.     Appearance: She is obese. She is not diaphoretic.   HENT:      Head: Normocephalic and atraumatic.   Eyes:      General: No scleral icterus.     Conjunctiva/sclera: Conjunctivae normal.   Cardiovascular:      Rate and Rhythm: Normal rate.      Heart sounds: Normal heart sounds.   Pulmonary:      Effort: Pulmonary effort is normal. No respiratory distress.      Breath sounds: Normal breath sounds. No stridor.   Abdominal:      General: There is no distension.      Palpations: Abdomen is soft. There is no mass.      Tenderness: There is no abdominal tenderness. There is no guarding or rebound.   Musculoskeletal:         General: No tenderness or deformity.      Cervical back: Neck supple.   Lymphadenopathy:      Cervical: No cervical adenopathy.   Skin:     General: Skin is warm and dry.      Coloration: Skin is pale.      Findings: No rash.   Neurological:      Mental Status: She is alert and oriented to person, place, and time.      Gait: Gait normal.   Psychiatric:         Mood and Affect: Mood normal.         Behavior: Behavior normal.          Significant Labs:  CBC:   Recent Labs   Lab 01/10/24  0333 01/10/24  0530 01/11/24 0246   WBC 13.75*  --  12.73*   HGB 5.8* 5.6* 8.9*   HCT 20.3*  --  27.4*     --  360     BMP:   Recent Labs   Lab 01/11/24 0246   GLU 87      K 4.2      CO2 24   BUN 25*   CREATININE 1.0   CALCIUM 8.9     CMP:   Recent Labs   Lab 01/11/24 0246  "  GLU 87   CALCIUM 8.9   ALBUMIN 3.1*   PROT 5.6*      K 4.2   CO2 24      BUN 25*   CREATININE 1.0   ALKPHOS 64   ALT 8*   AST 15   BILITOT 1.6*     Coagulation: No results for input(s): "PT", "INR", "APTT" in the last 48 hours.    Significant Imaging:  Imaging results within the past 24 hours have been reviewed.  "

## 2024-01-12 NOTE — [employer] PATIENT INSTRUCTIONS
Discharge Summary/Instructions after an Endoscopic Procedure  Patient Name: Purvi Quiroga  Patient MRN: [id number]  Patient YOB: 1945  Friday, January 12, 2024  Matthew Del Cid MD  Dear patient,  As a result of recent federal legislation (The Federal Cures Act), you may   receive lab or pathology results from your procedure in your [facility]   account before your physician is able to contact you. Your physician or   their representative will relay the results to you with their   recommendations at their soonest availability.  Thank you,  Your health is very important to us during the Covid Crisis. Following your   procedure today, you will receive a daily text for 2 weeks asking about   signs or symptoms of Covid 19.  Please respond to this text when you   receive it so we can follow up and keep you as safe as possible.   RESTRICTIONS:  During your procedure today, you received medications for sedation.  These   medications may affect your judgment, balance and coordination.  Therefore,   for 24 hours, you have the following restrictions:   - DO NOT drive a car, operate machinery, make legal/financial decisions,   sign important papers or drink alcohol.    ACTIVITY:  Today: no heavy lifting, straining or running due to procedural   sedation/anesthesia.  The following day: return to full activity including work.  DIET:  Eat and drink normally unless instructed otherwise.     TREATMENT FOR COMMON SIDE EFFECTS:  - Mild abdominal pain, nausea, belching, bloating or excessive gas:  rest,   eat lightly and use a heating pad.  - Sore Throat: treat with throat lozenges and/or gargle with warm salt   water.  - Because air was used during the procedure, expelling large amounts of air   from your rectum or belching is normal.  - If a bowel prep was taken, you may not have a bowel movement for 1-3 days.    This is normal.  SYMPTOMS TO WATCH FOR AND REPORT TO YOUR PHYSICIAN:  1. Abdominal pain or bloating,  other than gas cramps.  2. Chest pain.  3. Back pain.  4. Signs of infection such as: chills or fever occurring within 24 hours   after the procedure.  5. Rectal bleeding, which would show as bright red, maroon, or black stools.   (A tablespoon of blood from the rectum is not serious, especially if   hemorrhoids are present.)  6. Vomiting.  7. Weakness or dizziness.  GO DIRECTLY TO THE NEAREST EMERGENCY ROOM IF YOU HAVE ANY OF THE FOLLOWING:      Difficulty breathing              Chills and/or fever over 101 F   Persistent vomiting and/or vomiting blood   Severe abdominal pain   Severe chest pain   Black, tarry stools   Bleeding- more than one tablespoon   Any other symptom or condition that you feel may need urgent attention  Your doctor recommends these additional instructions:  If any biopsies were taken, your doctors clinic will contact you in 1 to 2   weeks with any results.  - [facility].   - Resume previous diet.   - Continue present medications.   - Await pathology results.   - Perform a colonoscopy to be scheduled as outpatient.   - Observe patient's clinical course.  For questions, problems or results please call your physician - Matthew Del Cid MD.  EMERGENCY PHONE NUMBER: [phone],  LAB RESULTS: [phone]  IF A COMPLICATION OR EMERGENCY SITUATION ARISES AND YOU ARE UNABLE TO REACH   YOUR PHYSICIAN - GO DIRECTLY TO THE EMERGENCY ROOM.  Matthew Del Cid MD  1/12/2024 10:48:42 AM  This report has been verified and signed electronically.  Dear patient,  As a result of recent federal legislation (The Federal Cures Act), you may   receive lab or pathology results from your procedure in your [facility]   account before your physician is able to contact you. Your physician or   their representative will relay the results to you with their   recommendations at their soonest availability.  Thank you,  [employer]ATION

## 2024-01-12 NOTE — SUBJECTIVE & OBJECTIVE
Interval History: awake an alert  S/p 2 units PRBC- stool occult blood-positive-IV Protonix and consult GI  JOSE-resolved   Hypertension-resume home metoprolol  Urine culture with Gram-negative rods--E coli, Raoultella ornithinolytica both sensitive to ceftriaxone-continue and discharge on PO  PT/OT - recommend SNF placement      Review of Systems   Constitutional:  Positive for activity change and fatigue.   Respiratory:  Negative for shortness of breath.    Gastrointestinal:  Negative for blood in stool.   Neurological:  Positive for weakness.     Objective:     Vital Signs (Most Recent):  Temp: 98.1 °F (36.7 °C) (01/12/24 1204)  Pulse: 76 (01/12/24 1204)  Resp: 18 (01/12/24 1204)  BP: (!) 140/65 (01/12/24 1204)  SpO2: (!) 94 % (01/12/24 1204) Vital Signs (24h Range):  Temp:  [97.5 °F (36.4 °C)-98.6 °F (37 °C)] 98.1 °F (36.7 °C)  Pulse:  [70-87] 76  Resp:  [16-24] 18  SpO2:  [93 %-98 %] 94 %  BP: (131-189)/(65-95) 140/65     Weight: 101.3 kg (223 lb 5.2 oz)  Body mass index is 51.91 kg/m².    Intake/Output Summary (Last 24 hours) at 1/12/2024 1300  Last data filed at 1/12/2024 1037  Gross per 24 hour   Intake 200 ml   Output 200 ml   Net 0 ml           Physical Exam  HENT:      Head: Normocephalic and atraumatic.   Cardiovascular:      Rate and Rhythm: Normal rate.   Pulmonary:      Effort: Pulmonary effort is normal.   Abdominal:      General: Bowel sounds are normal. There is no distension.      Palpations: Abdomen is soft.   Musculoskeletal:      Cervical back: Normal range of motion.      Right lower leg: No edema.      Left lower leg: No edema.   Skin:     General: Skin is warm.      Capillary Refill: Capillary refill takes less than 2 seconds.   Neurological:      Mental Status: She is alert and oriented to person, place, and time.   Psychiatric:         Mood and Affect: Mood normal.         Behavior: Behavior normal.           Significant Labs: A1C:   Recent Labs   Lab 10/09/23  1224   HGBA1C 5.1       ABGs:  "No results for input(s): "PH", "PCO2", "HCO3", "POCSATURATED", "BE", "TOTALHB", "COHB", "METHB", "O2HB", "POCFIO2", "PO2" in the last 48 hours.  Blood Culture: No results for input(s): "LABBLOO" in the last 48 hours.  CBC:   Recent Labs   Lab 01/11/24 0246 01/12/24 0229   WBC 12.73* 12.02   HGB 8.9* 8.8*   HCT 27.4* 28.5*    364       CMP:   Recent Labs   Lab 01/11/24  0246 01/12/24 0229    141   K 4.2 3.9    109   CO2 24 23   GLU 87 94   BUN 25* 14   CREATININE 1.0 0.9   CALCIUM 8.9 8.9   PROT 5.6* 5.8*   ALBUMIN 3.1* 3.3*   BILITOT 1.6* 0.5   ALKPHOS 64 61   AST 15 17   ALT 8* 13   ANIONGAP 11 9       Coagulation: No results for input(s): "PT", "INR", "APTT" in the last 48 hours.  Lactic Acid: No results for input(s): "LACTATE" in the last 48 hours.  Lipase: No results for input(s): "LIPASE" in the last 48 hours.  Magnesium: No results for input(s): "MG" in the last 48 hours.  Troponin:   No results for input(s): "TROPONINI", "TROPONINIHS" in the last 48 hours.    TSH:   Recent Labs   Lab 10/09/23  1224   TSH 1.466       Urine Culture:   No results for input(s): "LABURIN" in the last 48 hours.    Urine Studies:   No results for input(s): "COLORU", "APPEARANCEUA", "PHUR", "SPECGRAV", "PROTEINUA", "GLUCUA", "KETONESU", "BILIRUBINUA", "OCCULTUA", "NITRITE", "UROBILINOGEN", "LEUKOCYTESUR", "RBCUA", "WBCUA", "BACTERIA", "SQUAMEPITHEL", "HYALINECASTS" in the last 48 hours.    Invalid input(s): "WRIGHTSUR"      Significant Imaging: I have reviewed all pertinent imaging results/findings within the past 24 hours.  "

## 2024-01-12 NOTE — ANESTHESIA PREPROCEDURE EVALUATION
01/12/2024    Purvi Quiroga is a 79 y.o., female.    Past Medical History:   Diagnosis Date    Asthma     Essential hypertension 12/10/2018    Hiatal hernia     Insomnia     Other osteoporosis without current pathological fracture 12/10/2018     Past Surgical History:   Procedure Laterality Date    CATARACT EXTRACTION, BILATERAL      DILATION AND CURETTAGE OF UTERUS      KNEE ARTHROSCOPY      TONSILLECTOMY           TTE 2022    Technically challenging study. Patient was unable to  The left ventricle is normal in size with normal systolic function.  The estimated ejection fraction is 55%.  Indeterminate left ventricular diastolic function.  Normal right ventricular size with normal right ventricular systolic function.  Inferior vena cava is not well visualized.      Pre-op Assessment    I have reviewed the Patient Summary Reports.     I have reviewed the Nursing Notes. I have reviewed the NPO Status.      Review of Systems  Anesthesia Hx:   History of prior surgery of interest to airway management or planning:            Denies Personal Hx of Anesthesia complications.                    Hematology/Oncology:       -- Anemia:                                  Cardiovascular:     Hypertension      Angina                                  Pulmonary:      Shortness of breath  Sleep Apnea                Renal/:  Chronic Renal Disease                Hepatic/GI:    Hiatal Hernia, GERD             Musculoskeletal:  Arthritis               Neurological:      Headaches                                 Endocrine:        Morbid Obesity / BMI > 40      Physical Exam  General: Well nourished    Airway:  Mallampati: II   Mouth Opening: Normal  Neck ROM: Normal ROM        Anesthesia Plan  Type of Anesthesia, risks & benefits discussed:    Anesthesia Type: Gen Natural Airway  Informed Consent: Informed consent signed with  the Patient and all parties understand the risks and agree with anesthesia plan.  All questions answered.   ASA Score: 3    Ready For Surgery From Anesthesia Perspective.     .

## 2024-01-12 NOTE — ANESTHESIA POSTPROCEDURE EVALUATION
Anesthesia Post Evaluation    Patient: Purvi Quiroga    Procedure(s) Performed: Procedure(s) (LRB):  EGD (ESOPHAGOGASTRODUODENOSCOPY) (N/A)    Final Anesthesia Type: general      Patient location during evaluation: PACU  Patient participation: Yes- Able to Participate  Level of consciousness: awake and alert  Post-procedure vital signs: reviewed and stable  Pain management: adequate  Airway patency: patent    PONV status at discharge: No PONV  Anesthetic complications: no      Cardiovascular status: blood pressure returned to baseline  Respiratory status: unassisted  Hydration status: euvolemic                Vitals Value Taken Time   /65 01/12/24 1204   Temp 36.7 °C (98.1 °F) 01/12/24 1204   Pulse 76 01/12/24 1204   Resp 18 01/12/24 1204   SpO2 94 % 01/12/24 1204         Event Time   Out of Recovery 01/12/2024 11:25:20         Pain/Flavia Score: Pain Rating Prior to Med Admin: 5 (1/11/2024 10:02 PM)  Pain Rating Post Med Admin: 4 (1/11/2024 11:02 PM)  Flavia Score: 10 (1/12/2024 10:45 AM)

## 2024-01-12 NOTE — HPI
This is 80 y/o lady hx arthritis and htn here admitted for 3 days with weakness and presyncope.  Found to have anemia and occult stool positive, thus GI consulted  Pt feeling weak, tired, lethargic, no chest pain, no fevers or chills. No vomiting. Denies seeing blood in stool. No black sttol.  Found to have anemia with occult stool positive and elevated bun.  Treating for GNR in urine as well.  Never had egd or colonoscopy

## 2024-01-12 NOTE — PT/OT/SLP PROGRESS
Physical Therapy      Patient Name:  Purvi Quiroga   MRN:  [id number]    Patient not seen today secondary to  (pt just returned from endoscopy and requests rest([id number])). Will follow-up over wknd.

## 2024-01-12 NOTE — PLAN OF CARE
"Patient c/o chest pain.  Pt stated "feels like my heart is being squeezed."  Dr. Killian notifed and EKG ordered.    "

## 2024-01-13 LAB
ALBUMIN SERPL BCP-MCNC: 3.1 G/DL (ref 3.5–5.2)
ALP SERPL-CCNC: 68 U/L (ref 55–135)
ALT SERPL W/O P-5'-P-CCNC: 19 U/L (ref 10–44)
ANION GAP SERPL CALC-SCNC: 12 MMOL/L (ref 8–16)
AST SERPL-CCNC: 22 U/L (ref 10–40)
BASOPHILS # BLD AUTO: 0.05 K/UL (ref 0–0.2)
BASOPHILS NFR BLD: 0.5 % (ref 0–1.9)
BILIRUB SERPL-MCNC: 0.5 MG/DL (ref 0.1–1)
BUN SERPL-MCNC: 8 MG/DL (ref 8–23)
CALCIUM SERPL-MCNC: 8.2 MG/DL (ref 8.7–10.5)
CHLORIDE SERPL-SCNC: 110 MMOL/L (ref 95–110)
CO2 SERPL-SCNC: 19 MMOL/L (ref 23–29)
CREAT SERPL-MCNC: 0.8 MG/DL (ref 0.5–1.4)
DIFFERENTIAL METHOD BLD: ABNORMAL
EOSINOPHIL # BLD AUTO: 0.6 K/UL (ref 0–0.5)
EOSINOPHIL NFR BLD: 5.1 % (ref 0–8)
ERYTHROCYTE [DISTWIDTH] IN BLOOD BY AUTOMATED COUNT: 22.9 % (ref 11.5–14.5)
EST. GFR  (NO RACE VARIABLE): >60 ML/MIN/1.73 M^2
GLUCOSE SERPL-MCNC: 102 MG/DL (ref 70–110)
HCT VFR BLD AUTO: 28.9 % (ref 37–48.5)
HGB BLD-MCNC: 8.8 G/DL (ref 12–16)
IMM GRANULOCYTES # BLD AUTO: 0.09 K/UL (ref 0–0.04)
IMM GRANULOCYTES NFR BLD AUTO: 0.8 % (ref 0–0.5)
LYMPHOCYTES # BLD AUTO: 1.1 K/UL (ref 1–4.8)
LYMPHOCYTES NFR BLD: 9.8 % (ref 18–48)
MCH RBC QN AUTO: 24.2 PG (ref 27–31)
MCHC RBC AUTO-ENTMCNC: 30.4 G/DL (ref 32–36)
MCV RBC AUTO: 80 FL (ref 82–98)
MONOCYTES # BLD AUTO: 1.3 K/UL (ref 0.3–1)
MONOCYTES NFR BLD: 11.4 % (ref 4–15)
NEUTROPHILS # BLD AUTO: 8 K/UL (ref 1.8–7.7)
NEUTROPHILS NFR BLD: 72.4 % (ref 38–73)
NRBC BLD-RTO: 0 /100 WBC
PLATELET # BLD AUTO: 312 K/UL (ref 150–450)
PMV BLD AUTO: 9.4 FL (ref 9.2–12.9)
POTASSIUM SERPL-SCNC: 4 MMOL/L (ref 3.5–5.1)
PROT SERPL-MCNC: 5.5 G/DL (ref 6–8.4)
RBC # BLD AUTO: 3.63 M/UL (ref 4–5.4)
SODIUM SERPL-SCNC: 141 MMOL/L (ref 136–145)
WBC # BLD AUTO: 11 K/UL (ref 3.9–12.7)

## 2024-01-13 PROCEDURE — 85025 COMPLETE CBC W/AUTO DIFF WBC: CPT | Performed by: STUDENT IN AN ORGANIZED HEALTH CARE EDUCATION/TRAINING PROGRAM

## 2024-01-13 PROCEDURE — 63600175 PHARM REV CODE 636 W HCPCS: Performed by: STUDENT IN AN ORGANIZED HEALTH CARE EDUCATION/TRAINING PROGRAM

## 2024-01-13 PROCEDURE — 80053 COMPREHEN METABOLIC PANEL: CPT | Performed by: STUDENT IN AN ORGANIZED HEALTH CARE EDUCATION/TRAINING PROGRAM

## 2024-01-13 PROCEDURE — C9113 INJ PANTOPRAZOLE SODIUM, VIA: HCPCS | Performed by: FAMILY MEDICINE

## 2024-01-13 PROCEDURE — 97165 OT EVAL LOW COMPLEX 30 MIN: CPT

## 2024-01-13 PROCEDURE — 25000242 PHARM REV CODE 250 ALT 637 W/ HCPCS: Performed by: STUDENT IN AN ORGANIZED HEALTH CARE EDUCATION/TRAINING PROGRAM

## 2024-01-13 PROCEDURE — 94761 N-INVAS EAR/PLS OXIMETRY MLT: CPT

## 2024-01-13 PROCEDURE — 36415 COLL VENOUS BLD VENIPUNCTURE: CPT | Performed by: STUDENT IN AN ORGANIZED HEALTH CARE EDUCATION/TRAINING PROGRAM

## 2024-01-13 PROCEDURE — 99900035 HC TECH TIME PER 15 MIN (STAT)

## 2024-01-13 PROCEDURE — 27000221 HC OXYGEN, UP TO 24 HOURS

## 2024-01-13 PROCEDURE — 25000003 PHARM REV CODE 250: Performed by: STUDENT IN AN ORGANIZED HEALTH CARE EDUCATION/TRAINING PROGRAM

## 2024-01-13 PROCEDURE — 63600175 PHARM REV CODE 636 W HCPCS: Performed by: FAMILY MEDICINE

## 2024-01-13 PROCEDURE — 97530 THERAPEUTIC ACTIVITIES: CPT

## 2024-01-13 PROCEDURE — 11000001 HC ACUTE MED/SURG PRIVATE ROOM

## 2024-01-13 PROCEDURE — 63600175 PHARM REV CODE 636 W HCPCS: Performed by: INTERNAL MEDICINE

## 2024-01-13 PROCEDURE — 94640 AIRWAY INHALATION TREATMENT: CPT

## 2024-01-13 PROCEDURE — 25000003 PHARM REV CODE 250: Performed by: FAMILY MEDICINE

## 2024-01-13 RX ADMIN — PANTOPRAZOLE SODIUM 40 MG: 40 INJECTION, POWDER, FOR SOLUTION INTRAVENOUS at 10:01

## 2024-01-13 RX ADMIN — LEVALBUTEROL HYDROCHLORIDE 0.63 MG: 0.63 SOLUTION RESPIRATORY (INHALATION) at 11:01

## 2024-01-13 RX ADMIN — LEVALBUTEROL HYDROCHLORIDE 0.63 MG: 0.63 SOLUTION RESPIRATORY (INHALATION) at 03:01

## 2024-01-13 RX ADMIN — CEFTRIAXONE SODIUM 1 G: 1 INJECTION, POWDER, FOR SOLUTION INTRAMUSCULAR; INTRAVENOUS at 09:01

## 2024-01-13 RX ADMIN — ACETAMINOPHEN 1000 MG: 500 TABLET ORAL at 03:01

## 2024-01-13 RX ADMIN — PANTOPRAZOLE SODIUM 40 MG: 40 INJECTION, POWDER, FOR SOLUTION INTRAVENOUS at 08:01

## 2024-01-13 RX ADMIN — AMLODIPINE BESYLATE 10 MG: 5 TABLET ORAL at 10:01

## 2024-01-13 RX ADMIN — METOPROLOL TARTRATE 50 MG: 50 TABLET, FILM COATED ORAL at 09:01

## 2024-01-13 RX ADMIN — METOPROLOL TARTRATE 50 MG: 50 TABLET, FILM COATED ORAL at 10:01

## 2024-01-13 RX ADMIN — ATORVASTATIN CALCIUM 20 MG: 20 TABLET, FILM COATED ORAL at 10:01

## 2024-01-13 RX ADMIN — LEVALBUTEROL HYDROCHLORIDE 0.63 MG: 0.63 SOLUTION RESPIRATORY (INHALATION) at 07:01

## 2024-01-13 RX ADMIN — ACETAMINOPHEN 1000 MG: 500 TABLET ORAL at 09:01

## 2024-01-13 RX ADMIN — ONDANSETRON 4 MG: 2 INJECTION INTRAMUSCULAR; INTRAVENOUS at 10:01

## 2024-01-13 RX ADMIN — ACETAMINOPHEN 1000 MG: 500 TABLET ORAL at 01:01

## 2024-01-13 NOTE — PLAN OF CARE
Problem: Occupational Therapy  Goal: Occupational Therapy Goal  Description: Goals to be met by: 2/13/24      Patient will increase functional independence with ADLs by performing:    LE Dressing with Supervision and Assistive Devices as needed.  Grooming while standing with Stand-by Assistance.  Toileting from toilet with Stand-by Assistance for hygiene and clothing management.   Supine to sit with Stand-by Assistance.  Step transfer with Stand-by Assistance  Toilet transfer to toilet with Stand-by Assistance.  Increased functional strength to WFL for self care skills and functional mobility.  Upper extremity exercise program x10 reps per handout, with independence.    Outcome: Ongoing, Progressing       Pt would benefit from cont OT services in order to maximize functional independence. Recommending moderate intensity therapy at d/c.

## 2024-01-13 NOTE — PROGRESS NOTES
"[facility] Medicine  Progress Note    Patient Name: Purvi Quiroga  MRN: [id number]  Patient Class: IP- Inpatient   Admission Date: 1/8/2024  Length of Stay: 4 days  Attending Physician: Liz King*  Primary Care Provider: Palomo Burgos MD        Subjective:     Principal Problem:Pre-syncope        HPI:  Ms. Purvi Quiroga is a 80 y/o white F w/ PMH HTN, HLD, neuropathy, and arthritis who was sent to the hospital from her nursing home after patient had a pre-syncopal event at her nursing home. Patient states she has had off and on weakness for weeks. Today while using her walker to get back to her bedroom patient felt a sudden onset feeling of lightheadiness and "feeling" of wanting to faint. It's feeling she has had in the past right before she faints after seeing blood. Denies any prodromal symptoms, nausea, vomiting, chest pain, palpitaitons, fevers, chills, diarrhea, dysuria, urinary frequency, suprapubic pain, flank pain, melana. States she has been eating and drinking ok but has decreased her food intake recently trying to lose weight. No changes to her home BP meds recently though she states she has had hypotensive episodes in the past. When EMS arrived patient SBP was noted to be in the 70s and patient was brought to the hospital.     In the ED, labs were notable for WBC 19, hgb 8, Cr 1.7 (baseline 0.8 as 1/2023), and positive UA (however elevated squamous epithelial cells). Patient received a dose of rocephin.         Overview/Hospital Course:  No notes on file    Interval History: awake an alert  S/p 2 units PRBC- stool occult blood-positive-IV Protonix and consult GI  JOSE-resolved   Hypertension-resume home metoprolol  Urine culture with Gram-negative rods--E coli, Raoultella ornithinolytica both sensitive to ceftriaxone-continue and discharge on PO  PT/OT - recommend SNF placement      Review of Systems   Constitutional:  Positive for activity change and fatigue. " "  Respiratory:  Negative for shortness of breath.    Gastrointestinal:  Negative for blood in stool.   Neurological:  Positive for weakness.     Objective:     Vital Signs (Most Recent):  Temp: 98.1 °F (36.7 °C) (01/12/24 1204)  Pulse: 76 (01/12/24 1204)  Resp: 18 (01/12/24 1204)  BP: (!) 140/65 (01/12/24 1204)  SpO2: (!) 94 % (01/12/24 1204) Vital Signs (24h Range):  Temp:  [97.5 °F (36.4 °C)-98.6 °F (37 °C)] 98.1 °F (36.7 °C)  Pulse:  [70-87] 76  Resp:  [16-24] 18  SpO2:  [93 %-98 %] 94 %  BP: (131-189)/(65-95) 140/65     Weight: 101.3 kg (223 lb 5.2 oz)  Body mass index is 51.91 kg/m².    Intake/Output Summary (Last 24 hours) at 1/12/2024 1300  Last data filed at 1/12/2024 1037  Gross per 24 hour   Intake 200 ml   Output 200 ml   Net 0 ml           Physical Exam  HENT:      Head: Normocephalic and atraumatic.   Cardiovascular:      Rate and Rhythm: Normal rate.   Pulmonary:      Effort: Pulmonary effort is normal.   Abdominal:      General: Bowel sounds are normal. There is no distension.      Palpations: Abdomen is soft.   Musculoskeletal:      Cervical back: Normal range of motion.      Right lower leg: No edema.      Left lower leg: No edema.   Skin:     General: Skin is warm.      Capillary Refill: Capillary refill takes less than 2 seconds.   Neurological:      Mental Status: She is alert and oriented to person, place, and time.   Psychiatric:         Mood and Affect: Mood normal.         Behavior: Behavior normal.           Significant Labs: A1C:   Recent Labs   Lab 10/09/23  1224   HGBA1C 5.1       ABGs: No results for input(s): "PH", "PCO2", "HCO3", "POCSATURATED", "BE", "TOTALHB", "COHB", "METHB", "O2HB", "POCFIO2", "PO2" in the last 48 hours.  Blood Culture: No results for input(s): "LABBLOO" in the last 48 hours.  CBC:   Recent Labs   Lab 01/11/24 0246 01/12/24 0229   WBC 12.73* 12.02   HGB 8.9* 8.8*   HCT 27.4* 28.5*    364       CMP:   Recent Labs   Lab 01/11/24 0246 01/12/24 0229    " "141   K 4.2 3.9    109   CO2 24 23   GLU 87 94   BUN 25* 14   CREATININE 1.0 0.9   CALCIUM 8.9 8.9   PROT 5.6* 5.8*   ALBUMIN 3.1* 3.3*   BILITOT 1.6* 0.5   ALKPHOS 64 61   AST 15 17   ALT 8* 13   ANIONGAP 11 9       Coagulation: No results for input(s): "PT", "INR", "APTT" in the last 48 hours.  Lactic Acid: No results for input(s): "LACTATE" in the last 48 hours.  Lipase: No results for input(s): "LIPASE" in the last 48 hours.  Magnesium: No results for input(s): "MG" in the last 48 hours.  Troponin:   No results for input(s): "TROPONINI", "TROPONINIHS" in the last 48 hours.    TSH:   Recent Labs   Lab 10/09/23  1224   TSH 1.466       Urine Culture:   No results for input(s): "LABURIN" in the last 48 hours.    Urine Studies:   No results for input(s): "COLORU", "APPEARANCEUA", "PHUR", "SPECGRAV", "PROTEINUA", "GLUCUA", "KETONESU", "BILIRUBINUA", "OCCULTUA", "NITRITE", "UROBILINOGEN", "LEUKOCYTESUR", "RBCUA", "WBCUA", "BACTERIA", "SQUAMEPITHEL", "HYALINECASTS" in the last 48 hours.    Invalid input(s): "WRIGHTSUR"      Significant Imaging: I have reviewed all pertinent imaging results/findings within the past 24 hours.    Assessment/Plan:      * Pre-syncope  Most likely secondary to hypotension in the setting of BP meds vs infection.     - Will hold home BP meds for now and restart as needed.   - Start LR at 75cc/hr.   - See UTI plan below.   - orthostatics ordered.   - previous echo in 2022 unremarkable.       GI bleed  Stool occult blood positive  IV Protonix   S/p 2 unit PRBC  Post transfusion H/H stable  Hold anti coagulation   Consult GI      Anemia  - iron panel ordered.   Transfuse with 2 unit on PRBC 1/10/2024. Post transfusion H/H stable  Stool occult blood-positive  Trend H/H    JOSE (acute kidney injury)  Most likely secondary to UTI.     - continue treatment as above.   Resolved     UTI (urinary tract infection)  Although patient is asymptomatic from UTI perspective and has possible contaiminated " UA, with elevated WBC of 19 will continue rocephin for now.     - Continue rocephin 1gm daily.   - Ucx with Gram-negative rods-with-E coli, Raoultella ornithinolytica both sensitive to continue antibiotic     HLD (hyperlipidemia)  - continue statin      Morbid obesity  Body mass index is 51.91 kg/m². Morbid obesity complicates all aspects of disease management from diagnostic modalities to treatment. Weight loss encouraged and health benefits explained to patient.         Essential hypertension  Hold home BP meds for now.   Resume home meds      VTE Risk Mitigation (From admission, onward)           Ordered     Place sequential compression device  Until discontinued         01/09/24 0122     IP VTE HIGH RISK PATIENT  Once         01/09/24 0122                    Discharge Planning   NIRMALA: 1/16/2024     Code Status: Full Code   Is the patient medically ready for discharge?:     Reason for patient still in hospital (select all that apply): Patient trending condition  Discharge Plan A: Skilled Nursing Facility                  Liz King MD  Department of Hospital Medicine   [place] - Telemetry

## 2024-01-13 NOTE — PLAN OF CARE
Problem: Adult Inpatient Plan of Care  Goal: Plan of Care Review  Outcome: Ongoing, Progressing   VSS, NADN, and no obvious s/s of bleeding noted throughout day. Safety precautions maintained and call light within reach. Will continue with POC.

## 2024-01-13 NOTE — PLAN OF CARE
0915  Voicemail message left for Gianna Farooq questioning ins auth status. Awaiting response.     1100  CM was informed by [facility] that the admit office is closed today.     1200  CM was informed by Gianna Farooq that ins auth #[id number] has been given for the pt's admission to [facility].    1500  CM informed the pt of above. Patient verbalized understanding, agreement, & appreciation.       Will continue to follow.

## 2024-01-13 NOTE — PT/OT/SLP EVAL
Occupational Therapy   Evaluation/treatment    Name: Purvi Quiroga  MRN: [id number]  Admitting Diagnosis: Pre-syncope  Recent Surgery: Procedure(s) (LRB):  EGD (ESOPHAGOGASTRODUODENOSCOPY) (N/A) 1 Day Post-Op    Recommendations:     Discharge Recommendations: Moderate Intensity Therapy  Discharge Equipment Recommendations:  wheelchair  Barriers to discharge:  Decreased caregiver support    Assessment:     Purvi Quiroga is a 79 y.o. female with a medical diagnosis of Pre-syncope.  She presents with .daigp  . Performance deficits affecting function: weakness, impaired self care skills, impaired functional mobility, gait instability, impaired balance, pain, impaired cardiopulmonary response to activity, edema, impaired endurance.      Pt would benefit from cont OT services in order to maximize functional independence. Recommending moderate intensity therapy at d/c.     Rehab Prognosis: Good; patient would benefit from acute skilled OT services to address these deficits and reach maximum level of function.       Plan:     Patient to be seen 5 x/week to address the above listed problems via self-care/home management, therapeutic activities, therapeutic exercises  Plan of Care Expires: 02/13/24  Plan of Care Reviewed with: patient    Subjective     Chief Complaint: impaired endurance; SOB   Patient/Family Comments/goals: none stated     Occupational Profile:  Pt lives alone in [facility] Independent Living   Prior to admission, patients level of function was Mod I with use of rollator for mobility and ADL's; pt denies any falls but endorses increased difficulty managing her own ADL's including toileting; pt ambulates short distances at a time and ambulates to dining guadarrama for meals; elevator access.  Equipment used at home: rollator, shower chair, cane, quad, bedside commode.  DME owned (not currently used):  [employer] .  Upon discharge, patient will have assistance from unknown.    Pain/Comfort:  Pain Rating  1: 0/10    Patients cultural, spiritual, Restorationist conflicts given the current situation:      Objective:     Communicated with: nsyaima prior to session.  Patient found supine with   upon OT entry to room.    General Precautions: Standard, fall  Orthopedic Precautions: N/A  Braces: N/A  Respiratory Status: Nasal cannula     Occupational Performance:    Bed Mobility:    Patient completed Scooting/Bridging with contact guard assistance  Patient completed Supine to Sit with contact guard assistance      Functional Mobility/Transfers:  Patient completed Sit <> Stand Transfer with contact guard assistance  with  rolling walker   Patient completed Bed <> Chair Transfer using Step Transfer technique with contact guard assistance with rolling walker  Functional Mobility: CGA with RW     Activities of Daily Living:  Upper Body Dressing: minimum assistance doff/lakisha gown   Lower Body Dressing: stand by assistance pt sliding on sandals; declining wearing hospital socks     Cognitive/Visual Perceptual:  WFL       Physical Exam:  Balance:    -       fair + seated; fair standing   Postural examination/scapula alignment:    -       Rounded shoulders  -       Forward head  -       Abnormal trunk flexion  Skin integrity: Visible skin intact  Upper Extremity Range of Motion:   not formally assessed; BUE ROM appears limited by body habitus   Upper Extremity Strength:  not formally assessed; grossly 3+ to 4-/5 based on function during session    Strength:  WFL for pt's needs based on function     AMPA 6 Click ADL:  AMPAC Total Score: 16    Treatment & Education:  Pt found supine   Agreeable to perform OOB axs to t/f to chair to eat breakfast   SOB noted following functional mobility around bed to b/s chair with RW; prefers to pull on RW with B hands to stand up   Educated on safety and impt of OOB axs   SEt up with breakfast tray     Patient left up in chair with all lines intact, call button in reach, chair alarm on, and nsg  notified    GOALS:   Multidisciplinary Problems       Occupational Therapy Goals          Problem: Occupational Therapy    Goal Priority Disciplines Outcome Interventions   Occupational Therapy Goal     OT, PT/OT Ongoing, Progressing    Description: Goals to be met by: 2/13/24      Patient will increase functional independence with ADLs by performing:    LE Dressing with Supervision and Assistive Devices as needed.  Grooming while standing with Stand-by Assistance.  Toileting from toilet with Stand-by Assistance for hygiene and clothing management.   Supine to sit with Stand-by Assistance.  Step transfer with Stand-by Assistance  Toilet transfer to toilet with Stand-by Assistance.  Increased functional strength to WFL for self care skills and functional mobility.  Upper extremity exercise program x10 reps per handout, with independence.                         History:     Past Medical History:   Diagnosis Date    Asthma     Essential hypertension 12/10/2018    Hiatal hernia     Insomnia     Other osteoporosis without current pathological fracture 12/10/2018         Past Surgical History:   Procedure Laterality Date    CATARACT EXTRACTION, BILATERAL      DILATION AND CURETTAGE OF UTERUS      ESOPHAGOGASTRODUODENOSCOPY N/A 1/12/2024    Procedure: EGD (ESOPHAGOGASTRODUODENOSCOPY);  Surgeon: Matthew Del Cid MD;  Location: [facility];  Service: Gastroenterology;  Laterality: N/A;    KNEE ARTHROSCOPY      TONSILLECTOMY         Time Tracking:     OT Date of Treatment: 01/13/24  OT Start Time: 0828  OT Stop Time: 0852  OT Total Time (min): 24 min    Billable Minutes:Evaluation 10  Therapeutic Activity 14    1/13/2024

## 2024-01-14 LAB
ALBUMIN SERPL BCP-MCNC: 3.1 G/DL (ref 3.5–5.2)
ALP SERPL-CCNC: 75 U/L (ref 55–135)
ALT SERPL W/O P-5'-P-CCNC: 17 U/L (ref 10–44)
ANION GAP SERPL CALC-SCNC: 8 MMOL/L (ref 8–16)
AST SERPL-CCNC: 18 U/L (ref 10–40)
BASOPHILS # BLD AUTO: 0.03 K/UL (ref 0–0.2)
BASOPHILS NFR BLD: 0.3 % (ref 0–1.9)
BILIRUB SERPL-MCNC: 0.4 MG/DL (ref 0.1–1)
BUN SERPL-MCNC: 8 MG/DL (ref 8–23)
CALCIUM SERPL-MCNC: 8.1 MG/DL (ref 8.7–10.5)
CHLORIDE SERPL-SCNC: 109 MMOL/L (ref 95–110)
CO2 SERPL-SCNC: 23 MMOL/L (ref 23–29)
CREAT SERPL-MCNC: 0.8 MG/DL (ref 0.5–1.4)
DIFFERENTIAL METHOD BLD: ABNORMAL
EOSINOPHIL # BLD AUTO: 0.5 K/UL (ref 0–0.5)
EOSINOPHIL NFR BLD: 4.8 % (ref 0–8)
ERYTHROCYTE [DISTWIDTH] IN BLOOD BY AUTOMATED COUNT: 23.5 % (ref 11.5–14.5)
EST. GFR  (NO RACE VARIABLE): >60 ML/MIN/1.73 M^2
GLUCOSE SERPL-MCNC: 93 MG/DL (ref 70–110)
HCT VFR BLD AUTO: 30.1 % (ref 37–48.5)
HGB BLD-MCNC: 9.1 G/DL (ref 12–16)
IMM GRANULOCYTES # BLD AUTO: 0.06 K/UL (ref 0–0.04)
IMM GRANULOCYTES NFR BLD AUTO: 0.6 % (ref 0–0.5)
LYMPHOCYTES # BLD AUTO: 1 K/UL (ref 1–4.8)
LYMPHOCYTES NFR BLD: 10.2 % (ref 18–48)
MCH RBC QN AUTO: 24.7 PG (ref 27–31)
MCHC RBC AUTO-ENTMCNC: 30.2 G/DL (ref 32–36)
MCV RBC AUTO: 82 FL (ref 82–98)
MONOCYTES # BLD AUTO: 1.2 K/UL (ref 0.3–1)
MONOCYTES NFR BLD: 11.9 % (ref 4–15)
NEUTROPHILS # BLD AUTO: 7.3 K/UL (ref 1.8–7.7)
NEUTROPHILS NFR BLD: 72.2 % (ref 38–73)
NRBC BLD-RTO: 0 /100 WBC
PLATELET # BLD AUTO: 280 K/UL (ref 150–450)
PMV BLD AUTO: 9.4 FL (ref 9.2–12.9)
POTASSIUM SERPL-SCNC: 3.9 MMOL/L (ref 3.5–5.1)
PROT SERPL-MCNC: 5.7 G/DL (ref 6–8.4)
RBC # BLD AUTO: 3.68 M/UL (ref 4–5.4)
SODIUM SERPL-SCNC: 140 MMOL/L (ref 136–145)
WBC # BLD AUTO: 10.08 K/UL (ref 3.9–12.7)

## 2024-01-14 PROCEDURE — 25000003 PHARM REV CODE 250: Performed by: INTERNAL MEDICINE

## 2024-01-14 PROCEDURE — 11000001 HC ACUTE MED/SURG PRIVATE ROOM

## 2024-01-14 PROCEDURE — 25000003 PHARM REV CODE 250: Performed by: REGISTERED NURSE

## 2024-01-14 PROCEDURE — 63600175 PHARM REV CODE 636 W HCPCS: Performed by: FAMILY MEDICINE

## 2024-01-14 PROCEDURE — 85025 COMPLETE CBC W/AUTO DIFF WBC: CPT | Performed by: STUDENT IN AN ORGANIZED HEALTH CARE EDUCATION/TRAINING PROGRAM

## 2024-01-14 PROCEDURE — 99900035 HC TECH TIME PER 15 MIN (STAT)

## 2024-01-14 PROCEDURE — 25000242 PHARM REV CODE 250 ALT 637 W/ HCPCS: Performed by: STUDENT IN AN ORGANIZED HEALTH CARE EDUCATION/TRAINING PROGRAM

## 2024-01-14 PROCEDURE — 94640 AIRWAY INHALATION TREATMENT: CPT

## 2024-01-14 PROCEDURE — 25000003 PHARM REV CODE 250: Performed by: STUDENT IN AN ORGANIZED HEALTH CARE EDUCATION/TRAINING PROGRAM

## 2024-01-14 PROCEDURE — 27000221 HC OXYGEN, UP TO 24 HOURS

## 2024-01-14 PROCEDURE — 63600175 PHARM REV CODE 636 W HCPCS: Performed by: INTERNAL MEDICINE

## 2024-01-14 PROCEDURE — 36415 COLL VENOUS BLD VENIPUNCTURE: CPT | Performed by: STUDENT IN AN ORGANIZED HEALTH CARE EDUCATION/TRAINING PROGRAM

## 2024-01-14 PROCEDURE — C9113 INJ PANTOPRAZOLE SODIUM, VIA: HCPCS | Performed by: FAMILY MEDICINE

## 2024-01-14 PROCEDURE — 25000242 PHARM REV CODE 250 ALT 637 W/ HCPCS: Performed by: REGISTERED NURSE

## 2024-01-14 PROCEDURE — 25000003 PHARM REV CODE 250: Performed by: FAMILY MEDICINE

## 2024-01-14 PROCEDURE — 80053 COMPREHEN METABOLIC PANEL: CPT | Performed by: STUDENT IN AN ORGANIZED HEALTH CARE EDUCATION/TRAINING PROGRAM

## 2024-01-14 PROCEDURE — 94761 N-INVAS EAR/PLS OXIMETRY MLT: CPT

## 2024-01-14 RX ORDER — LOSARTAN POTASSIUM 25 MG/1
25 TABLET ORAL DAILY
Status: DISCONTINUED | OUTPATIENT
Start: 2024-01-14 | End: 2024-01-15 | Stop reason: HOSPADM

## 2024-01-14 RX ORDER — IPRATROPIUM BROMIDE AND ALBUTEROL SULFATE 2.5; .5 MG/3ML; MG/3ML
3 SOLUTION RESPIRATORY (INHALATION) EVERY 6 HOURS PRN
Status: DISCONTINUED | OUTPATIENT
Start: 2024-01-14 | End: 2024-01-15 | Stop reason: HOSPADM

## 2024-01-14 RX ORDER — ALUMINUM HYDROXIDE, MAGNESIUM HYDROXIDE, AND SIMETHICONE 1200; 120; 1200 MG/30ML; MG/30ML; MG/30ML
30 SUSPENSION ORAL
Status: DISCONTINUED | OUTPATIENT
Start: 2024-01-14 | End: 2024-01-15 | Stop reason: HOSPADM

## 2024-01-14 RX ORDER — CIPROFLOXACIN 500 MG/1
500 TABLET ORAL EVERY 12 HOURS
Status: DISCONTINUED | OUTPATIENT
Start: 2024-01-14 | End: 2024-01-15 | Stop reason: HOSPADM

## 2024-01-14 RX ADMIN — LEVALBUTEROL HYDROCHLORIDE 0.63 MG: 0.63 SOLUTION RESPIRATORY (INHALATION) at 07:01

## 2024-01-14 RX ADMIN — IPRATROPIUM BROMIDE AND ALBUTEROL SULFATE 3 ML: 2.5; .5 SOLUTION RESPIRATORY (INHALATION) at 04:01

## 2024-01-14 RX ADMIN — METOPROLOL TARTRATE 50 MG: 50 TABLET, FILM COATED ORAL at 09:01

## 2024-01-14 RX ADMIN — Medication 6 MG: at 09:01

## 2024-01-14 RX ADMIN — ALUMINUM HYDROXIDE, MAGNESIUM HYDROXIDE, AND SIMETHICONE 30 ML: 200; 200; 20 SUSPENSION ORAL at 09:01

## 2024-01-14 RX ADMIN — ONDANSETRON 4 MG: 2 INJECTION INTRAMUSCULAR; INTRAVENOUS at 09:01

## 2024-01-14 RX ADMIN — CIPROFLOXACIN 500 MG: 500 TABLET, FILM COATED ORAL at 12:01

## 2024-01-14 RX ADMIN — ACETAMINOPHEN 1000 MG: 500 TABLET ORAL at 11:01

## 2024-01-14 RX ADMIN — ALUMINUM HYDROXIDE, MAGNESIUM HYDROXIDE, AND SIMETHICONE 30 ML: 200; 200; 20 SUSPENSION ORAL at 05:01

## 2024-01-14 RX ADMIN — AMLODIPINE BESYLATE 10 MG: 5 TABLET ORAL at 09:01

## 2024-01-14 RX ADMIN — ONDANSETRON 4 MG: 2 INJECTION INTRAMUSCULAR; INTRAVENOUS at 01:01

## 2024-01-14 RX ADMIN — LEVALBUTEROL HYDROCHLORIDE 0.63 MG: 0.63 SOLUTION RESPIRATORY (INHALATION) at 03:01

## 2024-01-14 RX ADMIN — LOSARTAN POTASSIUM 25 MG: 25 TABLET, FILM COATED ORAL at 12:01

## 2024-01-14 RX ADMIN — ALUMINUM HYDROXIDE, MAGNESIUM HYDROXIDE, AND SIMETHICONE 30 ML: 200; 200; 20 SUSPENSION ORAL at 03:01

## 2024-01-14 RX ADMIN — ATORVASTATIN CALCIUM 20 MG: 20 TABLET, FILM COATED ORAL at 09:01

## 2024-01-14 RX ADMIN — PANTOPRAZOLE SODIUM 40 MG: 40 INJECTION, POWDER, FOR SOLUTION INTRAVENOUS at 09:01

## 2024-01-14 RX ADMIN — LEVALBUTEROL HYDROCHLORIDE 0.63 MG: 0.63 SOLUTION RESPIRATORY (INHALATION) at 11:01

## 2024-01-14 RX ADMIN — ACETAMINOPHEN 1000 MG: 500 TABLET ORAL at 09:01

## 2024-01-14 RX ADMIN — IPRATROPIUM BROMIDE AND ALBUTEROL SULFATE 3 ML: 2.5; .5 SOLUTION RESPIRATORY (INHALATION) at 08:01

## 2024-01-14 NOTE — PT/OT/SLP PROGRESS
Physical Therapy      Patient Name:  Purvi Quiroga   MRN:  [id number]    Patient not seen today secondary to Other (Comment) (Pt not seen per pt request due to not sleeping the night before and experiencing nausea most of the day and has finally settled and is resting well.). Will follow-up next treatment day.

## 2024-01-14 NOTE — ASSESSMENT & PLAN NOTE
Although patient is asymptomatic from UTI perspective and has possible contaiminated UA, with elevated WBC of 19 will continue rocephin for now.     - Continue rocephin 1gm daily-switch to Cipro on 01/14.   - Ucx with Gram-negative rods-with-E coli, Raoultella ornithinolytica both sensitive to continue antibiotic

## 2024-01-14 NOTE — PROGRESS NOTES
"[facility] Medicine  Progress Note    Patient Name: Purvi Quiroga  MRN: [id number]  Patient Class: IP- Inpatient   Admission Date: 1/8/2024  Length of Stay: 5 days  Attending Physician: Liz King*  Primary Care Provider: Palomo Burgos MD        Subjective:     Principal Problem:Pre-syncope        HPI:  Ms. Purvi Quiroga is a 78 y/o white F w/ PMH HTN, HLD, neuropathy, and arthritis who was sent to the hospital from her nursing home after patient had a pre-syncopal event at her nursing home. Patient states she has had off and on weakness for weeks. Today while using her walker to get back to her bedroom patient felt a sudden onset feeling of lightheadiness and "feeling" of wanting to faint. It's feeling she has had in the past right before she faints after seeing blood. Denies any prodromal symptoms, nausea, vomiting, chest pain, palpitaitons, fevers, chills, diarrhea, dysuria, urinary frequency, suprapubic pain, flank pain, melana. States she has been eating and drinking ok but has decreased her food intake recently trying to lose weight. No changes to her home BP meds recently though she states she has had hypotensive episodes in the past. When EMS arrived patient SBP was noted to be in the 70s and patient was brought to the hospital.     In the ED, labs were notable for WBC 19, hgb 8, Cr 1.7 (baseline 0.8 as 1/2023), and positive UA (however elevated squamous epithelial cells). Patient received a dose of rocephin.         Overview/Hospital Course:  No notes on file    Interval History: awake an alert  H/H stable  JOSE-resolved   Hypertension-resume home metoprolol add losartan  Urine culture with Gram-negative rods--E coli, Raoultella ornithinolytica both sensitive to ceftriaxone and Cipro.  Switch to Cipro  PT/OT - recommend SNF placement      Review of Systems   Constitutional:  Positive for activity change and fatigue.   Respiratory:  Negative for shortness of breath.  " "  Gastrointestinal:  Negative for blood in stool.   Neurological:  Positive for weakness.     Objective:     Vital Signs (Most Recent):  Temp: 98.1 °F (36.7 °C) (01/14/24 0834)  Pulse: 88 (01/14/24 0834)  Resp: 18 (01/14/24 0834)  BP: (!) 151/67 (01/14/24 0834)  SpO2: 100 % (01/14/24 0834) Vital Signs (24h Range):  Temp:  [96.7 °F (35.9 °C)-98.4 °F (36.9 °C)] 98.1 °F (36.7 °C)  Pulse:  [74-92] 88  Resp:  [18-20] 18  SpO2:  [95 %-100 %] 100 %  BP: (131-161)/(60-77) 151/67     Weight: 101.3 kg (223 lb 5.2 oz)  Body mass index is 51.91 kg/m².    Intake/Output Summary (Last 24 hours) at 1/14/2024 1019  Last data filed at 1/14/2024 0906  Gross per 24 hour   Intake 480 ml   Output 550 ml   Net -70 ml           Physical Exam  HENT:      Head: Normocephalic and atraumatic.   Cardiovascular:      Rate and Rhythm: Normal rate.   Pulmonary:      Effort: Pulmonary effort is normal.   Abdominal:      General: Bowel sounds are normal. There is no distension.      Palpations: Abdomen is soft.   Musculoskeletal:      Cervical back: Normal range of motion.      Right lower leg: No edema.      Left lower leg: No edema.   Skin:     General: Skin is warm.      Capillary Refill: Capillary refill takes less than 2 seconds.   Neurological:      Mental Status: She is alert and oriented to person, place, and time.   Psychiatric:         Mood and Affect: Mood normal.         Behavior: Behavior normal.             Significant Labs: A1C:   Recent Labs   Lab 10/09/23  1224   HGBA1C 5.1       ABGs: No results for input(s): "PH", "PCO2", "HCO3", "POCSATURATED", "BE", "TOTALHB", "COHB", "METHB", "O2HB", "POCFIO2", "PO2" in the last 48 hours.  Blood Culture: No results for input(s): "LABBLOO" in the last 48 hours.  CBC:   Recent Labs   Lab 01/13/24 0311 01/14/24 0218   WBC 11.00 10.08   HGB 8.8* 9.1*   HCT 28.9* 30.1*    280       CMP:   Recent Labs   Lab 01/13/24 0311 01/14/24 0218    140   K 4.0 3.9    109   CO2 19* 23 " "   93   BUN 8 8   CREATININE 0.8 0.8   CALCIUM 8.2* 8.1*   PROT 5.5* 5.7*   ALBUMIN 3.1* 3.1*   BILITOT 0.5 0.4   ALKPHOS 68 75   AST 22 18   ALT 19 17   ANIONGAP 12 8       Coagulation: No results for input(s): "PT", "INR", "APTT" in the last 48 hours.  Lactic Acid: No results for input(s): "LACTATE" in the last 48 hours.  Lipase: No results for input(s): "LIPASE" in the last 48 hours.  Magnesium: No results for input(s): "MG" in the last 48 hours.  Troponin:   No results for input(s): "TROPONINI", "TROPONINIHS" in the last 48 hours.    TSH:   Recent Labs   Lab 10/09/23  1224   TSH 1.466       Urine Culture:   No results for input(s): "LABURIN" in the last 48 hours.    Urine Studies:   No results for input(s): "COLORU", "APPEARANCEUA", "PHUR", "SPECGRAV", "PROTEINUA", "GLUCUA", "KETONESU", "BILIRUBINUA", "OCCULTUA", "NITRITE", "UROBILINOGEN", "LEUKOCYTESUR", "RBCUA", "WBCUA", "BACTERIA", "SQUAMEPITHEL", "HYALINECASTS" in the last 48 hours.    Invalid input(s): "WRIGHTSUR"      Significant Imaging: I have reviewed all pertinent imaging results/findings within the past 24 hours.    Assessment/Plan:      * Pre-syncope  Most likely secondary to hypotension in the setting of BP meds vs infection.     - Will hold home BP meds for now and restart as needed.   - Start LR at 75cc/hr.   - See UTI plan below.   - orthostatics ordered.   - previous echo in 2022 unremarkable.       GI bleed  Stool occult blood positive  IV Protonix   S/p 2 unit PRBC  Post transfusion H/H stable  Hold anti coagulation   Consult GI      Anemia  - iron panel ordered.   Transfuse with 2 unit on PRBC 1/10/2024. Post transfusion H/H stable  Stool occult blood-positive  Trend H/H    JOSE (acute kidney injury)  Most likely secondary to UTI.     - continue treatment as above.   Resolved     UTI (urinary tract infection)  Although patient is asymptomatic from UTI perspective and has possible contaiminated UA, with elevated WBC of 19 will continue " rocephin for now.     - Continue rocephin 1gm daily-switch to Cipro on 01/14.   - Ucx with Gram-negative rods-with-E coli, Raoultella ornithinolytica both sensitive to continue antibiotic     HLD (hyperlipidemia)  - continue statin      Morbid obesity  Body mass index is 51.91 kg/m². Morbid obesity complicates all aspects of disease management from diagnostic modalities to treatment. Weight loss encouraged and health benefits explained to patient.         Essential hypertension  Hold home BP meds for now.   Resume home meds      VTE Risk Mitigation (From admission, onward)           Ordered     Place sequential compression device  Until discontinued         01/09/24 0122     IP VTE HIGH RISK PATIENT  Once         01/09/24 0122                    Discharge Planning   NIRMALA: 1/16/2024     Code Status: Full Code   Is the patient medically ready for discharge?:     Reason for patient still in hospital (select all that apply): Pending disposition  Discharge Plan A: Skilled Nursing Facility                  Liz King MD  Department of Hospital Medicine   [place] [facility]

## 2024-01-14 NOTE — SUBJECTIVE & OBJECTIVE
"Interval History: awake an alert  H/H stable  JOSE-resolved   Hypertension-resume home metoprolol add losartan  Urine culture with Gram-negative rods--E coli, Raoultella ornithinolytica both sensitive to ceftriaxone and Cipro.  Switch to Cipro  PT/OT - recommend SNF placement      Review of Systems   Constitutional:  Positive for activity change and fatigue.   Respiratory:  Negative for shortness of breath.    Gastrointestinal:  Negative for blood in stool.   Neurological:  Positive for weakness.     Objective:     Vital Signs (Most Recent):  Temp: 98.1 °F (36.7 °C) (01/14/24 0834)  Pulse: 88 (01/14/24 0834)  Resp: 18 (01/14/24 0834)  BP: (!) 151/67 (01/14/24 0834)  SpO2: 100 % (01/14/24 0834) Vital Signs (24h Range):  Temp:  [96.7 °F (35.9 °C)-98.4 °F (36.9 °C)] 98.1 °F (36.7 °C)  Pulse:  [74-92] 88  Resp:  [18-20] 18  SpO2:  [95 %-100 %] 100 %  BP: (131-161)/(60-77) 151/67     Weight: 101.3 kg (223 lb 5.2 oz)  Body mass index is 51.91 kg/m².    Intake/Output Summary (Last 24 hours) at 1/14/2024 1019  Last data filed at 1/14/2024 0906  Gross per 24 hour   Intake 480 ml   Output 550 ml   Net -70 ml           Physical Exam  HENT:      Head: Normocephalic and atraumatic.   Cardiovascular:      Rate and Rhythm: Normal rate.   Pulmonary:      Effort: Pulmonary effort is normal.   Abdominal:      General: Bowel sounds are normal. There is no distension.      Palpations: Abdomen is soft.   Musculoskeletal:      Cervical back: Normal range of motion.      Right lower leg: No edema.      Left lower leg: No edema.   Skin:     General: Skin is warm.      Capillary Refill: Capillary refill takes less than 2 seconds.   Neurological:      Mental Status: She is alert and oriented to person, place, and time.   Psychiatric:         Mood and Affect: Mood normal.         Behavior: Behavior normal.             Significant Labs: A1C:   Recent Labs   Lab 10/09/23  1224   HGBA1C 5.1       ABGs: No results for input(s): "PH", "PCO2", " ""HCO3", "POCSATURATED", "BE", "TOTALHB", "COHB", "METHB", "O2HB", "POCFIO2", "PO2" in the last 48 hours.  Blood Culture: No results for input(s): "LABBLOO" in the last 48 hours.  CBC:   Recent Labs   Lab 01/13/24 0311 01/14/24  0218   WBC 11.00 10.08   HGB 8.8* 9.1*   HCT 28.9* 30.1*    280       CMP:   Recent Labs   Lab 01/13/24 0311 01/14/24  0218    140   K 4.0 3.9    109   CO2 19* 23    93   BUN 8 8   CREATININE 0.8 0.8   CALCIUM 8.2* 8.1*   PROT 5.5* 5.7*   ALBUMIN 3.1* 3.1*   BILITOT 0.5 0.4   ALKPHOS 68 75   AST 22 18   ALT 19 17   ANIONGAP 12 8       Coagulation: No results for input(s): "PT", "INR", "APTT" in the last 48 hours.  Lactic Acid: No results for input(s): "LACTATE" in the last 48 hours.  Lipase: No results for input(s): "LIPASE" in the last 48 hours.  Magnesium: No results for input(s): "MG" in the last 48 hours.  Troponin:   No results for input(s): "TROPONINI", "TROPONINIHS" in the last 48 hours.    TSH:   Recent Labs   Lab 10/09/23  1224   TSH 1.466       Urine Culture:   No results for input(s): "LABURIN" in the last 48 hours.    Urine Studies:   No results for input(s): "COLORU", "APPEARANCEUA", "PHUR", "SPECGRAV", "PROTEINUA", "GLUCUA", "KETONESU", "BILIRUBINUA", "OCCULTUA", "NITRITE", "UROBILINOGEN", "LEUKOCYTESUR", "RBCUA", "WBCUA", "BACTERIA", "SQUAMEPITHEL", "HYALINECASTS" in the last 48 hours.    Invalid input(s): "WRIGHTSUR"      Significant Imaging: I have reviewed all pertinent imaging results/findings within the past 24 hours.  "

## 2024-01-15 VITALS
RESPIRATION RATE: 18 BRPM | OXYGEN SATURATION: 98 % | HEIGHT: 55 IN | BODY MASS INDEX: 51.68 KG/M2 | HEART RATE: 80 BPM | DIASTOLIC BLOOD PRESSURE: 61 MMHG | WEIGHT: 223.31 LBS | TEMPERATURE: 98 F | SYSTOLIC BLOOD PRESSURE: 125 MMHG

## 2024-01-15 LAB
ALBUMIN SERPL BCP-MCNC: 3.1 G/DL (ref 3.5–5.2)
ALP SERPL-CCNC: 75 U/L (ref 55–135)
ALT SERPL W/O P-5'-P-CCNC: 15 U/L (ref 10–44)
ANION GAP SERPL CALC-SCNC: 10 MMOL/L (ref 8–16)
AST SERPL-CCNC: 14 U/L (ref 10–40)
BASOPHILS # BLD AUTO: 0.03 K/UL (ref 0–0.2)
BASOPHILS NFR BLD: 0.4 % (ref 0–1.9)
BILIRUB SERPL-MCNC: 0.3 MG/DL (ref 0.1–1)
BUN SERPL-MCNC: 8 MG/DL (ref 8–23)
CALCIUM SERPL-MCNC: 8.3 MG/DL (ref 8.7–10.5)
CHLORIDE SERPL-SCNC: 107 MMOL/L (ref 95–110)
CO2 SERPL-SCNC: 25 MMOL/L (ref 23–29)
CREAT SERPL-MCNC: 0.8 MG/DL (ref 0.5–1.4)
DIFFERENTIAL METHOD BLD: ABNORMAL
EOSINOPHIL # BLD AUTO: 0.3 K/UL (ref 0–0.5)
EOSINOPHIL NFR BLD: 3.9 % (ref 0–8)
ERYTHROCYTE [DISTWIDTH] IN BLOOD BY AUTOMATED COUNT: 23.1 % (ref 11.5–14.5)
EST. GFR  (NO RACE VARIABLE): >60 ML/MIN/1.73 M^2
GLUCOSE SERPL-MCNC: 108 MG/DL (ref 70–110)
HCT VFR BLD AUTO: 31.2 % (ref 37–48.5)
HGB BLD-MCNC: 9.2 G/DL (ref 12–16)
IMM GRANULOCYTES # BLD AUTO: 0.04 K/UL (ref 0–0.04)
IMM GRANULOCYTES NFR BLD AUTO: 0.5 % (ref 0–0.5)
LYMPHOCYTES # BLD AUTO: 1.4 K/UL (ref 1–4.8)
LYMPHOCYTES NFR BLD: 17.3 % (ref 18–48)
MCH RBC QN AUTO: 24.1 PG (ref 27–31)
MCHC RBC AUTO-ENTMCNC: 29.5 G/DL (ref 32–36)
MCV RBC AUTO: 82 FL (ref 82–98)
MONOCYTES # BLD AUTO: 0.9 K/UL (ref 0.3–1)
MONOCYTES NFR BLD: 11.4 % (ref 4–15)
NEUTROPHILS # BLD AUTO: 5.5 K/UL (ref 1.8–7.7)
NEUTROPHILS NFR BLD: 66.5 % (ref 38–73)
NRBC BLD-RTO: 0 /100 WBC
PLATELET # BLD AUTO: 265 K/UL (ref 150–450)
PMV BLD AUTO: 9.4 FL (ref 9.2–12.9)
POTASSIUM SERPL-SCNC: 4.1 MMOL/L (ref 3.5–5.1)
PROT SERPL-MCNC: 5.7 G/DL (ref 6–8.4)
RBC # BLD AUTO: 3.82 M/UL (ref 4–5.4)
SODIUM SERPL-SCNC: 142 MMOL/L (ref 136–145)
WBC # BLD AUTO: 8.28 K/UL (ref 3.9–12.7)

## 2024-01-15 PROCEDURE — C9113 INJ PANTOPRAZOLE SODIUM, VIA: HCPCS | Performed by: FAMILY MEDICINE

## 2024-01-15 PROCEDURE — 25000003 PHARM REV CODE 250: Performed by: STUDENT IN AN ORGANIZED HEALTH CARE EDUCATION/TRAINING PROGRAM

## 2024-01-15 PROCEDURE — 25000003 PHARM REV CODE 250: Performed by: REGISTERED NURSE

## 2024-01-15 PROCEDURE — 36415 COLL VENOUS BLD VENIPUNCTURE: CPT | Performed by: STUDENT IN AN ORGANIZED HEALTH CARE EDUCATION/TRAINING PROGRAM

## 2024-01-15 PROCEDURE — 99900035 HC TECH TIME PER 15 MIN (STAT)

## 2024-01-15 PROCEDURE — 80053 COMPREHEN METABOLIC PANEL: CPT | Performed by: STUDENT IN AN ORGANIZED HEALTH CARE EDUCATION/TRAINING PROGRAM

## 2024-01-15 PROCEDURE — 63600175 PHARM REV CODE 636 W HCPCS: Performed by: FAMILY MEDICINE

## 2024-01-15 PROCEDURE — 25000242 PHARM REV CODE 250 ALT 637 W/ HCPCS: Performed by: REGISTERED NURSE

## 2024-01-15 PROCEDURE — 85025 COMPLETE CBC W/AUTO DIFF WBC: CPT | Performed by: STUDENT IN AN ORGANIZED HEALTH CARE EDUCATION/TRAINING PROGRAM

## 2024-01-15 PROCEDURE — 25000003 PHARM REV CODE 250: Performed by: FAMILY MEDICINE

## 2024-01-15 PROCEDURE — 25000242 PHARM REV CODE 250 ALT 637 W/ HCPCS: Performed by: STUDENT IN AN ORGANIZED HEALTH CARE EDUCATION/TRAINING PROGRAM

## 2024-01-15 PROCEDURE — 97530 THERAPEUTIC ACTIVITIES: CPT

## 2024-01-15 PROCEDURE — 94761 N-INVAS EAR/PLS OXIMETRY MLT: CPT

## 2024-01-15 PROCEDURE — 94640 AIRWAY INHALATION TREATMENT: CPT

## 2024-01-15 PROCEDURE — 27000221 HC OXYGEN, UP TO 24 HOURS

## 2024-01-15 RX ORDER — DIPHENOXYLATE HYDROCHLORIDE AND ATROPINE SULFATE 2.5; .025 MG/1; MG/1
1 TABLET ORAL 4 TIMES DAILY PRN
Status: DISCONTINUED | OUTPATIENT
Start: 2024-01-15 | End: 2024-01-15 | Stop reason: HOSPADM

## 2024-01-15 RX ORDER — CIPROFLOXACIN 500 MG/1
500 TABLET ORAL EVERY 12 HOURS
Qty: 10 TABLET | Refills: 0 | Status: SHIPPED | OUTPATIENT
Start: 2024-01-15 | End: 2024-01-20

## 2024-01-15 RX ORDER — LOPERAMIDE HYDROCHLORIDE 2 MG/1
2 CAPSULE ORAL 4 TIMES DAILY PRN
Status: DISCONTINUED | OUTPATIENT
Start: 2024-01-15 | End: 2024-01-15

## 2024-01-15 RX ORDER — LOPERAMIDE HYDROCHLORIDE 2 MG/1
2 CAPSULE ORAL 4 TIMES DAILY PRN
Refills: 0
Start: 2024-01-15 | End: 2024-01-25

## 2024-01-15 RX ADMIN — IPRATROPIUM BROMIDE AND ALBUTEROL SULFATE 3 ML: 2.5; .5 SOLUTION RESPIRATORY (INHALATION) at 03:01

## 2024-01-15 RX ADMIN — PANTOPRAZOLE SODIUM 40 MG: 40 INJECTION, POWDER, FOR SOLUTION INTRAVENOUS at 09:01

## 2024-01-15 RX ADMIN — LEVALBUTEROL HYDROCHLORIDE 0.63 MG: 0.63 SOLUTION RESPIRATORY (INHALATION) at 07:01

## 2024-01-15 RX ADMIN — CIPROFLOXACIN 500 MG: 500 TABLET, FILM COATED ORAL at 02:01

## 2024-01-15 RX ADMIN — METOPROLOL TARTRATE 50 MG: 50 TABLET, FILM COATED ORAL at 09:01

## 2024-01-15 RX ADMIN — ALUMINUM HYDROXIDE, MAGNESIUM HYDROXIDE, AND SIMETHICONE 30 ML: 200; 200; 20 SUSPENSION ORAL at 05:01

## 2024-01-15 RX ADMIN — DIPHENOXYLATE HYDROCHLORIDE AND ATROPINE SULFATE 1 TABLET: 2.5; .025 TABLET ORAL at 01:01

## 2024-01-15 RX ADMIN — LEVALBUTEROL HYDROCHLORIDE 0.63 MG: 0.63 SOLUTION RESPIRATORY (INHALATION) at 03:01

## 2024-01-15 RX ADMIN — AMLODIPINE BESYLATE 10 MG: 5 TABLET ORAL at 09:01

## 2024-01-15 RX ADMIN — LOPERAMIDE HYDROCHLORIDE 2 MG: 2 CAPSULE ORAL at 09:01

## 2024-01-15 RX ADMIN — ATORVASTATIN CALCIUM 20 MG: 20 TABLET, FILM COATED ORAL at 09:01

## 2024-01-15 RX ADMIN — LOSARTAN POTASSIUM 25 MG: 25 TABLET, FILM COATED ORAL at 09:01

## 2024-01-15 RX ADMIN — ALUMINUM HYDROXIDE, MAGNESIUM HYDROXIDE, AND SIMETHICONE 30 ML: 200; 200; 20 SUSPENSION ORAL at 10:01

## 2024-01-15 RX ADMIN — IPRATROPIUM BROMIDE AND ALBUTEROL SULFATE 3 ML: 2.5; .5 SOLUTION RESPIRATORY (INHALATION) at 10:01

## 2024-01-15 NOTE — SUBJECTIVE & OBJECTIVE
Interval History: awake and alert,   H/H stable  JOSE-resolved   Hypertension-resume home metoprolol add losartan  Urine culture with Gram-negative rods--E coli, Raoultella ornithinolytica both sensitive to ceftriaxone and Cipro.  Switch to Cipro  PT/OT - recommend SNF placement- pending placement      Review of Systems   Constitutional:  Positive for activity change and fatigue.   Respiratory:  Negative for shortness of breath.    Gastrointestinal:  Negative for blood in stool.   Neurological:  Positive for weakness.     Objective:     Vital Signs (Most Recent):  Temp: 98.1 °F (36.7 °C) (01/15/24 0739)  Pulse: 78 (01/15/24 0739)  Resp: 18 (01/15/24 0739)  BP: (!) 143/65 (01/15/24 0739)  SpO2: 99 % (01/15/24 0739) Vital Signs (24h Range):  Temp:  [97.7 °F (36.5 °C)-99.6 °F (37.6 °C)] 98.1 °F (36.7 °C)  Pulse:  [70-88] 78  Resp:  [18-30] 18  SpO2:  [95 %-99 %] 99 %  BP: (125-156)/(58-78) 143/65     Weight: 101.3 kg (223 lb 5.2 oz)  Body mass index is 51.91 kg/m².    Intake/Output Summary (Last 24 hours) at 1/15/2024 1026  Last data filed at 1/14/2024 1745  Gross per 24 hour   Intake 360 ml   Output --   Net 360 ml           Physical Exam  HENT:      Head: Normocephalic and atraumatic.   Cardiovascular:      Rate and Rhythm: Normal rate.   Pulmonary:      Effort: Pulmonary effort is normal.   Abdominal:      General: Bowel sounds are normal. There is no distension.      Palpations: Abdomen is soft.   Musculoskeletal:      Cervical back: Normal range of motion.      Right lower leg: No edema.      Left lower leg: No edema.   Skin:     General: Skin is warm.      Capillary Refill: Capillary refill takes less than 2 seconds.   Neurological:      Mental Status: She is alert and oriented to person, place, and time.   Psychiatric:         Mood and Affect: Mood normal.         Behavior: Behavior normal.             Significant Labs: A1C:   Recent Labs   Lab 10/09/23  1224   HGBA1C 5.1       ABGs: No results for input(s):  ""PH", "PCO2", "HCO3", "POCSATURATED", "BE", "TOTALHB", "COHB", "METHB", "O2HB", "POCFIO2", "PO2" in the last 48 hours.  Blood Culture: No results for input(s): "LABBLOO" in the last 48 hours.  CBC:   Recent Labs   Lab 01/14/24 0218 01/15/24  0254   WBC 10.08 8.28   HGB 9.1* 9.2*   HCT 30.1* 31.2*    265       CMP:   Recent Labs   Lab 01/14/24 0218 01/15/24  0254    142   K 3.9 4.1    107   CO2 23 25   GLU 93 108   BUN 8 8   CREATININE 0.8 0.8   CALCIUM 8.1* 8.3*   PROT 5.7* 5.7*   ALBUMIN 3.1* 3.1*   BILITOT 0.4 0.3   ALKPHOS 75 75   AST 18 14   ALT 17 15   ANIONGAP 8 10       Coagulation: No results for input(s): "PT", "INR", "APTT" in the last 48 hours.  Lactic Acid: No results for input(s): "LACTATE" in the last 48 hours.  Lipase: No results for input(s): "LIPASE" in the last 48 hours.  Magnesium: No results for input(s): "MG" in the last 48 hours.  Troponin:   No results for input(s): "TROPONINI", "TROPONINIHS" in the last 48 hours.    TSH:   Recent Labs   Lab 10/09/23  1224   TSH 1.466       Urine Culture:   No results for input(s): "LABURIN" in the last 48 hours.    Urine Studies:   No results for input(s): "COLORU", "APPEARANCEUA", "PHUR", "SPECGRAV", "PROTEINUA", "GLUCUA", "KETONESU", "BILIRUBINUA", "OCCULTUA", "NITRITE", "UROBILINOGEN", "LEUKOCYTESUR", "RBCUA", "WBCUA", "BACTERIA", "SQUAMEPITHEL", "HYALINECASTS" in the last 48 hours.    Invalid input(s): "WRIGHTSUR"      Significant Imaging: I have reviewed all pertinent imaging results/findings within the past 24 hours.  "

## 2024-01-15 NOTE — ASSESSMENT & PLAN NOTE
Stool occult blood positive  IV Protonix   S/p 2 unit PRBC  Post transfusion H/H stable  Hold anti coagulation   Consult GI- appreciate rec's

## 2024-01-15 NOTE — PT/OT/SLP PROGRESS
Occupational Therapy   Treatment    Name: Purvi Quiroga  MRN: [id number]  Admitting Diagnosis:  Pre-syncope  3 Days Post-Op    Recommendations:     Discharge Recommendations: Moderate Intensity Therapy  Discharge Equipment Recommendations:  to be determined by next level of care  Barriers to discharge:  Decreased caregiver support    Assessment:     Purvi Quiroga is a 79 y.o. female with a medical diagnosis of Pre-syncope.  She presents with ..The primary encounter diagnosis was Acute cystitis without hematuria. Diagnoses of Near syncope, Shortness of breath, Generalized weakness, Chest pain, Gastrointestinal hemorrhage with melena, and Essential hypertension were also pertinent to this visit.  . Performance deficits affecting function are weakness, impaired endurance, impaired self care skills, impaired functional mobility, gait instability, impaired balance, impaired cardiopulmonary response to activity, decreased safety awareness, decreased lower extremity function, decreased upper extremity function.     Continue OT POC. Patient progressed to performing bed mobility with SBA, however, limited activity tolerance and self rating of exertion/ shortness of breath at 8/10 although SpO2 is WFL on 2.5L. Recommend moderate intensity therapy upon medically stable.         Rehab Prognosis:  Good and Fair; patient would benefit from acute skilled OT services to address these deficits and reach maximum level of function.       Plan:     Patient to be seen 5 x/week to address the above listed problems via self-care/home management, therapeutic activities, therapeutic exercises  Plan of Care Expires: 02/13/24  Plan of Care Reviewed with: patient    Subjective     Chief Complaint: shortness of breath  Patient/Family Comments/goals: reports preparing self for transition to SNF; indicates request for breathing treatment (however, per RN patient had just received a  breathing treatment just prior to OT  session)  Pain/Comfort:  Pain Rating 1: other (see comments) (no pain; but rates 8/10 shortness of breath)  Pain Addressed 1: Reposition  Pain Rating Post-Intervention 1: other (see comments) (no pain, rates moderate SOB,improved slightly post pursed lip breathing instruction)    Objective:     Communicated with: nursing prior to session.  Patient found HOB elevated with bed alarm, telemetry, PureWick, oxygen upon OT entry to room.    General Precautions: Standard, fall    Orthopedic Precautions:N/A  Braces: N/A  Respiratory Status: Nasal cannula, flow 2.5 L/min     Occupational Performance:     Bed Mobility/functional mobility    Patient completed Supine to Sit with stand by assistance  Patient completed Sit to Supine with stand by assistance   Eob sitting x8 minutes with SBA; flexed posture, mildly improved with cues for erect sitting    AMPAC 6 Click ADL: 18    Treatment & Education:  Patient expressing shortness of breath and exertion at rest at 8/10, although throughout session SpO2 at >97% on 2.5L.   Patient re-educated on role and purpose of OT.  Patient quickly self transitions to sitting eob.   Therapeutic use of self and firm directive response provided in response to patient's quick movements and limited initial participation.   After visual demonstration and one step direction instruction patient reciprocates performance of pursed lip breathing for 2 x 5 reps.  Patient indicating slightly decreased shortness of breath post pursed lip instruction.  Patient requesting return to bed; instructed to move slower, exhale with movement.  Return transition to bed.  Requesting additional breathing treatment (RN informed by OT).  Patient firmly declining additional intervention (ADL/mobility/ ther ex). Belongings in reach.    Patient left HOB elevated with all lines intact, call button in reach, bed alarm on, and nursing notified    GOALS:   Multidisciplinary Problems       Occupational Therapy Goals           Problem: Occupational Therapy    Goal Priority Disciplines Outcome Interventions   Occupational Therapy Goal     OT, PT/OT Ongoing, Progressing    Description: Goals to be met by: 2/13/24      Patient will increase functional independence with ADLs by performing:    LE Dressing with Supervision and Assistive Devices as needed.  Grooming while standing with Stand-by Assistance.  Toileting from toilet with Stand-by Assistance for hygiene and clothing management.   Supine to sit with Stand-by Assistance.  Step transfer with Stand-by Assistance  Toilet transfer to toilet with Stand-by Assistance.  Increased functional strength to WFL for self care skills and functional mobility.  Upper extremity exercise program x10 reps per handout, with independence.                         Time Tracking:     OT Date of Treatment: 01/15/24  OT Start Time: 1018  OT Stop Time: 1029  OT Total Time (min): 11 min    Billable Minutes:Therapeutic Activity 11 min    OT/CRISTIANO: OT     Number of CRISTIANO visits since last OT visit: 0    1/15/2024

## 2024-01-15 NOTE — PROGRESS NOTES
"[facility] Medicine  Progress Note    Patient Name: Purvi Quiroga  MRN: [id number]  Patient Class: IP- Inpatient   Admission Date: 1/8/2024  Length of Stay: 6 days  Attending Physician: Liz King*  Primary Care Provider: Palomo Burgos MD        Subjective:     Principal Problem:Pre-syncope        HPI:  Ms. Purvi Quiroga is a 80 y/o white F w/ PMH HTN, HLD, neuropathy, and arthritis who was sent to the hospital from her nursing home after patient had a pre-syncopal event at her nursing home. Patient states she has had off and on weakness for weeks. Today while using her walker to get back to her bedroom patient felt a sudden onset feeling of lightheadiness and "feeling" of wanting to faint. It's feeling she has had in the past right before she faints after seeing blood. Denies any prodromal symptoms, nausea, vomiting, chest pain, palpitaitons, fevers, chills, diarrhea, dysuria, urinary frequency, suprapubic pain, flank pain, melana. States she has been eating and drinking ok but has decreased her food intake recently trying to lose weight. No changes to her home BP meds recently though she states she has had hypotensive episodes in the past. When EMS arrived patient SBP was noted to be in the 70s and patient was brought to the hospital.     In the ED, labs were notable for WBC 19, hgb 8, Cr 1.7 (baseline 0.8 as 1/2023), and positive UA (however elevated squamous epithelial cells). Patient received a dose of rocephin.         Overview/Hospital Course:  No notes on file    Interval History: awake and alert,   H/H stable  JOSE-resolved   Hypertension-resume home metoprolol add losartan  Urine culture with Gram-negative rods--E coli, Raoultella ornithinolytica both sensitive to ceftriaxone and Cipro.  Switch to Cipro  PT/OT - recommend SNF placement- pending placement      Review of Systems   Constitutional:  Positive for activity change and fatigue.   Respiratory:  Negative for " "shortness of breath.    Gastrointestinal:  Negative for blood in stool.   Neurological:  Positive for weakness.     Objective:     Vital Signs (Most Recent):  Temp: 98.1 °F (36.7 °C) (01/15/24 0739)  Pulse: 78 (01/15/24 0739)  Resp: 18 (01/15/24 0739)  BP: (!) 143/65 (01/15/24 0739)  SpO2: 99 % (01/15/24 0739) Vital Signs (24h Range):  Temp:  [97.7 °F (36.5 °C)-99.6 °F (37.6 °C)] 98.1 °F (36.7 °C)  Pulse:  [70-88] 78  Resp:  [18-30] 18  SpO2:  [95 %-99 %] 99 %  BP: (125-156)/(58-78) 143/65     Weight: 101.3 kg (223 lb 5.2 oz)  Body mass index is 51.91 kg/m².    Intake/Output Summary (Last 24 hours) at 1/15/2024 1026  Last data filed at 1/14/2024 1745  Gross per 24 hour   Intake 360 ml   Output --   Net 360 ml           Physical Exam  HENT:      Head: Normocephalic and atraumatic.   Cardiovascular:      Rate and Rhythm: Normal rate.   Pulmonary:      Effort: Pulmonary effort is normal.   Abdominal:      General: Bowel sounds are normal. There is no distension.      Palpations: Abdomen is soft.   Musculoskeletal:      Cervical back: Normal range of motion.      Right lower leg: No edema.      Left lower leg: No edema.   Skin:     General: Skin is warm.      Capillary Refill: Capillary refill takes less than 2 seconds.   Neurological:      Mental Status: She is alert and oriented to person, place, and time.   Psychiatric:         Mood and Affect: Mood normal.         Behavior: Behavior normal.             Significant Labs: A1C:   Recent Labs   Lab 10/09/23  1224   HGBA1C 5.1       ABGs: No results for input(s): "PH", "PCO2", "HCO3", "POCSATURATED", "BE", "TOTALHB", "COHB", "METHB", "O2HB", "POCFIO2", "PO2" in the last 48 hours.  Blood Culture: No results for input(s): "LABBLOO" in the last 48 hours.  CBC:   Recent Labs   Lab 01/14/24 0218 01/15/24  0254   WBC 10.08 8.28   HGB 9.1* 9.2*   HCT 30.1* 31.2*    265       CMP:   Recent Labs   Lab 01/14/24  0218 01/15/24  0254    142   K 3.9 4.1    107 " "  CO2 23 25   GLU 93 108   BUN 8 8   CREATININE 0.8 0.8   CALCIUM 8.1* 8.3*   PROT 5.7* 5.7*   ALBUMIN 3.1* 3.1*   BILITOT 0.4 0.3   ALKPHOS 75 75   AST 18 14   ALT 17 15   ANIONGAP 8 10       Coagulation: No results for input(s): "PT", "INR", "APTT" in the last 48 hours.  Lactic Acid: No results for input(s): "LACTATE" in the last 48 hours.  Lipase: No results for input(s): "LIPASE" in the last 48 hours.  Magnesium: No results for input(s): "MG" in the last 48 hours.  Troponin:   No results for input(s): "TROPONINI", "TROPONINIHS" in the last 48 hours.    TSH:   Recent Labs   Lab 10/09/23  1224   TSH 1.466       Urine Culture:   No results for input(s): "LABURIN" in the last 48 hours.    Urine Studies:   No results for input(s): "COLORU", "APPEARANCEUA", "PHUR", "SPECGRAV", "PROTEINUA", "GLUCUA", "KETONESU", "BILIRUBINUA", "OCCULTUA", "NITRITE", "UROBILINOGEN", "LEUKOCYTESUR", "RBCUA", "WBCUA", "BACTERIA", "SQUAMEPITHEL", "HYALINECASTS" in the last 48 hours.    Invalid input(s): "WRIGHTSUR"      Significant Imaging: I have reviewed all pertinent imaging results/findings within the past 24 hours.    Assessment/Plan:      * Pre-syncope  Most likely secondary to hypotension in the setting of BP meds vs infection.     - Will hold home BP meds for now and restart as needed.   - Start LR at 75cc/hr.   - See UTI plan below.   - orthostatics ordered.   - previous echo in 2022 unremarkable.       GI bleed  Stool occult blood positive  IV Protonix   S/p 2 unit PRBC  Post transfusion H/H stable  Hold anti coagulation   Consult GI- appreciate rec's      Anemia  - iron panel ordered.   Transfuse with 2 unit on PRBC 1/10/2024. Post transfusion H/H stable  Stool occult blood-positive  Trend H/H    JOSE (acute kidney injury)  Most likely secondary to UTI.     - continue treatment as above.   Resolved     UTI (urinary tract infection)  Although patient is asymptomatic from UTI perspective and has possible contaiminated UA, with " elevated WBC of 19 will continue rocephin for now.     - Continue rocephin 1gm daily-switch to Cipro on 01/14.   - Ucx with Gram-negative rods-with-E coli, Raoultella ornithinolytica both sensitive to continue antibiotic     HLD (hyperlipidemia)  - continue statin      Morbid obesity  Body mass index is 51.91 kg/m². Morbid obesity complicates all aspects of disease management from diagnostic modalities to treatment. Weight loss encouraged and health benefits explained to patient.         Essential hypertension  Hold home BP meds for now.   Resume home meds      VTE Risk Mitigation (From admission, onward)           Ordered     Place sequential compression device  Until discontinued         01/09/24 0122     IP VTE HIGH RISK PATIENT  Once         01/09/24 0122                    Discharge Planning   NIRMALA: 1/15/2024     Code Status: Full Code   Is the patient medically ready for discharge?:     Reason for patient still in hospital (select all that apply): Pending disposition  Discharge Plan A: Skilled Nursing Facility                  Liz King MD  Department of Hospital Medicine   [facility] - Telemetry

## 2024-01-15 NOTE — PLAN OF CARE
Problem: Adult Inpatient Plan of Care  Goal: Plan of Care Review  Outcome: Ongoing, Progressing     Problem: Renal Function Impairment (Acute Kidney Injury/Impairment)  Goal: Effective Renal Function  Outcome: Met     Problem: Bleeding (Gastrointestinal Bleeding)  Goal: Hemostasis  Outcome: Met     VIRTUAL NURSE:  Labs, notes, orders, and careplan reviewed.

## 2024-01-15 NOTE — DISCHARGE SUMMARY
"[facility] Medicine  Discharge Summary      Patient Name: Purvi Quiroga  MRN: [id number]  CHEY: [id number]  Patient Class: IP- Inpatient  Admission Date: 1/8/2024  Hospital Length of Stay: 6 days  Discharge Date and Time: 1/15/2024  6:08 PM  Attending Physician: Liz King*   Discharging Provider: Liz King MD  Primary Care Provider: Palomo Burgos MD    Primary Care Team: Networked reference to record PCT     HPI:   Ms. Purvi Quiroga is a 80 y/o white F w/ PMH HTN, HLD, neuropathy, and arthritis who was sent to the hospital from her nursing home after patient had a pre-syncopal event at her nursing home. Patient states she has had off and on weakness for weeks. Today while using her walker to get back to her bedroom patient felt a sudden onset feeling of lightheadiness and "feeling" of wanting to faint. It's feeling she has had in the past right before she faints after seeing blood. Denies any prodromal symptoms, nausea, vomiting, chest pain, palpitaitons, fevers, chills, diarrhea, dysuria, urinary frequency, suprapubic pain, flank pain, melana. States she has been eating and drinking ok but has decreased her food intake recently trying to lose weight. No changes to her home BP meds recently though she states she has had hypotensive episodes in the past. When EMS arrived patient SBP was noted to be in the 70s and patient was brought to the hospital.     In the ED, labs were notable for WBC 19, hgb 8, Cr 1.7 (baseline 0.8 as 1/2023), and positive UA (however elevated squamous epithelial cells). Patient received a dose of rocephin.         Procedure(s) (LRB):  EGD (ESOPHAGOGASTRODUODENOSCOPY) (N/A)      Hospital Course:   No notes on file     Goals of Care Treatment Preferences:  Code Status: Full Code      Consults:   Consults (From admission, onward)          Status Ordering Provider     Inpatient consult to Social Work  Once        Provider:  (Not yet assigned)    " Completed ABRAHAM CONNORS     Inpatient consult to Gastroenterology  Once        Provider:  (Not yet assigned)    Completed ABRAHAM CONNORS.     IP consult to case management  Once        Provider:  (Not yet assigned)    Completed ABRAHAM CONNORS.            Cardiac/Vascular  HLD (hyperlipidemia)  - continue statin      Essential hypertension  Hold home BP meds for now.   Resume home meds    Renal/  JOSE (acute kidney injury)  Most likely secondary to UTI.     - continue treatment as above.   Resolved     UTI (urinary tract infection)  Although patient is asymptomatic from UTI perspective and has possible contaiminated UA, with elevated WBC of 19 will continue rocephin for now.     - Continue rocephin 1gm daily-switch to Cipro on 01/14.   - Ucx with Gram-negative rods-with-E coli, Raoultella ornithinolytica both sensitive to continue antibiotic     Oncology  Anemia  - iron panel ordered.   Transfuse with 2 unit on PRBC 1/10/2024. Post transfusion H/H stable  Stool occult blood-positive  Trend H/H    Endocrine  Morbid obesity  Body mass index is 51.91 kg/m². Morbid obesity complicates all aspects of disease management from diagnostic modalities to treatment. Weight loss encouraged and health benefits explained to patient.         GI  GI bleed  Stool occult blood positive  IV Protonix   S/p 2 unit PRBC  Post transfusion H/H stable  Hold anti coagulation   Consult GI- appreciate rec's      Other  * Pre-syncope  Most likely secondary to hypotension in the setting of BP meds vs infection.     - Will hold home BP meds for now and restart as needed.   - Start LR at 75cc/hr.   - See UTI plan below.   - orthostatics ordered.   - previous echo in 2022 unremarkable.         Final Active Diagnoses:    Diagnosis Date Noted POA    PRINCIPAL PROBLEM:  Pre-syncope [R55] 01/09/2024 Yes    GI bleed [K92.2] 01/11/2024 Yes    UTI (urinary tract infection) [N39.0] 01/09/2024 Yes    JOSE (acute kidney injury)  [N17.9] 01/09/2024 Yes    Anemia [D64.9] 01/09/2024 Yes    HLD (hyperlipidemia) [E78.5] 07/18/2019 Yes    Essential hypertension [I10] 12/10/2018 Yes    Morbid obesity [E66.01] 12/10/2018 Yes     Chronic      Problems Resolved During this Admission:       Discharged Condition: stable    Disposition: Skilled Nursing Facility    Follow Up:   Follow-up Information       Palomo Burgos MD Follow up on 1/24/2024.    Specialty: Internal Medicine  Why: at 10:00 AM; PCP hospital follow up appointment  Contact information:  [address] [facility]  [place] [address]  [phone]                           Patient Instructions:      Diet Cardiac     Activity as tolerated       Significant Diagnostic Studies:     Pending Diagnostic Studies:       Procedure Component Value Units Date/Time    Specimen to Pathology, Surgery Gastrointestinal tract [[id number]] Collected: 01/12/24 1036    Order Status: Sent Lab Status: In process Updated: 01/12/24 1112    Specimen: Tissue            Medications:  Reconciled Home Medications:      Medication List        START taking these medications      ciprofloxacin HCl 500 MG tablet  Commonly known as: CIPRO  Take 1 tablet (500 mg total) by mouth every 12 (twelve) hours. for 5 days     loperamide 2 mg capsule  Commonly known as: IMODIUM  Take 1 capsule (2 mg total) by mouth 4 (four) times daily as needed for Diarrhea.            CHANGE how you take these medications      ERINI AEROSPHERE 160-9-4.8 mcg/actuation Hfaa  Generic drug: budesonide-glycopyr-formoterol  INHALE 2 puffs TWICE DAILY. RINSE MOUTH AND throat AFTER USE.  What changed: Another medication with the same name was removed. Continue taking this medication, and follow the directions you see here.     ondansetron 8 MG Tbdl  Commonly known as: ZOFRAN-ODT  PLACE 1 TABLET UNDER THE TONGUE EVERY TWELVE HOURS AS NEEDED  What changed: Another medication with the same name was removed. Continue taking this medication, and follow the  directions you see here.            CONTINUE taking these medications      albuterol 90 mcg/actuation inhaler  Commonly known as: PROVENTIL/VENTOLIN HFA  INHALE 1-2 PUFFS INTO THE LUNGS EVERY 6 HOURS AS NEEDED FOR SHORTNESS OF BREATH     albuterol-ipratropium 2.5 mg-0.5 mg/3 mL nebulizer solution  Commonly known as: DUO-NEB  USE 1 VIAL via NEBULIZER EVERY 4 HOURS AS NEEDED SHORTNESS OF BREATH     alendronate 70 MG tablet  Commonly known as: FOSAMAX  TAKE 1 TABLET BY MOUTH 1/2  HOUR BEFORE ANY FOOD OR  MEDICATION ONCE WEEKLY ON  SAME DAY     amLODIPine 10 MG tablet  Commonly known as: NORVASC  TAKE 1 TABLET BY MOUTH ONCE DAILY     atorvastatin 20 MG tablet  Commonly known as: LIPITOR  1 by mouth daily in evening     calcium carbonate 650 mg calcium (1,625 mg) tablet  Take 1 tablet (650 mg total) by mouth once daily.     fluticasone propionate 50 mcg/actuation nasal spray  Commonly known as: FLONASE  1 spray (50 mcg total) by Each Nostril route once daily.     furosemide 40 MG tablet  Commonly known as: LASIX  Take 1 tablet (40 mg total) by mouth 2 (two) times a day.     gabapentin 300 MG capsule  Commonly known as: NEURONTIN  TAKE TWO CAPSULES BY MOUTH THREE TIMES DAILY     hydrOXYzine pamoate 50 MG Cap  Commonly known as: VISTARIL  TAKE ONE CAPSULE BY MOUTH THREE TIMES DAILY AS NEEDED     irbesartan 150 MG tablet  Commonly known as: AVAPRO  TAKE 1 TABLET BY MOUTH  TWICE DAILY     melatonin 5 mg Cap  Take 1 capsule (5 mg total) by mouth nightly as needed (insomnia).     mepolizumab 100 mg/mL Atin  Generic drug: mepolizumab  Inject 1 mL (100 mg total) into the skin every 28 days.     metoprolol tartrate 50 MG tablet  Commonly known as: LOPRESSOR  Take 1 tablet (50 mg total) by mouth 2 (two) times daily.     montelukast 10 mg tablet  Commonly known as: SINGULAIR  TAKE 1 TABLET BY MOUTH IN  THE EVENING     omeprazole 20 MG capsule  Commonly known as: PRILOSEC  TAKE 1 CAPSULE BY MOUTH TWICE  DAILY     potassium chloride  10 MEQ Cpsr  Commonly known as: MICRO-K  One capsule by mouth every morning with meal     sodium hyaluronate (EUFLEXXA) 10 mg/mL(mw 2.4 -3.6 million) injection  Commonly known as: EUFLEXXA  Inject into the articular space.     traZODone 50 MG tablet  Commonly known as: DESYREL  Take 1 tablet (50 mg total) by mouth every evening.     vitamin D 1000 units Tab  Commonly known as: VITAMIN D3  Take 1 tablet (1,000 Units total) by mouth once daily.            STOP taking these medications      amitriptyline 10 MG tablet  Commonly known as: ELAVIL     ergocalciferol 50,000 unit Cap  Commonly known as: ERGOCALCIFEROL              Indwelling Lines/Drains at time of discharge:   Lines/Drains/Airways       Drain  Duration             Female External Urinary Catheter w/ Suction 01/09/24 2020 5 days                    Time spent on the discharge of patient: 35 minutes         Liz King MD  Department of Hospital Medicine  [place] [facility]

## 2024-01-15 NOTE — PLAN OF CARE
0815  CM was informed by Sharlene ([phone]) w/[facility] that the pt will be accepted for admission today. Message sent to Dr Bernstein informing of above & requesting SNF orders. Awaiting response.     0850  SNF orders noted sent to [facility] via [facility]. Awaiting response.     0900  Patient resting quietly in bed when CM rounded via [employer]. No family present. Patient in agreement with plan to discharge to [facility] today. Pox 96% on 3L O2 via NC this AM. Message sent to Dr Bernstein remind of above & requesting that SNF orders be updated. Awaiting response.     0945  Updated SNF orders & MAR sent to [facility] as requested. Awaiting response.     1105  Message left for Sharlene informing of updated SNF orders sent to [facility] via [facility]. Awaiting response.     1245  Updates SNF orders sent to [facility] via [facility]. Awaiting response.     1325  Updates SNF orders sent to [facility] via [facility]. Awaiting response.      1350  CM was informed by Sharlene that the pt will be accepted for admission to [facility] room 227B today & requested that report be called to [phone].  van transportation w/O2 scheduled with requested pickup at 1500. CM to bring transportation packet to the nurse's station.     Message sent to nurse Florin & virtual nurse Kermit informing that the pt is cleared to discharge and requesting that Florin call report.       Sherri - Telemetry  Discharge Final Note    Primary Care Provider: Palomo Burgos MD    Expected Discharge Date: 1/15/2024    Final Discharge Note (most recent)       Final Note - 01/15/24 1706          Final Note    Assessment Type Final Discharge Note (P)      Anticipated Discharge Disposition Skilled Nursing Facility (P)    [facility]    Hospital Resources/Appts/Education Provided Appointments scheduled and added to AVS (P)         Post-Acute Status    Post-Acute Authorization Placement (P)      Post-Acute Placement Status Set-up Complete/Auth obtained (P)    [facility]     Discharge Delays PFC Arranged Transportation (P)                       Contact Info       Palomo Burgos MD   Specialty: Internal Medicine   Relationship: PCP - General    [address] [facility] [address]   Phone: [phone]       Next Steps: Follow up on 1/24/2024    Instructions: at 10:00 AM; PCP hospital follow up appointment

## 2024-01-15 NOTE — PLAN OF CARE
Continue OT POC. Patient progressed to performing bed mobility with SBA, however, limited activity tolerance and self rating of exertion/ shortness of breath at 8/10 although SpO2 is WFL on 2.5L. Recommend moderate intensity therapy upon medically stable.     Problem: Occupational Therapy  Goal: Occupational Therapy Goal  Description: Goals to be met by: 2/13/24      Patient will increase functional independence with ADLs by performing:    LE Dressing with Supervision and Assistive Devices as needed.  Grooming while standing with Stand-by Assistance.  Toileting from toilet with Stand-by Assistance for hygiene and clothing management.   Supine to sit with Stand-by Assistance.  Step transfer with Stand-by Assistance  Toilet transfer to toilet with Stand-by Assistance.  Increased functional strength to WFL for self care skills and functional mobility.  Upper extremity exercise program x10 reps per handout, with independence.    Outcome: Ongoing, Progressing

## 2024-01-15 NOTE — PLAN OF CARE
[facility]    FACILITY TRANSFER ORDERS      Patient Name: Purvi Quiroga  YOB: 1945    PCP: Palomo Burgos MD   PCP Address: [address] [facility] / [place] [address]  PCP Phone Number: [phone]  PCP Fax: [phone]    Encounter Date: 01/15/2024    Admit to:  [facility]    Vital Signs:  Routine    Diagnoses:   Active Hospital Problems    Diagnosis  POA    *Pre-syncope [R55]  Yes    GI bleed [K92.2]  Yes    UTI (urinary tract infection) [N39.0]  Yes    JOSE (acute kidney injury) [N17.9]  Yes    Anemia [D64.9]  Yes    HLD (hyperlipidemia) [E78.5]  Yes    Essential hypertension [I10]  Yes    Morbid obesity [E66.01]  Yes     Chronic      Resolved Hospital Problems   No resolved problems to display.       Allergies:Review of patient's allergies indicates:  No Known Allergies    Diet: cardiac diet    Activities: Activity as tolerated    Goals of Care Treatment Preferences:  Code Status: Full Code       Nursing: per facility protocol    Supplemental oxygen: 2 L nasal cannula continously    CONSULTS:    Physical Therapy to evaluate and treat.  and Occupational Therapy to evaluate and treat.        Medications: Review discharge medications with patient and family and provide education.      Current Discharge Medication List        START taking these medications    Details   ciprofloxacin HCl (CIPRO) 500 MG tablet Take 1 tablet (500 mg total) by mouth every 12 (twelve) hours. for 5 days  Qty: 10 tablet, Refills: 0      loperamide (IMODIUM) 2 mg capsule Take 1 capsule (2 mg total) by mouth 4 (four) times daily as needed for Diarrhea.  Refills: 0           CONTINUE these medications which have NOT CHANGED    Details   albuterol (PROVENTIL/VENTOLIN HFA) 90 mcg/actuation inhaler INHALE 2 PUFFS INTO THE LUNGS EVERY 6 HOURS AS NEEDED FOR SHORTNESS OF BREATH  Qty: 8.5 g, Refills: 5      albuterol-ipratropium (DUO-NEB) 2.5 mg-0.5 mg/3 mL nebulizer solution USE 1 VIAL via NEBULIZER EVERY 4 HOURS  AS NEEDED SHORTNESS OF BREATH      alendronate (FOSAMAX) 70 MG tablet TAKE 1 TABLET BY MOUTH 1/2  HOUR BEFORE ANY FOOD OR  MEDICATION ONCE WEEKLY ON  SAME DAY on Tuesdays  Qty: 8 tablet, Refills: 5    Comments: Requesting 1 year supply      amLODIPine (NORVASC) 10 MG tablet TAKE 1 TABLET BY MOUTH ONCE DAILY  Qty: 90 tablet, Refills: 2      atorvastatin (LIPITOR) 20 MG tablet 1 by mouth daily in evening  Qty: 90 tablet, Refills: 3    Comments: -   Ref: [id number]      BREZTRI AEROSPHERE 160-9-4.8 mcg/actuation HFAA INHALE 2 puffs TWICE DAILY. RINSE MOUTH AND throat AFTER USE.      calcium carbonate 650 mg calcium (1,625 mg) tablet Take 1 tablet (650 mg total) by mouth once daily.  Qty: 30 tablet, Refills: 11      fluticasone propionate (FLONASE) 50 mcg/actuation nasal spray 1 spray (50 mcg total) by Each Nostril route once daily.  Qty: 16 g, Refills: 0      furosemide (LASIX) 40 MG tablet Take 1 tablet (40 mg total) by mouth 2 (two) times a day.  Qty: 180 tablet, Refills: 3      gabapentin (NEURONTIN) 300 MG capsule TAKE TWO CAPSULES BY MOUTH THREE TIMES DAILY  Qty: 540 capsule, Refills: 0    Associated Diagnoses: Migraine without aura and without status migrainosus, not intractable      hydrOXYzine pamoate (VISTARIL) 50 MG Cap TAKE ONE CAPSULE BY MOUTH THREE TIMES DAILY AS NEEDED  Qty: 90 capsule, Refills: 1      irbesartan (AVAPRO) 150 MG tablet TAKE 1 TABLET BY MOUTH  TWICE DAILY  Qty: 180 tablet, Refills: 1    Comments: Please send a replace/new response with 100-Day Supply if appropriate to maximize member benefit. Requesting 1 year supply. - .      melatonin 5 mg Cap Take 1 capsule (5 mg total) by mouth nightly as needed (insomnia).  Qty: 90 each, Refills: 1      metoprolol tartrate (LOPRESSOR) 50 MG tablet Take 1 tablet (50 mg total) by mouth 2 (two) times daily.  Qty: 180 tablet, Refills: 3      omeprazole (PRILOSEC) 20 MG capsule TAKE 1 CAPSULE BY MOUTH TWICE  DAILY  Qty: 180 capsule, Refills: 1    Comments:  Please send a replace/new response with 100-Day Supply if appropriate to maximize member benefit. Requesting 1 year supply.      potassium chloride (MICRO-K) 10 MEQ CpSR One capsule by mouth every morning with meal  Qty: 90 capsule, Refills: 2      sodium hyaluronate, EUFLEXXA, (EUFLEXXA) 10 mg/mL(mw 2.4 -3.6 million) injection Inject into the articular space q 6 month prn  Outpatient FU with oncology      traZODone (DESYREL) 50 MG tablet Take 1 tablet (50 mg total) by mouth every evening.  Qty: 90 tablet, Refills: 2                 montelukast (SINGULAIR) 10 mg tablet TAKE 1 TABLET BY MOUTH IN  THE EVENING  Qty: 90 tablet, Refills: 2    Comments: Requesting 1 year supply      ondansetron (ZOFRAN-ODT) 8 MG TbDL PLACE 1 TABLET UNDER THE TONGUE EVERY TWELVE HOURS AS NEEDED  Qty: 30 tablet, Refills: 2    Associated Diagnoses: Nausea      vitamin D (VITAMIN D3) 1000 units Tab Take 1 tablet (1,000 Units total) by mouth once daily.  Qty: 30 tablet, Refills: 11           STOP taking these medications       ergocalciferol (ERGOCALCIFEROL) 50,000 unit Cap Comments:   Reason for Stopping:         amitriptyline (ELAVIL) 10 MG tablet Comments:   Reason for Stopping:                  Immunizations Administered as of 1/15/2024       Name Date Dose VIS Date Route Exp Date    COVID-19, MRNA, LN-S, PF (Moderna) 6/23/2022 0.25 mL -- Intramuscular --    Site: Left arm     Lot: [id number]     COVID-19, MRNA, LN-S, PF (Moderna) 11/29/2021 0.25 mL -- Intramuscular --    Site: Left arm     Lot: [id number]     COVID-19, MRNA, LN-S, PF (Moderna) 2/9/2021 -- -- Intramuscular --    Site: Left deltoid     Lot: [id number]     COVID-19, MRNA, LN-S, PF (Moderna) 1/12/2021 -- -- Intramuscular --    Site: Left deltoid     Lot: [id number]     COVID-19, MRNA, LN-S, PF (Pfizer) (Purple Cap) 2/9/2021 -- -- -- --    Lot: [id number]     COVID-19, MRNA, LN-S, PF (Pfizer) (Purple Cap) 1/12/2021 -- -- -- --    Lot: [id number]             This patient has had both covid  vaccinations    Some patients may experience side effects after vaccination.  These may include fever, headache, muscle or joint aches.  Most symptoms resolve with 24-48 hours and do not require urgent medical evaluation unless they persist for more than 72 hours or symptoms are concerning for an unrelated medical condition.          _________________________________  Liz King MD  01/15/2024

## 2024-01-16 ENCOUNTER — TELEPHONE (OUTPATIENT)
Dept: GASTROENTEROLOGY | Facility: HOSPITAL | Age: 79
End: 2024-01-16
Payer: MEDICARE

## 2024-01-16 NOTE — TELEPHONE ENCOUNTER
Needs colonoscopy , has iron def anemia and occult positive.  Egd was negative inatpeint , done by dr martin    Please set up colonoscopy with either veronica or MEG , 2- 6 weeks or so is fine.

## 2024-01-16 NOTE — NURSING
Discharge orders noted. IV removed. AVS printed and given to patient. Report called to Abeba at [facility]. Patient left via wheelchair with [employer] transportation. No distress noted.

## 2024-01-17 ENCOUNTER — TELEPHONE (OUTPATIENT)
Dept: GASTROENTEROLOGY | Facility: CLINIC | Age: 79
End: 2024-01-17
Payer: MEDICARE

## 2024-01-17 LAB
FINAL PATHOLOGIC DIAGNOSIS: NORMAL
GROSS: NORMAL
Lab: NORMAL

## 2024-01-17 NOTE — TELEPHONE ENCOUNTER
Contacted pt to get her scheduled for colonoscopy. Pt states that she would like a reminder mailed to her and she will contact then to schedule. Verbal understanding

## 2024-01-29 RX ORDER — HYDROXYZINE PAMOATE 50 MG/1
CAPSULE ORAL
Qty: 90 CAPSULE | Refills: 2 | Status: SHIPPED | OUTPATIENT
Start: 2024-01-29

## 2024-01-29 NOTE — TELEPHONE ENCOUNTER
Refill Routing Note   Medication(s) are not appropriate for processing by [facility] for the following reason(s):        Outside of protocol    [facility] action(s):  Route               Appointments  past 12m or future 3m with PCP    Date Provider   Last Visit   10/9/2023 Palomo Burgos MD   Next Visit   Visit date not found Palomo Burgos MD   ED visits in past 90 days: 2        Note composed:9:34 AM 01/29/2024

## 2024-02-02 ENCOUNTER — HOSPITAL ENCOUNTER (EMERGENCY)
Facility: HOSPITAL | Age: 79
Discharge: HOME | End: 2024-02-02
Attending: STUDENT IN AN ORGANIZED HEALTH CARE EDUCATION/TRAINING PROGRAM
Payer: MEDICARE

## 2024-02-02 VITALS
TEMPERATURE: 98 F | HEIGHT: 55 IN | SYSTOLIC BLOOD PRESSURE: 118 MMHG | OXYGEN SATURATION: 95 % | HEART RATE: 73 BPM | RESPIRATION RATE: 20 BRPM | WEIGHT: 218 LBS | BODY MASS INDEX: 50.45 KG/M2 | DIASTOLIC BLOOD PRESSURE: 62 MMHG

## 2024-02-02 DIAGNOSIS — N30.00 ACUTE CYSTITIS WITHOUT HEMATURIA: ICD-10-CM

## 2024-02-02 DIAGNOSIS — Z95.828 S/P PICC CENTRAL LINE PLACEMENT: Primary | ICD-10-CM

## 2024-02-02 DIAGNOSIS — Z45.2 PICC (PERIPHERALLY INSERTED CENTRAL CATHETER) IN PLACE: ICD-10-CM

## 2024-02-02 LAB
BACTERIA #/AREA URNS HPF: ABNORMAL /HPF
BILIRUB UR QL STRIP: NEGATIVE
CLARITY UR: CLEAR
COLOR UR: COLORLESS
GLUCOSE UR QL STRIP: NEGATIVE
HGB UR QL STRIP: NEGATIVE
HYALINE CASTS #/AREA URNS LPF: 2 /LPF
KETONES UR QL STRIP: NEGATIVE
LEUKOCYTE ESTERASE UR QL STRIP: ABNORMAL
MICROSCOPIC COMMENT: ABNORMAL
NITRITE UR QL STRIP: NEGATIVE
PH UR STRIP: 5 [PH] (ref 5–8)
PROT UR QL STRIP: NEGATIVE
SP GR UR STRIP: 1.01 (ref 1–1.03)
SQUAMOUS #/AREA URNS HPF: 0 /HPF
URN SPEC COLLECT METH UR: ABNORMAL
UROBILINOGEN UR STRIP-ACNC: NEGATIVE EU/DL
WBC #/AREA URNS HPF: 10 /HPF (ref 0–5)

## 2024-02-02 PROCEDURE — 81000 URINALYSIS NONAUTO W/SCOPE: CPT | Performed by: STUDENT IN AN ORGANIZED HEALTH CARE EDUCATION/TRAINING PROGRAM

## 2024-02-02 PROCEDURE — 96365 THER/PROPH/DIAG IV INF INIT: CPT

## 2024-02-02 PROCEDURE — 99285 EMERGENCY DEPT VISIT HI MDM: CPT | Mod: 25

## 2024-02-02 PROCEDURE — 36569 INSJ PICC 5 YR+ W/O IMAGING: CPT | Mod: 59

## 2024-02-02 PROCEDURE — C1751 CATH, INF, PER/CENT/MIDLINE: HCPCS

## 2024-02-02 PROCEDURE — 36573 INSJ PICC RS&I 5 YR+: CPT | Mod: RT

## 2024-02-02 PROCEDURE — 63600175 PHARM REV CODE 636 W HCPCS: Performed by: STUDENT IN AN ORGANIZED HEALTH CARE EDUCATION/TRAINING PROGRAM

## 2024-02-02 PROCEDURE — 25000003 PHARM REV CODE 250: Performed by: STUDENT IN AN ORGANIZED HEALTH CARE EDUCATION/TRAINING PROGRAM

## 2024-02-02 RX ORDER — SODIUM CHLORIDE 0.9 % (FLUSH) 0.9 %
10 SYRINGE (ML) INJECTION EVERY 6 HOURS
Status: DISCONTINUED | OUTPATIENT
Start: 2024-02-03 | End: 2024-02-03 | Stop reason: HOSPADM

## 2024-02-02 RX ORDER — SODIUM CHLORIDE 0.9 % (FLUSH) 0.9 %
10 SYRINGE (ML) INJECTION
Status: DISCONTINUED | OUTPATIENT
Start: 2024-02-02 | End: 2024-02-03 | Stop reason: HOSPADM

## 2024-02-02 RX ADMIN — ERTAPENEM 1 G: 1 INJECTION INTRAMUSCULAR; INTRAVENOUS at 08:02

## 2024-02-02 NOTE — ED PROVIDER NOTES
NAME:  Purvi Quiroga  CSN:     [id number]  MRN:    [id number]  ADMIT DATE: 2/2/2024        eMERGENCY dEPARTMENT eNCOUnter    CHIEF COMPLAINT    Chief Complaint   Patient presents with    Vascular Access Problem     Pt BIBEMS from [facility] for PICC line placement. Per EMS pt has UTI. Pt denies symptoms.       HPI    Purvi Quiroga is a 79 y.o. female with a past medical history of  has a past medical history of Asthma, Essential hypertension (12/10/2018), Hiatal hernia, Insomnia, and Other osteoporosis without current pathological fracture (12/10/2018).     she presents to the ED due to request for PICC line placement by physician at her care facility.  Complain of 2-3 days of dysuria.  She states that often she sits in a wet diaper for 4-5 hours and urinates multiple times prior to being change and thought perhaps that her burning sensation down there was due to skin breakdown.  However urinalysis was obtained and note from care facility notes that she has Klebsiella pneumoniae that requires I have and.  Can not see sensitivities or culture results as they were not sent with her.  She denies any other symptoms at this time including abdominal pain, nausea and vomiting.    HPI       PAST MEDICAL HISTORY  Past Medical History:   Diagnosis Date    Asthma     Essential hypertension 12/10/2018    Hiatal hernia     Insomnia     Other osteoporosis without current pathological fracture 12/10/2018       SURGICAL HISTORY    Past Surgical History:   Procedure Laterality Date    CATARACT EXTRACTION, BILATERAL      DILATION AND CURETTAGE OF UTERUS      ESOPHAGOGASTRODUODENOSCOPY N/A 1/12/2024    Procedure: EGD (ESOPHAGOGASTRODUODENOSCOPY);  Surgeon: Matthew Del Cid MD;  Location: [facility];  Service: Gastroenterology;  Laterality: N/A;    KNEE ARTHROSCOPY      TONSILLECTOMY         FAMILY HISTORY    Family History   Problem Relation Age of Onset    Heart disease Father         fatal MI in mid 60s    Hypertension  Father     Hypertension Sister     Cancer Brother         colon cancer    Hypertension Brother     Diabetes Neg Hx     Stroke Neg Hx     Hyperlipidemia Neg Hx        SOCIAL HISTORY    Social History     Socioeconomic History    Marital status: Single   Tobacco Use    Smoking status: Never    Smokeless tobacco: Never    Tobacco comments:     18 or 19 yo for 1 yr   Substance and Sexual Activity    Alcohol use: Not Currently    Drug use: No     Social Determinants of Health     Financial Resource Strain: Low Risk  (11/24/2023)    Overall Financial Resource Strain (CARDIA)     Difficulty of Paying Living Expenses: Not very hard   Food Insecurity: No Food Insecurity (11/24/2023)    Hunger Vital Sign     Worried About Running Out of Food in the Last Year: Never true     Ran Out of Food in the Last Year: Never true   Transportation Needs: Unmet Transportation Needs (11/24/2023)    PRAPARE - Transportation     Lack of Transportation (Medical): Yes     Lack of Transportation (Non-Medical): No   Physical Activity: Unknown (11/24/2023)    Exercise Vital Sign     Days of Exercise per Week: 0 days   Stress: Stress Concern Present (1/9/2024)    [employer] - Occupational Stress Questionnaire     Feeling of Stress : Rather much   Social Connections: Unknown (1/9/2024)    Social Connection and Isolation Panel [NHANES]     Frequency of Communication with Friends and Family: Three times a week     Frequency of Social Gatherings with Friends and Family: Once a week     Active Member of Clubs or Organizations: No     Attends Club or Organization Meetings: Never     Marital Status: Never    Housing Stability: Unknown (11/24/2023)    Housing Stability Vital Sign     Number of Places Lived in the Last Year: 1     Unstable Housing in the Last Year: No       MEDICATIONS  Current Outpatient Medications   Medication Instructions    albuterol (PROVENTIL/VENTOLIN HFA) 90 mcg/actuation inhaler INHALE 1-2 PUFFS  INTO THE LUNGS EVERY 6 HOURS AS NEEDED FOR SHORTNESS OF BREATH    albuterol-ipratropium (DUO-NEB) 2.5 mg-0.5 mg/3 mL nebulizer solution USE 1 VIAL via NEBULIZER EVERY 4 HOURS AS NEEDED SHORTNESS OF BREATH    alendronate (FOSAMAX) 70 MG tablet TAKE 1 TABLET BY MOUTH 1/2  HOUR BEFORE ANY FOOD OR  MEDICATION ONCE WEEKLY ON  SAME DAY    amLODIPine (NORVASC) 10 MG tablet TAKE 1 TABLET BY MOUTH ONCE DAILY    atorvastatin (LIPITOR) 20 MG tablet 1 by mouth daily in evening    BRETRI AEROSPHERE 160-9-4.8 mcg/actuation HFAA INHALE 2 puffs TWICE DAILY. RINSE MOUTH AND throat AFTER USE.    calcium carbonate 650 mg, Oral, Daily    fluticasone propionate (FLONASE) 50 mcg, Each Nostril, Daily    furosemide (LASIX) 40 mg, Oral, 2 times daily    gabapentin (NEURONTIN) 600 mg, Oral, 3 times daily    hydrOXYzine pamoate (VISTARIL) 50 MG Cap TAKE ONE CAPSULE BY MOUTH THREE TIMES DAILY AS NEEDED    irbesartan (AVAPRO) 150 MG tablet TAKE 1 TABLET BY MOUTH  TWICE DAILY    melatonin 5 mg, Oral, Nightly PRN    mepolizumab 100 mg, Subcutaneous, Every 28 days    metoprolol tartrate (LOPRESSOR) 50 mg, Oral, 2 times daily    montelukast (SINGULAIR) 10 mg tablet TAKE 1 TABLET BY MOUTH IN  THE EVENING    omeprazole (PRILOSEC) 20 MG capsule TAKE 1 CAPSULE BY MOUTH TWICE  DAILY    ondansetron (ZOFRAN-ODT) 8 MG TbDL PLACE 1 TABLET UNDER THE TONGUE EVERY TWELVE HOURS AS NEEDED    potassium chloride (MICRO-K) 10 MEQ CpSR One capsule by mouth every morning with meal    sodium hyaluronate, EUFLEXXA, (EUFLEXXA) 10 mg/mL(mw 2.4 -3.6 million) injection Intra-articular    traZODone (DESYREL) 50 mg, Oral, Nightly    vitamin D (VITAMIN D3) 1,000 Units, Oral, Daily       ALLERGIES    Review of patient's allergies indicates:  No Known Allergies      REVIEW OF SYSTEMS   Review of Systems       PHYSICAL EXAM    Reviewed Triage Note    VITAL SIGNS:   ED Triage Vitals [02/02/24 1750]   Enc Vitals Group      BP (!) 111/51      Pulse 78      Resp 20      Temp        "Temp src       SpO2 99 %      Weight       Height 4' 7"      Head Circumference       Peak Flow       Pain Score       Pain Loc       Pain Edu?       Excl. in GC?        Patient Vitals for the past 24 hrs:   BP Temp Temp src Pulse Resp SpO2 Height   02/02/24 1803 -- 98.3 °F (36.8 °C) Oral -- -- -- --   02/02/24 1750 (!) 111/51 -- -- 78 20 99 % 4' 7" (1.397 m)       Physical Exam    Nursing note and vitals reviewed.  Constitutional: She appears well-developed and well-nourished.   HENT:   Head: Normocephalic and atraumatic.   Eyes: EOM are normal. Pupils are equal, round, and reactive to light.   Neck: Neck supple.   Normal range of motion.  Cardiovascular:  Normal rate and regular rhythm.           Pulmonary/Chest: Breath sounds normal. No respiratory distress.   Abdominal: Abdomen is soft. There is no abdominal tenderness.   Genitourinary:    Vagina normal.     Musculoskeletal:         General: Normal range of motion.      Cervical back: Normal range of motion and neck supple.     Neurological: She is alert and oriented to person, place, and time.   Skin: Skin is warm and dry.   Psychiatric: She has a normal mood and affect.            EKG     Interpreted by EM physician if performed:               LABS  Pertinent labs reviewed. (See chart for details)   Labs Reviewed   URINALYSIS, REFLEX TO URINE CULTURE - Abnormal; Notable for the following components:       Result Value    Color, UA Colorless (*)     Leukocytes, UA 2+ (*)     All other components within normal limits    Narrative:     Specimen Source->Urine   URINALYSIS MICROSCOPIC - Abnormal; Notable for the following components:    WBC, UA 10 (*)     Hyaline Casts, UA 2 (*)     All other components within normal limits    Narrative:     Specimen Source->Urine         RADIOLOGY          Imaging Results    None           PROCEDURES    Procedures      ED COURSE & MEDICAL DECISION MAKING    Pertinent Labs & Imaging studies reviewed. (See chart for details and " specific orders.)          Summary of review of records:   Discharged on January 16th on oral Cipro for UTI at that time, cultures grew out RAOULTELLA (K) ORNITHINOLYTICA and E coli.    Review of records from her care facility did today note Klebsiella pneumoniae UTI requiring Ivan[facility] and request for PICC line placement.    Medical Decision Making    Purvi Quiroga is a 79 y.o. female presents for request for PICC line placement in the setting of UTI.    Differential includes but is not limited to UTI, nontoxic appearing, doubt pyelo, sepsis, ureteral stone.    Lab work done 2 weeks ago with normal renal function.  Do not feel that repeat lab work currently indicated.  Urinalysis and repeat culture will be obtained here.    At time of sign-out, PICC line is being placed with plan of discharge back to her care facility under the management of the physician there with likely ongoing treatment with IV and per their request.          Medications   ertapenem (INVANZ) 1 g in sodium chloride 0.9 % 100 mL IVPB (MB+) (has no administration in time range)            FINAL IMPRESSION    Final diagnoses:  [Z95.828] S/P PICC central line placement (Primary)  [N30.00] Acute cystitis without hematuria       DISPOSITION  Patient discharged in stable condition             DISCLAIMER: This note was prepared with [employer] voice recognition transcription software. Garbled syntax, mangled pronouns, and other bizarre constructions may be attributed to that software system.             Yasmine Canchola DO  02/02/24 2011

## 2024-02-03 NOTE — PROCEDURES
"Purvi Quiroga is a 79 y.o. female patient.    Temp: 98.3 °F (36.8 °C) (02/02/24 1803)  Pulse: 78 (02/02/24 1750)  Resp: 20 (02/02/24 1750)  BP: (!) 111/51 (02/02/24 1750)  SpO2: 99 % (02/02/24 1750)  Height: 4' 7" (139.7 cm) (02/02/24 1750)    PICC  Date/Time: 2/2/2024 8:22 PM  Performed by: Gordon Mathis, RN  Consent Done: Yes  Time out: Immediately prior to procedure a time out was called to verify the correct patient, procedure, equipment, support staff and site/side marked as required  Indications: med administration and vascular access  Anesthesia: local infiltration  Local anesthetic: lidocaine 1% without epinephrine  Anesthetic Total (mL): 2  Preparation: skin prepped with chlorhexidine (without alcohol)  Skin prep agent dried: skin prep agent completely dried prior to procedure  Sterile barriers: all five maximum sterile barriers used - cap, mask, sterile gown, sterile gloves, and large sterile sheet  Hand hygiene: hand hygiene performed prior to central venous catheter insertion  Location details: right basilic  Catheter type: double lumen  Catheter size: 5 Fr  Catheter Length: 35cm    Ultrasound guidance: yes  Vessel Caliber: medium and patent, compressibility normal  Vascular Doppler: not done  Needle advanced into vessel with real time Ultrasound guidance.  Guidewire confirmed in vessel.  Sterile sheath used.  no esophageal manometryNumber of attempts: 1  Post-procedure: blood return through all ports, chlorhexidine patch and sterile dressing applied            Name DELMY PETTY  2/2/2024    "

## 2024-02-17 PROCEDURE — G0180 MD CERTIFICATION HHA PATIENT: HCPCS | Mod: ,,, | Performed by: INTERNAL MEDICINE

## 2024-02-19 ENCOUNTER — HOSPITAL ENCOUNTER (EMERGENCY)
Facility: HOSPITAL | Age: 79
Discharge: HOME | End: 2024-02-19
Attending: EMERGENCY MEDICINE
Payer: MEDICARE

## 2024-02-19 VITALS
DIASTOLIC BLOOD PRESSURE: 74 MMHG | SYSTOLIC BLOOD PRESSURE: 162 MMHG | BODY MASS INDEX: 50.67 KG/M2 | TEMPERATURE: 98 F | OXYGEN SATURATION: 96 % | RESPIRATION RATE: 18 BRPM | HEART RATE: 87 BPM | WEIGHT: 218 LBS

## 2024-02-19 DIAGNOSIS — R53.1 GENERALIZED WEAKNESS: ICD-10-CM

## 2024-02-19 DIAGNOSIS — R19.7 DIARRHEA, UNSPECIFIED TYPE: Primary | ICD-10-CM

## 2024-02-19 LAB
ALBUMIN SERPL BCP-MCNC: 3.2 G/DL (ref 3.5–5.2)
ALP SERPL-CCNC: 99 U/L (ref 55–135)
ALT SERPL W/O P-5'-P-CCNC: 11 U/L (ref 10–44)
ANION GAP SERPL CALC-SCNC: 13 MMOL/L (ref 8–16)
AST SERPL-CCNC: 17 U/L (ref 10–40)
BILIRUB SERPL-MCNC: 0.5 MG/DL (ref 0.1–1)
BILIRUB UR QL STRIP: NEGATIVE
BUN SERPL-MCNC: 14 MG/DL (ref 8–23)
CALCIUM SERPL-MCNC: 9.3 MG/DL (ref 8.7–10.5)
CHLORIDE SERPL-SCNC: 108 MMOL/L (ref 95–110)
CLARITY UR: CLEAR
CO2 SERPL-SCNC: 24 MMOL/L (ref 23–29)
COLOR UR: COLORLESS
CREAT SERPL-MCNC: 1.1 MG/DL (ref 0.5–1.4)
ERYTHROCYTE [DISTWIDTH] IN BLOOD BY AUTOMATED COUNT: 24.4 % (ref 11.5–14.5)
EST. GFR  (NO RACE VARIABLE): 51 ML/MIN/1.73 M^2
GLUCOSE SERPL-MCNC: 111 MG/DL (ref 70–110)
GLUCOSE UR QL STRIP: NEGATIVE
HCT VFR BLD AUTO: 30 % (ref 37–48.5)
HGB BLD-MCNC: 9.4 G/DL (ref 12–16)
HGB UR QL STRIP: NEGATIVE
KETONES UR QL STRIP: ABNORMAL
LACTATE SERPL-SCNC: 1.1 MMOL/L (ref 0.5–2.2)
LEUKOCYTE ESTERASE UR QL STRIP: NEGATIVE
LIPASE SERPL-CCNC: 26 U/L (ref 4–60)
MCH RBC QN AUTO: 24.9 PG (ref 27–31)
MCHC RBC AUTO-ENTMCNC: 31.3 G/DL (ref 32–36)
MCV RBC AUTO: 79 FL (ref 82–98)
NITRITE UR QL STRIP: NEGATIVE
PH UR STRIP: 8 [PH] (ref 5–8)
PLATELET # BLD AUTO: 298 K/UL (ref 150–450)
PMV BLD AUTO: 10.3 FL (ref 9.2–12.9)
POTASSIUM SERPL-SCNC: 4.3 MMOL/L (ref 3.5–5.1)
PROT SERPL-MCNC: 6.4 G/DL (ref 6–8.4)
PROT UR QL STRIP: NEGATIVE
RBC # BLD AUTO: 3.78 M/UL (ref 4–5.4)
SODIUM SERPL-SCNC: 145 MMOL/L (ref 136–145)
SP GR UR STRIP: 1.01 (ref 1–1.03)
URN SPEC COLLECT METH UR: ABNORMAL
UROBILINOGEN UR STRIP-ACNC: NEGATIVE EU/DL
WBC # BLD AUTO: 12.65 K/UL (ref 3.9–12.7)

## 2024-02-19 PROCEDURE — 85027 COMPLETE CBC AUTOMATED: CPT | Performed by: EMERGENCY MEDICINE

## 2024-02-19 PROCEDURE — 25000003 PHARM REV CODE 250: Performed by: EMERGENCY MEDICINE

## 2024-02-19 PROCEDURE — 80053 COMPREHEN METABOLIC PANEL: CPT | Performed by: EMERGENCY MEDICINE

## 2024-02-19 PROCEDURE — 99283 EMERGENCY DEPT VISIT LOW MDM: CPT | Mod: 25

## 2024-02-19 PROCEDURE — 83690 ASSAY OF LIPASE: CPT | Performed by: EMERGENCY MEDICINE

## 2024-02-19 PROCEDURE — 96360 HYDRATION IV INFUSION INIT: CPT

## 2024-02-19 PROCEDURE — 81003 URINALYSIS AUTO W/O SCOPE: CPT | Performed by: EMERGENCY MEDICINE

## 2024-02-19 PROCEDURE — 83605 ASSAY OF LACTIC ACID: CPT | Performed by: EMERGENCY MEDICINE

## 2024-02-19 RX ORDER — LOPERAMIDE HYDROCHLORIDE 2 MG/1
2 CAPSULE ORAL 4 TIMES DAILY PRN
Qty: 12 CAPSULE | Refills: 0 | Status: SHIPPED | OUTPATIENT
Start: 2024-02-19 | End: 2024-02-22

## 2024-02-19 RX ADMIN — SODIUM CHLORIDE 500 ML: 9 INJECTION, SOLUTION INTRAVENOUS at 07:02

## 2024-02-19 NOTE — ED PROVIDER NOTES
Emergency Department Encounter  Provider Note    Purvi Quiroga  [id number]  2/19/2024    Evaluation:    History Acquisition:     Chief Complaint   Patient presents with    Diarrhea     Pt reports green diarrhea that started today. Pt reports last time this happened she required a blood transfusion. Pt arrived from home via  EMS.        History of Present Illness:  Purvi Quiroga is a 79 y.o. female who has a past medical history of Asthma, Essential hypertension (12/10/2018), Hiatal hernia, Insomnia, and Other osteoporosis without current pathological fracture (12/10/2018).    The patient presents to the ED due to diarrhea.   Patient reports symptoms started earlier today.  She describes green-colored, watery diarrhea starting this AM. She reports mild lower abdominal discomfort and generalized weakness. She reports eating salad and pickle white before the diarrhea began.   She is mainly concerned because she was recently admitted to the hospital for a UTI with anemia and required 3 units of blood as well as IV ABX. She is concerned about anemia. She denies any fever, vomiting, CP, SOB, leg pain/swelling, or any other concerns.     Additional historians utilized:  EMS report - vitals stable en route. No meds given.     Prior medical records were reviewed:   Office visit 2/16 for f/u  Admitted 1/8-1/15 for UTI, JOSE, anemia, RBC transfusion   IM visit 12/2023 for foot injury    The patient's list of active medical history, family/social history, medications, and allergies as documented has been reviewed.     Past Medical History:   Diagnosis Date    Asthma     Essential hypertension 12/10/2018    Hiatal hernia     Insomnia     Other osteoporosis without current pathological fracture 12/10/2018     Past Surgical History:   Procedure Laterality Date    CATARACT EXTRACTION, BILATERAL      DILATION AND CURETTAGE OF UTERUS      ESOPHAGOGASTRODUODENOSCOPY N/A 1/12/2024    Procedure: EGD  (ESOPHAGOGASTRODUODENOSCOPY);  Surgeon: Matthew Del Cid MD;  Location: [facility];  Service: Gastroenterology;  Laterality: N/A;    KNEE ARTHROSCOPY      TONSILLECTOMY       Family History   Problem Relation Age of Onset    Heart disease Father         fatal MI in mid 60s    Hypertension Father     Hypertension Sister     Cancer Brother         colon cancer    Hypertension Brother     Diabetes Neg Hx     Stroke Neg Hx     Hyperlipidemia Neg Hx      Social History     Socioeconomic History    Marital status: Single   Tobacco Use    Smoking status: Never    Smokeless tobacco: Never    Tobacco comments:     18 or 21 yo for 1 yr   Substance and Sexual Activity    Alcohol use: Not Currently    Drug use: No     Social Determinants of Health     Financial Resource Strain: Low Risk  (11/24/2023)    Overall Financial Resource Strain (CARDIA)     Difficulty of Paying Living Expenses: Not very hard   Food Insecurity: No Food Insecurity (11/24/2023)    Hunger Vital Sign     Worried About Running Out of Food in the Last Year: Never true     Ran Out of Food in the Last Year: Never true   Transportation Needs: Unmet Transportation Needs (11/24/2023)    PRAPARE - Transportation     Lack of Transportation (Medical): Yes     Lack of Transportation (Non-Medical): No   Physical Activity: Unknown (11/24/2023)    Exercise Vital Sign     Days of Exercise per Week: 0 days   Stress: Stress Concern Present (1/9/2024)    Citizen of [place] of Occupational Health - Occupational Stress Questionnaire     Feeling of Stress : Rather much   Social Connections: Unknown (1/9/2024)    Social Connection and Isolation Panel [NHANES]     Frequency of Communication with Friends and Family: Three times a week     Frequency of Social Gatherings with Friends and Family: Once a week     Active Member of Clubs or Organizations: No     Attends Club or Organization Meetings: Never     Marital Status: Never    Housing Stability: Unknown  (11/24/2023)    Housing Stability Vital Sign     Number of Places Lived in the Last Year: 1     Unstable Housing in the Last Year: No       Medications:  Medication List with Changes/Refills   New Medications    LOPERAMIDE (IMODIUM) 2 MG CAPSULE    Take 1 capsule (2 mg total) by mouth 4 (four) times daily as needed for Diarrhea.   Current Medications    ALBUTEROL (PROVENTIL/VENTOLIN HFA) 90 MCG/ACTUATION INHALER    INHALE 1-2 PUFFS INTO THE LUNGS EVERY 6 HOURS AS NEEDED FOR SHORTNESS OF BREATH    ALBUTEROL-IPRATROPIUM (DUO-NEB) 2.5 MG-0.5 MG/3 ML NEBULIZER SOLUTION    USE 1 VIAL via NEBULIZER EVERY 4 HOURS AS NEEDED SHORTNESS OF BREATH    ALENDRONATE (FOSAMAX) 70 MG TABLET    TAKE 1 TABLET BY MOUTH 1/2  HOUR BEFORE ANY FOOD OR  MEDICATION ONCE WEEKLY ON  SAME DAY    AMLODIPINE (NORVASC) 10 MG TABLET    TAKE 1 TABLET BY MOUTH ONCE DAILY    ATORVASTATIN (LIPITOR) 20 MG TABLET    1 by mouth daily in evening    BREZTRI AEROSPHERE 160-9-4.8 MCG/ACTUATION HFAA    INHALE 2 puffs TWICE DAILY. RINSE MOUTH AND throat AFTER USE.    CALCIUM CARBONATE 650 MG CALCIUM (1,625 MG) TABLET    Take 1 tablet (650 mg total) by mouth once daily.    FLUTICASONE PROPIONATE (FLONASE) 50 MCG/ACTUATION NASAL SPRAY    1 spray (50 mcg total) by Each Nostril route once daily.    FUROSEMIDE (LASIX) 40 MG TABLET    Take 1 tablet (40 mg total) by mouth 2 (two) times a day.    GABAPENTIN (NEURONTIN) 300 MG CAPSULE    TAKE TWO CAPSULES BY MOUTH THREE TIMES DAILY    HYDROXYZINE PAMOATE (VISTARIL) 50 MG CAP    TAKE ONE CAPSULE BY MOUTH THREE TIMES DAILY AS NEEDED    IRBESARTAN (AVAPRO) 150 MG TABLET    TAKE 1 TABLET BY MOUTH  TWICE DAILY    MELATONIN 5 MG CAP    Take 1 capsule (5 mg total) by mouth nightly as needed (insomnia).    MEPOLIZUMAB 100 MG/ML ATIN    Inject 1 mL (100 mg total) into the skin every 28 days.    METOPROLOL TARTRATE (LOPRESSOR) 50 MG TABLET    Take 1 tablet (50 mg total) by mouth 2 (two) times daily.    MONTELUKAST (SINGULAIR) 10  MG TABLET    TAKE 1 TABLET BY MOUTH IN  THE EVENING    OMEPRAZOLE (PRILOSEC) 20 MG CAPSULE    TAKE 1 CAPSULE BY MOUTH TWICE  DAILY    ONDANSETRON (ZOFRAN-ODT) 8 MG TBDL    PLACE 1 TABLET UNDER THE TONGUE EVERY TWELVE HOURS AS NEEDED    POTASSIUM CHLORIDE (MICRO-K) 10 MEQ CPSR    One capsule by mouth every morning with meal    SODIUM HYALURONATE, EUFLEXXA, (EUFLEXXA) 10 MG/ML(MW 2.4 -3.6 MILLION) INJECTION    Inject into the articular space.    TRAZODONE (DESYREL) 50 MG TABLET    Take 1 tablet (50 mg total) by mouth every evening.    VITAMIN D (VITAMIN D3) 1000 UNITS TAB    Take 1 tablet (1,000 Units total) by mouth once daily.       Allergies:  Review of patient's allergies indicates:  No Known Allergies    Review of Systems   Constitutional:  Positive for fatigue.   Gastrointestinal:  Positive for abdominal pain and diarrhea.         Physical Exam:     Initial Vitals [02/19/24 1639]   BP Pulse Resp Temp SpO2   130/80 82 18 98 °F (36.7 °C) 98 %      MAP       --         Physical Exam    Nursing note and vitals reviewed.  Constitutional: She appears well-developed and well-nourished. She is not diaphoretic. No distress.   HENT:   Head: Normocephalic and atraumatic.   Mouth/Throat: Oropharynx is clear and moist.   Eyes: EOM are normal. Pupils are equal, round, and reactive to light.   Neck: No tracheal deviation present.   Cardiovascular:  Normal rate, regular rhythm, normal heart sounds and intact distal pulses.           Pulmonary/Chest: Breath sounds normal. No stridor. No respiratory distress. She has no wheezes.   Abdominal: Abdomen is soft and protuberant. Bowel sounds are normal. She exhibits no distension and no mass. There is abdominal tenderness (mild) in the suprapubic area. There is no rebound and no guarding.   Musculoskeletal:         General: No edema. Normal range of motion.     Neurological: She is alert and oriented to person, place, and time. She has normal strength. No cranial nerve deficit or  sensory deficit.   Skin: Skin is warm and dry. Capillary refill takes less than 2 seconds. No pallor.   Psychiatric: She has a normal mood and affect. Her behavior is normal. Thought content normal.         Differential Diagnoses:   Based on available information and initial assessment, Differential Diagnosis includes, but is not limited to:  AAA, aortic dissection, mesenteric ischemia, perforated viscous, MI/ACS, SBO/volvulus, incarcerated/strangulated hernia, intussusception, ileus, appendicitis, cholecystitis, cholangitis, diverticulitis, esophagitis, hepatitis, nephrolithiasis, pancreatitis, gastroenteritis, colitis, IBD/IBS, biliary colic, GERD, PUD, constipation, UTI/pyelonephritis,  disorder.      ED Management:   Procedures    Orders Placed This Encounter    CBC Without Differential    Comprehensive metabolic panel    Urinalysis, Reflex to Urine Culture Urine, Clean Catch    Lipase    Lactic Acid, Plasma    Straight Cath    Insert peripheral IV    sodium chloride 0.9% bolus 500 mL 500 mL    loperamide (IMODIUM) 2 mg capsule          EKG:       Labs:     Labs Reviewed   CBC WITHOUT DIFFERENTIAL - Abnormal; Notable for the following components:       Result Value    RBC 3.78 (*)     Hemoglobin 9.4 (*)     Hematocrit 30.0 (*)     MCV 79 (*)     MCH 24.9 (*)     MCHC 31.3 (*)     RDW 24.4 (*)     All other components within normal limits   COMPREHENSIVE METABOLIC PANEL - Abnormal; Notable for the following components:    Glucose 111 (*)     Albumin 3.2 (*)     eGFR 51 (*)     All other components within normal limits   URINALYSIS, REFLEX TO URINE CULTURE - Abnormal; Notable for the following components:    Color, UA Colorless (*)     Ketones, UA 1+ (*)     All other components within normal limits    Narrative:     Specimen Source->Urine   LIPASE   LACTIC ACID, PLASMA     Independent review of the labs ordered include:   See ED course    Imaging:     Imaging Results    None            Medications Given:      Medications   sodium chloride 0.9% bolus 500 mL 500 mL (0 mLs Intravenous Stopped 2/19/24 2031)        Medical Decision Making:    Additional Consideration:   Additional testing considered during clinical course: CT imaging considered, but felt unlikely to be beneficial given reassuring vitals, exam, and labs.     Social determinants of health considered during development of treatment plan include: poor access to care     Current co-morbidities considered which impacted clinical decision making: HTN, HLD, neuropathy, and arthritis     Case discussed with additional provider: none    ED Course as of 02/19/24 2227 Mon Feb 19, 2024 2204 SpO2: 98 % [SS]   2204 Resp: 18 [SS]   2204 Pulse: 82 [SS]   2204 Temp: 98 °F (36.7 °C) [SS]   2204 BP: 130/80  Vitals reassuring [SS]   2204 Comprehensive metabolic panel(!)  Unremarkable  [SS]   2204 Lipase  Unremarkable  [SS]   2204 Lactic Acid, Plasma  Unremarkable  [SS]   2204 CBC Without Differential(!)  H/H improving from prior [SS]   2204 Urinalysis, Reflex to Urine Culture Urine, Catheterized(!)  UA without infection [SS]      ED Course User Index  [SS] Arash Olguin MD            Medical Decision Making  80 yo F with HTN, HLD, neuropathy, and arthritis presents to ED with diarrhea x 1 day.  Vitals and exam benign.  Labs unremarkable.  UA without infection.  Vitals stable in ED.    After complete evaluation, including thorough history and physical exam, the patient's symptoms are most consistent with benign diarrheal illness. There is no rebound/guarding or other peritoneal signs to suggest perforation or other emergent surgical process. There is no fever or leukocytosis to suggest acute bacterial infection. There is no significant focal abdominal tenderness to suggest cholecystitis, appendicitis, diverticulitis, or  source, and the patient's current symptoms and clinical presentation do not warrant other targeted diagnostics at this time. CT A/P IS unlikely to  outweigh risks of contrast/radiation at this time. The patient will be treated with supportive care. Will provide RX for Imodium upon D/C.      Problems Addressed:  Diarrhea, unspecified type: acute illness or injury  Generalized weakness: acute illness or injury    Amount and/or Complexity of Data Reviewed  Independent Historian: EMS  External Data Reviewed: notes.  Labs: ordered. Decision-making details documented in ED Course.    Risk  Prescription drug management.  Diagnosis or treatment significantly limited by social determinants of health.        Clinical Impression:       ICD-10-CM ICD-9-CM   1. Diarrhea, unspecified type  R19.7 787.91   2. Generalized weakness  R53.1 780.79       Discharge Medications:  Current Discharge Medication List        START taking these medications    Details   loperamide (IMODIUM) 2 mg capsule Take 1 capsule (2 mg total) by mouth 4 (four) times daily as needed for Diarrhea.  Qty: 12 capsule, Refills: 0               Follow-up Information       Follow up With Specialties Details Why Contact Info    Palomo Burgos MD Internal Medicine Schedule an appointment as soon as possible for a visit   [address] [facility] [address]  [phone]               ED Disposition Condition    Discharge Stable              On re-evaluation, the patient's status has improved.  After complete ED evaluation, clinical impression is most consistent with diarrhea, weakness.  PCP follow-up as soon as possible was recommended.    After taking into careful account the patient's history, physical exam findings, as well as empirical and objective data obtained throughout ED workup, I feel no emergent medical condition has been identified. No further evaluation or admission was felt to be required, and the patient is stable for discharge from the ED. The patient and any additional family present were updated with test results, overall clinical impression, and recommended further plan of care,  including discharge instructions as provided and outpatient follow-up for continued evaluation and management as needed. All questions were answered. The patient expressed understanding and agreed with current plan for discharge and follow-up plan of care. Strict ED return precautions were provided, including return/worsening of current symptoms, new symptoms, or any other concerns.       Arash Olguin MD  02/20/24 0902

## 2024-02-20 NOTE — ED NOTES
Patient identifiers verified and correct.      LOC: The patient is awake, alert and aware of environment with an appropriate affect, the patient is oriented x 3 and speaking appropriately.      APPEARANCE: Patient appears comfortable and in no acute distress, patient is clean and well groomed.     HEENT: Head symmetrical. Eyes bilateral.  Bilateral ears without drainage. Bilateral nares patent, throat clear.     SKIN: The skin is warm and dry, color consistent with ethnicity, patient has normal skin turgor and moist mucus membranes, skin intact, no breakdown or bruising noted.      MUSCULOSKELETAL: Patient moving all extremities spontaneously, no swelling noted.     RESPIRATORY: Airway is open and patent, respirations are spontaneous, patient has a normal effort and rate, no accessory muscle use noted.      CARDIAC: Patient has a normal rate and regular rhythm, no edema noted, capillary refill < 3 seconds.      GASTRO: Abdomen soft and non-distended. Pt reports diarrhea that started earlier today.   .  NEURO: Pt opens eyes spontaneously pupils equal, round, and reactive. behavior appropriate to situation, follows commands, facial expression symmetrical,   bilateral hand grasp equal and even, purposeful motor response noted, normal sensation in all extremities when touched with a finger.     NEUROVASCULAR: All extremities are warm and pink.

## 2024-02-20 NOTE — DISCHARGE INSTRUCTIONS
Thank you for choosing [facility]!     Our goal in the Emergency Department is to always provide outstanding medical care. You may receive a survey by mail or e-mail in the next week regarding your experience today. We would greatly appreciate you completing and returning the survey. Your feedback provides us with a way to recognize our staff who provide very good care, and it helps us learn how to improve when your experience was below our aspiration of excellence.      It is important to remember that some problems are difficult to diagnose and may not be found during your first visit. Be sure to follow up with your primary care doctor and review any labs/imaging that was performed during your visit with them. If you do not have a primary care doctor, you may contact the one listed on your discharge paperwork, or you may also call the [facility] Appointment Desk at [phone] to schedule an appointment.     All medications may potentially have side effects and it is impossible to predict which medications may give you side effects. If you feel that you are having a negative effect of any medication you should immediately stop taking them and seek medical attention.  Do not drive or make any important decisions for 24 hours if you have received any pain medications, sedatives or mood altering drugs during your ER visit.    We appreciate you trusting us with your medical care. We will be happy to take care of you for all of your future medical needs. You may return to the ER at any time for any new/concerning symptoms, worsening condition, or failure to improve. We hope you feel better soon.     Sincerely,    Arash Olguin Jr., MD  Board-Certified Emergency Medicine Physician  [facility]

## 2024-02-22 ENCOUNTER — PATIENT OUTREACH (OUTPATIENT)
Dept: EMERGENCY MEDICINE | Facility: HOSPITAL | Age: 79
End: 2024-02-22
Payer: MEDICARE

## 2024-02-22 ENCOUNTER — TELEPHONE (OUTPATIENT)
Dept: INTERNAL MEDICINE | Facility: CLINIC | Age: 79
End: 2024-02-22
Payer: MEDICARE

## 2024-02-22 NOTE — PROGRESS NOTES
Patient discharged from the ED on 2/19/24 for diarrhea. Reached out to patient to inform her that her PCP's staff have scheduled her post ED 7-day follow up with RAJIV Loza at 2:20 pm. Patient will receive an appointment reminder.

## 2024-02-22 NOTE — TELEPHONE ENCOUNTER
----- Message from Yen Richard MA sent at 2/22/2024  4:51 PM CST -----  Contact: angel [phone]  Pt denied  home health speech evaluation   ----- Message -----  From: Katia Nix  Sent: 2/22/2024   4:32 PM CST  To: Aubrey Culver Staff    [email or web address]     Patient is calling for Medical Advice regarding: pt  has denied home health speech evaluation     How long has patient had these symptoms:    Pharmacy name and phone#:    Would like response via [employer]:   call back if you would like she says no call back is needed tho     Comments:

## 2024-02-26 ENCOUNTER — PATIENT OUTREACH (OUTPATIENT)
Dept: EMERGENCY MEDICINE | Facility: HOSPITAL | Age: 79
End: 2024-02-26
Payer: MEDICARE

## 2024-02-26 NOTE — PROGRESS NOTES
"I received a call back from patient who didn't want to verify her name and  for her appointment reminder. She said she was calling me because someone called her this morning asking the spelling of her name and . I informed the patient it was me that called regarding her upcoming appointment. She then stated that if I had good manners I would have identified myself first and stated why I was calling (which I did several times earlier). She then proceeded to yell at me demanding to know why I called, I explained why and stated per policy I must verify her identity. She stated " why do you need to know the spelling of my name, my  or the color of my underwear". She proceeded to shout even lounder into the phone saying "what the hell is your problem, why would you call if you weren't sure I was the patient". The patient continued screaming and demanding her appointment information and etc. She said to not call her again.    "

## 2024-02-26 NOTE — PROGRESS NOTES
ED navigator reminded patient about appointment for Tuesday (24) at 2:20 pm with NP Katiuska Loza through email [email or web address] [email or web address] as they refused to verify name and  over the phone and hung up. ED navigator to close encounter at this time.

## 2024-03-27 ENCOUNTER — EXTERNAL HOME HEALTH (OUTPATIENT)
Dept: HOME HEALTH SERVICES | Facility: HOSPITAL | Age: 79
End: 2024-03-27
Payer: MEDICARE

## 2024-04-01 ENCOUNTER — OFFICE VISIT (OUTPATIENT)
Dept: INTERNAL MEDICINE | Facility: CLINIC | Age: 79
End: 2024-04-01
Payer: MEDICARE

## 2024-04-01 VITALS
HEIGHT: 55 IN | SYSTOLIC BLOOD PRESSURE: 108 MMHG | OXYGEN SATURATION: 99 % | DIASTOLIC BLOOD PRESSURE: 62 MMHG | TEMPERATURE: 98 F | WEIGHT: 213.88 LBS | BODY MASS INDEX: 49.49 KG/M2 | HEART RATE: 88 BPM | RESPIRATION RATE: 16 BRPM

## 2024-04-01 DIAGNOSIS — R06.09 DYSPNEA ON EXERTION: ICD-10-CM

## 2024-04-01 DIAGNOSIS — G47.10 HYPERSOMNIA: ICD-10-CM

## 2024-04-01 DIAGNOSIS — D64.9 ANEMIA, UNSPECIFIED TYPE: Primary | ICD-10-CM

## 2024-04-01 DIAGNOSIS — G43.009 MIGRAINE WITHOUT AURA AND WITHOUT STATUS MIGRAINOSUS, NOT INTRACTABLE: ICD-10-CM

## 2024-04-01 DIAGNOSIS — R53.83 FATIGUE, UNSPECIFIED TYPE: ICD-10-CM

## 2024-04-01 PROCEDURE — 99214 OFFICE O/P EST MOD 30 MIN: CPT | Mod: S$GLB,,, | Performed by: NURSE PRACTITIONER

## 2024-04-01 PROCEDURE — 99999 PR PBB SHADOW E&M-EST. PATIENT-LVL IV: CPT | Mod: PBBFAC,,, | Performed by: NURSE PRACTITIONER

## 2024-04-01 PROCEDURE — 1160F RVW MEDS BY RX/DR IN RCRD: CPT | Mod: CPTII,S$GLB,, | Performed by: NURSE PRACTITIONER

## 2024-04-01 PROCEDURE — 3078F DIAST BP <80 MM HG: CPT | Mod: CPTII,S$GLB,, | Performed by: NURSE PRACTITIONER

## 2024-04-01 PROCEDURE — 3074F SYST BP LT 130 MM HG: CPT | Mod: CPTII,S$GLB,, | Performed by: NURSE PRACTITIONER

## 2024-04-01 PROCEDURE — 1159F MED LIST DOCD IN RCRD: CPT | Mod: CPTII,S$GLB,, | Performed by: NURSE PRACTITIONER

## 2024-04-01 RX ORDER — GABAPENTIN 300 MG/1
300 CAPSULE ORAL 3 TIMES DAILY
Qty: 180 CAPSULE | Refills: 3 | Status: SHIPPED | OUTPATIENT
Start: 2024-04-01

## 2024-04-01 NOTE — PROGRESS NOTES
Subjective     Patient ID: Purvi Quiroga is a 79 y.o. female.    Chief Complaint: Follow-up    Patient in office for an evaluation of ongoing fatigue and weakness.  Patient's usual PCP is Dr Palomo Burgos. Pt was hospitalized back in January 2024 for UTI and haematuria which required 2 units PRBCs. At that time her stool tested positive for blood. Pt in today with similar complaints of fatigue but this time she denies any hematuria or urinary symptoms. She continues to move slowly due to dizziness and fatigues. She denies any syncope, SOB, CP or palpitations. She was recently evaluated at the ED in February for diarrhea. Pt was subsequently discharged home on imodium. Diarrhea has since subsided.      Pt is also requesting an update of her gabapentin from 600 mg TID to 300 mg BID for chronic migraines.       Transitional Care Note    Family and/or Caretaker present at visit?  No.  Diagnostic tests reviewed/disposition: No diagnosic tests pending after this hospitalization.  Disease/illness education: Anemia  Home health/community services discussion/referrals: HH was ordered by pt states she declined.   Establishment or re-establishment of referral orders for community resources: No other necessary community resources.   Discussion with other health care providers: No discussion with other health care providers necessary.        Follow-up  Associated symptoms include arthralgias and weakness.     Review of Systems   Musculoskeletal:  Positive for arthralgias and gait problem.   Neurological:  Positive for weakness.   All other systems reviewed and are negative.     Objective   Physical Exam  Vitals reviewed.   Constitutional:       Appearance: Normal appearance. She is obese. She is ill-appearing.   HENT:      Head: Normocephalic and atraumatic.   Eyes:      Conjunctiva/sclera: Conjunctivae normal.      Pupils: Pupils are equal, round, and reactive to light.      Comments: Pale conjunctiva   Cardiovascular:       Rate and Rhythm: Normal rate and regular rhythm.   Pulmonary:      Effort: Pulmonary effort is normal.      Breath sounds: Normal breath sounds.   Abdominal:      General: Bowel sounds are normal.      Palpations: Abdomen is soft.   Musculoskeletal:         General: Normal range of motion.      Cervical back: Normal range of motion and neck supple.      Right lower leg: Edema present.      Left lower leg: Edema present.   Skin:     General: Skin is warm.   Neurological:      General: No focal deficit present.   Psychiatric:         Mood and Affect: Mood normal.        Assessment and Plan   1. Anemia, unspecified type  Assessment & Plan:  Patient is encouraged to complete labs today which includes CBC, CMP iron and ferritin studies.  Pt stated she did not feel like having labs done today even after trying to convince her of the need for labs in order to make medical decisions.       Orders:  -     COMPREHENSIVE METABOLIC PANEL; Future; Expected date: 04/01/2024  -     Vitamin D; Future; Expected date: 04/01/2024  -     TSH; Future; Expected date: 04/01/2024  -     CBC W/ AUTO DIFFERENTIAL; Future; Expected date: 04/01/2024  -     IRON AND TIBC; Future; Expected date: 04/01/2024  -     FERRITIN; Future; Expected date: 04/01/2024    2. Fatigue, unspecified type  -     COMPREHENSIVE METABOLIC PANEL; Future; Expected date: 04/01/2024  -     Vitamin D; Future; Expected date: 04/01/2024  -     TSH; Future; Expected date: 04/01/2024  -     CBC W/ AUTO DIFFERENTIAL; Future; Expected date: 04/01/2024  -     IRON AND TIBC; Future; Expected date: 04/01/2024  -     FERRITIN; Future; Expected date: 04/01/2024    3. Dyspnea on exertion  Assessment & Plan:  I suspect this is partly due to increased BMI in addition to possible anemia.    Orders:  -     Echo; Future    4. Hypersomnia  Assessment & Plan:  Likely undiagnosed LUX.    Referral to sleep study placed.    We had a discussion about the importance of controlling sleep apnea,  despite her hesitation for referral she accepted    Orders:  -     Ambulatory referral/consult to Sleep Disorders; Future; Expected date: 04/08/2024    5. Migraine without aura and without status migrainosus, not intractable  Assessment & Plan:  Chronic, stable.  We will send updated prescription for gabapentin 300 mg t.i.d..    Orders:  -     gabapentin (NEURONTIN) 300 MG capsule; Take 1 capsule (300 mg total) by mouth 3 (three) times daily.  Dispense: 180 capsule; Refill: 3      Please follow up with PCP.

## 2024-04-02 PROBLEM — G47.10 HYPERSOMNIA: Status: ACTIVE | Noted: 2024-04-02

## 2024-04-09 NOTE — ASSESSMENT & PLAN NOTE
Likely undiagnosed LUX.    Referral to sleep study placed.    We had a discussion about the importance of controlling sleep apnea, despite her hesitation for referral she accepted

## 2024-04-09 NOTE — ASSESSMENT & PLAN NOTE
Patient is encouraged to complete labs today which includes CBC, CMP iron and ferritin studies.  Pt stated she did not feel like having labs done today even after trying to convince her of the need for labs in order to make medical decisions.

## 2024-04-11 ENCOUNTER — HOSPITAL ENCOUNTER (OUTPATIENT)
Dept: CARDIOLOGY | Facility: HOSPITAL | Age: 79
Discharge: HOME | End: 2024-04-11
Attending: NURSE PRACTITIONER
Payer: MEDICARE

## 2024-04-11 VITALS
DIASTOLIC BLOOD PRESSURE: 70 MMHG | HEART RATE: 68 BPM | WEIGHT: 213 LBS | SYSTOLIC BLOOD PRESSURE: 120 MMHG | BODY MASS INDEX: 49.3 KG/M2 | HEIGHT: 55 IN

## 2024-04-11 DIAGNOSIS — R06.09 DYSPNEA ON EXERTION: ICD-10-CM

## 2024-04-11 PROCEDURE — 93306 TTE W/DOPPLER COMPLETE: CPT | Mod: 26,,, | Performed by: INTERNAL MEDICINE

## 2024-04-11 PROCEDURE — 93306 TTE W/DOPPLER COMPLETE: CPT | Mod: PO

## 2024-04-12 ENCOUNTER — TELEPHONE (OUTPATIENT)
Dept: INTERNAL MEDICINE | Facility: CLINIC | Age: 79
End: 2024-04-12
Payer: MEDICARE

## 2024-04-12 ENCOUNTER — TELEPHONE (OUTPATIENT)
Dept: SPORTS MEDICINE | Facility: CLINIC | Age: 79
End: 2024-04-12
Payer: MEDICARE

## 2024-04-12 DIAGNOSIS — M17.0 PRIMARY OSTEOARTHRITIS OF BOTH KNEES: Primary | ICD-10-CM

## 2024-04-12 DIAGNOSIS — N18.31 STAGE 3A CHRONIC KIDNEY DISEASE: ICD-10-CM

## 2024-04-12 DIAGNOSIS — M25.561 PAIN IN BOTH KNEES, UNSPECIFIED CHRONICITY: Primary | ICD-10-CM

## 2024-04-12 DIAGNOSIS — M25.562 PAIN IN BOTH KNEES, UNSPECIFIED CHRONICITY: Primary | ICD-10-CM

## 2024-04-12 DIAGNOSIS — D50.8 OTHER IRON DEFICIENCY ANEMIA: Primary | ICD-10-CM

## 2024-04-12 LAB
ASCENDING AORTA: 3.06 CM
AV INDEX (PROSTH): 0.81
AV MEAN GRADIENT: 3 MMHG
AV PEAK GRADIENT: 6 MMHG
AV VALVE AREA BY VELOCITY RATIO: 1.81 CM²
AV VALVE AREA: 1.87 CM²
AV VELOCITY RATIO: 0.79
BSA FOR ECHO PROCEDURE: 1.94 M2
CV ECHO LV RWT: 0.36 CM
DOP CALC AO PEAK VEL: 1.19 M/S
DOP CALC AO VTI: 22.29 CM
DOP CALC LVOT AREA: 2.3 CM2
DOP CALC LVOT DIAMETER: 1.71 CM
DOP CALC LVOT PEAK VEL: 0.94 M/S
DOP CALC LVOT STROKE VOLUME: 41.62 CM3
DOP CALC RVOT PEAK VEL: 0.95 M/S
DOP CALC RVOT VTI: 16.64 CM
DOP CALCLVOT PEAK VEL VTI: 18.13 CM
E WAVE DECELERATION TIME: 209.82 MSEC
E/A RATIO: 1.08
E/E' RATIO: 12.55 M/S
ECHO LV POSTERIOR WALL: 0.74 CM (ref 0.6–1.1)
EJECTION FRACTION: 65 %
FRACTIONAL SHORTENING: 30 % (ref 28–44)
INTERVENTRICULAR SEPTUM: 0.71 CM (ref 0.6–1.1)
LA MAJOR: 5.09 CM
LA MINOR: 5.12 CM
LA WIDTH: 2.47 CM
LEFT ATRIUM VOLUME INDEX MOD: 18.7 ML/M2
LEFT ATRIUM VOLUME MOD: 33.69 CM3
LEFT INTERNAL DIMENSION IN SYSTOLE: 2.89 CM (ref 2.1–4)
LEFT VENTRICLE DIASTOLIC VOLUME INDEX: 41.76 ML/M2
LEFT VENTRICLE DIASTOLIC VOLUME: 75.17 ML
LEFT VENTRICLE MASS INDEX: 48 G/M2
LEFT VENTRICLE SYSTOLIC VOLUME INDEX: 17.7 ML/M2
LEFT VENTRICLE SYSTOLIC VOLUME: 31.88 ML
LEFT VENTRICULAR INTERNAL DIMENSION IN DIASTOLE: 4.12 CM (ref 3.5–6)
LEFT VENTRICULAR MASS: 86.19 G
LV LATERAL E/E' RATIO: 13.8 M/S
LV SEPTAL E/E' RATIO: 11.5 M/S
MV PEAK A VEL: 0.64 M/S
MV PEAK E VEL: 0.69 M/S
MV STENOSIS PRESSURE HALF TIME: 60.85 MS
MV VALVE AREA P 1/2 METHOD: 3.62 CM2
PISA TR MAX VEL: 2.38 M/S
PV MEAN GRADIENT: 2 MMHG
PV PEAK GRADIENT: 4 MMHG
PV PEAK VELOCITY: 1.02 M/S
RA MAJOR: 4.46 CM
RA PRESSURE ESTIMATED: 3 MMHG
RA WIDTH: 2.81 CM
RIGHT VENTRICULAR END-DIASTOLIC DIMENSION: 2.46 CM
RV TB RVSP: 5 MMHG
SINUS: 2.85 CM
STJ: 2.42 CM
TDI LATERAL: 0.05 M/S
TDI SEPTAL: 0.06 M/S
TDI: 0.06 M/S
TR MAX PG: 23 MMHG
TRICUSPID ANNULAR PLANE SYSTOLIC EXCURSION: 1.96 CM
TV REST PULMONARY ARTERY PRESSURE: 26 MMHG
Z-SCORE OF LEFT VENTRICULAR DIMENSION IN END DIASTOLE: -1.89
Z-SCORE OF LEFT VENTRICULAR DIMENSION IN END SYSTOLE: -0.49

## 2024-04-12 NOTE — TELEPHONE ENCOUNTER
Returned pt call to schedule for knee injections. Scheduled appts for 5/6, 5/13 and 5/20.     Raiza Caruso MD  System Director, Primary Care Sports Medicine

## 2024-04-15 ENCOUNTER — TELEPHONE (OUTPATIENT)
Dept: INTERNAL MEDICINE | Facility: CLINIC | Age: 79
End: 2024-04-15
Payer: MEDICARE

## 2024-04-15 PROBLEM — K92.2 GI BLEED: Status: RESOLVED | Noted: 2024-01-11 | Resolved: 2024-04-15

## 2024-04-15 PROBLEM — N39.0 UTI (URINARY TRACT INFECTION): Status: RESOLVED | Noted: 2024-01-09 | Resolved: 2024-04-15

## 2024-04-15 PROBLEM — N17.9 AKI (ACUTE KIDNEY INJURY): Status: RESOLVED | Noted: 2024-01-09 | Resolved: 2024-04-15

## 2024-04-15 NOTE — TELEPHONE ENCOUNTER
----- Message from Katiuska Loza NP sent at 4/12/2024  1:02 PM CDT -----  Please inform pt her recent echocardiogram was normal.   Thanks!

## 2024-04-15 NOTE — TELEPHONE ENCOUNTER
Called Pt.  Regarding Lab results and Echo test results.    Pt was understanding of all information relayed from the provider.  Pt was compliant of instructions/recommendations mentioned.    Pt stated she will call to inform us if she has any Urinary issues.  She Also stated does not want to schedule labs for 3 months from now. She will let us know if/when she wants get labs done during that time.    Letter sent out to pt of her results.

## 2024-04-18 DIAGNOSIS — M25.561 PAIN IN BOTH KNEES, UNSPECIFIED CHRONICITY: Primary | ICD-10-CM

## 2024-04-18 DIAGNOSIS — M25.562 PAIN IN BOTH KNEES, UNSPECIFIED CHRONICITY: Primary | ICD-10-CM

## 2024-04-28 RX ORDER — METOPROLOL TARTRATE 50 MG/1
50 TABLET ORAL 2 TIMES DAILY
Qty: 180 TABLET | Refills: 1 | Status: SHIPPED | OUTPATIENT
Start: 2024-04-28

## 2024-04-28 NOTE — TELEPHONE ENCOUNTER
Care Due:                  Date            Visit Type   Department     Provider  --------------------------------------------------------------------------------                                EP -                              PRIMARY      MET INTERNAL  Last Visit: 10-      CARE ([facility])   MEDICINE       Palomo Burgos  Next Visit: None Scheduled  None         None Found                                                            Last  Test          Frequency    Reason                     Performed    Due Date  --------------------------------------------------------------------------------    Mg Level....  12 months..  alendronate..............  02- 01-    Phosphate...  12 months..  alendronate..............  Not Found    Overdue    [employer] Embedded Care Due Messages. Reference number: [id number].   4/28/2024 2:00:03 PM CDT

## 2024-04-29 ENCOUNTER — TELEPHONE (OUTPATIENT)
Dept: INTERNAL MEDICINE | Facility: CLINIC | Age: 79
End: 2024-04-29
Payer: MEDICARE

## 2024-04-29 RX ORDER — DIPHENOXYLATE HYDROCHLORIDE AND ATROPINE SULFATE 2.5; .025 MG/1; MG/1
1 TABLET ORAL 4 TIMES DAILY PRN
Qty: 20 TABLET | Refills: 0 | Status: SHIPPED | OUTPATIENT
Start: 2024-04-29 | End: 2024-05-09

## 2024-04-29 NOTE — TELEPHONE ENCOUNTER
Pt complain of diarrhea, pt ran out of Rx for Lomotil 2.5/.025 MG. Please send Rx to pharmacy.    [facility] - [place] - [address]  [place] [address]  Phone: [phone] Fax: [phone]

## 2024-04-29 NOTE — TELEPHONE ENCOUNTER
----- Message from Petrona Zuniga sent at 4/29/2024 10:24 AM CDT -----  Contact: [phone]  Requesting an RX refill or new RX.  Is this a refill or new RX: refill  RX name and strength (copy/paste from chart):  Lomotil 2.5/.025 MG (not in chart, but pt prefers generic)  Is this a 30 day or 90 day RX: 30  Pharmacy name and phone # (copy/paste from chart):    [facility] - [place], [place] - [address] [facility] [address]  Phone: [phone] Fax: [phone]  The doctors have asked that we provide their patients with the following 2 reminders -- prescription refills can take up to 72 hours, and a friendly reminder that in the future you can use your [facility] account to request refills: yes

## 2024-05-15 ENCOUNTER — TELEPHONE (OUTPATIENT)
Dept: SPORTS MEDICINE | Facility: CLINIC | Age: 79
End: 2024-05-15
Payer: MEDICARE

## 2024-05-15 NOTE — TELEPHONE ENCOUNTER
----- Message from Tamiko Vinson sent at 5/15/2024 11:32 AM CDT -----  Who Called: Pt    What is the request in detail: Requesting call back to discuss scheduling next 2 injection appts. Please advise    Can the clinic reply by [facility]? No    Best Call Back Number: [phone]      Additional Information:

## 2024-05-16 NOTE — PROGRESS NOTES
Kendrick knee joint bilateral 1/3  Lot number: [id number]  Expiration date: 06/30/26    MEDICAL NECESSITY FOR VISCOSUPPLEMENTATION USE: After thorough evaluation of the patient, I have determined that viscosupplementation treatment is medically necessary. 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 Oral analgesics including NSAIDs have not adequately controlled the patient's symptoms. 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

## 2024-05-17 ENCOUNTER — TELEPHONE (OUTPATIENT)
Dept: INTERNAL MEDICINE | Facility: CLINIC | Age: 79
End: 2024-05-17
Payer: MEDICARE

## 2024-05-17 ENCOUNTER — TELEPHONE (OUTPATIENT)
Dept: ENDOSCOPY | Facility: HOSPITAL | Age: 79
End: 2024-05-17
Payer: MEDICARE

## 2024-05-17 DIAGNOSIS — Z12.11 ENCOUNTER FOR COLORECTAL CANCER SCREENING: Primary | ICD-10-CM

## 2024-05-17 DIAGNOSIS — Z12.12 ENCOUNTER FOR COLORECTAL CANCER SCREENING: Primary | ICD-10-CM

## 2024-05-17 NOTE — TELEPHONE ENCOUNTER
Spoke with Pt to schedule procedure pt informed me that she will reach out to her PCP to get an order sent over to our Dept

## 2024-05-17 NOTE — TELEPHONE ENCOUNTER
----- Message from Tia Fernandez sent at 5/17/2024 12:56 PM CDT -----  Contact: Mobile [phone]  Patient would like to put in a new order for a Colonoscopy.    Patient would like to have her Colonoscopy done at the [facility] in [place].

## 2024-05-20 ENCOUNTER — OFFICE VISIT (OUTPATIENT)
Dept: SPORTS MEDICINE | Facility: CLINIC | Age: 79
End: 2024-05-20
Payer: MEDICARE

## 2024-05-20 VITALS — TEMPERATURE: 97 F

## 2024-05-20 DIAGNOSIS — M25.562 CHRONIC PAIN OF BOTH KNEES: ICD-10-CM

## 2024-05-20 DIAGNOSIS — M25.562 PAIN IN BOTH KNEES, UNSPECIFIED CHRONICITY: ICD-10-CM

## 2024-05-20 DIAGNOSIS — G89.29 CHRONIC PAIN OF BOTH KNEES: ICD-10-CM

## 2024-05-20 DIAGNOSIS — M17.0 PRIMARY OSTEOARTHRITIS OF BOTH KNEES: Primary | ICD-10-CM

## 2024-05-20 DIAGNOSIS — M25.561 PAIN IN BOTH KNEES, UNSPECIFIED CHRONICITY: ICD-10-CM

## 2024-05-20 DIAGNOSIS — M25.561 CHRONIC PAIN OF BOTH KNEES: ICD-10-CM

## 2024-05-20 PROCEDURE — 20611 DRAIN/INJ JOINT/BURSA W/US: CPT | Mod: 50,S$GLB,, | Performed by: FAMILY MEDICINE

## 2024-05-20 PROCEDURE — 99999 PR PBB SHADOW E&M-EST. PATIENT-LVL III: CPT | Mod: PBBFAC,,, | Performed by: FAMILY MEDICINE

## 2024-05-20 PROCEDURE — 99499 UNLISTED E&M SERVICE: CPT | Mod: S$GLB,,, | Performed by: FAMILY MEDICINE

## 2024-05-20 NOTE — PROCEDURES
"Large Joint Aspiration/Injection: bilateral knee    Date/Time: 5/20/2024 1:15 PM    Performed by: Cj Caruso MD  Authorized by: Cj Caruso MD    Consent Done?:  Yes (Verbal)  Indications:  Pain  Site marked: the procedure site was marked    Timeout: prior to procedure the correct patient, procedure, and site was verified    Prep: patient was prepped and draped in usual sterile fashion      Details:  Needle Size:  25 G  Ultrasonic Guidance for needle placement?: Yes    Images are saved and documented.  Approach:  Lateral  Location:  Knee  Laterality:  Bilateral  Site:  Bilateral knee  Medications (Right):  16.8 mg sodium hyaluronate 16.8 mg/2 mL  Medications (Left):  16.8 mg sodium hyaluronate 16.8 mg/2 mL  Patient tolerance:  Patient tolerated the procedure well with no immediate complications     Description of ultrasound utilization for needle guidance:   Ultrasound guidance used for needle localization. Images saved and stored for documentation. The SUPRAPATELLAR BURSA / KNEE JOINT was visualized. Dynamic visualization of the 25g x 1.5" needle was continuous throughout the procedure.     "

## 2024-05-24 NOTE — PROGRESS NOTES
Kendrick knee joint bilateral 2/3  Lot number: [id number] deshaun  Expiration date: 6/30/2026 deshaun    MEDICAL NECESSITY FOR VISCOSUPPLEMENTATION USE: After thorough evaluation of the patient, I have determined that viscosupplementation treatment is medically necessary. 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 Oral analgesics including NSAIDs have not adequately controlled the patient's symptoms. 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

## 2024-05-29 ENCOUNTER — OFFICE VISIT (OUTPATIENT)
Dept: SPORTS MEDICINE | Facility: CLINIC | Age: 79
End: 2024-05-29
Payer: MEDICARE

## 2024-05-29 VITALS — TEMPERATURE: 98 F | HEIGHT: 55 IN | BODY MASS INDEX: 49.51 KG/M2

## 2024-05-29 DIAGNOSIS — M25.562 CHRONIC PAIN OF BOTH KNEES: ICD-10-CM

## 2024-05-29 DIAGNOSIS — M25.561 CHRONIC PAIN OF BOTH KNEES: ICD-10-CM

## 2024-05-29 DIAGNOSIS — M17.0 PRIMARY OSTEOARTHRITIS OF BOTH KNEES: Primary | ICD-10-CM

## 2024-05-29 DIAGNOSIS — G89.29 CHRONIC PAIN OF BOTH KNEES: ICD-10-CM

## 2024-05-29 PROCEDURE — 20611 DRAIN/INJ JOINT/BURSA W/US: CPT | Mod: 50,S$GLB,, | Performed by: FAMILY MEDICINE

## 2024-05-29 PROCEDURE — 99499 UNLISTED E&M SERVICE: CPT | Mod: S$GLB,,, | Performed by: FAMILY MEDICINE

## 2024-05-29 PROCEDURE — 99999 PR PBB SHADOW E&M-EST. PATIENT-LVL III: CPT | Mod: PBBFAC,,, | Performed by: FAMILY MEDICINE

## 2024-05-29 NOTE — PROCEDURES
"Large Joint Aspiration/Injection: bilateral knee    Date/Time: 5/29/2024 1:15 PM    Performed by: Cj Caruso MD  Authorized by: Cj Caruso MD    Consent Done?:  Yes (Verbal)  Indications:  Pain  Site marked: the procedure site was marked    Timeout: prior to procedure the correct patient, procedure, and site was verified    Prep: patient was prepped and draped in usual sterile fashion      Details:  Needle Size:  25 G  Ultrasonic Guidance for needle placement?: Yes    Images are saved and documented.  Approach:  Lateral  Location:  Knee  Laterality:  Bilateral  Site:  Bilateral knee  Medications (Right):  16.8 mg sodium hyaluronate 16.8 mg/2 mL  Medications (Left):  16.8 mg sodium hyaluronate 16.8 mg/2 mL  Patient tolerance:  Patient tolerated the procedure well with no immediate complications     Description of ultrasound utilization for needle guidance:   Ultrasound guidance used for needle localization. Images saved and stored for documentation. The SUPRAPATELLAR BURSA / KNEE JOINT was visualized. Dynamic visualization of the 25g x 1.5" needle was continuous throughout the procedure.     "

## 2024-05-30 NOTE — PROGRESS NOTES
Kendrick knee joint bilateral 3/3  Lot number: RIGHT: [id number]/ [id number]  Expiration date: BIL 6-    MEDICAL NECESSITY FOR VISCOSUPPLEMENTATION USE: After thorough evaluation of the patient, I have determined that viscosupplementation treatment is medically necessary. 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 Oral analgesics including NSAIDs have not adequately controlled the patient's symptoms. 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

## 2024-06-03 ENCOUNTER — OFFICE VISIT (OUTPATIENT)
Dept: SPORTS MEDICINE | Facility: CLINIC | Age: 79
End: 2024-06-03
Payer: MEDICARE

## 2024-06-03 VITALS
DIASTOLIC BLOOD PRESSURE: 76 MMHG | HEART RATE: 80 BPM | HEIGHT: 55 IN | WEIGHT: 207 LBS | BODY MASS INDEX: 47.9 KG/M2 | SYSTOLIC BLOOD PRESSURE: 134 MMHG

## 2024-06-03 DIAGNOSIS — M25.561 CHRONIC PAIN OF BOTH KNEES: ICD-10-CM

## 2024-06-03 DIAGNOSIS — G89.29 CHRONIC PAIN OF BOTH KNEES: ICD-10-CM

## 2024-06-03 DIAGNOSIS — M17.0 PRIMARY OSTEOARTHRITIS OF BOTH KNEES: Primary | ICD-10-CM

## 2024-06-03 DIAGNOSIS — R26.89 ANTALGIC GAIT: ICD-10-CM

## 2024-06-03 DIAGNOSIS — M25.562 CHRONIC PAIN OF BOTH KNEES: ICD-10-CM

## 2024-06-03 PROCEDURE — 99999 PR PBB SHADOW E&M-EST. PATIENT-LVL III: CPT | Mod: PBBFAC,,, | Performed by: FAMILY MEDICINE

## 2024-06-03 PROCEDURE — 99499 UNLISTED E&M SERVICE: CPT | Mod: S$GLB,,, | Performed by: FAMILY MEDICINE

## 2024-06-03 PROCEDURE — 20611 DRAIN/INJ JOINT/BURSA W/US: CPT | Mod: 50,S$GLB,, | Performed by: FAMILY MEDICINE

## 2024-06-03 NOTE — PROCEDURES
"Large Joint Aspiration/Injection: bilateral knee    Date/Time: 6/3/2024 1:15 PM    Performed by: Cj Caruso MD  Authorized by: Cj Caruso MD    Consent Done?:  Yes (Verbal)  Indications:  Pain  Site marked: the procedure site was marked    Timeout: prior to procedure the correct patient, procedure, and site was verified    Prep: patient was prepped and draped in usual sterile fashion      Details:  Needle Size:  25 G  Ultrasonic Guidance for needle placement?: Yes    Images are saved and documented.  Approach:  Lateral  Location:  Knee  Laterality:  Bilateral  Site:  Bilateral knee  Medications (Right):  16.8 mg sodium hyaluronate 16.8 mg/2 mL  Medications (Left):  16.8 mg sodium hyaluronate 16.8 mg/2 mL  Patient tolerance:  Patient tolerated the procedure well with no immediate complications     Description of ultrasound utilization for needle guidance:   Ultrasound guidance used for needle localization. Images saved and stored for documentation. The SUPRAPATELLAR BURSA / KNEE JOINT was visualized. Dynamic visualization of the 25g x 1.5" needle was continuous throughout the procedure.     "

## 2024-06-10 RX ORDER — TRAZODONE HYDROCHLORIDE 50 MG/1
50 TABLET ORAL NIGHTLY
Qty: 90 TABLET | Refills: 1 | Status: SHIPPED | OUTPATIENT
Start: 2024-06-10

## 2024-06-10 NOTE — TELEPHONE ENCOUNTER
No care due was identified.  [facility] Embedded Care Due Messages. Reference number: [id number].   6/10/2024 10:11:18 AM CDT

## 2024-06-10 NOTE — TELEPHONE ENCOUNTER
Refill Decision Note   Purvi Reauthier  is requesting a refill authorization.  Brief Assessment and Rationale for Refill:  Approve     Medication Therapy Plan: ED visit for pain of lower extremities, foot pain, acute cystitis and diarrhea. FOVS. Will approve 90 with 1      Extended chart review required: Yes   Comments:     Note composed:1:33 PM 06/10/2024

## 2024-06-18 ENCOUNTER — OFFICE VISIT (OUTPATIENT)
Dept: INTERNAL MEDICINE | Facility: CLINIC | Age: 79
End: 2024-06-18
Payer: MEDICARE

## 2024-06-18 VITALS
BODY MASS INDEX: 49.04 KG/M2 | DIASTOLIC BLOOD PRESSURE: 78 MMHG | SYSTOLIC BLOOD PRESSURE: 114 MMHG | OXYGEN SATURATION: 96 % | RESPIRATION RATE: 16 BRPM | WEIGHT: 211 LBS | TEMPERATURE: 98 F | HEART RATE: 76 BPM

## 2024-06-18 DIAGNOSIS — R53.83 FATIGUE, UNSPECIFIED TYPE: ICD-10-CM

## 2024-06-18 DIAGNOSIS — I10 ESSENTIAL HYPERTENSION: Primary | Chronic | ICD-10-CM

## 2024-06-18 DIAGNOSIS — J45.50 SEVERE PERSISTENT ASTHMA WITHOUT COMPLICATION: ICD-10-CM

## 2024-06-18 DIAGNOSIS — I27.20 PULMONARY HYPERTENSION: ICD-10-CM

## 2024-06-18 DIAGNOSIS — D50.9 IRON DEFICIENCY ANEMIA, UNSPECIFIED IRON DEFICIENCY ANEMIA TYPE: ICD-10-CM

## 2024-06-18 DIAGNOSIS — I70.0 AORTIC ATHEROSCLEROSIS: ICD-10-CM

## 2024-06-18 PROBLEM — I20.89 ANGINAL EQUIVALENT: Chronic | Status: RESOLVED | Noted: 2021-05-26 | Resolved: 2024-06-18

## 2024-06-18 PROCEDURE — 1101F PT FALLS ASSESS-DOCD LE1/YR: CPT | Mod: CPTII,S$GLB,, | Performed by: INTERNAL MEDICINE

## 2024-06-18 PROCEDURE — 1160F RVW MEDS BY RX/DR IN RCRD: CPT | Mod: CPTII,S$GLB,, | Performed by: INTERNAL MEDICINE

## 2024-06-18 PROCEDURE — 1125F AMNT PAIN NOTED PAIN PRSNT: CPT | Mod: CPTII,S$GLB,, | Performed by: INTERNAL MEDICINE

## 2024-06-18 PROCEDURE — 99214 OFFICE O/P EST MOD 30 MIN: CPT | Mod: S$GLB,,, | Performed by: INTERNAL MEDICINE

## 2024-06-18 PROCEDURE — 1159F MED LIST DOCD IN RCRD: CPT | Mod: CPTII,S$GLB,, | Performed by: INTERNAL MEDICINE

## 2024-06-18 PROCEDURE — 3078F DIAST BP <80 MM HG: CPT | Mod: CPTII,S$GLB,, | Performed by: INTERNAL MEDICINE

## 2024-06-18 PROCEDURE — 99999 PR PBB SHADOW E&M-EST. PATIENT-LVL IV: CPT | Mod: PBBFAC,,, | Performed by: INTERNAL MEDICINE

## 2024-06-18 PROCEDURE — 3288F FALL RISK ASSESSMENT DOCD: CPT | Mod: CPTII,S$GLB,, | Performed by: INTERNAL MEDICINE

## 2024-06-18 PROCEDURE — 3074F SYST BP LT 130 MM HG: CPT | Mod: CPTII,S$GLB,, | Performed by: INTERNAL MEDICINE

## 2024-06-18 PROCEDURE — G2211 COMPLEX E/M VISIT ADD ON: HCPCS | Mod: S$GLB,,, | Performed by: INTERNAL MEDICINE

## 2024-06-18 RX ORDER — FUROSEMIDE 20 MG/1
20 TABLET ORAL DAILY
Qty: 90 TABLET | Refills: 3 | Status: SHIPPED | OUTPATIENT
Start: 2024-06-18 | End: 2025-06-18

## 2024-06-18 RX ORDER — AMLODIPINE BESYLATE 5 MG/1
5 TABLET ORAL DAILY
Qty: 90 TABLET | Refills: 3 | Status: SHIPPED | OUTPATIENT
Start: 2024-06-18

## 2024-06-18 NOTE — PROGRESS NOTES
Subjective:       Patient ID: Purvi Quiroga is a 79 y.o. female.    Chief Complaint: Hypertension, Fatigue, Medication Problem, and Anemia    HPI    79-year-old female with pulmonary hypertension, aortic atherosclerosis here for evaluation of weakness.  She reports that she has felt weak for a while.  It comes and goes.  It is mostly in the morning.  She sometimes cannot go down because she is dizzy.  She misses food when she cannot go down.  It does not last all day.  It is intermittent.  It might be half the day.  It is like she is going to pass out.  She thought this was the anemia.  She feels like she is going to pass out, like right before everything starts to turn black in the process of passing out.  She has not checked her BP when this happens.  She feels like this every 3 days.  When she feels weak like this, it may be 1-2 days in a row and not happen for 1-2 days.      HTN -  Patient is currently on norvasc 10 mg, lopressor 50 mg BID, irbesartan 150 mg. She does check her BP at home, and it runs 100s-120s/70s. Side effects of medications note: none. Denies headaches, blurred vision, chest pain, shortness of breath, nausea.    She is asking for lasix 20 mg prescription.  The swelling in her feet and ankles is not as bad as it used to be.  It is very much decreased.    She has an iron deficiency anemia.  She is asking about doing a colonoscopy.    Review of Systems      Objective:      Physical Exam  Vitals reviewed.   Constitutional:       Appearance: She is well-developed.   HENT:      Head: Normocephalic and atraumatic.      Mouth/Throat:      Pharynx: No oropharyngeal exudate.   Eyes:      General: No scleral icterus.        Right eye: No discharge.         Left eye: No discharge.      Pupils: Pupils are equal, round, and reactive to light.   Neck:      Thyroid: No thyromegaly.      Trachea: No tracheal deviation.   Cardiovascular:      Rate and Rhythm: Normal rate and regular rhythm.      Heart  sounds: Normal heart sounds. No murmur heard.     No friction rub. No gallop.   Pulmonary:      Effort: Pulmonary effort is normal. No respiratory distress.      Breath sounds: Normal breath sounds. No wheezing or rales.   Chest:      Chest wall: No tenderness.   Abdominal:      General: Bowel sounds are normal. There is no distension.      Palpations: Abdomen is soft. There is no mass.      Tenderness: There is no abdominal tenderness. There is no guarding or rebound.   Musculoskeletal:         General: No tenderness. Normal range of motion.      Cervical back: Normal range of motion and neck supple.   Skin:     General: Skin is warm and dry.      Coloration: Skin is not pale.      Findings: No erythema or rash.   Neurological:      Mental Status: She is alert and oriented to person, place, and time.   Psychiatric:         Behavior: Behavior normal.         Assessment:       1. Essential hypertension    2. Fatigue, unspecified type    3. Iron deficiency anemia, unspecified iron deficiency anemia type  - Ambulatory referral/consult to Endo Procedure ; Future    4. Pulmonary hypertension    5. Severe persistent asthma without complication    6. Aortic atherosclerosis      Plan:       1. Decrease amlodipine to 5 mg.  Continue Lopressor 50 mg b.i.d., irbesartan 150 mg p.o.  2. Could be related to 1. Or 3.   3. Refer for colonoscopy.  Iron tablets every other day.  4.  Continue Lasix 20  5.  Albuterol as needed.    6. Monitor

## 2024-07-12 ENCOUNTER — TELEPHONE (OUTPATIENT)
Dept: INTERNAL MEDICINE | Facility: CLINIC | Age: 79
End: 2024-07-12
Payer: MEDICARE

## 2024-07-12 NOTE — TELEPHONE ENCOUNTER
----- Message from Amira Man sent at 7/12/2024 10:43 AM CDT -----  Contact: [phone]@[email or web address]  Good morning patient would like a call back to discuss if she needs lab work for her upcoming apt on 7/19. Patient also would like to know if she does need lab work can it be on 7/19. Please call patient to advise  [phone]

## 2024-07-25 ENCOUNTER — TELEPHONE (OUTPATIENT)
Dept: ENDOSCOPY | Facility: HOSPITAL | Age: 79
End: 2024-07-25
Payer: MEDICARE

## 2024-07-25 NOTE — TELEPHONE ENCOUNTER
Received Called: Pt was returning a called stated what are we calling for pt stated we was suppose to contact her on the 8/19 I informed pt that I didn't see anything in the system about calling her to schedule on the 8/19 I advise pt I can schedule her now if she will like. Pt got upset and scream and stated I don't want to be schedule now I want yall to call me back.

## 2024-07-30 ENCOUNTER — TELEPHONE (OUTPATIENT)
Dept: ENDOSCOPY | Facility: HOSPITAL | Age: 79
End: 2024-07-30
Payer: MEDICARE

## 2024-07-30 RX ORDER — METOPROLOL TARTRATE 50 MG/1
50 TABLET ORAL 2 TIMES DAILY
Qty: 180 TABLET | Refills: 3 | Status: SHIPPED | OUTPATIENT
Start: 2024-07-30

## 2024-07-30 RX ORDER — DIPHENOXYLATE HYDROCHLORIDE AND ATROPINE SULFATE 2.5; .025 MG/1; MG/1
1 TABLET ORAL 4 TIMES DAILY PRN
Qty: 20 TABLET | Refills: 1 | Status: SHIPPED | OUTPATIENT
Start: 2024-07-30

## 2024-07-30 NOTE — TELEPHONE ENCOUNTER
No care due was identified.  [facility] Embedded Care Due Messages. Reference number: [id number].   7/30/2024 11:07:31 AM CDT

## 2024-07-30 NOTE — TELEPHONE ENCOUNTER
Refill Decision Note   Purvi Quiroga  is requesting a refill authorization.  Brief Assessment and Rationale for Refill:  Approve     Medication Therapy Plan:         Comments:     Note composed:5:39 PM 07/30/2024

## 2024-07-30 NOTE — TELEPHONE ENCOUNTER
"Contacted the patient to schedule an endoscopy procedure(s) colonoscopy. Spoke with patient. Patient states she does not want to schedule colonoscopy at this time. Patient states "I saw something online yesterday about a blood test for colon cancer and I will call my doctor in the next few days to see it that is an option for me. I will call back if I decide to schedule the colonoscopy". Phone number provided.   "

## 2024-07-31 ENCOUNTER — PATIENT MESSAGE (OUTPATIENT)
Dept: INTERNAL MEDICINE | Facility: CLINIC | Age: 79
End: 2024-07-31
Payer: MEDICARE

## 2024-08-05 ENCOUNTER — TELEPHONE (OUTPATIENT)
Dept: INTERNAL MEDICINE | Facility: CLINIC | Age: 79
End: 2024-08-05
Payer: MEDICARE

## 2024-08-07 ENCOUNTER — TELEPHONE (OUTPATIENT)
Dept: ENDOSCOPY | Facility: HOSPITAL | Age: 79
End: 2024-08-07
Payer: MEDICARE

## 2024-08-08 ENCOUNTER — TELEPHONE (OUTPATIENT)
Dept: ENDOSCOPY | Facility: HOSPITAL | Age: 79
End: 2024-08-08
Payer: MEDICARE

## 2024-08-08 VITALS — WEIGHT: 206 LBS | BODY MASS INDEX: 47.67 KG/M2 | HEIGHT: 55 IN

## 2024-08-08 NOTE — PROGRESS NOTES
Purvi Quiroga is a 79 y.o. female is here to be evaluated for a sleep disorder; referred by Self, Aaareferral.    + difficulty falling asleep for over 20 yrs now  Has been a shift worker prior to that.  + [id number] (4-6 times per nigth); + interrupted sleep.    The patient reports snoring, interrupted sleep, excessive daytime sleepiness, and excessive daytime fatigue since over 20 years ago.   Purvi Quiroga denied  gasping for air, choking , and witnessed apneas.    She does  not know if she snores anymore - but lives alone.     The patient feels rested upon awakening. Takes naps. Long nap in the afternoon    The patient  denies morning headaches and reports  dry mouth on awakening. Associates it with asthma inhaler.   Purvi Quiroga reports occasional  nasal congestion.        The patient  reports difficulty with falling and staying asleep.    Purvi Quiroga  denies symptoms concerning for parasomnia except for occasional somniloquy.  The patient  denies auxiliary symptoms of narcolepsy including sleep onset paralysis, hypnagogic hallucinations, sleep attacks and cataplexy.    Purvi Quiroga denied symptoms concerning for RLS; nocturnal leg movements have not been noticed.   The patient does not experience sleep related leg cramps.       Bedtime: 9-10:30 AM  Leep latency hours without medicine; not long with   Awakenings per night: 4=6 bathroom; 5-60 min to return to sleep  Wake time: 9:30 -10 AM    Medications pertinent to sleep  disorders taken currently: Hydroxyzine 50, Trazodone 50 mg, melatonin - all together. 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 Feeling nervous, anxious, or on edge? 0   2. Not being able to stop or control worrying? 0             DME:         PAST MEDICAL HISTORY:    Active Ambulatory Problems     Diagnosis Date Noted    Migraine without aura 10/17/2018    Essential hypertension 12/10/2018    Other osteoporosis without current pathological fracture 12/10/2018    Insomnia     Hiatal hernia     Morbid obesity 12/10/2018    HLD (hyperlipidemia) 07/18/2019    Anxiety 07/18/2019    Impaired fasting blood sugar 10/30/2019    Stage 3a chronic kidney disease 11/08/2019    Arthritis 05/28/2020    Dyspnea on exertion     Gastroesophageal reflux disease 05/26/2021    Obstructive sleep apnea syndrome 05/26/2021    Primary osteoarthritis of both knees 04/25/2022    Decreased range of motion (ROM) of both knees 04/25/2022    Decreased strength 04/25/2022    Neuropathy 09/08/2022    Venous insufficiency 11/02/2022    Lymphedema 11/02/2022    Impaired functional mobility, balance, gait, and endurance 12/08/2023    Pre-syncope 01/09/2024    Anemia 01/09/2024    Fatigue 04/01/2024    Hypersomnia 04/02/2024    Pulmonary hypertension 06/18/2024    Severe persistent asthma without complication 06/18/2024    Aortic atherosclerosis 06/18/2024     Resolved Ambulatory Problems     Diagnosis Date Noted    Anginal equivalent 05/26/2021    UTI (urinary tract infection) 01/09/2024    JOSE (acute kidney injury) 01/09/2024    GI bleed 01/11/2024     Past Medical History:   Diagnosis Date    Asthma                 PAST SURGICAL HISTORY:    Past Surgical History:   Procedure Laterality Date    CATARACT EXTRACTION, BILATERAL      DILATION AND CURETTAGE OF UTERUS      ESOPHAGOGASTRODUODENOSCOPY N/A 1/12/2024    Procedure: EGD (ESOPHAGOGASTRODUODENOSCOPY);  Surgeon: Matthew Del Cid MD;  Location: [facility];  Service:  Gastroenterology;  Laterality: N/A;    KNEE ARTHROSCOPY      TONSILLECTOMY           FAMILY HISTORY:                Family History   Problem Relation Name Age of Onset    Heart disease Father          fatal MI in mid 60s    Hypertension Father      Hypertension Sister      Cancer Brother          colon cancer    Hypertension Brother      Diabetes Neg Hx      Stroke Neg Hx      Hyperlipidemia Neg Hx         SOCIAL HISTORY:          Tobacco:   Social History     Tobacco Use   Smoking Status Never   Smokeless Tobacco Never   Tobacco Comments    18 or 19 yo for 1 yr       alcohol use:    Social History     Substance and Sexual Activity   Alcohol Use Not Currently                   ALLERGIES:  Review of patient's allergies indicates:  No Known Allergies    CURRENT MEDICATIONS:    Current Outpatient Medications   Medication Sig Dispense Refill    albuterol (PROVENTIL/VENTOLIN HFA) 90 mcg/actuation inhaler INHALE 1-2 PUFFS INTO THE LUNGS EVERY 6 HOURS AS NEEDED FOR SHORTNESS OF BREATH 8.5 g 5    albuterol-ipratropium (DUO-NEB) 2.5 mg-0.5 mg/3 mL nebulizer solution USE 1 VIAL via NEBULIZER EVERY 4 HOURS AS NEEDED SHORTNESS OF BREATH      alendronate (FOSAMAX) 70 MG tablet TAKE 1 TABLET BY MOUTH 1/2  HOUR BEFORE ANY FOOD OR  MEDICATION ONCE WEEKLY ON  SAME DAY 8 tablet 5    amLODIPine (NORVASC) 5 MG tablet Take 1 tablet (5 mg total) by mouth once daily. 90 tablet 3    atorvastatin (LIPITOR) 20 MG tablet 1 by mouth daily in evening 90 tablet 3    BREZTRI AEROSPHERE 160-9-4.8 mcg/actuation HFAA INHALE 2 puffs TWICE DAILY. RINSE MOUTH AND throat AFTER USE.      calcium carbonate 650 mg calcium (1,625 mg) tablet Take 1 tablet (650 mg total) by mouth once daily. 30 tablet 11    diphenoxylate-atropine 2.5-0.025 mg (LOMOTIL) 2.5-0.025 mg per tablet TAKE 1 TABLET BY MOUTH 4 TIMES  DAILY AS NEEDED FOR DIARRHEA 20 tablet 1    fluticasone propionate (FLONASE) 50 mcg/actuation nasal spray 1 spray (50 mcg total) by Each Nostril route  once daily. 16 g 0    furosemide (LASIX) 20 MG tablet Take 1 tablet (20 mg total) by mouth once daily. 90 tablet 3    gabapentin (NEURONTIN) 300 MG capsule Take 1 capsule (300 mg total) by mouth 3 (three) times daily. 180 capsule 3    hydrOXYzine pamoate (VISTARIL) 50 MG Cap TAKE ONE CAPSULE BY MOUTH THREE TIMES DAILY AS NEEDED 90 capsule 2    irbesartan (AVAPRO) 150 MG tablet TAKE 1 TABLET BY MOUTH TWICE  DAILY 180 tablet 2    melatonin 5 mg Cap Take 1 capsule (5 mg total) by mouth nightly as needed (insomnia). 90 each 1    metoprolol tartrate (LOPRESSOR) 50 MG tablet Take 1 tablet (50 mg total) by mouth 2 (two) times daily. 180 tablet 3    omeprazole (PRILOSEC) 20 MG capsule TAKE 1 CAPSULE BY MOUTH TWICE  DAILY 180 capsule 1    ondansetron (ZOFRAN-ODT) 8 MG TbDL PLACE 1 TABLET UNDER THE TONGUE EVERY TWELVE HOURS AS NEEDED 30 tablet 2    potassium chloride (MICRO-K) 10 MEQ CpSR One capsule by mouth every morning with meal 90 capsule 2    sodium hyaluronate, EUFLEXXA, (EUFLEXXA) 10 mg/mL(mw 2.4 -3.6 million) injection Inject into the articular space.      traZODone (DESYREL) 50 MG tablet Take 1 tablet (50 mg total) by mouth every evening. 90 tablet 1     No current facility-administered medications for this visit.                PHYSICAL EXAM:  There were no vitals taken for this visit.  GENERAL: Normal development, well groomed.  HEENT: Modified Mallampati:III-IV; Posterior palate is low; Tonsils not visualized; Uvula is wide and elongated;Tongue is enlarged; Dentition is fair; No TMJ tenderness; Jaw opening and protrusion without click and without discomfort.  NECK: Supple. Neck circumference is 15 inches. No thyromegaly. No palpable nodes.     SKIN: On face and neck: No abrasions, no rashes, no lesions.  No subcutaneous nodules are palpable.  RESPIRATORY: Chest is clear to auscultation.  Normal chest expansion and non-labored breathing at rest.  CARDIOVASCULAR: Normal S1, S2.  No murmurs, gallops or rubs. No  carotid bruits bilaterally.  No edema. No clubbing. No cyanosis.    NEURO: Oriented to time, place and person. Normal attention span and concentration. Gait normal.    PSYCH: Affect is full. Mood is normal  MUSCULOSKELETAL: Moves 4 extremities. Gait normal.           ASSESSMENT:    1. Sleep Apnea NEC. The patient symptomatically has  snoring, interrupted sleep, nocturia, excessive daytime sleepiness, and excessive daytime fatigue  with exam findings of crowded airway, elevated BMI, and large neck size. The patient has medical co-morbidities of GI Reflux and hypertension  which can be worsened by LUX. This warrants further investigation for possible obstructive sleep apnea.      2. Insomnia NEC. Multi-factorial -  excess time in bed, poor sleep hygiene, and likely paradoxical insomnia play a role            PLAN:    Diagnostic: PSG; he nature of this procedure and its indication was discussed with the patient. We will notify the patient about sleep study resuts by phone and via my chart.    Will. Recommend to increase 50 mg Trazodone to 100 mg  Will try to eliminate Hydroxyzine if poossible   Sleep hygiene was discussed in detail + brochure was provided.          During our discussion today, we talked about the etiology of  LUX as well as the potential ramifications of untreated sleep apnea, which could include daytime sleepiness, hypertension, heart disease and/or stroke.  We discussed potential treatment options, which could include weight loss, body positioning, continuous positive airway pressure (CPAP), or referral for surgical consideration. Meanwhile, she  is urged to avoid supine sleep, weight gain and alcoholic beverages since all of these can worsen LUX.     The patient was given open opportunity to ask questions and/or express concerns about treatment plan. Two point patient identifier confirmed.       Precautions: The patient was advised to abstain from driving should he feel sleepy or drowsy.    Follow  up: MD after the sleep study has been completed.     ESS ([place] Sleepiness Scale) and other sleep medicine related questionnaires were reviewed with the patient.      46-minute visit. >50% spent counseling patient and coordination of care.  The patient was  cautioned against drowsy driving.

## 2024-08-09 ENCOUNTER — PATIENT MESSAGE (OUTPATIENT)
Dept: SLEEP MEDICINE | Facility: CLINIC | Age: 79
End: 2024-08-09
Payer: MEDICARE

## 2024-08-12 ENCOUNTER — OFFICE VISIT (OUTPATIENT)
Dept: SLEEP MEDICINE | Facility: CLINIC | Age: 79
End: 2024-08-12
Attending: PSYCHIATRY & NEUROLOGY
Payer: MEDICARE

## 2024-08-12 VITALS
WEIGHT: 204.31 LBS | DIASTOLIC BLOOD PRESSURE: 85 MMHG | BODY MASS INDEX: 47.28 KG/M2 | HEIGHT: 55 IN | HEART RATE: 73 BPM | SYSTOLIC BLOOD PRESSURE: 164 MMHG

## 2024-08-12 DIAGNOSIS — G47.00 INSOMNIA, UNSPECIFIED TYPE: Primary | ICD-10-CM

## 2024-08-12 DIAGNOSIS — G47.30 SLEEP APNEA, UNSPECIFIED TYPE: ICD-10-CM

## 2024-08-12 PROCEDURE — 99999 PR PBB SHADOW E&M-EST. PATIENT-LVL III: CPT | Mod: PBBFAC,,, | Performed by: PSYCHIATRY & NEUROLOGY

## 2024-08-12 PROCEDURE — 3077F SYST BP >= 140 MM HG: CPT | Mod: CPTII,S$GLB,, | Performed by: PSYCHIATRY & NEUROLOGY

## 2024-08-12 PROCEDURE — 3079F DIAST BP 80-89 MM HG: CPT | Mod: CPTII,S$GLB,, | Performed by: PSYCHIATRY & NEUROLOGY

## 2024-08-12 PROCEDURE — 3288F FALL RISK ASSESSMENT DOCD: CPT | Mod: CPTII,S$GLB,, | Performed by: PSYCHIATRY & NEUROLOGY

## 2024-08-12 PROCEDURE — 1101F PT FALLS ASSESS-DOCD LE1/YR: CPT | Mod: CPTII,S$GLB,, | Performed by: PSYCHIATRY & NEUROLOGY

## 2024-08-12 PROCEDURE — 99204 OFFICE O/P NEW MOD 45 MIN: CPT | Mod: S$GLB,,, | Performed by: PSYCHIATRY & NEUROLOGY

## 2024-08-12 RX ORDER — TRAZODONE HYDROCHLORIDE 50 MG/1
TABLET ORAL
Qty: 60 TABLET | Refills: 0 | Status: SHIPPED | OUTPATIENT
Start: 2024-08-12

## 2024-08-12 NOTE — PATIENT INSTRUCTIONS
SLEEP LAB (Vickie or Kermit) will contact you to schedulethe sleep study. Their number is [phone] (ext 2). Please call them if you do not hear from them in 2 weeks from now.  The [facility] Lab is located on 7th floor of the [facility]; [facility] is located in [facility] ( 3rd floor [facility]).    SLEEP CLINIC (my assistant) will call you when the sleep study results are ready or I will message you through the portal with the results as we have discussed - if you have not heard from us by 2 weeks from the date of the study, or you can use [facility] to contact me.    Our clinic phone number is [phone] (ext 1)       You are advised to abstain from driving should you feel sleepy or drowsy.  _______________      Don;t forget to bring your sleep medication to the study + other meds you may need  Try increasing Trzodone to 100 mg instead of 50 mg to see if it works better. If it works better, I hope we can eventually decrease /stop Hydroxyzine    __________________            Sleep Hygiene Practices     1. Try going to bed only when you are drowsy.  ?   2. If you are unable to fall asleep or stay asleep, leave your bedroom and engage in a quiet activity elsewhere. Do not permit yourself to fall asleep outside the bedroom. Return to bed when and only when you are sleepy. Repeat this process as often as necessary throughout night.   3. Maintain regular wake-up time, even on days off work & weekends   4. Use your bedroom for sleep and sex   5. Do not watch TV in bed  6. Avoid napping during the daytime. If daytime sleepiness becomes overwhelming, limit nap time to a single nap of less than 1hr, no later than 3pm.   7. Distract your mind. Avoid clock watching. Lying in bed unable to sleep and frustrated needs to be avoided. Try reading or watching a videotape or listening to books on tape. It may be necessary to go into another room to do these.   8. Avoid caffeine within  4-6hrs of bedtime   9. Avoid use of nicotine close to bedtime   10. do not drink alcoholic beverages within 4-6hrs of bedtime   11. While a light snack before bedtime can help promote sound sleep, avoid large meals.   12. Obtain regular exercise, but avoid strenuous exercise within 4hrs of bedtime   13. Minimize light, noise, and extremes in temperature in the bedroom.   14. Precautions: The patient was advised to abstain from driving should they feel sleepy or drowsy.

## 2024-08-13 ENCOUNTER — TELEPHONE (OUTPATIENT)
Dept: SLEEP MEDICINE | Facility: CLINIC | Age: 79
End: 2024-08-13
Payer: MEDICARE

## 2024-08-13 NOTE — TELEPHONE ENCOUNTER
----- Message from Kailee Sanders sent at 8/13/2024  1:24 PM CDT -----  Type:  Patient Returning Call    Who Called:     Who Left Message for Patient:     Does the patient know what this is regarding?: missed call     Best Call Back Number:   [phone]    Additional Information:

## 2024-08-15 ENCOUNTER — TELEPHONE (OUTPATIENT)
Dept: SLEEP MEDICINE | Facility: OTHER | Age: 79
End: 2024-08-15
Payer: MEDICARE

## 2024-08-15 NOTE — TELEPHONE ENCOUNTER
----- Message from Ernesto Hoffman MA sent at 1/16/2020  5:09 PM CST -----  Contact: self      ----- Message -----  From: Carlene Saleem  Sent: 1/16/2020   4:55 PM CST  To: [facility] CASTRO Staff    Pt asking for call back in regards to getting a series of her injection in the left knee      Contact Info          [facility]                                                                                   OUTPATIENT OCCUPATIONAL THERAPY    PLAN OF TREATMENT FOR OUTPATIENT REHABILITATION   Patient's Last Name, First Name, Bozena Gutierrez YOB: 1955   Provider's Name   [facility]   Medical Record No.  [id number]     Onset Date: 05/13/24 Start of Care Date: 05/23/24     Medical Diagnosis:  Closed fracture of distal ends of left radius and ulna, initial encounter      OT Treatment Diagnosis:  Stiffness. Pain. Limited ROM. Plan of Treatment  Frequency/Duration:1x/wk/12 weeks    Certification date from 08/15/24   To 10/10/24        See note for plan of treatment details and functional goals     Rosalina Blackwell OT                         I CERTIFY THE NEED FOR THESE SERVICES FURNISHED UNDER        THIS PLAN OF TREATMENT AND WHILE UNDER MY CARE     (Physician attestation of this document indicates review and certification of the therapy plan).              Referring Provider:  Benjie Gifford  I have reviewed records and images and concur with evaluation and treatment plan.  Benjie Gifford MD    Initial Assessment  See Epic Evaluation- 05/23/24         08/15/24 0500   Appointment Info   Treating Provider Rosalina Blackwell, ELKER/L, CHT   Total/Authorized Visits 12   Visits Used 11   Medical Diagnosis Closed fracture of distal ends of left radius and ulna, initial encounter   OT Tx Diagnosis Stiffness. Pain. Limited ROM.   Progress Note/Certification   Start Of Care Date 05/23/24   Onset of Illness/Injury or Date of Surgery 05/13/24   Therapy Frequency 1x/wk   Predicted Duration 12 weeks   Certification date from 08/15/24   Certification date to 10/10/24   KX Modifier Statement I certify the need for these services furnished under this plan of treatment and while under my care.  (Physician co-signature of this document indicates review and certification  of the therapy plan)   Progress Note Due Date 10/10/24   Progress Note Completed Date 08/15/24   OT Goal 1   Goal Description Pt will increase AROM to 60/60 E/F for increased functional engagement in ADLs.   Rationale In order to maximize safety and independence with ADL/IADLs   Goal Progress not met, making progress towards   Target Date 08/15/24   Subjective Report   Subjective Report More sore through the ulnar side of wrist, couldn't make it through a whole piano song without pain.   Objective Measures   Objective Measures Hand Obj Measures;Objective Measure 1   Hand Objective Measures ROM;Strength   ROM Wrist ROM;Forearm ROM   Strength ;Lateral Pinch;3 Point Pinch   Objective Measure 1   Objective Measure DWC edema   Details 16.5   Forearm ROM   Supination 90  (nt)   Pronation  85  (nt)   Wrist ROM    Extension 68  (65 post)   Flexion 35  (40 post)   Radial Deviation (RD) 20   Ulnar Deviation (UD) 30    (measured in pounds)    Average Strength 15R 50L   Lateral Pinch (measured in pounds)   Lateral Pinch Average Strength 10L 14R   3 Point Pinch (measured in pounds)   3 Point Pinch Average Strength 8L 13R   Treatment Interventions (OT)   Interventions Manual Therapy;Therapeutic Procedure/Exercise;Neuromuscular Re-education   Neuromuscular Re-education   Neuro Re-ed 1 Proprioception   Neuro Re-ed 1 - Details hammer  (rotation reviewed)   Skilled Intervention graded input into the wrist with controlled movement/motion   Patient Response/Progress well   Therapeutic Procedure/Exercise   Therapeutic Procedure: strength, endurance, ROM, flexibillity minutes (83861) 35   Therapeutic Procedures Ther Proc 2;Ther Proc 3;Ther Proc 4;Ther Proc 5;Ther Proc 6;Ther Proc 7   Ther Proc 1 PROM   Ther Proc 1 - Details Wrist E/F/R/U  (table top)   Ther Proc 2 Tendon glides   Ther Proc 2 - Details hook and flat, A/PROM  (+ finger spread)   Ther Proc 3 web   Ther Proc 3 - Details WB   Ther Proc 4 MWM   Ther Proc 4 -  Details table edge with facilitated mobility of PCR   Ther Proc 5 Isotonics   Ther Proc 5 - Details WE and RD with 3#  (reviewed for technique/mechanics)   Ther Proc 6 putty   Ther Proc 6 - Details , pinch, add, abd  (reviewed)   Ther Proc 7 reverse blocking   Ther Proc 7 - Details w/ use of orthosis to improve extensor mechanism strength   Skilled Intervention graded activity to meet patient at current level and modify as needed   Patient Response/Progress well   Manual Therapy   Manual Therapy Minutes (28375) 8   Manual Therapy Manual Therapy 2   Manual Therapy 1 STM   Manual Therapy 1 - Details tissue mobilization throughout the FA for elongation to tissues.  (focus on flexion motion)   Manual Therapy 2 JMs   Manual Therapy 2 - Details individual and through PCR   Skilled Intervention anatomical knowledge of structures to reduce edema ad create length of tissues   Patient Response/Progress well   Education   Learner/Method Patient   Plan   Home program PTRx   Updates to plan of care cont as per POC 1x/wk   Plan for next session ROM, PROM, stability, edema control. strength   Comments   Comments Pt with continued defcitis in AROM of the wrist and digits. She remains very stiff with lingering edema throughout the wrist/hand. Pt with ulnar sided wrist pain, addressing with [facility] based rehab for strengthening to offload this area. Pt overall has made good gains in functional use, but pain and stiffness cont to limit.   Total Session Time   Timed Code Treatment Minutes 43   Total Treatment Time (sum of timed and untimed services) 43

## 2024-08-20 ENCOUNTER — TELEPHONE (OUTPATIENT)
Dept: ENDOSCOPY | Facility: HOSPITAL | Age: 79
End: 2024-08-20
Payer: MEDICARE

## 2024-08-20 DIAGNOSIS — Z12.11 SPECIAL SCREENING FOR MALIGNANT NEOPLASMS, COLON: Primary | ICD-10-CM

## 2024-08-20 DIAGNOSIS — D50.9 IRON DEFICIENCY ANEMIA, UNSPECIFIED IRON DEFICIENCY ANEMIA TYPE: ICD-10-CM

## 2024-08-20 NOTE — TELEPHONE ENCOUNTER
Spoke to patient to schedule procedure(s) Colonoscopy       Physician to perform procedure(s) Dr. NICHOLAS Sanabria  Date of Procedure (s) 10/16/24  Arrival Time 10:30 AM  Time of Procedure(s) 11:30 AM   Location of Procedure(s) [place] 2nd Floor  Type of Rx Prep sent to patient: PEG  Instructions provided to patient via Postal Mail    Patient was informed on the following information and verbalized understanding. Screening questionnaire reviewed with patient and complete. If procedure requires anesthesia, a responsible adult needs to be present to accompany the patient home, patient cannot drive after receiving anesthesia. Appointment details are tentative, especially check-in time. Patient will receive a prep-op call 7 days prior to confirm check-in time for procedure. If applicable the patient should contact their pharmacy to verify Rx for procedure prep is ready for pick-up. Patient was advised to call the scheduling department at [phone] if pharmacy states no Rx is available. Patient was advised to call the endoscopy scheduling department if any questions or concerns arise.      [facility] Scheduling Department

## 2024-09-24 ENCOUNTER — HOSPITAL ENCOUNTER (OUTPATIENT)
Dept: SLEEP MEDICINE | Facility: HOSPITAL | Age: 79
Discharge: HOME | End: 2024-09-24
Attending: PSYCHIATRY & NEUROLOGY
Payer: MEDICARE

## 2024-09-24 DIAGNOSIS — G47.30 SLEEP APNEA, UNSPECIFIED TYPE: ICD-10-CM

## 2024-09-24 PROCEDURE — 95810 POLYSOM 6/> YRS 4/> PARAM: CPT

## 2024-09-25 DIAGNOSIS — Z78.0 MENOPAUSE: ICD-10-CM

## 2024-09-25 PROBLEM — G47.30 SLEEP APNEA: Status: ACTIVE | Noted: 2021-05-26

## 2024-09-25 NOTE — PROGRESS NOTES
Education was done via explanation of sleep study process and procedure. All questions were answered.    Pt did not meet criteria for CPAP. Some respiratory events were observed. No REM sleep was obtained.    Low sat of 87% was observed in study. EKG revealed NSR. Soft to moderate snoring was heard. Thank you letter was given in a.m

## 2024-10-03 ENCOUNTER — PATIENT MESSAGE (OUTPATIENT)
Dept: SLEEP MEDICINE | Facility: CLINIC | Age: 79
End: 2024-10-03
Payer: MEDICARE

## 2024-10-04 ENCOUNTER — TELEPHONE (OUTPATIENT)
Dept: SLEEP MEDICINE | Facility: CLINIC | Age: 79
End: 2024-10-04
Payer: MEDICARE

## 2024-10-04 NOTE — TELEPHONE ENCOUNTER
"She has several things to mention about the PSG night and results  Mentions that she did not have a urinalysis  Study "full of errors"  She didn't sleep  Doors were locked/had to enter from ER area and it was about close to 3 blocks of walking/wore her out and also has asthma and had to stop 3-4x to use her inhaler for breathing  "

## 2024-10-09 ENCOUNTER — PATIENT MESSAGE (OUTPATIENT)
Dept: SLEEP MEDICINE | Facility: CLINIC | Age: 79
End: 2024-10-09
Payer: MEDICARE

## 2024-10-09 DIAGNOSIS — G43.009 MIGRAINE WITHOUT AURA AND WITHOUT STATUS MIGRAINOSUS, NOT INTRACTABLE: ICD-10-CM

## 2024-10-09 RX ORDER — GABAPENTIN 300 MG/1
CAPSULE ORAL
Start: 2024-10-09

## 2024-10-09 RX ORDER — IPRATROPIUM BROMIDE AND ALBUTEROL SULFATE 2.5; .5 MG/3ML; MG/3ML
3 SOLUTION RESPIRATORY (INHALATION) EVERY 4 HOURS PRN
Qty: 75 ML | Refills: 1 | Status: SHIPPED | OUTPATIENT
Start: 2024-10-09

## 2024-10-15 ENCOUNTER — TELEPHONE (OUTPATIENT)
Dept: INTERNAL MEDICINE | Facility: CLINIC | Age: 79
End: 2024-10-15

## 2024-10-15 NOTE — TELEPHONE ENCOUNTER
----- Message from Rachael sent at 10/14/2024  9:26 AM CDT -----  Contact: Self [phone]  Would like to receive medical advice.    Would they like a call back or a response via [facility]: call back     Additional information:  Calling to r/s upcoming appt for 10/31 in afternoons.

## 2024-10-18 ENCOUNTER — TELEPHONE (OUTPATIENT)
Dept: SLEEP MEDICINE | Facility: CLINIC | Age: 79
End: 2024-10-18
Payer: MEDICARE

## 2024-10-18 NOTE — TELEPHONE ENCOUNTER
Patient sent a message to schedule a follow up. Patient was scheduled for 03/26/2025 at 1:30 and added to the wait list

## 2024-10-21 ENCOUNTER — TELEPHONE (OUTPATIENT)
Dept: INTERNAL MEDICINE | Facility: CLINIC | Age: 79
End: 2024-10-21
Payer: MEDICARE

## 2024-10-21 NOTE — TELEPHONE ENCOUNTER
----- Message from Marj sent at 10/21/2024  9:31 AM CDT -----  Contact: Self/[phone]  [email or web address]     Patient is calling for Medical Advice regarding:frequent urination/elevated blood pressure     How long has patient had these symptoms: 2 days     Patient wants a call back or thru [facility]:placed patient on hold to get triage nurse patient hung up bc It was taking too long to get a nurse to answer .    Comments:Symptoms: Urine Symptoms, High Blood Pressure - Caller Reports  Outcome: Schedule a same-day appointment or talk to a nurse or provider within 1 hour.  Reason: Caller denied all higher acuity questions    The caller rejected this outcome.      Please advise patient replies from provider may take up to 48 hours.

## 2024-10-21 NOTE — TELEPHONE ENCOUNTER
----- Message from Marimar sent at 10/21/2024  9:56 AM CDT -----  Contact: [phone] Patient  Symptoms: Urine Symptoms, High Blood Pressure - Caller Reports, Weakness  Outcome: Schedule a same-day appointment or talk to a nurse or provider within 1 hour.  Reason: Caller denied all higher acuity questions      They did not have an appointment with Dr Burgos and she asked if the nurse can call her. I offered with a different doctor but she does not want and she does not want to be seen today but she wants to speak to the nurse.

## 2024-10-22 ENCOUNTER — OFFICE VISIT (OUTPATIENT)
Dept: INTERNAL MEDICINE | Facility: CLINIC | Age: 79
End: 2024-10-22
Payer: MEDICARE

## 2024-10-22 VITALS
HEART RATE: 67 BPM | HEIGHT: 55 IN | TEMPERATURE: 98 F | WEIGHT: 198.44 LBS | DIASTOLIC BLOOD PRESSURE: 84 MMHG | BODY MASS INDEX: 45.92 KG/M2 | OXYGEN SATURATION: 97 % | SYSTOLIC BLOOD PRESSURE: 138 MMHG

## 2024-10-22 DIAGNOSIS — R35.0 URINARY FREQUENCY: Primary | ICD-10-CM

## 2024-10-22 PROCEDURE — 3288F FALL RISK ASSESSMENT DOCD: CPT | Mod: CPTII,S$GLB,, | Performed by: NURSE PRACTITIONER

## 2024-10-22 PROCEDURE — 99999 PR PBB SHADOW E&M-EST. PATIENT-LVL IV: CPT | Mod: PBBFAC,,, | Performed by: NURSE PRACTITIONER

## 2024-10-22 PROCEDURE — 1160F RVW MEDS BY RX/DR IN RCRD: CPT | Mod: CPTII,S$GLB,, | Performed by: NURSE PRACTITIONER

## 2024-10-22 PROCEDURE — 1101F PT FALLS ASSESS-DOCD LE1/YR: CPT | Mod: CPTII,S$GLB,, | Performed by: NURSE PRACTITIONER

## 2024-10-22 PROCEDURE — 3079F DIAST BP 80-89 MM HG: CPT | Mod: CPTII,S$GLB,, | Performed by: NURSE PRACTITIONER

## 2024-10-22 PROCEDURE — 3075F SYST BP GE 130 - 139MM HG: CPT | Mod: CPTII,S$GLB,, | Performed by: NURSE PRACTITIONER

## 2024-10-22 PROCEDURE — 1159F MED LIST DOCD IN RCRD: CPT | Mod: CPTII,S$GLB,, | Performed by: NURSE PRACTITIONER

## 2024-10-22 PROCEDURE — 99214 OFFICE O/P EST MOD 30 MIN: CPT | Mod: S$GLB,,, | Performed by: NURSE PRACTITIONER

## 2024-10-22 NOTE — PROGRESS NOTES
Subjective:       Patient ID: Purvi Quiroga is a 79 y.o. female.    Chief Complaint: Urinary Frequency, Urinary Tract Infection, and Hypertension    History of Present Illness    CHIEF COMPLAINT:  Patient presents today for concerns about a possible urinary tract infection.    URINARY SYMPTOMS:  She reports frequent urination with large volumes, primarily occurring at night, since approximately the end of last week. She denies any burning sensation when urinating but notes tenderness in the bladder area and slight pain in the lower back. The urinary symptoms have been affecting her sleep quality, requiring her to urinate approximately every 1.5 to 2 hours during the night. Urinary frequency is primarily nocturnal and not as problematic during the day.    ASSOCIATED SYMPTOMS:  She reports more frequent nausea (not increased in severity) and significant weakness since the onset of urinary symptoms. She notes a weight loss of 3-4 lbs over the past week, which she attributes to increased urination rather than decreased food intake.    SLEEP:  She reports poor sleep quality due to  and frequent urination.    FLUID INTAKE:  She reports drinking fluids mainly at night, often due to experiencing dry mouth after taking her sleep medication. She does not specify a specific time when she stops fluid intake before bed.      ROS:  Constitutional: +weight loss  Gastrointestinal: +nausea  Genitourinary: +frequency  Psychiatric: +sleep difficulty       Objective:      Physical Exam  Vitals reviewed.   Constitutional:       Appearance: Normal appearance. She is obese.   HENT:      Head: Normocephalic and atraumatic.   Eyes:      Conjunctiva/sclera: Conjunctivae normal.      Pupils: Pupils are equal, round, and reactive to light.   Cardiovascular:      Rate and Rhythm: Normal rate and regular rhythm.   Pulmonary:      Effort: Pulmonary effort is normal.      Breath sounds: Normal breath sounds.   Abdominal:      General: Bowel  sounds are normal.      Palpations: Abdomen is soft.      Tenderness: There is abdominal tenderness in the suprapubic area. There is right CVA tenderness and left CVA tenderness.   Musculoskeletal:         General: Normal range of motion.      Cervical back: Normal range of motion and neck supple.   Skin:     General: Skin is warm.   Neurological:      General: No focal deficit present.   Psychiatric:         Mood and Affect: Mood normal.         Assessment:       1. Urinary frequency  - Urinalysis; Future  - Urine culture; Future      Plan:       Assessment & Plan    IMPRESSION:  Suspected UTI based on reported symptoms of frequent urination, large urine output, and weakness  Performed physical exam, noting tenderness in bladder area and slight pain in back    SUSPECTED URINARY TRACT INFECTION (UTI):  - Explained the difference between urinalysis and urine culture results.  - Discussed that white blood cells in urine do not necessarily indicate a UTI, as vaginal bacteria can contaminate the sample.  - Informed patient that urine culture results take 48 to 72 hours.  - Urinalysis and urine culture ordered.  - Follow up in 48 to 72 hours for urine culture results.  - Instructed patient to provide a clean catch urine sample on the 5th floor.    FOLLOW UP:  - Advised patient to take the order to the  for scheduling.       This note was generated with the assistance of ambient listening technology. Verbal consent was obtained by the patient and accompanying visitor(s) for the recording of patient appointment to facilitate this note. I attest to having reviewed and edited the generated note for accuracy, though some syntax or spelling errors may persist. Please contact the author of this note for any clarification.

## 2024-10-29 ENCOUNTER — LAB VISIT (OUTPATIENT)
Dept: LAB | Facility: HOSPITAL | Age: 79
End: 2024-10-29
Attending: NURSE PRACTITIONER
Payer: MEDICARE

## 2024-10-29 DIAGNOSIS — R35.0 URINARY FREQUENCY: ICD-10-CM

## 2024-10-29 DIAGNOSIS — G47.00 INSOMNIA, UNSPECIFIED TYPE: ICD-10-CM

## 2024-10-29 LAB
BILIRUB UR QL STRIP: ABNORMAL
CLARITY UR REFRACT.AUTO: ABNORMAL
COLOR UR AUTO: YELLOW
GLUCOSE UR QL STRIP: NEGATIVE
HGB UR QL STRIP: NEGATIVE
KETONES UR QL STRIP: NEGATIVE
LEUKOCYTE ESTERASE UR QL STRIP: NEGATIVE
NITRITE UR QL STRIP: NEGATIVE
PH UR STRIP: 6 [PH] (ref 5–8)
PROT UR QL STRIP: NEGATIVE
SP GR UR STRIP: 1.02 (ref 1–1.03)
URN SPEC COLLECT METH UR: ABNORMAL

## 2024-10-29 PROCEDURE — 81003 URINALYSIS AUTO W/O SCOPE: CPT | Performed by: NURSE PRACTITIONER

## 2024-10-29 PROCEDURE — 87086 URINE CULTURE/COLONY COUNT: CPT | Performed by: NURSE PRACTITIONER

## 2024-10-29 PROCEDURE — 87186 SC STD MICRODIL/AGAR DIL: CPT | Performed by: NURSE PRACTITIONER

## 2024-10-29 PROCEDURE — 87088 URINE BACTERIA CULTURE: CPT | Performed by: NURSE PRACTITIONER

## 2024-10-29 RX ORDER — TRAZODONE HYDROCHLORIDE 50 MG/1
TABLET ORAL
Qty: 60 TABLET | Refills: 0 | Status: SHIPPED | OUTPATIENT
Start: 2024-10-29

## 2024-10-30 ENCOUNTER — PATIENT MESSAGE (OUTPATIENT)
Dept: INTERNAL MEDICINE | Facility: CLINIC | Age: 79
End: 2024-10-30
Payer: MEDICARE

## 2024-10-31 LAB — BACTERIA UR CULT: ABNORMAL

## 2024-11-01 ENCOUNTER — TELEPHONE (OUTPATIENT)
Dept: INTERNAL MEDICINE | Facility: CLINIC | Age: 79
End: 2024-11-01
Payer: MEDICARE

## 2024-11-01 DIAGNOSIS — N39.0 E. COLI UTI (URINARY TRACT INFECTION): Primary | ICD-10-CM

## 2024-11-01 DIAGNOSIS — B96.20 E. COLI UTI (URINARY TRACT INFECTION): Primary | ICD-10-CM

## 2024-11-01 RX ORDER — NITROFURANTOIN 25; 75 MG/1; MG/1
100 CAPSULE ORAL 2 TIMES DAILY
Qty: 10 CAPSULE | Refills: 0 | Status: SHIPPED | OUTPATIENT
Start: 2024-11-01

## 2024-11-07 ENCOUNTER — TELEPHONE (OUTPATIENT)
Dept: SPORTS MEDICINE | Facility: CLINIC | Age: 79
End: 2024-11-07
Payer: MEDICARE

## 2024-11-07 DIAGNOSIS — M25.561 CHRONIC PAIN OF BOTH KNEES: ICD-10-CM

## 2024-11-07 DIAGNOSIS — M17.0 PRIMARY OSTEOARTHRITIS OF BOTH KNEES: Primary | ICD-10-CM

## 2024-11-07 DIAGNOSIS — G89.29 CHRONIC PAIN OF BOTH KNEES: ICD-10-CM

## 2024-11-07 DIAGNOSIS — M25.562 CHRONIC PAIN OF BOTH KNEES: ICD-10-CM

## 2024-11-07 NOTE — TELEPHONE ENCOUNTER
----- Message from Reshma sent at 11/7/2024  2:31 PM CST -----  Regarding: Appt  Contact: pt  [phone]  Pt is returning call to schedule appt, was referred by Dr. Caruso for bilateral knee pain, please call pt @[phone]

## 2024-11-07 NOTE — TELEPHONE ENCOUNTER
LVM for the Pt asking her to call the office back so that we can get her a follow up appt to re-eval her knee pain and to reorder the gel injection if it is still the plan of action to help her knee pain.  Provided call back number.

## 2024-11-07 NOTE — TELEPHONE ENCOUNTER
Spoke to the Pt about her request for appts for her knee pain and the gel injections that she would like to get.  After she informed me of the soonest she would be able to come in for these injections I asked her if she would like get just one gel injection in each knee instead of doing the series of injections due to her having to get transportation for each appt in the series.  She denied the idea and did not want to change her ways. I was only able to get 2 of the 3 injections schedule as Dr SALGADO is out for sometime to keep it consistent per the series factors. Pt understood as I informed her of the dates for the injections.

## 2024-11-07 NOTE — TELEPHONE ENCOUNTER
Care Due:                  Date            Visit Type   Department     Provider  --------------------------------------------------------------------------------                                EP -                              PRIMARY      MET INTERNAL  Last Visit: 06-      CARE ([facility])   MEDICINE       Palomo Burgos  Next Visit: None Scheduled  None         None Found                                                            Last  Test          Frequency    Reason                     Performed    Due Date  --------------------------------------------------------------------------------    Lipid Panel.  12 months..  atorvastatin.............  10-   10-    Mg Level....  12 months..  alendronate..............  02- 01-    Phosphate...  12 months..  alendronate..............  Not Found    Overdue    [employer] Embedded Care Due Messages. Reference number: [id number].   11/07/2024 4:45:53 PM CST

## 2024-11-07 NOTE — TELEPHONE ENCOUNTER
Refill Routing Note   Medication(s) are not appropriate for processing by [facility] for the following reason(s):        Due for refill >6 months ago: Micro-K  Outside of protocol: Prilosec    [facility] action(s):  Defer  Route   Labs required: Lipid panel, Mg, phosphate outdated   Micro-K last dispensed 2/12/24 for 30 day supply per Epic data.   Prilosec total daily dose outside of [facility] protocol (40 mg daily max)          Appointments  past 12m or future 3m with PCP    Date Provider   Last Visit   6/18/2024 Palomo Burgos MD   Next Visit   Visit date not found Palomo Burgos MD   ED visits in past 90 days: 0        Note composed:5:41 PM 11/07/2024

## 2024-11-07 NOTE — TELEPHONE ENCOUNTER
LVM for the Pt asking her to call the office back so that we can get her follow up appt scheduled.

## 2024-11-07 NOTE — TELEPHONE ENCOUNTER
----- Message from Armando Reinoso sent at 11/7/2024  1:44 PM CST -----  Regarding: appt for Injection  Contact: pt [phone]  Type:  Sooner Apoointment Request    Caller is requesting an  appointment.  For bilateral knee injections  Name of Caller: carrol   When is the first available appointment? No appt in epic   Symptoms: bilateral knee pain   Would the patient rather a call back or a response via [facility]? Call back   Best Call Back Number:pt [phone]   Additional Information:

## 2024-11-08 RX ORDER — OMEPRAZOLE 20 MG/1
CAPSULE, DELAYED RELEASE ORAL
Qty: 180 CAPSULE | Refills: 1 | Status: SHIPPED | OUTPATIENT
Start: 2024-11-08

## 2024-11-08 RX ORDER — POTASSIUM CHLORIDE 750 MG/1
CAPSULE, EXTENDED RELEASE ORAL
Qty: 90 CAPSULE | Refills: 1 | Status: SHIPPED | OUTPATIENT
Start: 2024-11-08

## 2024-11-14 ENCOUNTER — TELEPHONE (OUTPATIENT)
Dept: SPORTS MEDICINE | Facility: CLINIC | Age: 79
End: 2024-11-14
Payer: MEDICARE

## 2024-11-14 DIAGNOSIS — M25.561 CHRONIC PAIN OF BOTH KNEES: ICD-10-CM

## 2024-11-14 DIAGNOSIS — G89.29 CHRONIC PAIN OF BOTH KNEES: ICD-10-CM

## 2024-11-14 DIAGNOSIS — M17.0 PRIMARY OSTEOARTHRITIS OF BOTH KNEES: Primary | ICD-10-CM

## 2024-11-14 DIAGNOSIS — M25.562 CHRONIC PAIN OF BOTH KNEES: ICD-10-CM

## 2024-11-14 NOTE — TELEPHONE ENCOUNTER
Spoke with patient regarding upcoming VSI series. Informed patient that with the timing of her injections and Dr. Villanueva's Caret plans, she would have to wait one month between injections 2 and 3. Offered Synvisc one to patient so she can do 1 injection at her appt on 12/2, and would not have a gap within her series. Patient elects to do single VSI on 12/2 and cancel her appt on 12/9.    Raiza Ortiz MS, [facility]     Sports Medicine Assistant to:  Cj Caruso M.D.  System Director, [facility]  Head Medical Team Physician, [place]  President-Elect, [facility] [employer]  Chief Medical Officer, [facility]

## 2024-11-18 ENCOUNTER — TELEPHONE (OUTPATIENT)
Dept: INTERNAL MEDICINE | Facility: CLINIC | Age: 79
End: 2024-11-18
Payer: MEDICARE

## 2024-11-18 NOTE — TELEPHONE ENCOUNTER
Spoke to pt on phone, pt says she will call back tomorrow morning after she figures out approximately when she would like to book appt

## 2024-11-25 NOTE — PROGRESS NOTES
Satya knee joint bilateral 1/1  Lot number: [id number]  Expiration date: 2027-04-30    MEDICAL NECESSITY FOR VISCOSUPPLEMENTATION USE: After thorough evaluation of the patient, I have determined that viscosupplementation treatment is medically necessary. 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 Oral analgesics including NSAIDs have not adequately controlled the patient's symptoms. 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

## 2024-11-26 DIAGNOSIS — G47.00 INSOMNIA, UNSPECIFIED TYPE: ICD-10-CM

## 2024-11-26 RX ORDER — TRAZODONE HYDROCHLORIDE 50 MG/1
TABLET ORAL
Qty: 180 TABLET | Refills: 1 | Status: SHIPPED | OUTPATIENT
Start: 2024-11-26

## 2024-11-26 NOTE — TELEPHONE ENCOUNTER
No care due was identified.  [facility] Embedded Care Due Messages. Reference number: [id number].   11/26/2024 9:04:11 AM CST

## 2024-11-26 NOTE — TELEPHONE ENCOUNTER
Good Morning, I spoke to the patient in regards to her message for her medication refill. The patient is requesting for a Trazodone (Desyrel)50 MG tablet refill request. The patient also stated that she started having tremors in her hands that started about 2 to 3 weeks ago. She states that the tremors started off slight then as time went on the tremors become stronger. The patient also wants to know if you can refill her Trazodone(Desyrel) 50 MG tablet  medication, so that she can take 2 pills instead of 1 during the day.The patient says that 1 Trazodone does not help with her pain, so she would like enough pills refilled to last her a while.

## 2024-11-26 NOTE — TELEPHONE ENCOUNTER
----- Message from Michela sent at 11/26/2024  8:24 AM CST -----  Contact: [phone] patient  [email or web address]     Patient is calling for Medical Advice regarding:Patient is calling . Patient want to speak to nurse only on appointments    How long has patient had these symptoms:    Pharmacy name and phone#:    Patient wants a call back or thru [facility]:call     Comments:    Please advise patient replies from provider may take up to 48 hours.

## 2024-12-02 ENCOUNTER — OFFICE VISIT (OUTPATIENT)
Dept: SPORTS MEDICINE | Facility: CLINIC | Age: 79
End: 2024-12-02
Payer: MEDICARE

## 2024-12-02 VITALS
SYSTOLIC BLOOD PRESSURE: 124 MMHG | BODY MASS INDEX: 46.26 KG/M2 | DIASTOLIC BLOOD PRESSURE: 75 MMHG | WEIGHT: 199.88 LBS | HEART RATE: 76 BPM | HEIGHT: 55 IN

## 2024-12-02 DIAGNOSIS — M17.0 PRIMARY OSTEOARTHRITIS OF BOTH KNEES: Primary | ICD-10-CM

## 2024-12-02 PROCEDURE — 99499 UNLISTED E&M SERVICE: CPT | Mod: S$GLB,,, | Performed by: FAMILY MEDICINE

## 2024-12-02 PROCEDURE — 20611 DRAIN/INJ JOINT/BURSA W/US: CPT | Mod: 50,S$GLB,, | Performed by: FAMILY MEDICINE

## 2024-12-02 PROCEDURE — 99999 PR PBB SHADOW E&M-EST. PATIENT-LVL IV: CPT | Mod: PBBFAC,,, | Performed by: FAMILY MEDICINE

## 2024-12-02 NOTE — PROCEDURES
"Large Joint Aspiration/Injection: bilateral knee    Date/Time: 12/2/2024 10:30 AM    Performed by: Cj Caruso MD  Authorized by: Cj Caruso MD    Consent Done?:  Yes (Verbal)  Indications:  Pain  Site marked: the procedure site was marked    Timeout: prior to procedure the correct patient, procedure, and site was verified    Prep: patient was prepped and draped in usual sterile fashion      Details:  Needle Size:  25 G  Ultrasonic Guidance for needle placement?: Yes    Images are saved and documented.  Approach:  Lateral  Location:  Knee  Laterality:  Bilateral  Site:  Bilateral knee  Medications (Right):  60 mg hyaluronate sodium, stabilized (DUROLANE) 60 mg/3 mL  Medications (Left):  60 mg hyaluronate sodium, stabilized (DUROLANE) 60 mg/3 mL  Patient tolerance:  Patient tolerated the procedure well with no immediate complications     Description of ultrasound utilization for needle guidance:   Ultrasound guidance used for needle localization. Images saved and stored for documentation. The SUPRAPATELLAR BURSA / KNEE JOINT was visualized. Dynamic visualization of the 25g x 1.5" needle was continuous throughout the procedure.     "

## 2024-12-03 ENCOUNTER — TELEPHONE (OUTPATIENT)
Dept: INTERNAL MEDICINE | Facility: CLINIC | Age: 79
End: 2024-12-03
Payer: MEDICARE

## 2024-12-03 NOTE — TELEPHONE ENCOUNTER
----- Message from Tia sent at 12/3/2024  9:50 AM CST -----  Contact: [employer] [phone]  Patient would like to know if you have forms in your office to renew her Drivers license?

## 2024-12-03 NOTE — TELEPHONE ENCOUNTER
Contact: Mobile [phone]  Patient would like to know if you have forms in your office to renew her Drivers license?    Good Evening I have spoke to the patient in regards to her questions about renewing her drivers license.The patient states that she went to the DMV to renew her drivers license, and was told that she can get a form from her primary care doctor.I have explained to the patient that the only drivers form she can get from her provider is a handicap tag. I had advised the patient to give the DMV a call back for clarification.

## 2024-12-05 RX ORDER — HYDROXYZINE PAMOATE 50 MG/1
CAPSULE ORAL
Qty: 90 CAPSULE | Refills: 1 | Status: SHIPPED | OUTPATIENT
Start: 2024-12-05

## 2024-12-05 NOTE — TELEPHONE ENCOUNTER
Refill Routing Note   Medication(s) are not appropriate for processing by [facility] for the following reason(s):        Outside of protocol    [facility] action(s):  Route             Appointments  past 12m or future 3m with PCP    Date Provider   Last Visit   6/18/2024 Palomo Burgos MD   Next Visit   Visit date not found Palomo Burgos MD   ED visits in past 90 days: 0        Note composed:11:57 AM 12/05/2024

## 2024-12-06 ENCOUNTER — TELEPHONE (OUTPATIENT)
Dept: INTERNAL MEDICINE | Facility: CLINIC | Age: 79
End: 2024-12-06
Payer: MEDICARE

## 2024-12-24 DIAGNOSIS — R11.0 NAUSEA: ICD-10-CM

## 2024-12-24 RX ORDER — ONDANSETRON 8 MG/1
TABLET, ORALLY DISINTEGRATING ORAL
Qty: 30 TABLET | Refills: 2 | Status: SHIPPED | OUTPATIENT
Start: 2024-12-24

## 2024-12-24 RX ORDER — ALENDRONATE SODIUM 70 MG/1
TABLET ORAL
Qty: 12 TABLET | Refills: 2 | Status: SHIPPED | OUTPATIENT
Start: 2024-12-24

## 2024-12-24 NOTE — TELEPHONE ENCOUNTER
No care due was identified.  [facility] Embedded Care Due Messages. Reference number: [id number].   12/24/2024 9:07:52 AM CST

## 2025-01-29 ENCOUNTER — TELEPHONE (OUTPATIENT)
Dept: INTERNAL MEDICINE | Facility: CLINIC | Age: 80
End: 2025-01-29
Payer: MEDICARE

## 2025-01-29 DIAGNOSIS — R73.09 ELEVATED GLUCOSE: ICD-10-CM

## 2025-01-29 DIAGNOSIS — I10 ESSENTIAL HYPERTENSION: Primary | ICD-10-CM

## 2025-01-29 DIAGNOSIS — N18.31 STAGE 3A CHRONIC KIDNEY DISEASE: ICD-10-CM

## 2025-01-29 DIAGNOSIS — D50.8 OTHER IRON DEFICIENCY ANEMIA: ICD-10-CM

## 2025-01-29 NOTE — TELEPHONE ENCOUNTER
Spoke to pt labs ordered, she will make her own lab appt per pt request she just wanted to know if labs were needed and if they could be put in

## 2025-01-29 NOTE — TELEPHONE ENCOUNTER
----- Message from Camilla sent at 1/29/2025 10:48 AM CST -----  Contact: Pt  [phone]  Pt called in regards to she would like to know if she will need labs done before her upcoming vidhi if so please put the orders pt will call back to schedule.

## 2025-02-26 ENCOUNTER — LAB VISIT (OUTPATIENT)
Dept: LAB | Facility: HOSPITAL | Age: 80
End: 2025-02-26
Attending: INTERNAL MEDICINE
Payer: MEDICARE

## 2025-02-26 DIAGNOSIS — I10 ESSENTIAL HYPERTENSION: ICD-10-CM

## 2025-02-26 DIAGNOSIS — N18.31 STAGE 3A CHRONIC KIDNEY DISEASE: ICD-10-CM

## 2025-02-26 DIAGNOSIS — R73.09 ELEVATED GLUCOSE: ICD-10-CM

## 2025-02-26 DIAGNOSIS — D50.8 OTHER IRON DEFICIENCY ANEMIA: ICD-10-CM

## 2025-02-26 LAB
ALBUMIN SERPL BCP-MCNC: 3.6 G/DL (ref 3.5–5.2)
ALP SERPL-CCNC: 89 U/L (ref 40–150)
ALT SERPL W/O P-5'-P-CCNC: 8 U/L (ref 10–44)
ANION GAP SERPL CALC-SCNC: 11 MMOL/L (ref 8–16)
AST SERPL-CCNC: 19 U/L (ref 10–40)
BASOPHILS # BLD AUTO: 0.08 K/UL (ref 0–0.2)
BASOPHILS NFR BLD: 0.6 % (ref 0–1.9)
BILIRUB SERPL-MCNC: 0.9 MG/DL (ref 0.1–1)
BUN SERPL-MCNC: 39 MG/DL (ref 8–23)
CALCIUM SERPL-MCNC: 8.5 MG/DL (ref 8.7–10.5)
CHLORIDE SERPL-SCNC: 103 MMOL/L (ref 95–110)
CHOLEST SERPL-MCNC: 114 MG/DL (ref 120–199)
CHOLEST/HDLC SERPL: 2.5 {RATIO} (ref 2–5)
CO2 SERPL-SCNC: 25 MMOL/L (ref 23–29)
CREAT SERPL-MCNC: 1.4 MG/DL (ref 0.5–1.4)
DIFFERENTIAL METHOD BLD: ABNORMAL
EOSINOPHIL # BLD AUTO: 0.2 K/UL (ref 0–0.5)
EOSINOPHIL NFR BLD: 1.7 % (ref 0–8)
ERYTHROCYTE [DISTWIDTH] IN BLOOD BY AUTOMATED COUNT: 14.4 % (ref 11.5–14.5)
EST. GFR  (NO RACE VARIABLE): 38 ML/MIN/1.73 M^2
ESTIMATED AVG GLUCOSE: 103 MG/DL (ref 68–131)
FERRITIN SERPL-MCNC: 92 NG/ML (ref 20–300)
GLUCOSE SERPL-MCNC: 88 MG/DL (ref 70–110)
HBA1C MFR BLD: 5.2 % (ref 4–5.6)
HCT VFR BLD AUTO: 45.9 % (ref 37–48.5)
HDLC SERPL-MCNC: 45 MG/DL (ref 40–75)
HDLC SERPL: 39.5 % (ref 20–50)
HGB BLD-MCNC: 14.7 G/DL (ref 12–16)
IMM GRANULOCYTES # BLD AUTO: 0.09 K/UL (ref 0–0.04)
IMM GRANULOCYTES NFR BLD AUTO: 0.7 % (ref 0–0.5)
IRON SERPL-MCNC: 39 UG/DL (ref 30–160)
LDLC SERPL CALC-MCNC: 55.6 MG/DL (ref 63–159)
LYMPHOCYTES # BLD AUTO: 2.3 K/UL (ref 1–4.8)
LYMPHOCYTES NFR BLD: 18.3 % (ref 18–48)
MCH RBC QN AUTO: 30.8 PG (ref 27–31)
MCHC RBC AUTO-ENTMCNC: 32 G/DL (ref 32–36)
MCV RBC AUTO: 96 FL (ref 82–98)
MONOCYTES # BLD AUTO: 1.2 K/UL (ref 0.3–1)
MONOCYTES NFR BLD: 9.2 % (ref 4–15)
NEUTROPHILS # BLD AUTO: 8.9 K/UL (ref 1.8–7.7)
NEUTROPHILS NFR BLD: 69.5 % (ref 38–73)
NONHDLC SERPL-MCNC: 69 MG/DL
NRBC BLD-RTO: 0 /100 WBC
PLATELET # BLD AUTO: 234 K/UL (ref 150–450)
PMV BLD AUTO: 11.8 FL (ref 9.2–12.9)
POTASSIUM SERPL-SCNC: 4.2 MMOL/L (ref 3.5–5.1)
PROT SERPL-MCNC: 6.6 G/DL (ref 6–8.4)
RBC # BLD AUTO: 4.78 M/UL (ref 4–5.4)
SATURATED IRON: 13 % (ref 20–50)
SODIUM SERPL-SCNC: 139 MMOL/L (ref 136–145)
TOTAL IRON BINDING CAPACITY: 305 UG/DL (ref 250–450)
TRANSFERRIN SERPL-MCNC: 206 MG/DL (ref 200–375)
TRIGL SERPL-MCNC: 67 MG/DL (ref 30–150)
TSH SERPL DL<=0.005 MIU/L-ACNC: 1.2 UIU/ML (ref 0.4–4)
WBC # BLD AUTO: 12.78 K/UL (ref 3.9–12.7)

## 2025-02-26 PROCEDURE — 83036 HEMOGLOBIN GLYCOSYLATED A1C: CPT | Performed by: INTERNAL MEDICINE

## 2025-02-26 PROCEDURE — 85025 COMPLETE CBC W/AUTO DIFF WBC: CPT | Performed by: INTERNAL MEDICINE

## 2025-02-26 PROCEDURE — 84443 ASSAY THYROID STIM HORMONE: CPT | Performed by: INTERNAL MEDICINE

## 2025-02-26 PROCEDURE — 80053 COMPREHEN METABOLIC PANEL: CPT | Performed by: INTERNAL MEDICINE

## 2025-02-26 PROCEDURE — 80061 LIPID PANEL: CPT | Performed by: INTERNAL MEDICINE

## 2025-02-26 PROCEDURE — 36415 COLL VENOUS BLD VENIPUNCTURE: CPT | Mod: PO | Performed by: INTERNAL MEDICINE

## 2025-02-26 PROCEDURE — 82728 ASSAY OF FERRITIN: CPT | Performed by: INTERNAL MEDICINE

## 2025-02-26 PROCEDURE — 84466 ASSAY OF TRANSFERRIN: CPT | Performed by: INTERNAL MEDICINE

## 2025-02-27 ENCOUNTER — RESULTS FOLLOW-UP (OUTPATIENT)
Dept: INTERNAL MEDICINE | Facility: CLINIC | Age: 80
End: 2025-02-27
Payer: MEDICARE

## 2025-03-06 DIAGNOSIS — R19.7 DIARRHEA, UNSPECIFIED TYPE: Primary | ICD-10-CM

## 2025-03-06 RX ORDER — DIPHENOXYLATE HYDROCHLORIDE AND ATROPINE SULFATE 2.5; .025 MG/1; MG/1
1 TABLET ORAL 4 TIMES DAILY PRN
Qty: 10 TABLET | Refills: 0 | Status: SHIPPED | OUTPATIENT
Start: 2025-03-06

## 2025-03-24 ENCOUNTER — PATIENT MESSAGE (OUTPATIENT)
Dept: INTERNAL MEDICINE | Facility: CLINIC | Age: 80
End: 2025-03-24
Payer: MEDICARE

## 2025-03-26 RX ORDER — POTASSIUM CHLORIDE 750 MG/1
CAPSULE, EXTENDED RELEASE ORAL
Qty: 90 CAPSULE | Refills: 0 | Status: SHIPPED | OUTPATIENT
Start: 2025-03-26

## 2025-03-26 NOTE — TELEPHONE ENCOUNTER
Provider Staff:  Action required for this patient       Please see care gap opportunities below in Care Due Message.    Thanks!  [facility]     Appointments      Date Provider   Last Visit   6/18/2024 Palomo Burgos MD   Next Visit   3/31/2025 Palomo Burgos MD     Refill Decision Note   Purvi Quiroga  is requesting a refill authorization.  Brief Assessment and Rationale for Refill:  Approve     Medication Therapy Plan:         Comments:     Note composed:7:15 AM 03/26/2025

## 2025-03-26 NOTE — TELEPHONE ENCOUNTER
Care Due:                  Date            Visit Type   Department     Provider  --------------------------------------------------------------------------------                                EP -                              PRIMARY      [facility] INTERNAL  Last Visit: 06-      CARE ([facility])   JOSEFA Burgos                              EP -                              PRIMARY      [facility] INTERNAL  Next Visit: 03-      CARE ([facility])   JOSEFA Burgos                                                            Last  Test          Frequency    Reason                     Performed    Due Date  --------------------------------------------------------------------------------    Mg Level....  12 months..  alendronate..............  Not Found    Overdue    Phosphate...  12 months..  alendronate..............  Not Found    Overdue    [employer] Embedded Care Due Messages. Reference number: [id number].   3/25/2025 9:10:57 PM CDT

## 2025-04-15 ENCOUNTER — LAB VISIT (OUTPATIENT)
Dept: LAB | Facility: HOSPITAL | Age: 80
End: 2025-04-15
Attending: INTERNAL MEDICINE
Payer: MEDICARE

## 2025-04-15 ENCOUNTER — OFFICE VISIT (OUTPATIENT)
Dept: INTERNAL MEDICINE | Facility: CLINIC | Age: 80
End: 2025-04-15
Payer: MEDICARE

## 2025-04-15 ENCOUNTER — TELEPHONE (OUTPATIENT)
Dept: INTERNAL MEDICINE | Facility: CLINIC | Age: 80
End: 2025-04-15
Payer: MEDICARE

## 2025-04-15 VITALS
TEMPERATURE: 98 F | HEIGHT: 55 IN | WEIGHT: 202.81 LBS | HEART RATE: 69 BPM | DIASTOLIC BLOOD PRESSURE: 82 MMHG | OXYGEN SATURATION: 98 % | SYSTOLIC BLOOD PRESSURE: 124 MMHG | BODY MASS INDEX: 46.93 KG/M2

## 2025-04-15 DIAGNOSIS — I10 ESSENTIAL HYPERTENSION: Primary | Chronic | ICD-10-CM

## 2025-04-15 DIAGNOSIS — N18.4 CHRONIC KIDNEY DISEASE, STAGE 4 (SEVERE): ICD-10-CM

## 2025-04-15 DIAGNOSIS — R73.01 IMPAIRED FASTING BLOOD SUGAR: Chronic | ICD-10-CM

## 2025-04-15 DIAGNOSIS — K21.9 GASTROESOPHAGEAL REFLUX DISEASE, UNSPECIFIED WHETHER ESOPHAGITIS PRESENT: Chronic | ICD-10-CM

## 2025-04-15 DIAGNOSIS — I27.20 PULMONARY HYPERTENSION: ICD-10-CM

## 2025-04-15 DIAGNOSIS — H35.30 MACULAR DEGENERATION, UNSPECIFIED LATERALITY, UNSPECIFIED TYPE: ICD-10-CM

## 2025-04-15 DIAGNOSIS — E78.00 PURE HYPERCHOLESTEROLEMIA: Chronic | ICD-10-CM

## 2025-04-15 DIAGNOSIS — D50.9 IRON DEFICIENCY ANEMIA, UNSPECIFIED IRON DEFICIENCY ANEMIA TYPE: ICD-10-CM

## 2025-04-15 DIAGNOSIS — R10.9 ABDOMINAL PAIN, UNSPECIFIED ABDOMINAL LOCATION: ICD-10-CM

## 2025-04-15 DIAGNOSIS — E66.01 MORBID OBESITY: Chronic | ICD-10-CM

## 2025-04-15 DIAGNOSIS — J45.50 SEVERE PERSISTENT ASTHMA WITHOUT COMPLICATION: ICD-10-CM

## 2025-04-15 DIAGNOSIS — M81.8 OTHER OSTEOPOROSIS WITHOUT CURRENT PATHOLOGICAL FRACTURE: Chronic | ICD-10-CM

## 2025-04-15 DIAGNOSIS — I70.0 AORTIC ATHEROSCLEROSIS: Chronic | ICD-10-CM

## 2025-04-15 LAB
ABSOLUTE EOSINOPHIL (OHS): 0.36 K/UL
ABSOLUTE MONOCYTE (OHS): 0.75 K/UL (ref 0.3–1)
ABSOLUTE NEUTROPHIL COUNT (OHS): 7.21 K/UL (ref 1.8–7.7)
BASOPHILS # BLD AUTO: 0.07 K/UL
BASOPHILS NFR BLD AUTO: 0.6 %
ERYTHROCYTE [DISTWIDTH] IN BLOOD BY AUTOMATED COUNT: 13.7 % (ref 11.5–14.5)
FERRITIN SERPL-MCNC: 67 NG/ML (ref 20–300)
HCT VFR BLD AUTO: 47.1 % (ref 37–48.5)
HGB BLD-MCNC: 15.1 GM/DL (ref 12–16)
IMM GRANULOCYTES # BLD AUTO: 0.05 K/UL (ref 0–0.04)
IMM GRANULOCYTES NFR BLD AUTO: 0.5 % (ref 0–0.5)
IRON SATN MFR SERPL: 34 % (ref 20–50)
IRON SERPL-MCNC: 116 UG/DL (ref 30–160)
LYMPHOCYTES # BLD AUTO: 2.64 K/UL (ref 1–4.8)
MCH RBC QN AUTO: 30.3 PG (ref 27–31)
MCHC RBC AUTO-ENTMCNC: 32.1 G/DL (ref 32–36)
MCV RBC AUTO: 94 FL (ref 82–98)
NUCLEATED RBC (/100WBC) (OHS): 0 /100 WBC
PLATELET # BLD AUTO: 241 K/UL (ref 150–450)
PMV BLD AUTO: 11.5 FL (ref 9.2–12.9)
RBC # BLD AUTO: 4.99 M/UL (ref 4–5.4)
RELATIVE EOSINOPHIL (OHS): 3.2 %
RELATIVE LYMPHOCYTE (OHS): 23.8 % (ref 18–48)
RELATIVE MONOCYTE (OHS): 6.8 % (ref 4–15)
RELATIVE NEUTROPHIL (OHS): 65.1 % (ref 38–73)
TIBC SERPL-MCNC: 337 UG/DL (ref 250–450)
TRANSFERRIN SERPL-MCNC: 228 MG/DL (ref 200–375)
WBC # BLD AUTO: 11.08 K/UL (ref 3.9–12.7)

## 2025-04-15 PROCEDURE — 82728 ASSAY OF FERRITIN: CPT

## 2025-04-15 PROCEDURE — 3074F SYST BP LT 130 MM HG: CPT | Mod: CPTII,S$GLB,, | Performed by: INTERNAL MEDICINE

## 2025-04-15 PROCEDURE — 1125F AMNT PAIN NOTED PAIN PRSNT: CPT | Mod: CPTII,S$GLB,, | Performed by: INTERNAL MEDICINE

## 2025-04-15 PROCEDURE — 99214 OFFICE O/P EST MOD 30 MIN: CPT | Mod: S$GLB,,, | Performed by: INTERNAL MEDICINE

## 2025-04-15 PROCEDURE — 85025 COMPLETE CBC W/AUTO DIFF WBC: CPT

## 2025-04-15 PROCEDURE — 83540 ASSAY OF IRON: CPT

## 2025-04-15 PROCEDURE — 1159F MED LIST DOCD IN RCRD: CPT | Mod: CPTII,S$GLB,, | Performed by: INTERNAL MEDICINE

## 2025-04-15 PROCEDURE — 3288F FALL RISK ASSESSMENT DOCD: CPT | Mod: CPTII,S$GLB,, | Performed by: INTERNAL MEDICINE

## 2025-04-15 PROCEDURE — 99999 PR PBB SHADOW E&M-EST. PATIENT-LVL V: CPT | Mod: PBBFAC,,, | Performed by: INTERNAL MEDICINE

## 2025-04-15 PROCEDURE — 1160F RVW MEDS BY RX/DR IN RCRD: CPT | Mod: CPTII,S$GLB,, | Performed by: INTERNAL MEDICINE

## 2025-04-15 PROCEDURE — 1101F PT FALLS ASSESS-DOCD LE1/YR: CPT | Mod: CPTII,S$GLB,, | Performed by: INTERNAL MEDICINE

## 2025-04-15 PROCEDURE — G2211 COMPLEX E/M VISIT ADD ON: HCPCS | Mod: S$GLB,,, | Performed by: INTERNAL MEDICINE

## 2025-04-15 PROCEDURE — 36415 COLL VENOUS BLD VENIPUNCTURE: CPT | Mod: PO

## 2025-04-15 PROCEDURE — 3079F DIAST BP 80-89 MM HG: CPT | Mod: CPTII,S$GLB,, | Performed by: INTERNAL MEDICINE

## 2025-04-15 RX ORDER — CLONIDINE HYDROCHLORIDE 0.1 MG/1
0.1 TABLET ORAL 2 TIMES DAILY PRN
Qty: 60 TABLET | Refills: 11 | Status: SHIPPED | OUTPATIENT
Start: 2025-04-15 | End: 2026-04-15

## 2025-04-15 NOTE — PROGRESS NOTES
Assessment:       1. Essential hypertension  - cloNIDine (CATAPRES) 0.1 MG tablet; Take 1 tablet (0.1 mg total) by mouth 2 (two) times daily as needed. Take 2 times daily for BP >180/100  Dispense: 60 tablet; Refill: 11    2. Pure hypercholesterolemia    3. Aortic atherosclerosis    4. Impaired fasting blood sugar    5. Morbid obesity    6. Gastroesophageal reflux disease, unspecified whether esophagitis present    7. Other osteoporosis without current pathological fracture    8. Chronic kidney disease, stage 4 (severe)    9. Pulmonary hypertension    10. Severe persistent asthma without complication    11. Iron deficiency anemia, unspecified iron deficiency anemia type  - CBC Auto Differential; Future  - Iron and TIBC; Future  - Ferritin; Future    12. Macular degeneration, unspecified laterality, unspecified type    13. Abdominal pain, unspecified abdominal location  - US Abdomen Limited; Future        Plan:       1. Continue irbesartan 150 mg, Lopressor 50 mg twice daily, amlodipine 5 mg   2/3.  Continue Lipitor 20 mg.    4.  Monitor   5. Monitor   6.  Continue Prilosec 20 mg  7. Monitor.    8. Monitor CMP.    9. Continue Lasix 20 mg.   10.  Continue albuterol as needed.  11. Check CBC, iron studies.    12.  Follow up with Ophthalmology  13.  Check ultrasound.    Deep Scribe:  IMPRESSION:  1. Evaluated BP control, noting significant fluctuations with highs reaching 202/70s.  2. Assessed fluid status and potential impact on pulmonary HTN.  3. Reviewed anemia status, last noted as resolved in February.  4. Considered reducing iron supplementation frequency based on upcoming lab results.  5. Recommend colonoscopy to investigate potential GI bleed source, given history of iron deficiency anemia.  6. Evaluated abdominal discomfort and decided to investigate with liver and pancreas US.  7. Reconciled vaccine records.    SUMMARY:   Continue Prilosec 20 mg for acid reflux   Continue Lasix 20 mg   Continue Lipitor 20 mg  for hypercholesterolemia and atherosclerosis   Continue calcium and vitamin D supplementation   Continue current regimen: lisinopril 50 mg twice daily, amlodipine 5 mg, propranolol 20 mg, irbesartan 150 mg   Continue oral B12 supplements   Prescribe albuterol as needed   Prescribe clonidine 0.1 mg as needed for blood pressure over 180/100, up to twice daily   Discontinue iron supplements pending new lab results   Order labs to check current anemia status   Order abdominal ultrasound to evaluate liver and pancreas   Schedule colonoscopy   Check blood pressure twice daily   Check weight daily   Upcoming MRI to evaluate macular degeneration    MORBID OBESITY:   Monitored the patient's morbid obesity status.   Advised the patient to check weight daily and monitor for fluid retention.    PULMONARY HYPERTENSION:   Educated the patient about high blood pressure in the lungs and the importance of daily weight monitoring for fluid status assessment.   Instructed to report weight gain of 3 lbs in a day or 5 lbs in 3 days.   Continued Lasix 20 mg for management.    SEVERE PERSISTENT ASTHMA WITHOUT COMPLICATION:   Monitored the patient's asthma condition.   Prescribed albuterol as needed for management.    CHRONIC KIDNEY DISEASE, STAGE 4 (SEVERE):   Monitored stage 4 chronic kidney disease with CMP.   Will continue to monitor CMP for disease management.    HYPERTENSION:   Instructed patient to check blood pressure twice daily.   Continued current regimen: lisinopril 50 mg twice daily, amlodipine 5 mg, propranolol 20 mg, irbesartan 150 mg.   Prescribed clonidine 0.1 mg as needed for blood pressure over 180/100, up to twice daily.   Noted home blood pressure readings ranging from 99/50s to 202/70s, acknowledging the need for additional medication for very high readings.    IRON DEFICIENCY ANEMIA:   Discontinued iron supplements pending new lab results.   Ordered labs to check current anemia status, noting last panel in February  showed no anemia.   Suggested potentially reducing iron supplementation based on new results.   Continued oral B12 supplements.    GASTROESOPHAGEAL REFLUX DISEASE:   Continued Prilosec 20 mg for acid reflux management.    HYPERLIPIDEMIA AND ATHEROSCLEROSIS:   Maintained Lipitor 20 mg for management of both hypercholesterolemia and atherosclerosis.    MACULAR DEGENERATION:   Noted upcoming MRI to evaluate macular degeneration.   Continued eye vitamins for management.    IMPAIRED FASTING GLUCOSE:   Monitored impaired fasting blood sugar, currently not on medication.    OSTEOPOROSIS:   Continued calcium and vitamin D supplementation for management.    ABDOMINAL PAIN:   Ordered abdominal ultrasound to evaluate liver and pancreas; instructed patient to schedule appointment at .   Noted patient reports occasional pain along the waistline area, mostly when lying down at night.   Observed ten  derness during physical exam.    FOLLOW-UP AND ADDITIONAL NOTES:   Discussed rationale for colonoscopy in the context of previous anemia and cancer prevention.   Provided information on alternative colonoscopy prep options with lower volume, explaining the necessity of salt in the solution to maintain its effectiveness.                 This note was generated with the assistance of ambient listening technology. Verbal consent was obtained by the patient and accompanying visitor(s) for the recording of patient appointment to facilitate this note. I attest to having reviewed and edited the generated note for accuracy, though some syntax or spelling errors may persist. Please contact the author of this note for any clarification.       Subjective:       Patient ID: Purvi Quiroga is a 80 y.o. female.    Chief Complaint: discuss colonoscopy alternatives, Follow-up, Anemia (No longer anemic?), and macular degenerative disease    HPI    80-year-old female here for evaluation of anemia and follow-up.    Patient has hypertension  "on irbesartan 150 mg, Lopressor 50 mg twice daily, amlodipine 5 mg.    Patient has hypercholesterolemia, aortic atherosclerosis on Lipitor 20 mg.    Patient has impaired fasting blood sugar on no current medication.    Patient has morbid obesity on no current medication.    Patient has acid reflux on Prilosec 20 mg    Patient has osteoporosis on calcium and vitamin-D.    Patient has stage 4 chronic kidney disease monitor with CMP.    Patient has pulmonary hypertension on Lasix 20 mg.    Patient has asthma on albuterol as needed.    History of Present Illness    CHIEF COMPLAINT:  Ms. Quiroga presents today for evaluation of anemia and follow-up.    HYPERTENSION:  She reports home blood pressure monitoring 2-3 times daily with significant fluctuations. Systolic values range from 99 to 202 mmHg and diastolic values range from 50 to 70 mmHg. She takes irbesartan 150 mg, lisinopril 50 mg twice daily, and amlodipine 5 mg for management.    CURRENT SYMPTOMS:  She reports abdominal pain along waistline area, primarily occurring when lying down at night approximately every 3-4 nights.    MEDICAL HISTORY:  She has stage 4 chronic kidney disease monitored with CMP, pulmonary hypertension managed with Lasix 20 mg, asthma managed with albuterol as needed, GERD managed with Prilosec 20 mg, hypercholesterolemia from atherosclerosis managed with Lipitor 20 mg, and macular degeneration. She also has impaired fasting blood sugar and morbid obesity, both currently unmedicated. She has osteoporosis managed with calcium and vitamin D supplements.    PREVENTIVE CARE:  She expresses hesitancy regarding colonoscopy due to living alone and concerns about potential complications during prep requiring emergency care. She also has concerns about prep volume requirements given her height of 4'7".    MEDICATIONS:  She takes iron and B12 supplements for anemia management, in addition to her other medications mentioned " above.      ROS:  Cardiovascular: +chest pain, +orthostatic symptoms  Gastrointestinal: +abdominal pain, +nightime pain         Review of Systems          Objective:      Physical Exam  Vitals reviewed.   Constitutional:       Appearance: She is well-developed.   HENT:      Head: Normocephalic and atraumatic.      Mouth/Throat:      Pharynx: No oropharyngeal exudate.   Eyes:      General: No scleral icterus.        Right eye: No discharge.         Left eye: No discharge.      Pupils: Pupils are equal, round, and reactive to light.   Neck:      Thyroid: No thyromegaly.      Trachea: No tracheal deviation.   Cardiovascular:      Rate and Rhythm: Normal rate and regular rhythm.      Heart sounds: Normal heart sounds. No murmur heard.     No friction rub. No gallop.   Pulmonary:      Effort: Pulmonary effort is normal. No respiratory distress.      Breath sounds: Normal breath sounds. No wheezing or rales.   Chest:      Chest wall: No tenderness.   Abdominal:      General: Bowel sounds are normal. There is no distension.      Palpations: Abdomen is soft. There is no mass.      Tenderness: There is abdominal tenderness in the right upper quadrant and epigastric area. There is no guarding or rebound.   Musculoskeletal:         General: No tenderness. Normal range of motion.      Cervical back: Normal range of motion and neck supple.   Skin:     General: Skin is warm and dry.      Coloration: Skin is not pale.      Findings: No erythema or rash.   Neurological:      Mental Status: She is alert and oriented to person, place, and time.   Psychiatric:         Behavior: Behavior normal.

## 2025-04-15 NOTE — TELEPHONE ENCOUNTER
----- Message from Amira sent at 4/15/2025  9:07 AM CDT -----  Contact: [phone]@patient  Type: Returning a callWho left a message? Pt When did the practice call?Does patient know what this is regarding:Would the patient rather a call back or a response via [facility]? Comments:Pt wants to let the office know that she might be 5-10 due to her transportation

## 2025-04-16 ENCOUNTER — RESULTS FOLLOW-UP (OUTPATIENT)
Dept: INTERNAL MEDICINE | Facility: CLINIC | Age: 80
End: 2025-04-16

## 2025-04-29 ENCOUNTER — TELEPHONE (OUTPATIENT)
Dept: INTERNAL MEDICINE | Facility: CLINIC | Age: 80
End: 2025-04-29
Payer: MEDICARE

## 2025-04-29 NOTE — TELEPHONE ENCOUNTER
Pt wants referral to neurosurgery. States she had an MRI at [facility] and was told she had a mass on brain. Wants to investigate further and see if she can have removed.

## 2025-04-29 NOTE — TELEPHONE ENCOUNTER
----- Message from Deliasarah sent at 4/28/2025  3:43 PM CDT -----  Contact: [phone]@PATIENT  Type: Returning a callWho left a message? Patient When did the practice call?Does patient know what this is regarding:Would the patient rather a call back or a response via [facility]? Call back Comments: Pt says she would like a call back to discuss if the doc can give her a order for a neuro surgeon due to her taking a DEMI at [facility] and the doc told her that she has a mass on her brain. Please call pt to advise [phone]

## 2025-04-29 NOTE — TELEPHONE ENCOUNTER
We do not have a copy of the MRI.  Can patient get us a copy, so I can make sure we are directing her appropriately?

## 2025-04-30 ENCOUNTER — TELEPHONE (OUTPATIENT)
Dept: INTERNAL MEDICINE | Facility: CLINIC | Age: 80
End: 2025-04-30
Payer: MEDICARE

## 2025-04-30 NOTE — TELEPHONE ENCOUNTER
Called [facility] at [phone] and spoke with rep. Rep confirmed pt had MRI done on 4/21. Requested results to be sent. Provided our fax number

## 2025-04-30 NOTE — TELEPHONE ENCOUNTER
Received records from ophthalmologist.  They recommend an MRI orbits brain with without contrast.  This was to evaluate for progression of vision loss over the last few years.    Please call ophthalmology practice to find out if they got the MRI.

## 2025-04-30 NOTE — TELEPHONE ENCOUNTER
Received results from [facility]   Informed dr garnett will review the MRI then sign whatever orders are appropriate. Pt says she does not want dr garnett to order what he thinks, she wants him to order what the prior physician recommended. Informed pt he will order whatever is appropriate, and it's likely that it will be the same as the other physician.  Pt says she prefers Neurosurgery at [place] if they have an office there

## 2025-04-30 NOTE — TELEPHONE ENCOUNTER
----- Message from Aaliyah sent at 4/30/2025 12:59 PM CDT -----  Contact: [phone]  .[email or web address] Patient is calling for Medical Advice regarding:Patient would like a call back about speeding up the referral for neurosurgery, if the referral has been received from Dr. PEDRAZA ?Patient wants a call back or thru [facility]:callComments: Patient would like a response back by Friday please. Please advise patient replies from provider may take up to 48 hours.

## 2025-05-01 ENCOUNTER — TELEPHONE (OUTPATIENT)
Dept: INTERNAL MEDICINE | Facility: CLINIC | Age: 80
End: 2025-05-01
Payer: MEDICARE

## 2025-05-01 NOTE — TELEPHONE ENCOUNTER
Call from Jeanine with [facility].   Explained need for results so that Dr. Burgos can review and order whatever is necessary.  Rep says pt Saw ophth 3/17, did mri 4/21, discussed results with doc. Ms Solorzano says that referral neurosurgery was already ordered and sent to [facility].   Called pt and informed her that [facility] should be reaching out to her once they process the referral

## 2025-05-08 DIAGNOSIS — G47.00 INSOMNIA, UNSPECIFIED TYPE: ICD-10-CM

## 2025-05-08 RX ORDER — TRAZODONE HYDROCHLORIDE 50 MG/1
TABLET ORAL
Qty: 180 TABLET | Refills: 3 | Status: SHIPPED | OUTPATIENT
Start: 2025-05-08

## 2025-05-08 NOTE — TELEPHONE ENCOUNTER
Refill Routing Note   Medication(s) are not appropriate for processing by [facility] for the following reason(s):        Outside of protocol    [facility] action(s):  Route        Medication Therapy Plan: SELF ADJUSTING DOSE IS OOP FOR [facility]      Appointments  past 12m or future 3m with PCP    Date Provider   Last Visit   4/15/2025 Palomo Burgos MD   Next Visit   Visit date not found Palomo Burgos MD   ED visits in past 90 days: 0        Note composed:8:32 AM 05/08/2025

## 2025-05-08 NOTE — TELEPHONE ENCOUNTER
No care due was identified.  [facility] Embedded Care Due Messages. Reference number: [id number].   5/08/2025 8:05:29 AM CDT

## 2025-05-12 DIAGNOSIS — C72.31: Primary | ICD-10-CM

## 2025-05-14 RX ORDER — IRBESARTAN 150 MG/1
150 TABLET ORAL 2 TIMES DAILY
Qty: 180 TABLET | Refills: 2 | Status: SHIPPED | OUTPATIENT
Start: 2025-05-14

## 2025-05-14 RX ORDER — ATORVASTATIN CALCIUM 20 MG/1
20 TABLET, FILM COATED ORAL NIGHTLY
Qty: 90 TABLET | Refills: 2 | Status: SHIPPED | OUTPATIENT
Start: 2025-05-14

## 2025-05-14 NOTE — TELEPHONE ENCOUNTER
----- Message from Melodie sent at 5/14/2025 10:33 AM CDT -----  Contact: [phone]  Type: Rx Clarification/ Additional Information/ QuestionsMedication:atorvastatin (LIPITOR) 20 MG tabletWhat questions do you have about the medication, if any?pharmacy is needing a new prescription What information is needed?Pharmacy number:[facility] - [place], [place] - [address] [facility] [address]Tamfj: [phone] Fax: [phone] Pharmacy Contact Name:Comments:

## 2025-05-14 NOTE — TELEPHONE ENCOUNTER
No care due was identified.  [facility] Embedded Care Due Messages. Reference number: [id number].   5/14/2025 10:04:59 AM CDT

## 2025-05-15 ENCOUNTER — PATIENT MESSAGE (OUTPATIENT)
Dept: INTERNAL MEDICINE | Facility: CLINIC | Age: 80
End: 2025-05-15
Payer: MEDICARE

## 2025-05-15 NOTE — TELEPHONE ENCOUNTER
"Pt was rude, and going off as I attempted to make appt w/ Dr. Burgos.     Pt wrote: "I asked if the  nurse or Florinda Burgos can make my appointment sooner in June . Having headaches. By at least 2nd week of June." This reads as " make appt w/ Dr. Burgos for BARRIENTOS. I told pt that I was misinformed.     Pt proceeded to continue to be ugly with me so I told her to contact Neurosurgery back.  "

## 2025-05-23 ENCOUNTER — NURSE TRIAGE (OUTPATIENT)
Dept: ADMINISTRATIVE | Facility: CLINIC | Age: 80
End: 2025-05-23
Payer: MEDICARE

## 2025-05-23 NOTE — TELEPHONE ENCOUNTER
"Pt called asking if she should  take clonidine which is prescribed if blood pressure is over 180/100. Unsure if to take clonidine  with regular scheduled blood pressure medication or not. Pt also reports having dizzy spells when going to lay down in bed which started 4-5 days ago then stopped and  resumed this morning. Ibersartan and metoprolol last taken at 8PM last night. Report current blood pressure reading 185/76. During triage questions, pt yelled "I am tired of answering questions, I just want a yes or no answer" then hung up.    Reason for Disposition   Caller hangs up    Additional Information   Negative: Difficult to awaken or acting confused (e.g., disoriented, slurred speech)   Negative: SEVERE difficulty breathing (e.g., struggling for each breath, speaks in single words)    Protocols used: Blood Pressure - High-A-AH, Difficult Caller-A-AH    "

## 2025-05-30 ENCOUNTER — TELEPHONE (OUTPATIENT)
Dept: INTERNAL MEDICINE | Facility: CLINIC | Age: 80
End: 2025-05-30
Payer: MEDICARE

## 2025-05-30 DIAGNOSIS — R10.9 ABDOMINAL PAIN, UNSPECIFIED ABDOMINAL LOCATION: Primary | ICD-10-CM

## 2025-05-30 NOTE — TELEPHONE ENCOUNTER
----- Message from Wayne sent at 5/30/2025 10:34 AM CDT -----  Regarding: Appt  Contact: [phone]  Patient is calling to schedule an appointment for ultrasound need order in chart. Please contact pt

## 2025-05-30 NOTE — TELEPHONE ENCOUNTER
Pt cancelled US ABD appt, and order was discontinued. Pt needs new order for US ABD for abdominal pain so that she can schedule

## 2025-06-03 RX ORDER — POTASSIUM CHLORIDE 750 MG/1
CAPSULE, EXTENDED RELEASE ORAL
Qty: 90 CAPSULE | Refills: 2 | Status: SHIPPED | OUTPATIENT
Start: 2025-06-03

## 2025-06-08 NOTE — LETTER
January 17, 2024    Purvi Quiroga  [address] [place] [address]  [place] [address]               [address] [place] [address]  [place] [address]  Phone: [phone]  Fax: [phone]   Dear Purvi Quiroga:    Reminder Letter    Based on our records it appears that it is time for you to call and schedule the following appointment:       Colonoscopy       We would like for you to contact the clinic at [phone] to schedule this procedure around February 2024.             Thank you,    Department of Gastroenterology                   
CRITICAL CARE

## 2025-06-10 ENCOUNTER — RESULTS FOLLOW-UP (OUTPATIENT)
Dept: INTERNAL MEDICINE | Facility: CLINIC | Age: 80
End: 2025-06-10
Payer: MEDICARE

## 2025-06-10 ENCOUNTER — HOSPITAL ENCOUNTER (OUTPATIENT)
Dept: RADIOLOGY | Facility: HOSPITAL | Age: 80
Discharge: HOME | End: 2025-06-10
Attending: INTERNAL MEDICINE
Payer: MEDICARE

## 2025-06-10 DIAGNOSIS — R10.9 ABDOMINAL PAIN, UNSPECIFIED ABDOMINAL LOCATION: ICD-10-CM

## 2025-06-10 PROCEDURE — 76700 US EXAM ABDOM COMPLETE: CPT | Mod: TC

## 2025-06-10 PROCEDURE — 76700 US EXAM ABDOM COMPLETE: CPT | Mod: 26,,, | Performed by: RADIOLOGY

## 2025-06-11 NOTE — TELEPHONE ENCOUNTER
Called pt, booking for 6/26 11:00 pending override. Advised pt to call and make sooner appt with any available provider if pt needs sooner attention

## 2025-06-20 ENCOUNTER — TELEPHONE (OUTPATIENT)
Dept: SPORTS MEDICINE | Facility: CLINIC | Age: 80
End: 2025-06-20
Payer: MEDICARE

## 2025-06-25 ENCOUNTER — TELEPHONE (OUTPATIENT)
Dept: NEUROSURGERY | Facility: CLINIC | Age: 80
End: 2025-06-25
Payer: MEDICARE

## 2025-06-25 ENCOUNTER — PATIENT MESSAGE (OUTPATIENT)
Dept: NEUROSURGERY | Facility: CLINIC | Age: 80
End: 2025-06-25
Payer: MEDICARE

## 2025-06-25 NOTE — TELEPHONE ENCOUNTER
Attempted to return call at number listed. No answer. LVM advising call back.     Copied from CRM #[id number]. Topic: General Inquiry - Return Call  >> Jun 25, 2025  8:30 AM Johanny Bal wrote:  Returning a Missed Call    Caller: Patient     Returning call to: Someone from office     Caller can be reached @: [phone]

## 2025-06-25 NOTE — TELEPHONE ENCOUNTER
Attempted to call and discuss pt's upcoming appt but no answer. LVM advising call back. Portal msg also sent to pt to further discuss.     Future Appointments   Date Time Provider Department Center   6/26/2025 11:00 AM Palomo Burgos MD [facility] [place]   6/30/2025  1:00 PM Dara Austin, PA-C [facility] NEUROS[facility]

## 2025-06-26 ENCOUNTER — OFFICE VISIT (OUTPATIENT)
Dept: INTERNAL MEDICINE | Facility: CLINIC | Age: 80
End: 2025-06-26
Payer: MEDICARE

## 2025-06-26 VITALS
HEIGHT: 55 IN | OXYGEN SATURATION: 93 % | BODY MASS INDEX: 47.2 KG/M2 | HEART RATE: 71 BPM | DIASTOLIC BLOOD PRESSURE: 68 MMHG | SYSTOLIC BLOOD PRESSURE: 118 MMHG | WEIGHT: 203.94 LBS | TEMPERATURE: 98 F

## 2025-06-26 DIAGNOSIS — R19.7 DIARRHEA, UNSPECIFIED TYPE: ICD-10-CM

## 2025-06-26 DIAGNOSIS — F32.1 CURRENT MODERATE EPISODE OF MAJOR DEPRESSIVE DISORDER WITHOUT PRIOR EPISODE: ICD-10-CM

## 2025-06-26 DIAGNOSIS — D32.9 MENINGIOMA: ICD-10-CM

## 2025-06-26 DIAGNOSIS — G47.33 OSA (OBSTRUCTIVE SLEEP APNEA): ICD-10-CM

## 2025-06-26 DIAGNOSIS — F41.9 ANXIETY: Primary | Chronic | ICD-10-CM

## 2025-06-26 DIAGNOSIS — R10.9 ABDOMINAL PAIN, UNSPECIFIED ABDOMINAL LOCATION: ICD-10-CM

## 2025-06-26 DIAGNOSIS — K83.9 BILE DUCT ABNORMALITY: ICD-10-CM

## 2025-06-26 PROCEDURE — 99999 PR PBB SHADOW E&M-EST. PATIENT-LVL IV: CPT | Mod: PBBFAC,,, | Performed by: INTERNAL MEDICINE

## 2025-06-26 PROCEDURE — G2211 COMPLEX E/M VISIT ADD ON: HCPCS | Mod: S$GLB,,, | Performed by: INTERNAL MEDICINE

## 2025-06-26 PROCEDURE — 1159F MED LIST DOCD IN RCRD: CPT | Mod: CPTII,S$GLB,, | Performed by: INTERNAL MEDICINE

## 2025-06-26 PROCEDURE — 99214 OFFICE O/P EST MOD 30 MIN: CPT | Mod: S$GLB,,, | Performed by: INTERNAL MEDICINE

## 2025-06-26 PROCEDURE — 3074F SYST BP LT 130 MM HG: CPT | Mod: CPTII,S$GLB,, | Performed by: INTERNAL MEDICINE

## 2025-06-26 PROCEDURE — 1160F RVW MEDS BY RX/DR IN RCRD: CPT | Mod: CPTII,S$GLB,, | Performed by: INTERNAL MEDICINE

## 2025-06-26 PROCEDURE — 3078F DIAST BP <80 MM HG: CPT | Mod: CPTII,S$GLB,, | Performed by: INTERNAL MEDICINE

## 2025-06-26 PROCEDURE — 3288F FALL RISK ASSESSMENT DOCD: CPT | Mod: CPTII,S$GLB,, | Performed by: INTERNAL MEDICINE

## 2025-06-26 PROCEDURE — 1101F PT FALLS ASSESS-DOCD LE1/YR: CPT | Mod: CPTII,S$GLB,, | Performed by: INTERNAL MEDICINE

## 2025-06-26 RX ORDER — DIPHENOXYLATE HYDROCHLORIDE AND ATROPINE SULFATE 2.5; .025 MG/1; MG/1
1 TABLET ORAL 4 TIMES DAILY PRN
Qty: 20 TABLET | Refills: 0 | Status: SHIPPED | OUTPATIENT
Start: 2025-06-26

## 2025-06-26 RX ORDER — PAROXETINE 20 MG/1
20 TABLET, FILM COATED ORAL EVERY MORNING
Qty: 30 TABLET | Refills: 11 | Status: SHIPPED | OUTPATIENT
Start: 2025-06-26 | End: 2026-06-26

## 2025-06-26 NOTE — PROGRESS NOTES
Assessment:       1. Anxiety  - paroxetine (PAXIL) 20 MG tablet; Take 1 tablet (20 mg total) by mouth every morning.  Dispense: 30 tablet; Refill: 11    2. Current moderate episode of major depressive disorder without prior episode  - paroxetine (PAXIL) 20 MG tablet; Take 1 tablet (20 mg total) by mouth every morning.  Dispense: 30 tablet; Refill: 11    3. Meningioma    4. Diarrhea, unspecified type  - diphenoxylate-atropine 2.5-0.025 mg (LOMOTIL) 2.5-0.025 mg per tablet; Take 1 tablet by mouth 4 (four) times daily as needed.  Dispense: 20 tablet; Refill: 0    5. ULX (obstructive sleep apnea)  - CPAP FOR HOME USE    6. Abdominal pain, unspecified abdominal location    7. Bile duct abnormality  - Ambulatory referral/consult to Gastroenterology; Future  - Comprehensive Metabolic Panel; Future  - Gamma GT; Future        Plan:       1/2.  Start Paxil 20 mg.  Take half tablet daily for week, then full tablet daily.  3. Proceed with the Neurosurgery appointment.    4. Luminal refilled.    5. CPAP ordered.    6/7.  Refer to GI.  Check CMP, GGT.    Deep Scribe:  IMPRESSION:  1. Evaluated meningioma, determining it is benign and abutting right optic nerve, potentially causing retinal swelling and vision problems; likely resulted from previous head trauma with calcification buildup.  2. Assessed depression symptoms, considering medication management.  3. Reviewed sleep study results, noting discrepancy between report of significant sleep apnea and previous study showing no criteria met for CPAP.  4. Evaluated abdominal pain, noting it has been ongoing for months and is not clearly associated with eating.  5. Identified enlarged bile duct on imaging, considering potential causes such as stones.  6. Discussed potential vision issues related to meningioma compression of optic nerve.    SUMMARY:   Referred to gastroenterology for enlarged bile duct and abdominal pain evaluation   Started Paxil for depression management   Ordered  labs and abdominal ultrasound   Ordered CPAP machine with nasal mask for sleep apnea treatment   Refilled Lomotil (20 tablets, no refills)   Follow up in 2 months to re-evaluate depression and Paxil efficacy    CURRENT MODERATE EPISODE OF MAJOR DEPRESSIVE DISORDER WITHOUT PRIOR EPISODE:   Started Paxil for depression management.   Follow up in 2 months to re-evaluate depression and Paxil efficacy.    DIARRHEA, UNSPECIFIED TYPE:   Refilled Lomotil (20 tablets, no refills due to controlled substance status).    LUX (OBSTRUCTIVE SLEEP APNEA):   Explained sleep apnea symptoms including nocturia, CPAP therapy and its benefits as first-line treatment, and alternative treatments including oral appliances and Inspire therapy.   Ordered CPAP machine with nasal mask for sleep apnea treatment.    ABDOMINAL PAIN, UNSPECIFIED ABDOMINAL LOCATION:   Ordered labs.   Ordered abdominal US.   Referred to gastroenterology for evaluation of abdominal pain.    BILE DUCT ABNORMALITY:   Referred to gastroenterology for evaluation of enlarged bile duct.                 This note was generated with the assistance of ambient listening technology. Verbal consent was obtained by the patient and accompanying visitor(s) for the recording of patient appointment to facilitate this note. I attest to having reviewed and edited the generated note for accuracy, though some syntax or spelling errors may persist. Please contact the author of this note for any clarification.       Subjective:       Patient ID: Purvi Quiroga is a 80 y.o. female.    Chief Complaint: Results    HPI    80 y.o. female here for evaluation of an enlarged bile duct.    History of Present Illness    CHIEF COMPLAINT:  Ms. Quiroga presents today for follow up of an enlarged bile duct    NEUROLOGICAL:  She reports a brain mass (meningioma) was discovered during a routine eye exam for 's license renewal, initially identified through retinal swelling. The meningioma is  "abutting the right optic nerve. She currently has no significant vision problems except for a small hole in the left eye. The mass is likely benign and potentially related to past head trauma.    MENTAL HEALTH:  She reports feeling depressed primarily due to ongoing health concerns, particularly the brain mass and potential surgery. She expresses anxiety about possible surgical complications, including concerns about speech and appearance. She describes her depression as "global" due to accumulating health issues but does not consider herself severely depressed. She denies current suicidal ideation. She has a vague history of possibly taking Paxil years ago. She is open to medication management but concerned about drowsiness side effects.    GASTROINTESTINAL:  She reports intermittent left-sided abdominal pain for several months, varying between sharp brief pain and longer-lasting dull pain. The pain is more prominent when sedentary and has been present since her 30s. She is uncertain about any association with eating and denies consistent right-sided pain.    SLEEP:  She has severe sleep apnea diagnosed through a previous sleep study at [facility]. A subsequent study with Dr. Alberto showed respiratory events without complete REM sleep documentation. She experiences significant nocturia, getting up 5 times per night to urinate. She expresses hesitancy about using CPAP for management.      ROS:  Constitutional: +sleep disturbances  Respiratory: +apnea, +intermittent breathing while sleeping  Gastrointestinal: +abdominal pain  Genitourinary: +nocturia  Neurological: +difficulty falling asleep  Psychiatric: +depression         Review of Systems          Objective:      Physical Exam  Vitals reviewed.   Constitutional:       Appearance: She is well-developed.   HENT:      Head: Normocephalic and atraumatic.      Mouth/Throat:      Pharynx: No oropharyngeal exudate.   Eyes:      General: No scleral icterus.        Right " eye: No discharge.         Left eye: No discharge.      Pupils: Pupils are equal, round, and reactive to light.   Neck:      Thyroid: No thyromegaly.      Trachea: No tracheal deviation.   Cardiovascular:      Rate and Rhythm: Normal rate and regular rhythm.      Heart sounds: Normal heart sounds. No murmur heard.     No friction rub. No gallop.   Pulmonary:      Effort: Pulmonary effort is normal. No respiratory distress.      Breath sounds: Normal breath sounds. No wheezing or rales.   Chest:      Chest wall: No tenderness.   Abdominal:      General: Bowel sounds are normal. There is no distension.      Palpations: Abdomen is soft. There is no mass.      Tenderness: There is no abdominal tenderness. There is no guarding or rebound.   Musculoskeletal:         General: No tenderness. Normal range of motion.      Cervical back: Normal range of motion and neck supple.   Skin:     General: Skin is warm and dry.      Coloration: Skin is not pale.      Findings: No erythema or rash.   Neurological:      Mental Status: She is alert and oriented to person, place, and time.   Psychiatric:         Behavior: Behavior normal.

## 2025-06-30 ENCOUNTER — OFFICE VISIT (OUTPATIENT)
Dept: NEUROSURGERY | Facility: CLINIC | Age: 80
End: 2025-06-30
Payer: MEDICARE

## 2025-06-30 DIAGNOSIS — C72.31: ICD-10-CM

## 2025-06-30 PROCEDURE — 99999 PR PBB SHADOW E&M-EST. PATIENT-LVL III: CPT | Mod: PBBFAC,,, | Performed by: PHYSICIAN ASSISTANT

## 2025-06-30 PROCEDURE — 1101F PT FALLS ASSESS-DOCD LE1/YR: CPT | Mod: CPTII,S$GLB,, | Performed by: PHYSICIAN ASSISTANT

## 2025-06-30 PROCEDURE — 1125F AMNT PAIN NOTED PAIN PRSNT: CPT | Mod: CPTII,S$GLB,, | Performed by: PHYSICIAN ASSISTANT

## 2025-06-30 PROCEDURE — 99204 OFFICE O/P NEW MOD 45 MIN: CPT | Mod: S$GLB,,, | Performed by: PHYSICIAN ASSISTANT

## 2025-06-30 PROCEDURE — 1159F MED LIST DOCD IN RCRD: CPT | Mod: CPTII,S$GLB,, | Performed by: PHYSICIAN ASSISTANT

## 2025-06-30 PROCEDURE — 3288F FALL RISK ASSESSMENT DOCD: CPT | Mod: CPTII,S$GLB,, | Performed by: PHYSICIAN ASSISTANT

## 2025-06-30 NOTE — PROGRESS NOTES
Neurosurgery  History & Physical    SUBJECTIVE:     Chief Complaint: meningioma    History of Present Illness: Purvi Quiroga is a 80 y.o. female who was referred to me by Dr. Kim Javier with Ophthalmology for right temporal fossa meningioma. Patient originally seen by outside ophthalmology for routine eye exam for drivers license where there was concern for papilledema. She was eventually diagnosed with macular degeneration as well as operculated hole of the left eye. She then saw a optic nerve specialist for the papilledema, visual field testing showed concern for a right homonymous hemianopsia. MRI brain was ordered and a right temporal fossa with abutment of the nerve prior to the chiasm was identified. She was then referred to Neurosurgery for evaluation.     Patient reports decreased vision in left eye particularly in the nasal field of vision. She reports many years of headaches. She lives in an independent senior living community. She reports difficulty with transportation. Her past medical history is significant for HTN, asthma, and bilateral foot neuropathy. She does not take any blood thinners.     Review of patient's allergies indicates:  No Known Allergies    Current Medications[1]    Past Medical History:   Diagnosis Date    Asthma     Essential hypertension 12/10/2018    Hiatal hernia     Insomnia     Other osteoporosis without current pathological fracture 12/10/2018     Past Surgical History:   Procedure Laterality Date    CATARACT EXTRACTION, BILATERAL      DILATION AND CURETTAGE OF UTERUS      ESOPHAGOGASTRODUODENOSCOPY N/A 1/12/2024    Procedure: EGD (ESOPHAGOGASTRODUODENOSCOPY);  Surgeon: Matthew Del Cid MD;  Location: [facility];  Service: Gastroenterology;  Laterality: N/A;    KNEE ARTHROSCOPY      TONSILLECTOMY       Family History       Problem Relation (Age of Onset)    Cancer Brother    Heart disease Father    Hypertension Father, Sister, Brother          Social  History     Socioeconomic History    Marital status: Single   Tobacco Use    Smoking status: Never    Smokeless tobacco: Never    Tobacco comments:     18 or 21 yo for 1 yr   Substance and Sexual Activity    Alcohol use: Not Currently    Drug use: No     Social Drivers of Health     Financial Resource Strain: Medium Risk (4/13/2025)    Overall Financial Resource Strain (CARDIA)     Difficulty of Paying Living Expenses: Somewhat hard   Food Insecurity: No Food Insecurity (4/13/2025)    Hunger Vital Sign     Worried About Running Out of Food in the Last Year: Never true     Ran Out of Food in the Last Year: Never true   Transportation Needs: No Transportation Needs (4/13/2025)    PRAPARE - Transportation     Lack of Transportation (Medical): No     Lack of Transportation (Non-Medical): No   Physical Activity: Inactive (4/13/2025)    Exercise Vital Sign     Days of Exercise per Week: 0 days     Minutes of Exercise per Session: 10 min   Stress: Stress Concern Present (4/13/2025)    [employer] - Occupational Stress Questionnaire     Feeling of Stress : Rather much   Housing Stability: Unknown (4/13/2025)    Housing Stability Vital Sign     Unable to Pay for Housing in the Last Year: Patient declined     Number of Times Moved in the Last Year: 0     Homeless in the Last Year: No       Review of Systems    OBJECTIVE:     Vital Signs  Pain Score:   6  There is no height or weight on file to calculate BMI.      Neurosurgery Physical Exam  General: well developed, well nourished, no distress.   Head: normocephalic, atraumatic  Neurologic: Alert and oriented. Thought content appropriate. Hard of hearing   GCS: Motor: 6/Verbal: 5/Eyes: 4 GCS Total: 15  Mental Status: Awake, Alert, Oriented x 4  Language: No aphasia  Speech: No dysarthria  Cranial nerves: face symmetric, tongue midline, CN II-XII grossly intact.   Eyes: pupils equal, round, reactive to light with accommodation, EOMI. Left nasal  field visual deficit.   Pulmonary: normal respirations, no signs of respiratory distress  Abdomen: soft, non-distended, not tender to palpation  Skin: Skin is warm, dry and intact.  Sensory: intact to light touch throughout    Motor Strength:Moves all extremities spontaneously with good tone.  Full strength upper and lower extremities. No abnormal movements seen.         Diagnostic Results:  No available imaging available for review    MRI brain w wo radiology results from 4/21/25: 1.   3.0 x 1.2 x 2.4 cm (L x W  x H) dural based mass in the right temporal fossa most compatible with a meningioma. The right optic nerve runs adjacent to this mass with abutment of the nerve prior to the optic chiasm.   2.   Small right mastoid effusion.     ASSESSMENT/PLAN:     Purvi Quiroga is a 80 y.o. female with macular degeneration, papilledema, and right homonymous hemianopsia with right temporal fossa meningioma. Unfortunately she does not have her imaging on a disc today. Given her ophthalmology findings and the read available she most likely will need neurosurgical intervention. I would like for her to obtain the imaging on a disc and follow up with Dr. Pereira. The patient knows she will need to obtain the disc prior to the appointment.        [1]   Current Outpatient Medications   Medication Sig Dispense Refill    albuterol (PROVENTIL/VENTOLIN HFA) 90 mcg/actuation inhaler INHALE 1-2 PUFFS INTO THE LUNGS EVERY 6 HOURS AS NEEDED FOR SHORTNESS OF BREATH 8.5 g 5    albuterol-ipratropium (DUO-NEB) 2.5 mg-0.5 mg/3 mL nebulizer solution Take 3 mLs by nebulization every 4 (four) hours as needed for Wheezing. Rescue 75 mL 1    alendronate (FOSAMAX) 70 MG tablet TAKE ONE TABLET BY MOUTH ONE-HALF HOUR BEFORE ANY FOOD OR MEDICATION ONCE WEEKLY ON SAME DAY 12 tablet 2    amLODIPine (NORVASC) 5 MG tablet Take 1 tablet (5 mg total) by mouth once daily. 90 tablet 3    atorvastatin (LIPITOR) 20 MG tablet Take 1 tablet (20 mg total) by  mouth every evening. 90 tablet 2    BREZTRI AEROSPHERE 160-9-4.8 mcg/actuation HFAA INHALE 2 puffs TWICE DAILY. RINSE MOUTH AND throat AFTER USE.      calcium carbonate 650 mg calcium (1,625 mg) tablet Take 1 tablet (650 mg total) by mouth once daily. 30 tablet 11    cloNIDine (CATAPRES) 0.1 MG tablet Take 1 tablet (0.1 mg total) by mouth 2 (two) times daily as needed. Take 2 times daily for BP >180/100 60 tablet 11    diphenoxylate-atropine 2.5-0.025 mg (LOMOTIL) 2.5-0.025 mg per tablet Take 1 tablet by mouth 4 (four) times daily as needed. 20 tablet 0    furosemide (LASIX) 20 MG tablet Take 1 tablet (20 mg total) by mouth once daily. 90 tablet 3    gabapentin (NEURONTIN) 300 MG capsule Take 1 capsule in am and at noon. Take 1-2 capsules each evening for neuropathy/headache      hydrOXYzine pamoate (VISTARIL) 50 MG Cap TAKE ONE CAPSULE BY MOUTH THREE TIMES DAILY AS NEEDED 90 capsule 1    irbesartan (AVAPRO) 150 MG tablet TAKE ONE TABLET BY MOUTH TWICE DAILY 180 tablet 2    melatonin 5 mg Cap Take 1 capsule (5 mg total) by mouth nightly as needed (insomnia). 90 each 1    metoprolol tartrate (LOPRESSOR) 50 MG tablet Take 1 tablet (50 mg total) by mouth 2 (two) times daily. 180 tablet 3    omeprazole (PRILOSEC) 20 MG capsule TAKE 1 CAPSULE BY MOUTH TWICE  DAILY 180 capsule 1    ondansetron (ZOFRAN-ODT) 8 MG TbDL PLACE 1 TABLET UNDER THE TONGUE EVERY TWELVE HOURS AS NEEDED 30 tablet 2    paroxetine (PAXIL) 20 MG tablet Take 1 tablet (20 mg total) by mouth every morning. 30 tablet 11    potassium chloride (MICRO-K) 10 MEQ CpSR TAKE 1 CAPSULE BY MOUTH IN THE  MORNING WITH MEAL 90 capsule 2    sodium hyaluronate, EUFLEXXA, (EUFLEXXA) 10 mg/mL(mw 2.4 -3.6 million) injection Inject into the articular space.      traZODone (DESYREL) 50 MG tablet TAKE ONE TABLET BY MOUTH AT BEDTIME (If ONE tab not effective, MAY TAKE 2 tablets) 180 tablet 3     No current facility-administered medications for this visit.

## 2025-07-02 DIAGNOSIS — R11.0 NAUSEA: ICD-10-CM

## 2025-07-02 RX ORDER — ONDANSETRON 8 MG/1
TABLET, ORALLY DISINTEGRATING ORAL
Qty: 30 TABLET | Refills: 2 | Status: SHIPPED | OUTPATIENT
Start: 2025-07-02

## 2025-07-02 NOTE — TELEPHONE ENCOUNTER
Refill Routing Note   Medication(s) are not appropriate for processing by [facility] for the following reason(s):        Outside of protocol    [facility] action(s):  Route             Appointments  past 12m or future 3m with PCP    Date Provider   Last Visit   6/26/2025 Palomo Burgos MD   Next Visit   Visit date not found Palomo Burgos MD   ED visits in past 90 days: 0        Note composed:10:49 AM 07/02/2025

## 2025-07-02 NOTE — TELEPHONE ENCOUNTER
No care due was identified.  [facility] Embedded Care Due Messages. Reference number: [id number].   7/02/2025 9:27:56 AM CDT

## 2025-07-07 ENCOUNTER — TELEPHONE (OUTPATIENT)
Dept: NEUROSURGERY | Facility: CLINIC | Age: 80
End: 2025-07-07
Payer: MEDICARE

## 2025-07-08 DIAGNOSIS — G43.009 MIGRAINE WITHOUT AURA AND WITHOUT STATUS MIGRAINOSUS, NOT INTRACTABLE: ICD-10-CM

## 2025-07-08 RX ORDER — OMEPRAZOLE 20 MG/1
20 CAPSULE, DELAYED RELEASE ORAL 2 TIMES DAILY
Qty: 180 CAPSULE | Refills: 3 | Status: SHIPPED | OUTPATIENT
Start: 2025-07-08

## 2025-07-08 RX ORDER — GABAPENTIN 300 MG/1
CAPSULE ORAL
Status: CANCELLED
Start: 2025-07-08

## 2025-07-08 NOTE — TELEPHONE ENCOUNTER
No care due was identified.  [facility] Embedded Care Due Messages. Reference number: [id number].   7/08/2025 3:49:57 PM CDT

## 2025-07-08 NOTE — TELEPHONE ENCOUNTER
Copied from CRM #[id number]. Topic: General Inquiry - Patient Advice  >> Jul 8, 2025  3:38 PM Aaliyah wrote:  Requesting an RX refill or new RX.    Is this a refill or new RX: refill    RX name and strength   Gabapentin    Is this a 30 day or 90 day RX: 90    Pharmacy name and phone #  [facility] - [place], [place] - [address] [facility] [address]  Phone: [phone] Fax: [phone]        Who called and call back number:[phone] (M)    The doctors have asked that we provide their patients with the following 2 reminders -- prescription refills can take up to 72 hours, and a friendly reminder that in the future you can use your [facility] account to request refills: Patient only uses [facility] now

## 2025-07-08 NOTE — TELEPHONE ENCOUNTER
No care due was identified.  [facility] Embedded Care Due Messages. Reference number: [id number].   7/08/2025 4:50:50 PM CDT

## 2025-07-08 NOTE — TELEPHONE ENCOUNTER
"  Provider Staff - Action is required     If a refill request needs assessment, please pend appropriate medication(s) for patient utilizing either Order Search or the "reorder" symbol next to the appropriate medication on the Medication Management folder within the encounter. If the medication is not pended as a Refill Request (Rx Request), information essential to ensuring the refill is appropriate will not appear. This can cause significant delay in processing refills. This request will be forwarded back to the staff pool. Please pend the requested medication(s) and route the Rx Request to the Centralized Refill Staff Pool.        If medication is already pended in a previous encounter, please kodi the initial request as High Priority and send both to the Centralized Refill Staff Pool.       Thank you for your assistance!   [facility]     Note composed: 3:47 PM 07/08/2025          "

## 2025-07-09 RX ORDER — GABAPENTIN 300 MG/1
CAPSULE ORAL
Qty: 540 CAPSULE | Refills: 0 | Status: SHIPPED | OUTPATIENT
Start: 2025-07-09 | End: 2025-07-09

## 2025-07-09 RX ORDER — GABAPENTIN 300 MG/1
CAPSULE ORAL
Qty: 360 CAPSULE | Refills: 0 | Status: SHIPPED | OUTPATIENT
Start: 2025-07-09

## 2025-07-09 NOTE — TELEPHONE ENCOUNTER
Copied from CRM #[id number]. Topic: Medications - Pharmacy  >> Jul 9, 2025  3:43 PM Gordon wrote:  Pharmacy is calling to clarify an RX.    RX name:  gabapentin (NEURONTIN) 300 MG capsule      What do they need to clarify:  directions    Pharmacy Contact Name and phone number:   [facility] - [place] - [address]  [place] [address]  Phone: [phone] Fax: [phone]     Comments:

## 2025-07-09 NOTE — TELEPHONE ENCOUNTER
Did you mean to send 540 capsules of gabapentin? Pharmacy is calling. The directions are as follows: Take 1 capsule in am and at noon. Take 1-2 capsules each evening for neuropathy/headache

## 2025-07-09 NOTE — TELEPHONE ENCOUNTER
Refill Decision Note   Purvi Quiroga  is requesting a refill authorization.  Brief Assessment and Rationale for Refill:  Approve     Medication Therapy Plan:        Comments:     Note composed:10:52 PM 07/08/2025

## 2025-07-10 ENCOUNTER — TELEPHONE (OUTPATIENT)
Dept: NEUROSURGERY | Facility: CLINIC | Age: 80
End: 2025-07-10
Payer: MEDICARE

## 2025-07-25 ENCOUNTER — TELEPHONE (OUTPATIENT)
Dept: NEUROSURGERY | Facility: CLINIC | Age: 80
End: 2025-07-25
Payer: MEDICARE

## 2025-07-28 ENCOUNTER — TELEPHONE (OUTPATIENT)
Dept: NEUROSURGERY | Facility: CLINIC | Age: 80
End: 2025-07-28
Payer: MEDICARE

## 2025-07-28 NOTE — TELEPHONE ENCOUNTER
Returned call to pt. Confirmed MRI is uploaded in chart. Pt states she takes transportation bc she is handicap and cannot drive so will need an appt at least 1 wk out. Scheduled appt with Dr. Pereira for Tues 8/18 @ 1:40 PM in [facility] 3rd floor.     Copied from CRM #[id number]. Topic: General Inquiry - Patient Advice  >> Jul 28, 2025 10:42 AM Pb wrote:  Pt calling to request call back regarding bring in MRI CD. Would like an update regarding whether she would need to schedule an appt or discuss results. Please call  [phone]

## 2025-07-30 ENCOUNTER — PATIENT MESSAGE (OUTPATIENT)
Dept: INTERNAL MEDICINE | Facility: CLINIC | Age: 80
End: 2025-07-30
Payer: MEDICARE

## 2025-07-31 ENCOUNTER — TELEPHONE (OUTPATIENT)
Dept: INTERNAL MEDICINE | Facility: CLINIC | Age: 80
End: 2025-07-31
Payer: MEDICARE

## 2025-07-31 DIAGNOSIS — F41.9 ANXIETY: Chronic | ICD-10-CM

## 2025-07-31 DIAGNOSIS — F32.1 CURRENT MODERATE EPISODE OF MAJOR DEPRESSIVE DISORDER WITHOUT PRIOR EPISODE: ICD-10-CM

## 2025-07-31 RX ORDER — PAROXETINE 40 MG/1
40 TABLET, FILM COATED ORAL EVERY MORNING
Qty: 30 TABLET | Refills: 11 | Status: SHIPPED | OUTPATIENT
Start: 2025-07-31 | End: 2026-07-31

## 2025-07-31 NOTE — TELEPHONE ENCOUNTER
Copied from CRM #[id number]. Topic: General Inquiry - Patient Advice  >> Jul 31, 2025 11:51 AM Gordon wrote:  Type:  Needs Medical Advice    Who Called: Patient  Symptoms (please be specific): Patient says she wanted to respond to comment from MA in portal. She was not able to respond, so she wanted to respond through scheduling. She said she asked if she could take two pills of her depression rx. She said she wanted to speak to someone in office about this. She also told me that she went ahead and took two of these depression pills anyway, without an answer from office. Please call patient at number provided.  How long has patient had these symptoms:    Pharmacy name and phone #:    Would the patient rather a call back or a response via [facility]? Call  Best Call Back Number: [phone] (M)  Additional Information:

## 2025-07-31 NOTE — TELEPHONE ENCOUNTER
Pt notified of increase in dose. Pt stated that she is unsure if she will be able to move appt to August due to other scheduled appointments. Pt stated that she would call back tomorrow to let us know if she will be able to schedule in August.

## 2025-07-31 NOTE — TELEPHONE ENCOUNTER
Copied from CRM #[id number]. Topic: General Inquiry - Patient Advice  >> Jul 30, 2025  2:36 PM Laura wrote:  Type:  Needs Medical Advice    Who Called: Purvi  Symptoms (please be specific): Pt said she needs a call back about questions she has   How long has patient had these symptoms:  n/a  Pharmacy name and phone #:  n/a  Would the patient rather a call back or a response via [facility]? Call back   Best Call Back Number: [phone]  Additional Information: n/a

## 2025-07-31 NOTE — TELEPHONE ENCOUNTER
Pt states that she called earlier to ask if she could take 2 pills of her paxil instead of 1 due to her not feeling the effects. Per pt, 20mg is not helping her. Pt also stated that after she called the first time, she took the 2 pills of paxil.   Pt is requesting for an increase on medication dose. Pt is scheduled to return to clinic on September 10th 2025.

## 2025-07-31 NOTE — TELEPHONE ENCOUNTER
Paxil increased 40 mg.  Please have patient follow up with me in August.  We should move up the appointment from September 10th.

## 2025-08-01 ENCOUNTER — HOSPITAL ENCOUNTER (OUTPATIENT)
Dept: RADIOLOGY | Facility: HOSPITAL | Age: 80
Discharge: HOME | End: 2025-08-01
Attending: INTERNAL MEDICINE
Payer: MEDICARE

## 2025-08-01 DIAGNOSIS — Z78.0 MENOPAUSE: ICD-10-CM

## 2025-08-01 PROCEDURE — 77080 DXA BONE DENSITY AXIAL: CPT | Mod: TC

## 2025-08-01 PROCEDURE — 77080 DXA BONE DENSITY AXIAL: CPT | Mod: 26,,, | Performed by: RADIOLOGY

## 2025-08-04 RX ORDER — HYDROXYZINE PAMOATE 50 MG/1
50 CAPSULE ORAL
Qty: 90 CAPSULE | Refills: 3 | Status: SHIPPED | OUTPATIENT
Start: 2025-08-04

## 2025-08-04 NOTE — TELEPHONE ENCOUNTER
Refill Routing Note   Medication(s) are not appropriate for processing by [facility] for the following reason(s):        Outside of protocol    [facility] action(s):  Route             Appointments  past 12m or future 3m with PCP    Date Provider   Last Visit   6/26/2025 Palomo Burgos MD   Next Visit   9/10/2025 Palomo Burgos MD   ED visits in past 90 days: 0        Note composed:5:01 PM 08/04/2025

## 2025-08-19 ENCOUNTER — OFFICE VISIT (OUTPATIENT)
Dept: NEUROSURGERY | Facility: CLINIC | Age: 80
End: 2025-08-19
Payer: MEDICARE

## 2025-08-19 VITALS
WEIGHT: 196.88 LBS | BODY MASS INDEX: 45.76 KG/M2 | HEART RATE: 64 BPM | SYSTOLIC BLOOD PRESSURE: 126 MMHG | DIASTOLIC BLOOD PRESSURE: 68 MMHG

## 2025-08-19 DIAGNOSIS — H54.7 VISION LOSS: ICD-10-CM

## 2025-08-19 DIAGNOSIS — H54.7 UNSPECIFIED VISUAL LOSS: ICD-10-CM

## 2025-08-19 DIAGNOSIS — D32.9 MENINGIOMA: Primary | ICD-10-CM

## 2025-08-19 PROCEDURE — 1159F MED LIST DOCD IN RCRD: CPT | Mod: CPTII,S$GLB,, | Performed by: NEUROLOGICAL SURGERY

## 2025-08-19 PROCEDURE — 3078F DIAST BP <80 MM HG: CPT | Mod: CPTII,S$GLB,, | Performed by: NEUROLOGICAL SURGERY

## 2025-08-19 PROCEDURE — 3288F FALL RISK ASSESSMENT DOCD: CPT | Mod: CPTII,S$GLB,, | Performed by: NEUROLOGICAL SURGERY

## 2025-08-19 PROCEDURE — 1160F RVW MEDS BY RX/DR IN RCRD: CPT | Mod: CPTII,S$GLB,, | Performed by: NEUROLOGICAL SURGERY

## 2025-08-19 PROCEDURE — 99215 OFFICE O/P EST HI 40 MIN: CPT | Mod: S$GLB,,, | Performed by: NEUROLOGICAL SURGERY

## 2025-08-19 PROCEDURE — 99999 PR PBB SHADOW E&M-EST. PATIENT-LVL IV: CPT | Mod: PBBFAC,,, | Performed by: NEUROLOGICAL SURGERY

## 2025-08-19 PROCEDURE — 1126F AMNT PAIN NOTED NONE PRSNT: CPT | Mod: CPTII,S$GLB,, | Performed by: NEUROLOGICAL SURGERY

## 2025-08-19 PROCEDURE — 3074F SYST BP LT 130 MM HG: CPT | Mod: CPTII,S$GLB,, | Performed by: NEUROLOGICAL SURGERY

## 2025-08-19 PROCEDURE — 1101F PT FALLS ASSESS-DOCD LE1/YR: CPT | Mod: CPTII,S$GLB,, | Performed by: NEUROLOGICAL SURGERY

## 2025-08-21 ENCOUNTER — TELEPHONE (OUTPATIENT)
Dept: OPHTHALMOLOGY | Facility: CLINIC | Age: 80
End: 2025-08-21
Payer: MEDICARE

## 2025-08-21 ENCOUNTER — TELEPHONE (OUTPATIENT)
Dept: SPORTS MEDICINE | Facility: CLINIC | Age: 80
End: 2025-08-21
Payer: MEDICARE

## 2025-08-21 DIAGNOSIS — D32.9 MENINGIOMA: Primary | ICD-10-CM

## 2025-08-21 DIAGNOSIS — H53.15 VISUAL DISTORTIONS OF SHAPE AND SIZE: ICD-10-CM

## 2025-08-22 ENCOUNTER — TELEPHONE (OUTPATIENT)
Dept: NEUROSURGERY | Facility: CLINIC | Age: 80
End: 2025-08-22
Payer: MEDICARE

## 2025-08-28 ENCOUNTER — OFFICE VISIT (OUTPATIENT)
Dept: GASTROENTEROLOGY | Facility: CLINIC | Age: 80
End: 2025-08-28
Payer: MEDICARE

## 2025-08-28 ENCOUNTER — LAB VISIT (OUTPATIENT)
Dept: LAB | Facility: HOSPITAL | Age: 80
End: 2025-08-28
Attending: INTERNAL MEDICINE
Payer: MEDICARE

## 2025-08-28 VITALS — HEIGHT: 55 IN | BODY MASS INDEX: 46.05 KG/M2 | WEIGHT: 199 LBS

## 2025-08-28 DIAGNOSIS — K83.9 BILE DUCT ABNORMALITY: ICD-10-CM

## 2025-08-28 DIAGNOSIS — K59.04 CHRONIC IDIOPATHIC CONSTIPATION: ICD-10-CM

## 2025-08-28 DIAGNOSIS — K83.8 ACQUIRED DILATION OF COMMON BILE DUCT: ICD-10-CM

## 2025-08-28 DIAGNOSIS — R10.30 LOWER ABDOMINAL PAIN: Primary | ICD-10-CM

## 2025-08-28 LAB — GGT SERPL-CCNC: 10 U/L (ref 8–55)

## 2025-08-28 PROCEDURE — 1100F PTFALLS ASSESS-DOCD GE2>/YR: CPT | Mod: CPTII,S$GLB,, | Performed by: NURSE PRACTITIONER

## 2025-08-28 PROCEDURE — 1160F RVW MEDS BY RX/DR IN RCRD: CPT | Mod: CPTII,S$GLB,, | Performed by: NURSE PRACTITIONER

## 2025-08-28 PROCEDURE — 36415 COLL VENOUS BLD VENIPUNCTURE: CPT

## 2025-08-28 PROCEDURE — 82977 ASSAY OF GGT: CPT

## 2025-08-28 PROCEDURE — 3288F FALL RISK ASSESSMENT DOCD: CPT | Mod: CPTII,S$GLB,, | Performed by: NURSE PRACTITIONER

## 2025-08-28 PROCEDURE — 1126F AMNT PAIN NOTED NONE PRSNT: CPT | Mod: CPTII,S$GLB,, | Performed by: NURSE PRACTITIONER

## 2025-08-28 PROCEDURE — 99999 PR PBB SHADOW E&M-EST. PATIENT-LVL V: CPT | Mod: PBBFAC,,, | Performed by: NURSE PRACTITIONER

## 2025-08-28 PROCEDURE — 99214 OFFICE O/P EST MOD 30 MIN: CPT | Mod: S$GLB,,, | Performed by: NURSE PRACTITIONER

## 2025-08-28 PROCEDURE — 1159F MED LIST DOCD IN RCRD: CPT | Mod: CPTII,S$GLB,, | Performed by: NURSE PRACTITIONER

## 2025-08-29 ENCOUNTER — TELEPHONE (OUTPATIENT)
Dept: GASTROENTEROLOGY | Facility: CLINIC | Age: 80
End: 2025-08-29
Payer: MEDICARE